# Patient Record
Sex: MALE | Race: BLACK OR AFRICAN AMERICAN | NOT HISPANIC OR LATINO | Employment: OTHER | URBAN - METROPOLITAN AREA
[De-identification: names, ages, dates, MRNs, and addresses within clinical notes are randomized per-mention and may not be internally consistent; named-entity substitution may affect disease eponyms.]

---

## 2017-01-20 RX ORDER — LOSARTAN POTASSIUM AND HYDROCHLOROTHIAZIDE 25; 100 MG/1; MG/1
TABLET ORAL
Qty: 90 TABLET | Refills: 11 | Status: SHIPPED | OUTPATIENT
Start: 2017-01-20 | End: 2017-10-26 | Stop reason: SDUPTHER

## 2017-01-20 RX ORDER — ATORVASTATIN CALCIUM 20 MG/1
TABLET, FILM COATED ORAL
Qty: 90 TABLET | Refills: 11 | Status: SHIPPED | OUTPATIENT
Start: 2017-01-20 | End: 2017-10-26 | Stop reason: SDUPTHER

## 2017-01-24 ENCOUNTER — LAB VISIT (OUTPATIENT)
Dept: LAB | Facility: HOSPITAL | Age: 67
End: 2017-01-24
Attending: INTERNAL MEDICINE
Payer: COMMERCIAL

## 2017-01-24 ENCOUNTER — OFFICE VISIT (OUTPATIENT)
Dept: INTERNAL MEDICINE | Facility: CLINIC | Age: 67
End: 2017-01-24
Payer: COMMERCIAL

## 2017-01-24 VITALS
DIASTOLIC BLOOD PRESSURE: 84 MMHG | WEIGHT: 169 LBS | HEART RATE: 50 BPM | BODY MASS INDEX: 28.16 KG/M2 | SYSTOLIC BLOOD PRESSURE: 122 MMHG | HEIGHT: 65 IN

## 2017-01-24 DIAGNOSIS — E11.9 TYPE 2 DIABETES MELLITUS WITHOUT COMPLICATION, WITHOUT LONG-TERM CURRENT USE OF INSULIN: ICD-10-CM

## 2017-01-24 DIAGNOSIS — N40.1 BPH WITH URINARY OBSTRUCTION: ICD-10-CM

## 2017-01-24 DIAGNOSIS — D35.2 PROLACTINOMA: ICD-10-CM

## 2017-01-24 DIAGNOSIS — N13.8 BPH WITH URINARY OBSTRUCTION: ICD-10-CM

## 2017-01-24 DIAGNOSIS — I10 ESSENTIAL HYPERTENSION: ICD-10-CM

## 2017-01-24 DIAGNOSIS — R35.89 POLYURIA: Primary | ICD-10-CM

## 2017-01-24 LAB
ANION GAP SERPL CALC-SCNC: 6 MMOL/L
BUN SERPL-MCNC: 15 MG/DL
CALCIUM SERPL-MCNC: 9.9 MG/DL
CHLORIDE SERPL-SCNC: 101 MMOL/L
CHOLEST/HDLC SERPL: 4.4 {RATIO}
CO2 SERPL-SCNC: 30 MMOL/L
COMPLEXED PSA SERPL-MCNC: 1.2 NG/ML
CREAT SERPL-MCNC: 1.1 MG/DL
EST. GFR  (AFRICAN AMERICAN): >60 ML/MIN/1.73 M^2
EST. GFR  (NON AFRICAN AMERICAN): >60 ML/MIN/1.73 M^2
GLUCOSE SERPL-MCNC: 92 MG/DL (ref 70–110)
GLUCOSE SERPL-MCNC: 99 MG/DL
HDL/CHOLESTEROL RATIO: 22.7 %
HDLC SERPL-MCNC: 181 MG/DL
HDLC SERPL-MCNC: 41 MG/DL
LDLC SERPL CALC-MCNC: 118.8 MG/DL
NONHDLC SERPL-MCNC: 140 MG/DL
POTASSIUM SERPL-SCNC: 4.2 MMOL/L
PROLACTIN SERPL IA-MCNC: 25.9 NG/ML
SODIUM SERPL-SCNC: 137 MMOL/L
TRIGL SERPL-MCNC: 106 MG/DL

## 2017-01-24 PROCEDURE — 80061 LIPID PANEL: CPT

## 2017-01-24 PROCEDURE — 80048 BASIC METABOLIC PNL TOTAL CA: CPT

## 2017-01-24 PROCEDURE — 84153 ASSAY OF PSA TOTAL: CPT

## 2017-01-24 PROCEDURE — 83036 HEMOGLOBIN GLYCOSYLATED A1C: CPT

## 2017-01-24 PROCEDURE — 4010F ACE/ARB THERAPY RXD/TAKEN: CPT | Mod: S$GLB,,, | Performed by: INTERNAL MEDICINE

## 2017-01-24 PROCEDURE — 3044F HG A1C LEVEL LT 7.0%: CPT | Mod: S$GLB,,, | Performed by: INTERNAL MEDICINE

## 2017-01-24 PROCEDURE — 36415 COLL VENOUS BLD VENIPUNCTURE: CPT

## 2017-01-24 PROCEDURE — 82948 REAGENT STRIP/BLOOD GLUCOSE: CPT | Mod: S$GLB,,, | Performed by: INTERNAL MEDICINE

## 2017-01-24 PROCEDURE — 3079F DIAST BP 80-89 MM HG: CPT | Mod: S$GLB,,, | Performed by: INTERNAL MEDICINE

## 2017-01-24 PROCEDURE — 1159F MED LIST DOCD IN RCRD: CPT | Mod: S$GLB,,, | Performed by: INTERNAL MEDICINE

## 2017-01-24 PROCEDURE — 84146 ASSAY OF PROLACTIN: CPT

## 2017-01-24 PROCEDURE — 3074F SYST BP LT 130 MM HG: CPT | Mod: S$GLB,,, | Performed by: INTERNAL MEDICINE

## 2017-01-24 PROCEDURE — 99214 OFFICE O/P EST MOD 30 MIN: CPT | Mod: S$GLB,,, | Performed by: INTERNAL MEDICINE

## 2017-01-24 PROCEDURE — 99999 PR PBB SHADOW E&M-EST. PATIENT-LVL III: CPT | Mod: PBBFAC,,, | Performed by: INTERNAL MEDICINE

## 2017-01-24 PROCEDURE — 99499 UNLISTED E&M SERVICE: CPT | Mod: S$GLB,,, | Performed by: INTERNAL MEDICINE

## 2017-01-24 RX ORDER — TAMSULOSIN HYDROCHLORIDE 0.4 MG/1
0.4 CAPSULE ORAL DAILY
Qty: 30 CAPSULE | Refills: 11 | Status: SHIPPED | OUTPATIENT
Start: 2017-01-24 | End: 2017-10-25

## 2017-01-24 NOTE — MR AVS SNAPSHOT
[facility]  [address]  [place] [address]  Phone: [phone]  Fax: [phone]                  Isma Martin   2017 1:20 PM   Office Visit    Description:  Male : 1950   Provider:  Anthony Tay MD   Department:  [facility]           Reason for Visit     Follow-up           Diagnoses this Visit        Comments    Polyuria    -  Primary     Essential hypertension         Type 2 diabetes mellitus without complication, without long-term current use of insulin         BPH with urinary obstruction         Prolactinoma                To Do List           Goals (5 Years of Data)     None      Follow-Up and Disposition     Return in about 6 months (around 2017).       These Medications        Disp Refills Start End    tamsulosin (FLOMAX) 0.4 mg Cp24 30 capsule 11 2017    Take 1 capsule (0.4 mg total) by mouth once daily. - Oral    Pharmacy: [facility] [address] [place] [address] #: [phone]         [facility] On Call     [facility] On Call Nurse [facility] -  Assistance  Registered nurses in the [facility] On Call Center provide clinical advisement, health education, appointment booking, and other advisory services.  Call for this free service at [phone].             Medications           Message regarding Medications     Verify the changes and/or additions to your medication regime listed below are the same as discussed with your clinician today.  If any of these changes or additions are incorrect, please notify your healthcare provider.        START taking these NEW medications        Refills    tamsulosin (FLOMAX) 0.4 mg Cp24 11    Sig: Take 1 capsule (0.4 mg total) by mouth once daily.    Class: Normal    Route: Oral           Verify that the below list of medications is an accurate representation of the medications you are currently taking.  If none reported, the list may be blank. If  "incorrect, please contact your healthcare provider. Carry this list with you in case of emergency.           Current Medications     ascorbic acid (VITAMIN C) 500 MG tablet Take 500 mg by mouth Every PM.    atorvastatin (LIPITOR) 20 MG tablet TAKE 1 TABLET DAILY    cabergoline (DOSTINEX) 0.5 mg tablet Take 0.5 tablets (0.25 mg total) by mouth once a week.    cholecalciferol, vitamin D3, 2,000 unit Tab Take 1 tablet by mouth Every PM.    cyanocobalamin (VITAMIN B-12) 1000 MCG tablet     losartan-hydrochlorothiazide 100-25 mg (HYZAAR) 100-25 mg per tablet TAKE 1 TABLET DAILY    metoprolol succinate (TOPROL-XL) 50 MG 24 hr tablet Take 1 tablet (50 mg total) by mouth once daily.    omega-3 fatty acids-vitamin E (FISH OIL) 1,000 mg Cap     omeprazole (PRILOSEC) 20 MG capsule Take 1 capsule (20 mg total) by mouth once daily.    sildenafil (VIAGRA) 50 MG tablet Take 1 tablet (50 mg total) by mouth daily as needed for Erectile Dysfunction.    oxycodone-acetaminophen (PERCOCET) 5-325 mg per tablet Take 1 tablet by mouth 2 (two) times daily as needed for Pain.    tamsulosin (FLOMAX) 0.4 mg Cp24 Take 1 capsule (0.4 mg total) by mouth once daily.           Clinical Reference Information           Vital Signs - Last Recorded  Most recent update: 1/24/2017  1:31 PM by Tayla Rod MA    BP Pulse Ht Wt BMI    122/84 (!) 50 5' 5" (1.651 m) 76.7 kg (169 lb) 28.12 kg/m2      Blood Pressure          Most Recent Value    BP  122/84      Allergies as of 1/24/2017     No Known Allergies      Immunizations Administered on Date of Encounter - 1/24/2017     None      Orders Placed During Today's Visit      Normal Orders This Visit    POCT glucose     Future Labs/Procedures Expected by Expires    Basic metabolic panel  1/24/2017 3/25/2018    Hemoglobin A1c  1/24/2017 4/24/2017    Prolactin  1/24/2017 3/25/2018    Prostate Specific Antigen, Diagnostic  1/24/2017 3/25/2018    Lipid panel  7/23/2017 (Approximate) 9/21/2017       "   1/24/2017  2:00 PM - Tayla Rod MA      Component Results     Component Value Flag Ref Range Units Status    POC Glucose 92  70 - 110 mg/dL Final

## 2017-01-24 NOTE — PROGRESS NOTES
Subjective:       Patient ID: Isma Martin is a 66 y.o. male.    Chief Complaint: Follow-up    HPI Comments: frequ waking up at noc. 6 times last noc. Denies incomplete evac. Has urinary frequ during morning as well, not in afternoon. Stream start is OK, but weak felling and takes a while. No dysuria, no blood. Mild polydip. Does drink much liquids p 6pm including OK.    Wants PRL checked.    Has not check sugars in long time.        Review of Systems   Constitutional: Negative for activity change and appetite change.   HENT: Negative for congestion and sinus pressure.    Eyes: Positive for visual disturbance (chronic occ burriness w/o amaurosis).   Respiratory: Negative for chest tightness, shortness of breath and wheezing.    Cardiovascular: Negative for chest pain, palpitations and leg swelling.   Gastrointestinal: Negative for abdominal distention and abdominal pain.   Endocrine: Positive for polyuria.   Genitourinary: Positive for urgency. Negative for decreased urine volume and dysuria.   Musculoskeletal: Negative for back pain, joint swelling and neck pain.   Skin: Negative for rash.   Neurological: Negative for tremors, syncope and weakness.   Hematological: Negative for adenopathy. Does not bruise/bleed easily.       Objective:      Physical Exam   Constitutional: He appears well-developed and well-nourished. No distress.   HENT:   Head: Normocephalic and atraumatic.   Mouth/Throat: No oropharyngeal exudate.   Eyes: Conjunctivae are normal. Pupils are equal, round, and reactive to light. No scleral icterus.   Neck: Normal range of motion. Neck supple. No thyromegaly present.   Cardiovascular: Normal rate, regular rhythm and normal heart sounds.    Pulmonary/Chest: Effort normal and breath sounds normal.   Abdominal: Soft. Bowel sounds are normal.   Genitourinary:   Genitourinary Comments: prostate non tender, no masses or nodules and not enlarged     Lymphadenopathy:     He has no cervical adenopathy.    Neurological:        Skin: No rash noted. He is not diaphoretic.   Psychiatric: He has a normal mood and affect. His behavior is normal.       Assessment:       1. Polyuria    2. Essential hypertension    3. Type 2 diabetes mellitus without complication, without long-term current use of insulin    4. BPH with urinary obstruction    5. Prolactinoma        Plan:       Isma was seen today for follow-up.    Diagnoses and all orders for this visit:    Polyuria  -     POCT glucose    Essential hypertension  At goal    Type 2 diabetes mellitus without complication, without long-term current use of insulin  -     Hemoglobin A1c; Future  -     POCT glucose  -     Lipid panel; Future    BPH with urinary obstruction  -     Prostate Specific Antigen, Diagnostic; Future  -     tamsulosin (FLOMAX) 0.4 mg Cp24; Take 1 capsule (0.4 mg total) by mouth once daily.  -     Basic metabolic panel; Future  Decrease fluids p 6pm.  Explained that if not better in 1-2 mos, pt should rtc/call PCP then see urology        Prolactinoma  -     Prolactin; Future    Return in about 6 months (around 7/24/2017).           Addendum FS was 92, above symptoms likely from BPH

## 2017-01-25 LAB
ESTIMATED AVG GLUCOSE: 128 MG/DL
HBA1C MFR BLD HPLC: 6.1 %

## 2017-01-30 ENCOUNTER — PATIENT MESSAGE (OUTPATIENT)
Dept: ENDOCRINOLOGY | Facility: CLINIC | Age: 67
End: 2017-01-30

## 2017-01-30 DIAGNOSIS — D35.2 PROLACTINOMA: ICD-10-CM

## 2017-01-30 RX ORDER — CABERGOLINE 0.5 MG/1
0.25 TABLET ORAL
Qty: 15 TABLET | Refills: 3 | Status: SHIPPED | OUTPATIENT
Start: 2017-01-30 | End: 2017-08-28 | Stop reason: SDUPTHER

## 2017-02-07 ENCOUNTER — TELEPHONE (OUTPATIENT)
Dept: OPTOMETRY | Facility: CLINIC | Age: 67
End: 2017-02-07

## 2017-02-07 NOTE — TELEPHONE ENCOUNTER
----- Message from Sangita Hawkins OD sent at 2/7/2017 11:08 AM CST -----  Regarding: call pt to schedule  Hello,    This patient is due for his 6-month follow-up with me to recheck his retinas. Please call him to schedule.    Thank you,  Sangita Hawkins OD

## 2017-02-14 ENCOUNTER — TELEPHONE (OUTPATIENT)
Dept: INTERNAL MEDICINE | Facility: CLINIC | Age: 67
End: 2017-02-14

## 2017-02-14 ENCOUNTER — OFFICE VISIT (OUTPATIENT)
Dept: OPTOMETRY | Facility: CLINIC | Age: 67
End: 2017-02-14
Payer: COMMERCIAL

## 2017-02-14 DIAGNOSIS — H35.62 RETINAL DOT HEMORRHAGE OF LEFT EYE: ICD-10-CM

## 2017-02-14 DIAGNOSIS — H53.9 TRANSIENT VISION DISTURBANCE OF LEFT EYE: ICD-10-CM

## 2017-02-14 DIAGNOSIS — G45.3 AMAUROSIS FUGAX OF LEFT EYE: Primary | ICD-10-CM

## 2017-02-14 DIAGNOSIS — H25.13 NUCLEAR SCLEROTIC CATARACT OF BOTH EYES: ICD-10-CM

## 2017-02-14 DIAGNOSIS — E11.9 TYPE 2 DIABETES MELLITUS WITHOUT RETINOPATHY: ICD-10-CM

## 2017-02-14 DIAGNOSIS — H33.101 RETINOSCHISIS OF RIGHT EYE: ICD-10-CM

## 2017-02-14 DIAGNOSIS — H35.033 HYPERTENSIVE RETINOPATHY OF BOTH EYES: ICD-10-CM

## 2017-02-14 DIAGNOSIS — G45.3 AMAUROSIS FUGAX, LEFT EYE: ICD-10-CM

## 2017-02-14 PROCEDURE — 92014 COMPRE OPH EXAM EST PT 1/>: CPT | Mod: S$GLB,,, | Performed by: OPTOMETRIST

## 2017-02-14 PROCEDURE — 99999 PR PBB SHADOW E&M-EST. PATIENT-LVL II: CPT | Mod: PBBFAC,,, | Performed by: OPTOMETRIST

## 2017-02-14 PROCEDURE — 99499 UNLISTED E&M SERVICE: CPT | Mod: S$GLB,,, | Performed by: OPTOMETRIST

## 2017-02-14 NOTE — PROGRESS NOTES
HPI     Pt here today for a 6 month retina follow up (retinoschisis OD and   isolated midperipheral heme OS)    Pt states that he still experiences transient vision loss OS (vision gets   blurry and then his entire vision OS whites out), same frequency as in   August 2016. These episodes last about 10-15 minutes then fully resolves.   When this happens he also gets foreign body sensation and epiphora OS. He   has noticed that these episodes occur after he takes his blood pressure   pill and he will measure blood pressure and it is really low (systolic in   the 90s).     (+)drops clear eyes every morning pt states he has use the drop for year   for red eye relief   (-)pain  (-)flashes  (-)floaters  (-)diplopia    Diabetic yes  LBS doesn't monitor daily  Hemoglobin A1C       Date                     Value               Ref Range             Status           01/24/2017               6.1                 4.5 - 6.2 %         Final              05/11/2016               5.9                 4.5 - 6.2 %         Final              11/10/2015               5.9                 4.5 - 6.2 %         Final                OCULAR HISTORY  Last Eye Exam 08/18/16 Dr. Cotton   (-)eye surgery   (-)eye injury     FAMILY HISTORY  (+)Glaucoma mother   (-)Macular Degeneration none        Last edited by Sangita Hawkins, OD on 2/14/2017  9:47 AM.         Assessment /Plan     For exam results, see Encounter Report.    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 See plan for retinal hemes below.    Retinal dot hemorrhage of left eye   Worsened vs 6 months ago. 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 Likely cause of transient vision loss OS that has been occurring for over 6 months. Pt noticed that transient vision loss usually occurs with low blood pressure.   Per Dr. Cotton's note 6 months ago, pt had previous cardiovascular work-up including carotid doppler around June 2016. However, results of carotid doppler not found in  Epic. Will consult with PCP to determine if carotid doppler needs to be ordered.   Advised pt to have someone drive him to emergency room immediately if he develops any stroke symptoms. Advised him to re-start 81mg aspirin daily (self-discontinued about 1 month ago).   Referred to retina specialist (scheduled for 02/21/17).    Retinoschisis of right eye    Asymptomatic, monitor.    Hypertensive retinopathy of both eyes   As previously noted, stable. Last in-office measurement on 01/24/17 was 122/84.    Type 2 diabetes mellitus without retinopathy   Controlled. No retinopathy noted OU. Continue management of DM as directed by PCP. Monitor.    Nuclear sclerotic cataract of both eyes   Not visually significant OU. Monitor.        RTC 02/21/17 for retina consult

## 2017-02-14 NOTE — PROGRESS NOTES
Assessment /Plan     For exam results, see Encounter Report.    There are no diagnoses linked to this encounter.  ***

## 2017-02-14 NOTE — Clinical Note
Dear Dr. Tay,  I had the pleasure of seeing Mr. Martin today for his 6-month follow-up. He has multiple retinal hemorrhages in the left eye only, which may be caused by ocular ischemic syndrome. I could not find in Epic that he has had a carotid doppler, but prior notes mention a normal carotid doppler. Can you see if he has had a recent carotid doppler, and if not can you please order one? Also, I have re-started him on 81mg aspirin daily; please let me know if you would like to change this. He has an appointment with the retina specialist on 02/21/17. Please let me know if you have questions or need any additional information.  Sincerely, Sangita Hawkins OD

## 2017-02-14 NOTE — TELEPHONE ENCOUNTER
Hi, please call him -- his eye doctor, Dr. Hawkins, recommend a carotid ultrasound to check for any blood flow blockages in the next that could affect the circulation to his eyes and brain. Here is the order, please schedule it for him.  Let me know if patient has any questions.  Thank you, Anthony Tay

## 2017-02-16 ENCOUNTER — HOSPITAL ENCOUNTER (OUTPATIENT)
Dept: RADIOLOGY | Facility: HOSPITAL | Age: 67
Discharge: HOME | End: 2017-02-16
Attending: INTERNAL MEDICINE
Payer: COMMERCIAL

## 2017-02-16 DIAGNOSIS — G45.3 AMAUROSIS FUGAX, LEFT EYE: ICD-10-CM

## 2017-02-16 DIAGNOSIS — H53.9 TRANSIENT VISION DISTURBANCE OF LEFT EYE: ICD-10-CM

## 2017-02-16 PROCEDURE — 93880 EXTRACRANIAL BILAT STUDY: CPT | Mod: 26,,, | Performed by: RADIOLOGY

## 2017-02-16 PROCEDURE — 93880 EXTRACRANIAL BILAT STUDY: CPT | Mod: TC

## 2017-02-17 ENCOUNTER — TELEPHONE (OUTPATIENT)
Dept: INTERNAL MEDICINE | Facility: CLINIC | Age: 67
End: 2017-02-17

## 2017-02-17 DIAGNOSIS — I77.9 CAROTID ARTERY DISEASE, UNSPECIFIED LATERALITY: Primary | ICD-10-CM

## 2017-02-17 DIAGNOSIS — G45.3 AMAUROSIS FUGAX, LEFT EYE: ICD-10-CM

## 2017-02-17 RX ORDER — ASPIRIN 81 MG/1
81 TABLET ORAL DAILY
Start: 2017-02-17 | End: 2019-10-07 | Stop reason: CLARIF

## 2017-02-17 NOTE — TELEPHONE ENCOUNTER
Hi, please set him up to see vascular ASAP.  Thank you, Anthony Tay    I called and explained that he has a large blockage L carotid, he agrees to see vascular surgery. He is very concerned as a [facility] and will discuss this with surgeon. He is back on Aspirin and taking statin.    Cc Dr. Hawkins

## 2017-02-20 ENCOUNTER — HOSPITAL ENCOUNTER (OUTPATIENT)
Dept: RADIOLOGY | Facility: HOSPITAL | Age: 67
Discharge: HOME | End: 2017-02-20
Attending: SURGERY
Payer: COMMERCIAL

## 2017-02-20 ENCOUNTER — INITIAL CONSULT (OUTPATIENT)
Dept: VASCULAR SURGERY | Facility: CLINIC | Age: 67
End: 2017-02-20
Payer: COMMERCIAL

## 2017-02-20 VITALS
TEMPERATURE: 96 F | BODY MASS INDEX: 28.49 KG/M2 | HEIGHT: 65 IN | WEIGHT: 171 LBS | DIASTOLIC BLOOD PRESSURE: 72 MMHG | HEART RATE: 66 BPM | SYSTOLIC BLOOD PRESSURE: 126 MMHG

## 2017-02-20 DIAGNOSIS — G45.3 AMAUROSIS FUGAX, LEFT EYE: Primary | ICD-10-CM

## 2017-02-20 DIAGNOSIS — I65.22 SYMPTOMATIC STENOSIS OF LEFT CAROTID ARTERY: ICD-10-CM

## 2017-02-20 DIAGNOSIS — H35.82 OCULAR ISCHEMIC SYNDROME: Primary | ICD-10-CM

## 2017-02-20 DIAGNOSIS — G45.3 AMAUROSIS FUGAX, LEFT EYE: ICD-10-CM

## 2017-02-20 PROCEDURE — 71020 XR CHEST PA AND LATERAL: CPT | Mod: TC

## 2017-02-20 PROCEDURE — 99244 OFF/OP CNSLTJ NEW/EST MOD 40: CPT | Mod: S$GLB,,, | Performed by: SURGERY

## 2017-02-20 PROCEDURE — 71020 XR CHEST PA AND LATERAL: CPT | Mod: 26,,, | Performed by: RADIOLOGY

## 2017-02-20 PROCEDURE — 99499 UNLISTED E&M SERVICE: CPT | Mod: S$GLB,,, | Performed by: NURSE PRACTITIONER

## 2017-02-20 PROCEDURE — 99999 PR PBB SHADOW E&M-EST. PATIENT-LVL V: CPT | Mod: PBBFAC,,, | Performed by: SURGERY

## 2017-02-20 RX ORDER — CLOPIDOGREL BISULFATE 75 MG/1
75 TABLET ORAL DAILY
Qty: 30 TABLET | Refills: 3 | Status: ON HOLD | OUTPATIENT
Start: 2017-02-20 | End: 2017-03-16 | Stop reason: HOSPADM

## 2017-02-20 RX ORDER — SODIUM CHLORIDE 9 MG/ML
INJECTION, SOLUTION INTRAVENOUS CONTINUOUS
Status: CANCELLED | OUTPATIENT
Start: 2017-02-20

## 2017-02-20 RX ORDER — LIDOCAINE HYDROCHLORIDE 10 MG/ML
1 INJECTION, SOLUTION EPIDURAL; INFILTRATION; INTRACAUDAL; PERINEURAL ONCE
Status: CANCELLED | OUTPATIENT
Start: 2017-02-20 | End: 2017-02-20

## 2017-02-20 NOTE — PROGRESS NOTES
"[facility]  02/20/2017  Consult: Referred by Dr. Tay  HPI:  Patient is a 66 y.o. male with HTN, DM (diet controlled), HLD, GERD, arthritis, prolactinoma, who is here today for evaluation of symptomatic high grade stenosis of left ICA. Pt. Reports visual symptoms with "whiteness and blurriness x 10-15 minutes" that began about 6 months ago. He experiences these symptoms about 3xs/week.  The patient notes that he experiences the changes in vision related to after he takes his blood pressure medication. When he checks his blood pressure at the time of the incident, he notes his blood pressure is 109 systolic. Pt. Reports that he does not experience these symptoms if he doesn't take his blood pressure medication. Pt. Last experienced these visual symptoms yesterday.  The patient also notes that about 9 months ago he had right arm weakness x 12 hours. He [place] [facility] who screened and ruled out MI/stroke. These symptoms persisted for about a week and never came back. He denies ever having difficulty speaking, or facial drooping or leg weakness.   the patient Is a retired  for [employer].     No MI/stroke  Tobacco use: Quit 1997; 40 pack year history    Past Medical History   Diagnosis Date    Arthritis     Colon polyp     Diabetes mellitus     GERD (gastroesophageal reflux disease)     Hypertension     Hypogonadism male     Obesity     Prolactinoma      Past Surgical History   Procedure Laterality Date    Transphenoidal pituitary resection       pituitary tumor    Foot surgery  4-23-14     right     Family History   Problem Relation Age of Onset    Heart disease Brother     Heart attack Sister     Cancer Neg Hx     Diabetes Neg Hx     Colon polyps Neg Hx      Social History     Social History    Marital status:      Spouse name: Brenda    Number of children: N/A    Years of education: N/A     Occupational History    [employer]     [employer] "     Social History Main Topics    Smoking status: Former Smoker     Packs/day: 1.50     Years: 35.00     Quit date: 1/1/1995    Smokeless tobacco: Never Used    Alcohol use 0.5 oz/week     1 Standard drinks or equivalent per week      Comment: rare    Drug use: No    Sexual activity: No     Other Topics Concern    Not on file     Social History Narrative    , on ssdi for his toe.    . 1 kid still with them. Wife dm and in WC.    3 kids total.     Current Outpatient Prescriptions on File Prior to Visit   Medication Sig    ascorbic acid (VITAMIN C) 500 MG tablet Take 500 mg by mouth Every PM.    aspirin (ECOTRIN) 81 MG EC tablet Take 1 tablet (81 mg total) by mouth once daily.    atorvastatin (LIPITOR) 20 MG tablet TAKE 1 TABLET DAILY    cabergoline (DOSTINEX) 0.5 mg tablet Take 0.5 tablets (0.25 mg total) by mouth twice a week.    cholecalciferol, vitamin D3, 2,000 unit Tab Take 1 tablet by mouth Every PM.    cyanocobalamin (VITAMIN B-12) 1000 MCG tablet     losartan-hydrochlorothiazide 100-25 mg (HYZAAR) 100-25 mg per tablet TAKE 1 TABLET DAILY    metoprolol succinate (TOPROL-XL) 50 MG 24 hr tablet Take 1 tablet (50 mg total) by mouth once daily.    omega-3 fatty acids-vitamin E (FISH OIL) 1,000 mg Cap     omeprazole (PRILOSEC) 20 MG capsule Take 1 capsule (20 mg total) by mouth once daily.    oxycodone-acetaminophen (PERCOCET) 5-325 mg per tablet Take 1 tablet by mouth 2 (two) times daily as needed for Pain.    sildenafil (VIAGRA) 50 MG tablet Take 1 tablet (50 mg total) by mouth daily as needed for Erectile Dysfunction.    tamsulosin (FLOMAX) 0.4 mg Cp24 Take 1 capsule (0.4 mg total) by mouth once daily.     No current facility-administered medications on file prior to visit.        REVIEW OF SYSTEMS:  General: negative; ENT: negative; Allergy and Immunology: negative; Hematological and Lymphatic: Negative; Endocrine: negative; Respiratory: no cough, shortness of breath, or  wheezing; Cardiovascular: no chest pain or dyspnea on exertion; Gastrointestinal: no abdominal pain/back, change in bowel habits, or bloody stools; Genito-Urinary: no dysuria, trouble voiding, or hematuria; Musculoskeletal: negative  Neurological: + visual changes x 6 months;     PHYSICAL EXAM:                General appearance:  Alert, well-appearing, and in no distress.  Oriented to person, place, and time   Neurological: Normal speech, no focal findings noted; CN II - XII grossly intact           Musculoskeletal: Digits/nail without cyanosis/clubbing.  Normal muscle strength/tone.                 Neck: Supple, no significant adenopathy; thyroid is not enlarged                  No carotid bruit can be auscultated                Chest:  Clear to auscultation, no wheezes, rales or rhonchi, symmetric air entry     No use of accessory muscles             Cardiac: Normal rate and regular rhythm, S1 and S2 normal; PMI non-displaced          Abdomen: Soft, nontender, nondistended, no masses or organomegaly     No rebound tenderness noted; bowel sounds normal     No Pulsatile aortic mass is palpable.     No groin adenopathy      Extremities: 1+ femoral pulses bilaterally      1+ Popliteal pulses; 2+ pedal pulses palpable.      No pedal edema      No ulcerations    LAB RESULTS:  Lab Results   Component Value Date    K 4.2 01/24/2017    K 3.6 06/21/2016    K 3.8 05/11/2016    CREATININE 1.1 01/24/2017    CREATININE 0.9 06/21/2016    CREATININE 0.9 05/11/2016     Lab Results   Component Value Date    WBC 4.95 08/04/2016    WBC 4.40 05/03/2016    WBC 5.60 10/13/2014    HCT 43.3 08/04/2016    HCT 43.9 05/03/2016    HCT 40.5 10/13/2014     08/04/2016     05/03/2016     10/13/2014     Lab Results   Component Value Date    HGBA1C 6.1 01/24/2017    HGBA1C 5.9 05/11/2016    HGBA1C 5.9 11/10/2015     IMAGING:  US Carotid Bilateral 2/16/17  Right: ICA: <40%  Left: ICA: 80-95%;  cm/s; Ratio:  4.4    IMP/PLAN:  66 y.o. male with a history of  HTN, DM, HLD, GERD, arthritis, prolactinoma with symptomatic high grade stenosis L ICA.     1. Left carotid endarterectomy schedule 3/8/17   No blood consent- patient is Denominational  2. Start plavix 75 mg daily  3. Continue ASA, and statin    Elsa Costello, MN, APRN, FNP-BC  Nurse Practitioner  Vascular and Endovascular Surgery        STAFF ADDENDUM    I have reviewed the relevant data and the resident's assessment and agree with the findings and plan as outlined above.  66 year old very pleasant male with a high grade L ICA stenosis.  His ocular symptoms are not typical for embolic symptoms from an ICA lesion, such as amaurosis fugax, but these vague symptoms in the setting of such a a high grade (~90%) ICA stenosis are concerning.  We will initiate plavix and continue asa and a statin and plan for elective left CEA in the coming weeks.   I had a long discussion with Mr Martin regarding the indications, risks, benefits, and recovery and he wishes to proceed with carotid endarterectomy.     Jose Ricketts MD  Vascular & Endovascular Surgery

## 2017-02-20 NOTE — LETTER
February 23, 2017      Anthony Tay MD  [address]  [facility] [address]           [facility]  [address]  [facility] [address]  Phone: [phone]  Fax: [phone]          Patient: Isma Martin   MR Number: [id number]   YOB: 1950   Date of Visit: 2/20/2017       Dear Dr. Anthony Tay:    Thank you for referring Isma Martin to me for evaluation. Attached you will find relevant portions of my assessment and plan of care.    If you have questions, please do not hesitate to call me. I look forward to following Isma Martin along with you.    Sincerely,    Jose Ricketts MD    Enclosure  CC:  No Recipients    If you would like to receive this communication electronically, please contact [email or web address] or [phone] to request more information on [facility] [employer] access.    For providers and/or their staff who would like to refer a patient to [facility], please contact us through our one-stop-shop provider referral line, [facility], at [phone].    If you feel you have received this communication in error or would no longer like to receive these types of communications, please e-mail [email or web address]

## 2017-02-21 ENCOUNTER — INITIAL CONSULT (OUTPATIENT)
Dept: OPHTHALMOLOGY | Facility: CLINIC | Age: 67
End: 2017-02-21
Payer: COMMERCIAL

## 2017-02-21 VITALS — SYSTOLIC BLOOD PRESSURE: 107 MMHG | DIASTOLIC BLOOD PRESSURE: 59 MMHG | HEART RATE: 58 BPM

## 2017-02-21 DIAGNOSIS — H35.82 OCULAR ISCHEMIC SYNDROME: Primary | ICD-10-CM

## 2017-02-21 DIAGNOSIS — H35.722 MACULAR PIGMENT EPITHELIAL DETACHMENT OF LEFT EYE: ICD-10-CM

## 2017-02-21 DIAGNOSIS — H35.033 HYPERTENSIVE RETINOPATHY OF BOTH EYES: ICD-10-CM

## 2017-02-21 PROCEDURE — 3078F DIAST BP <80 MM HG: CPT | Mod: S$GLB,,, | Performed by: OPHTHALMOLOGY

## 2017-02-21 PROCEDURE — 99999 PR PBB SHADOW E&M-EST. PATIENT-LVL II: CPT | Mod: PBBFAC,,, | Performed by: OPHTHALMOLOGY

## 2017-02-21 PROCEDURE — 92235 FLUORESCEIN ANGRPH MLTIFRAME: CPT | Mod: S$GLB,,, | Performed by: OPHTHALMOLOGY

## 2017-02-21 PROCEDURE — 92226 PR SPECIAL EYE EXAM, SUBSEQUENT: CPT | Mod: LT,S$GLB,, | Performed by: OPHTHALMOLOGY

## 2017-02-21 PROCEDURE — 99499 UNLISTED E&M SERVICE: CPT | Mod: S$GLB,,, | Performed by: OPHTHALMOLOGY

## 2017-02-21 PROCEDURE — 92134 CPTRZ OPH DX IMG PST SGM RTA: CPT | Mod: S$GLB,,, | Performed by: OPHTHALMOLOGY

## 2017-02-21 PROCEDURE — 92014 COMPRE OPH EXAM EST PT 1/>: CPT | Mod: S$GLB,,, | Performed by: OPHTHALMOLOGY

## 2017-02-21 PROCEDURE — 3074F SYST BP LT 130 MM HG: CPT | Mod: S$GLB,,, | Performed by: OPHTHALMOLOGY

## 2017-02-21 NOTE — PROGRESS NOTES
OCT  OD: normal macula anatomy  OS: superiorly PED, no SRF, no CME      FA  Normal filling time of arterioles OS with minimal leakage superior in area of PED seen on OCT, also late macular leakage and late leakage in macula     A/P    1. Ocular Ischemic Syndrome OS  - 80-95% blockage of L ICA, Vas Surgery planning CEA on 3/8/17  - no NV   - monitor for reperfusion NV post CEA      2. PED OS  - no SRF, will monitor today      3. HTN Retinopathy OU  - BP control      4. NSC OU   monitor      5 weeks

## 2017-02-21 NOTE — MR AVS SNAPSHOT
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      [facility]     [facility] -  Assistance  Registered nurses in the [facility] provide clinical advisement, health education, appointment booking, and other advisory services.  Call for this free service at [phone].             Medications           Message regarding Medications     Verify the changes and/or additions to your medication regime listed below are the same as discussed with your clinician today.  If any of these changes or additions are incorrect, please notify your healthcare provider.             Verify that the below list of medications is an accurate representation of the medications you are currently taking.  If none reported, the list may be blank. If incorrect, please contact your healthcare provider. Carry this list with you in case of emergency.           Current Medications     ascorbic acid  (VITAMIN C) 500 MG tablet Take 500 mg by mouth Every PM.    aspirin (ECOTRIN) 81 MG EC tablet Take 1 tablet (81 mg total) by mouth once daily.    atorvastatin (LIPITOR) 20 MG tablet TAKE 1 TABLET DAILY    cabergoline (DOSTINEX) 0.5 mg tablet Take 0.5 tablets (0.25 mg total) by mouth twice a week.    cholecalciferol, vitamin D3, 2,000 unit Tab Take 1 tablet by mouth Every PM.    clopidogrel (PLAVIX) 75 mg tablet Take 1 tablet (75 mg total) by mouth once daily.    cyanocobalamin (VITAMIN B-12) 1000 MCG tablet     losartan-hydrochlorothiazide 100-25 mg (HYZAAR) 100-25 mg per tablet TAKE 1 TABLET DAILY    metoprolol succinate (TOPROL-XL) 50 MG 24 hr tablet Take 1 tablet (50 mg total) by mouth once daily.    omega-3 fatty acids-vitamin E (FISH OIL) 1,000 mg Cap     omeprazole (PRILOSEC) 20 MG capsule Take 1 capsule (20 mg total) by mouth once daily.    oxycodone-acetaminophen (PERCOCET) 5-325 mg per tablet Take 1 tablet by mouth 2 (two) times daily as needed for Pain.    sildenafil (VIAGRA) 50 MG tablet Take 1 tablet (50 mg total) by mouth daily as needed for Erectile Dysfunction.    tamsulosin (FLOMAX) 0.4 mg Cp24 Take 1 capsule (0.4 mg total) by mouth once daily.           Clinical Reference Information           Your Vitals Were     BP Pulse                107/59 (BP Location: Left arm, Patient Position: Sitting, BP Method: Automatic) 58          Blood Pressure          Most Recent Value    BP  (!)  107/59      Allergies as of 2/21/2017     No Known Allergies      Immunizations Administered on Date of Encounter - 2/21/2017     None      Orders Placed During Today's Visit      Normal Orders This Visit    Fluorescein Angiography - OU - Both Eyes     Posterior Segment OCT Retina-Both eyes       Language Assistance Services     ATTENTION: Language assistance services are available, free of charge. Please call [phone].      ATENCIÓN: Si shayy sanders, tiene a meyers disposición servicios gratuitos de asistencia  lingüística. Je al [phone].     KALPANA Ý: N?u b?n nói Ti?ng Vi?t, có các d?ch v? h? tr? ngôn ng? mi?n phí dành cho b?n. G?i s? [phone].         [facility] complies with applicable Federal civil rights laws and does not discriminate on the basis of race, color, national origin, age, disability, or sex.

## 2017-02-21 NOTE — PATIENT INSTRUCTIONS
Fluorescein Angiography  Fluorescein angiography is an eye test. It is done to look at the back of your eye, including:  · The blood vessels in your eye  · The layer of tissue at the back of your eye (the retina)  · The center of your retina (the macula)  · The optic nerve  This test can diagnose diseases found in these areas. It can also diagnose other conditions that affect these areas. To do this test, a dye called fluorescein is shot (injected) into your arm. The dye goes into your bloodstream and up into the blood vessels in your eyes. A special camera is then used to take images (angiograms) of your eyes.     A fluorescein angiogram of the retina   Getting ready for your test  Tell your healthcare provider if you:  · Are pregnant or think you may be pregnant  · Are breastfeeding  · Have a history of severe allergic reactions, including to X-ray dye or other medicines  · Have kidney problems  Tell your provider about any medicines you are taking. You may need to stop taking all or some of these before the test. This includes:  · All prescription medicines  · Over-the-counter medicines such as aspirin or ibuprofen  · Street drugs  · Herbs, vitamins, and other supplements  You should arrange for an adult family member or friend to drive you home after your test. Your vision will be blurry for up to 12 hours.  Follow any other instructions from your healthcare provider.  During your test  · You are given eye drops to enlarge (dilate) your pupils.  · You then sit in front of a special camera. You place your chin on the chin rest and look into the camera.  · Images are taken of your eyes, one eye at a time.  · Fluorescein dye is then injected into your arm. The lights in the room are turned off. You may have mild nausea. You may have a warm feeling in your arm or upper body. Tell your provider if your skin feels itchy or if you are having trouble breathing. If so, you could be having an allergic reaction to the  dye.  · More pictures of your eyes are taken over 15 to 30 minutes. The camera shines a bright light into your eyes. Try to keep your head still and your eyes open.  · When enough images have been taken, the test is over.  After your test  Your vision will be blurry for up to 4 to 12 hours. This is because of your dilated pupils. Your eye will be more sensitive to light for up to 12 hours. You may want to wear sunglasses during this time. Do not drive if your vision is very blurry. You may also find it uncomfortable to read. Your skin may look yellow for a few hours. This is from the dye. Your urine will be bright yellow or orange for 24 to 48 hours after the test.     Risks and possible complications  All procedures have some risks. Possible risks of fluorescein angiography include:  · Upset stomach (nausea) and vomiting  · Leaking dye around the injection site that causes pain and swelling  · Metallic taste in your mouth  · Infection at injection site  · Allergic reaction to the dye  · Dry mouth or too much saliva  · Faster heart rate  · Sweating  · Lower back pain   © 6868-3513 [employer]. [address] [place] [address]. All rights reserved. This information is not intended as a substitute for professional medical care. Always follow your healthcare professional's instructions.

## 2017-02-21 NOTE — LETTER
February 21, 2017      Sangita Hawkins, OD  [address]  [facility] [address]           [facility] - [facility]  [address]  [facility] [address]  Phone: [phone]  Fax: [phone]          Patient: Isma Martin   MR Number: [id number]   YOB: 1950   Date of Visit: 2/21/2017       Dear Dr. Sangita Hawkins:    Thank you for referring Isma Martin to me for evaluation. Attached you will find relevant portions of my assessment and plan of care.    If you have questions, please do not hesitate to call me. I look forward to following Isma Martin along with you.    Sincerely,    THI Oh MD    Enclosure  CC:  No Recipients    If you would like to receive this communication electronically, please contact [email or web address] or [phone] to request more information on [facility] access.    For providers and/or their staff who would like to refer a patient to [facility], please contact us through our one-stop-shop provider referral line, [facility], at [phone].    If you feel you have received this communication in error or would no longer like to receive these types of communications, please e-mail [email or web address]

## 2017-03-07 ENCOUNTER — TELEPHONE (OUTPATIENT)
Dept: VASCULAR SURGERY | Facility: CLINIC | Age: 67
End: 2017-03-07

## 2017-03-07 NOTE — PRE-PROCEDURE INSTRUCTIONS
Preop instructions: NPO after midnight or 8 hours prior to procedure time, shower instructions, directions, leave all valuables at home, medication instructions for PM prior & am of procedure explained. Patient stated an understanding.    Patient denies any side effects or issues with anesthesia or sedation.    Needs to sign Blood Refusal Form    **rescheduled from 3/8 to 3/15. Reinforced all preop instructions/ questions answered.

## 2017-03-07 NOTE — TELEPHONE ENCOUNTER
Isma Martin notified that his surgery is scheduled for 0800AM on 3/8/17. he needs to arrive to the 2nd floor DOSC in the hospital @ 0700AM. he should not eat or drink anything after midnight.  Isma Martin verbalize understanding.

## 2017-03-13 ENCOUNTER — LAB VISIT (OUTPATIENT)
Dept: LAB | Facility: HOSPITAL | Age: 67
End: 2017-03-13
Attending: INTERNAL MEDICINE
Payer: COMMERCIAL

## 2017-03-13 DIAGNOSIS — D35.2 PROLACTINOMA: ICD-10-CM

## 2017-03-13 LAB
PROLACTIN SERPL IA-MCNC: 9.2 NG/ML
TESTOST SERPL-MCNC: 493 NG/DL

## 2017-03-13 PROCEDURE — 84146 ASSAY OF PROLACTIN: CPT

## 2017-03-13 PROCEDURE — 36415 COLL VENOUS BLD VENIPUNCTURE: CPT

## 2017-03-13 PROCEDURE — 84403 ASSAY OF TOTAL TESTOSTERONE: CPT

## 2017-03-13 RX ORDER — OMEPRAZOLE 20 MG/1
20 CAPSULE, DELAYED RELEASE ORAL DAILY
Qty: 90 CAPSULE | Refills: 11 | Status: SHIPPED | OUTPATIENT
Start: 2017-03-13 | End: 2018-10-24 | Stop reason: SDUPTHER

## 2017-03-14 ENCOUNTER — ANESTHESIA EVENT (OUTPATIENT)
Dept: SURGERY | Facility: HOSPITAL | Age: 67
DRG: 038 | End: 2017-03-14
Payer: COMMERCIAL

## 2017-03-14 ENCOUNTER — TELEPHONE (OUTPATIENT)
Dept: VASCULAR SURGERY | Facility: CLINIC | Age: 67
End: 2017-03-14

## 2017-03-14 NOTE — TELEPHONE ENCOUNTER
Isma Martin notified that his surgery is scheduled for 12:20pm on 3/15/17. he needs to arrive to the 2nd floor DOSC in the hospital @ 1030am. he should not eat or drink anything after midnight. Isma Martin verbalize understanding.

## 2017-03-14 NOTE — ANESTHESIA PREPROCEDURE EVALUATION
"                      Pre-operative evaluation for Procedure(s) (LRB):  ENDARTERECTOMY-CAROTID (Left)    Isma Martin is a 66 y.o. male with HTN, DM (diet controlled), HLD, GERD, arthritis, prolactinoma, who is here today for evaluation of symptomatic high grade stenosis of left ICA. Pt. Reports visual symptoms with "whiteness and blurriness x 10-15 minutes" that began about 6 months ago. Plan for above procedure 3/15/17.     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      Current Outpatient Prescriptions on File Prior to Encounter   Medication Sig Dispense Refill    ascorbic acid (VITAMIN C) 500 MG tablet Take 500 mg by mouth Every PM.      aspirin (ECOTRIN) 81 MG EC tablet Take 1 tablet (81 mg total) by mouth once daily.      atorvastatin (LIPITOR) 20 MG tablet TAKE 1 TABLET DAILY 90 tablet 11    cabergoline (DOSTINEX) 0.5 mg tablet Take 0.5 tablets (0.25 mg total) by mouth twice a week. 15 tablet 3    cholecalciferol, vitamin D3, 2,000 unit Tab Take 1 tablet by mouth Every PM.      clopidogrel (PLAVIX) 75 mg tablet Take 1 tablet (75 mg total) by mouth once daily. 30 tablet 3    cyanocobalamin (VITAMIN B-12) 1000 MCG tablet       losartan-hydrochlorothiazide 100-25 mg (HYZAAR) 100-25 mg per tablet TAKE 1 TABLET DAILY 90 tablet 11    metoprolol succinate (TOPROL-XL) 50 MG 24 hr tablet Take 1 tablet (50 " mg total) by mouth once daily. 90 tablet 11    omega-3 fatty acids-vitamin E (FISH OIL) 1,000 mg Cap       tamsulosin (FLOMAX) 0.4 mg Cp24 Take 1 capsule (0.4 mg total) by mouth once daily. 30 capsule 11    oxycodone-acetaminophen (PERCOCET) 5-325 mg per tablet Take 1 tablet by mouth 2 (two) times daily as needed for Pain. 40 tablet 0    sildenafil (VIAGRA) 50 MG tablet Take 1 tablet (50 mg total) by mouth daily as needed for Erectile Dysfunction. 30 tablet 2       Past Surgical History:   Procedure Laterality Date    FOOT SURGERY  4-23-14    right    TRANSPHENOIDAL PITUITARY RESECTION      pituitary tumor       Social History     Social History    Marital status:      Spouse name: Brenda    Number of children: N/A    Years of education: N/A     Occupational History    [employer]     [employer]     Social History Main Topics    Smoking status: Former Smoker     Packs/day: 1.50     Years: 35.00     Quit date: 1/1/1995    Smokeless tobacco: Never Used    Alcohol use 0.5 oz/week     1 Standard drinks or equivalent per week      Comment: rare    Drug use: No    Sexual activity: No     Other Topics Concern    Not on file     Social History Narrative    , on ssdi for his toe.    . 1 kid still with them. Wife dm and in WC.    3 kids total.         Vital Signs Range (Last 24H):         CBC: No results for input(s): WBC, RBC, HGB, HCT, PLT, MCV, MCH, MCHC in the last 72 hours.    CMP: No results for input(s): NA, K, CL, CO2, BUN, CREATININE, GLU, MG, PHOS, CALCIUM, ALBUMIN, PROT, ALKPHOS, ALT, AST, BILITOT in the last 72 hours.    INR  No results for input(s): INR, PROTIME, APTT in the last 72 hours.    Invalid input(s): PT        Diagnostic Studies:  Exercise stress test 6/2013:  CONCLUSIONS   No evidence of stress induced myocardial ischemia.    EKG:  Vent. Rate : 049 BPM     Atrial Rate : 049 BPM     P-R Int : 178 ms          QRS Dur : 070 ms      QT Int  : 398 ms       P-R-T Axes : 038 017 032 degrees     QTc Int : 359 ms    Marked sinus bradycardia  Abnormal ECG  When compared with ECG of 06-JUN-2013 09:27,  Previous ECG has undetermined rhythm, needs review  Confirmed by IRINA HATFIELD MD ([id number]) on 6/21/2016 12:27:35 PM    2D Echo:  6/2016:      CONCLUSIONS     1 - Normal left ventricular systolic function (EF 55-60%).     2 - Normal left ventricular diastolic function.     3 - Normal right ventricular systolic function .     4 - The estimated PA systolic pressure is 26 mmHg.     5 - Trivial aortic regurgitation.         [facility] Anesthesia Evaluation         Review of Systems         Anesthesia Plan  Type of Anesthesia, risks & benefits discussed:  Anesthesia Type:  general  Patient's Preference: General  Intra-op Monitoring Plan: standard ASA monitors  Intra-op Monitoring Plan Comments:   Post Op Pain Control Plan:   Post Op Pain Control Plan Comments: Per primary service.   Induction:   IV  Beta Blocker:  Patient is not currently on a Beta-Blocker (No further documentation required).       Informed Consent: Patient understands risks and agrees with Anesthesia plan.  Questions answered. Anesthesia consent signed with patient.  ASA Score: 3     Day of Surgery Review of History & Physical:    H&P update referred to the surgeon.     Anesthesia Plan Notes: Monitoring and airway management plans reviewed.   Rastafarian.  Discussed and reassured.          Ready For Surgery From Anesthesia Perspective.

## 2017-03-15 ENCOUNTER — HOSPITAL ENCOUNTER (INPATIENT)
Facility: HOSPITAL | Age: 67
LOS: 1 days | Discharge: HOME | DRG: 038 | End: 2017-03-16
Attending: SURGERY | Admitting: SURGERY
Payer: COMMERCIAL

## 2017-03-15 ENCOUNTER — ANESTHESIA (OUTPATIENT)
Dept: SURGERY | Facility: HOSPITAL | Age: 67
DRG: 038 | End: 2017-03-15
Payer: COMMERCIAL

## 2017-03-15 DIAGNOSIS — I65.22 SYMPTOMATIC STENOSIS OF LEFT CAROTID ARTERY: ICD-10-CM

## 2017-03-15 DIAGNOSIS — G45.3 AMAUROSIS FUGAX, LEFT EYE: ICD-10-CM

## 2017-03-15 DIAGNOSIS — E11.9 TYPE 2 DIABETES MELLITUS WITHOUT COMPLICATION: ICD-10-CM

## 2017-03-15 LAB
POC ACTIVATED CLOTTING TIME K: 327 SEC (ref 74–137)
POCT GLUCOSE: 118 MG/DL (ref 70–110)
SAMPLE: ABNORMAL

## 2017-03-15 PROCEDURE — 63600175 PHARM REV CODE 636 W HCPCS: Performed by: SURGERY

## 2017-03-15 PROCEDURE — D9220A PRA ANESTHESIA: Mod: ,,, | Performed by: ANESTHESIOLOGY

## 2017-03-15 PROCEDURE — 36000707: Performed by: SURGERY

## 2017-03-15 PROCEDURE — 71000033 HC RECOVERY, INTIAL HOUR: Performed by: SURGERY

## 2017-03-15 PROCEDURE — 37000009 HC ANESTHESIA EA ADD 15 MINS: Performed by: SURGERY

## 2017-03-15 PROCEDURE — 27201423 OPTIME MED/SURG SUP & DEVICES STERILE SUPPLY: Performed by: SURGERY

## 2017-03-15 PROCEDURE — 63600175 PHARM REV CODE 636 W HCPCS: Performed by: ANESTHESIOLOGY

## 2017-03-15 PROCEDURE — 25000003 PHARM REV CODE 250

## 2017-03-15 PROCEDURE — 36000706: Performed by: SURGERY

## 2017-03-15 PROCEDURE — 27201037 HC PRESSURE MONITORING SET UP

## 2017-03-15 PROCEDURE — 25000003 PHARM REV CODE 250: Performed by: SURGERY

## 2017-03-15 PROCEDURE — C1768 GRAFT, VASCULAR: HCPCS | Performed by: SURGERY

## 2017-03-15 PROCEDURE — 35301 RECHANNELING OF ARTERY: CPT | Mod: LT,,, | Performed by: SURGERY

## 2017-03-15 PROCEDURE — 71000039 HC RECOVERY, EACH ADD'L HOUR: Performed by: SURGERY

## 2017-03-15 PROCEDURE — 25000003 PHARM REV CODE 250: Performed by: NURSE PRACTITIONER

## 2017-03-15 PROCEDURE — 25000003 PHARM REV CODE 250: Performed by: ANESTHESIOLOGY

## 2017-03-15 PROCEDURE — 36620 INSERTION CATHETER ARTERY: CPT | Mod: 59,,, | Performed by: ANESTHESIOLOGY

## 2017-03-15 PROCEDURE — 03CJ0Z6: ICD-10-PCS | Performed by: SURGERY

## 2017-03-15 PROCEDURE — 63600175 PHARM REV CODE 636 W HCPCS

## 2017-03-15 PROCEDURE — 03UJ0KZ SUPPLEMENT LEFT COMMON CAROTID ARTERY WITH NONAUTOLOGOUS TISSUE SUBSTITUTE, OPEN APPROACH: ICD-10-PCS | Performed by: SURGERY

## 2017-03-15 PROCEDURE — 82962 GLUCOSE BLOOD TEST: CPT | Performed by: SURGERY

## 2017-03-15 PROCEDURE — 25000003 PHARM REV CODE 250: Performed by: STUDENT IN AN ORGANIZED HEALTH CARE EDUCATION/TRAINING PROGRAM

## 2017-03-15 PROCEDURE — 03CL0Z6 EXTIRPATION OF MATTER FROM LEFT INTERNAL CAROTID ARTERY, BIFURCATION, OPEN APPROACH: ICD-10-PCS | Performed by: SURGERY

## 2017-03-15 PROCEDURE — 20600001 HC STEP DOWN PRIVATE ROOM

## 2017-03-15 PROCEDURE — 12000002 HC ACUTE/MED SURGE SEMI-PRIVATE ROOM

## 2017-03-15 PROCEDURE — 37000008 HC ANESTHESIA 1ST 15 MINUTES: Performed by: SURGERY

## 2017-03-15 PROCEDURE — 63600175 PHARM REV CODE 636 W HCPCS: Performed by: STUDENT IN AN ORGANIZED HEALTH CARE EDUCATION/TRAINING PROGRAM

## 2017-03-15 DEVICE — GRAFT VAS GUARD 0.8X8CM BOVINE: Type: IMPLANTABLE DEVICE | Site: CAROTID | Status: FUNCTIONAL

## 2017-03-15 RX ORDER — LIDOCAINE HYDROCHLORIDE 10 MG/ML
1 INJECTION, SOLUTION EPIDURAL; INFILTRATION; INTRACAUDAL; PERINEURAL ONCE
Status: COMPLETED | OUTPATIENT
Start: 2017-03-15 | End: 2017-03-15

## 2017-03-15 RX ORDER — PROTAMINE SULFATE 10 MG/ML
INJECTION, SOLUTION INTRAVENOUS
Status: DISCONTINUED | OUTPATIENT
Start: 2017-03-15 | End: 2017-03-15

## 2017-03-15 RX ORDER — NAPROXEN SODIUM 220 MG/1
81 TABLET, FILM COATED ORAL DAILY
Status: DISCONTINUED | OUTPATIENT
Start: 2017-03-16 | End: 2017-03-16 | Stop reason: HOSPADM

## 2017-03-15 RX ORDER — ONDANSETRON 2 MG/ML
INJECTION INTRAMUSCULAR; INTRAVENOUS
Status: DISCONTINUED | OUTPATIENT
Start: 2017-03-15 | End: 2017-03-15

## 2017-03-15 RX ORDER — PANTOPRAZOLE SODIUM 40 MG/1
40 TABLET, DELAYED RELEASE ORAL DAILY
Status: DISCONTINUED | OUTPATIENT
Start: 2017-03-16 | End: 2017-03-15

## 2017-03-15 RX ORDER — SODIUM CHLORIDE 9 MG/ML
INJECTION, SOLUTION INTRAVENOUS CONTINUOUS
Status: DISCONTINUED | OUTPATIENT
Start: 2017-03-15 | End: 2017-03-16 | Stop reason: HOSPADM

## 2017-03-15 RX ORDER — GLYCOPYRROLATE 0.2 MG/ML
INJECTION INTRAMUSCULAR; INTRAVENOUS
Status: DISCONTINUED | OUTPATIENT
Start: 2017-03-15 | End: 2017-03-15

## 2017-03-15 RX ORDER — CEFAZOLIN SODIUM 2 G/50ML
2 SOLUTION INTRAVENOUS
Status: COMPLETED | OUTPATIENT
Start: 2017-03-15 | End: 2017-03-16

## 2017-03-15 RX ORDER — HYDROMORPHONE HYDROCHLORIDE 1 MG/ML
0.2 INJECTION, SOLUTION INTRAMUSCULAR; INTRAVENOUS; SUBCUTANEOUS EVERY 6 HOURS PRN
Status: DISCONTINUED | OUTPATIENT
Start: 2017-03-15 | End: 2017-03-16 | Stop reason: HOSPADM

## 2017-03-15 RX ORDER — HEPARIN SODIUM 1000 [USP'U]/ML
INJECTION, SOLUTION INTRAVENOUS; SUBCUTANEOUS
Status: DISCONTINUED | OUTPATIENT
Start: 2017-03-15 | End: 2017-03-15

## 2017-03-15 RX ORDER — NEOSTIGMINE METHYLSULFATE 1 MG/ML
INJECTION, SOLUTION INTRAVENOUS
Status: DISCONTINUED | OUTPATIENT
Start: 2017-03-15 | End: 2017-03-15

## 2017-03-15 RX ORDER — FENTANYL CITRATE 50 UG/ML
INJECTION, SOLUTION INTRAMUSCULAR; INTRAVENOUS
Status: DISCONTINUED | OUTPATIENT
Start: 2017-03-15 | End: 2017-03-15

## 2017-03-15 RX ORDER — SODIUM CHLORIDE 9 MG/ML
INJECTION, SOLUTION INTRAVENOUS CONTINUOUS
Status: DISCONTINUED | OUTPATIENT
Start: 2017-03-15 | End: 2017-03-15

## 2017-03-15 RX ORDER — METOPROLOL SUCCINATE 50 MG/1
50 TABLET, EXTENDED RELEASE ORAL DAILY
Status: DISCONTINUED | OUTPATIENT
Start: 2017-03-16 | End: 2017-03-16 | Stop reason: HOSPADM

## 2017-03-15 RX ORDER — LOSARTAN POTASSIUM AND HYDROCHLOROTHIAZIDE 25; 100 MG/1; MG/1
1 TABLET ORAL DAILY
Status: DISCONTINUED | OUTPATIENT
Start: 2017-03-16 | End: 2017-03-16 | Stop reason: HOSPADM

## 2017-03-15 RX ORDER — FENTANYL CITRATE 50 UG/ML
INJECTION, SOLUTION INTRAMUSCULAR; INTRAVENOUS
Status: COMPLETED
Start: 2017-03-15 | End: 2017-03-15

## 2017-03-15 RX ORDER — ACETAMINOPHEN 10 MG/ML
INJECTION, SOLUTION INTRAVENOUS
Status: DISCONTINUED | OUTPATIENT
Start: 2017-03-15 | End: 2017-03-15

## 2017-03-15 RX ORDER — KETAMINE HYDROCHLORIDE 100 MG/ML
INJECTION, SOLUTION INTRAMUSCULAR; INTRAVENOUS
Status: DISCONTINUED | OUTPATIENT
Start: 2017-03-15 | End: 2017-03-15

## 2017-03-15 RX ORDER — NICARDIPINE HYDROCHLORIDE 0.2 MG/ML
INJECTION INTRAVENOUS CONTINUOUS PRN
Status: DISCONTINUED | OUTPATIENT
Start: 2017-03-15 | End: 2017-03-15

## 2017-03-15 RX ORDER — HYDROMORPHONE HYDROCHLORIDE 1 MG/ML
0.5 INJECTION, SOLUTION INTRAMUSCULAR; INTRAVENOUS; SUBCUTANEOUS
Status: DISCONTINUED | OUTPATIENT
Start: 2017-03-15 | End: 2017-03-15

## 2017-03-15 RX ORDER — PROPOFOL 10 MG/ML
VIAL (ML) INTRAVENOUS
Status: DISCONTINUED | OUTPATIENT
Start: 2017-03-15 | End: 2017-03-15

## 2017-03-15 RX ORDER — SODIUM CHLORIDE 0.9 % (FLUSH) 0.9 %
3 SYRINGE (ML) INJECTION EVERY 8 HOURS
Status: DISCONTINUED | OUTPATIENT
Start: 2017-03-15 | End: 2017-03-15 | Stop reason: HOSPADM

## 2017-03-15 RX ORDER — TAMSULOSIN HYDROCHLORIDE 0.4 MG/1
0.4 CAPSULE ORAL DAILY
Status: DISCONTINUED | OUTPATIENT
Start: 2017-03-16 | End: 2017-03-16 | Stop reason: HOSPADM

## 2017-03-15 RX ORDER — HYDROCODONE BITARTRATE AND ACETAMINOPHEN 5; 325 MG/1; MG/1
TABLET ORAL
Status: COMPLETED
Start: 2017-03-15 | End: 2017-03-15

## 2017-03-15 RX ORDER — MIDAZOLAM HYDROCHLORIDE 1 MG/ML
INJECTION, SOLUTION INTRAMUSCULAR; INTRAVENOUS
Status: DISCONTINUED | OUTPATIENT
Start: 2017-03-15 | End: 2017-03-15

## 2017-03-15 RX ORDER — HEPARIN SODIUM 1000 [USP'U]/ML
INJECTION, SOLUTION INTRAVENOUS; SUBCUTANEOUS
Status: DISCONTINUED | OUTPATIENT
Start: 2017-03-15 | End: 2017-03-15 | Stop reason: HOSPADM

## 2017-03-15 RX ORDER — HYDROMORPHONE HYDROCHLORIDE 1 MG/ML
INJECTION, SOLUTION INTRAMUSCULAR; INTRAVENOUS; SUBCUTANEOUS
Status: DISPENSED
Start: 2017-03-15 | End: 2017-03-16

## 2017-03-15 RX ORDER — ROCURONIUM BROMIDE 10 MG/ML
INJECTION, SOLUTION INTRAVENOUS
Status: DISCONTINUED | OUTPATIENT
Start: 2017-03-15 | End: 2017-03-15

## 2017-03-15 RX ORDER — NAPROXEN SODIUM 220 MG/1
81 TABLET, FILM COATED ORAL DAILY
Status: DISCONTINUED | OUTPATIENT
Start: 2017-03-15 | End: 2017-03-15

## 2017-03-15 RX ORDER — GLYCOPYRROLATE 0.2 MG/ML
INJECTION INTRAMUSCULAR; INTRAVENOUS
Status: COMPLETED
Start: 2017-03-15 | End: 2017-03-15

## 2017-03-15 RX ORDER — HYDROCODONE BITARTRATE AND ACETAMINOPHEN 5; 325 MG/1; MG/1
1 TABLET ORAL EVERY 4 HOURS PRN
Status: DISCONTINUED | OUTPATIENT
Start: 2017-03-15 | End: 2017-03-16 | Stop reason: HOSPADM

## 2017-03-15 RX ORDER — FENTANYL CITRATE 50 UG/ML
25 INJECTION, SOLUTION INTRAMUSCULAR; INTRAVENOUS EVERY 5 MIN PRN
Status: COMPLETED | OUTPATIENT
Start: 2017-03-15 | End: 2017-03-15

## 2017-03-15 RX ORDER — SODIUM CHLORIDE 0.9 % (FLUSH) 0.9 %
3 SYRINGE (ML) INJECTION
Status: DISCONTINUED | OUTPATIENT
Start: 2017-03-15 | End: 2017-03-15 | Stop reason: HOSPADM

## 2017-03-15 RX ORDER — PANTOPRAZOLE SODIUM 40 MG/1
40 TABLET, DELAYED RELEASE ORAL DAILY
Status: DISCONTINUED | OUTPATIENT
Start: 2017-03-15 | End: 2017-03-15 | Stop reason: HOSPADM

## 2017-03-15 RX ADMIN — SODIUM CHLORIDE 500 ML: 0.9 INJECTION, SOLUTION INTRAVENOUS at 04:03

## 2017-03-15 RX ADMIN — MIDAZOLAM HYDROCHLORIDE 2 MG: 1 INJECTION, SOLUTION INTRAMUSCULAR; INTRAVENOUS at 11:03

## 2017-03-15 RX ADMIN — FENTANYL CITRATE 25 MCG: 50 INJECTION, SOLUTION INTRAMUSCULAR; INTRAVENOUS at 01:03

## 2017-03-15 RX ADMIN — PROTAMINE SULFATE 5 MG: 10 INJECTION, SOLUTION INTRAVENOUS at 02:03

## 2017-03-15 RX ADMIN — EPHEDRINE SULFATE 5 MG: 50 INJECTION, SOLUTION INTRAMUSCULAR; INTRAVENOUS; SUBCUTANEOUS at 02:03

## 2017-03-15 RX ADMIN — FENTANYL CITRATE 50 MCG: 50 INJECTION, SOLUTION INTRAMUSCULAR; INTRAVENOUS at 12:03

## 2017-03-15 RX ADMIN — HEPARIN SODIUM 3500 UNITS: 1000 INJECTION, SOLUTION INTRAVENOUS; SUBCUTANEOUS at 01:03

## 2017-03-15 RX ADMIN — HYDROCODONE BITARTRATE AND ACETAMINOPHEN 1 TABLET: 5; 325 TABLET ORAL at 03:03

## 2017-03-15 RX ADMIN — FENTANYL CITRATE 25 MCG: 50 INJECTION INTRAMUSCULAR; INTRAVENOUS at 04:03

## 2017-03-15 RX ADMIN — GLYCOPYRROLATE 0.2 MG: 0.2 INJECTION, SOLUTION INTRAMUSCULAR; INTRAVENOUS at 12:03

## 2017-03-15 RX ADMIN — ASPIRIN 81 MG 81 MG: 81 TABLET ORAL at 11:03

## 2017-03-15 RX ADMIN — CEFAZOLIN SODIUM 2 G: 2 SOLUTION INTRAVENOUS at 08:03

## 2017-03-15 RX ADMIN — FENTANYL CITRATE 25 MCG: 50 INJECTION, SOLUTION INTRAMUSCULAR; INTRAVENOUS at 02:03

## 2017-03-15 RX ADMIN — PROPOFOL 120 MG: 10 INJECTION, EMULSION INTRAVENOUS at 12:03

## 2017-03-15 RX ADMIN — SODIUM CHLORIDE, PRESERVATIVE FREE 3 ML: 5 INJECTION INTRAVENOUS at 09:03

## 2017-03-15 RX ADMIN — FENTANYL CITRATE 100 MCG: 50 INJECTION, SOLUTION INTRAMUSCULAR; INTRAVENOUS at 12:03

## 2017-03-15 RX ADMIN — NEOSTIGMINE METHYLSULFATE 4 MG: 1 INJECTION INTRAVENOUS at 02:03

## 2017-03-15 RX ADMIN — HYDROMORPHONE HYDROCHLORIDE 0.3 MG: 1 INJECTION, SOLUTION INTRAMUSCULAR; INTRAVENOUS; SUBCUTANEOUS at 09:03

## 2017-03-15 RX ADMIN — SODIUM CHLORIDE: 0.9 INJECTION, SOLUTION INTRAVENOUS at 11:03

## 2017-03-15 RX ADMIN — KETAMINE HYDROCHLORIDE 10 MG: 100 INJECTION, SOLUTION, CONCENTRATE INTRAMUSCULAR; INTRAVENOUS at 01:03

## 2017-03-15 RX ADMIN — ONDANSETRON 4 MG: 2 INJECTION INTRAMUSCULAR; INTRAVENOUS at 02:03

## 2017-03-15 RX ADMIN — HYDROCODONE BITARTRATE AND ACETAMINOPHEN 1 TABLET: 5; 325 TABLET ORAL at 06:03

## 2017-03-15 RX ADMIN — EPHEDRINE SULFATE 10 MG: 50 INJECTION, SOLUTION INTRAMUSCULAR; INTRAVENOUS; SUBCUTANEOUS at 02:03

## 2017-03-15 RX ADMIN — PANTOPRAZOLE SODIUM 40 MG: 40 TABLET, DELAYED RELEASE ORAL at 09:03

## 2017-03-15 RX ADMIN — KETAMINE HYDROCHLORIDE 20 MG: 100 INJECTION, SOLUTION, CONCENTRATE INTRAMUSCULAR; INTRAVENOUS at 12:03

## 2017-03-15 RX ADMIN — EPHEDRINE SULFATE 10 MG: 50 INJECTION, SOLUTION INTRAMUSCULAR; INTRAVENOUS; SUBCUTANEOUS at 01:03

## 2017-03-15 RX ADMIN — ROCURONIUM BROMIDE 50 MG: 10 INJECTION, SOLUTION INTRAVENOUS at 12:03

## 2017-03-15 RX ADMIN — ACETAMINOPHEN 1000 MG: 10 INJECTION, SOLUTION INTRAVENOUS at 12:03

## 2017-03-15 RX ADMIN — Medication 2 G: at 12:03

## 2017-03-15 RX ADMIN — ROCURONIUM BROMIDE 10 MG: 10 INJECTION, SOLUTION INTRAVENOUS at 02:03

## 2017-03-15 RX ADMIN — SODIUM CHLORIDE, SODIUM GLUCONATE, SODIUM ACETATE, POTASSIUM CHLORIDE, MAGNESIUM CHLORIDE, SODIUM PHOSPHATE, DIBASIC, AND POTASSIUM PHOSPHATE: .53; .5; .37; .037; .03; .012; .00082 INJECTION, SOLUTION INTRAVENOUS at 12:03

## 2017-03-15 RX ADMIN — KETAMINE HYDROCHLORIDE 10 MG: 100 INJECTION, SOLUTION, CONCENTRATE INTRAMUSCULAR; INTRAVENOUS at 02:03

## 2017-03-15 RX ADMIN — PHENYLEPHRINE HYDROCHLORIDE 0.3 MCG/KG/MIN: 10 INJECTION INTRAVENOUS at 12:03

## 2017-03-15 RX ADMIN — GLYCOPYRROLATE 0.4 MG: 0.2 INJECTION, SOLUTION INTRAMUSCULAR; INTRAVENOUS at 02:03

## 2017-03-15 RX ADMIN — PROTAMINE SULFATE 10 MG: 10 INJECTION, SOLUTION INTRAVENOUS at 02:03

## 2017-03-15 RX ADMIN — EPHEDRINE SULFATE 5 MG: 50 INJECTION, SOLUTION INTRAMUSCULAR; INTRAVENOUS; SUBCUTANEOUS at 12:03

## 2017-03-15 RX ADMIN — NICARDIPINE HYDROCHLORIDE 2.5 MG/HR: 0.2 INJECTION, SOLUTION INTRAVENOUS at 12:03

## 2017-03-15 RX ADMIN — GLYCOPYRROLATE 0.2 MG: 0.2 INJECTION INTRAMUSCULAR; INTRAVENOUS at 04:03

## 2017-03-15 RX ADMIN — ROCURONIUM BROMIDE 30 MG: 10 INJECTION, SOLUTION INTRAVENOUS at 01:03

## 2017-03-15 RX ADMIN — SODIUM CHLORIDE: 0.9 INJECTION, SOLUTION INTRAVENOUS at 04:03

## 2017-03-15 RX ADMIN — SODIUM CHLORIDE 500 ML: 0.9 INJECTION, SOLUTION INTRAVENOUS at 11:03

## 2017-03-15 RX ADMIN — LIDOCAINE HYDROCHLORIDE 0.2 MG: 10 INJECTION, SOLUTION EPIDURAL; INFILTRATION; INTRACAUDAL; PERINEURAL at 11:03

## 2017-03-15 NOTE — TRANSFER OF CARE
"Anesthesia Transfer of Care Note    Patient: Isma Martin    Procedure(s) Performed: Procedure(s) (LRB):  ENDARTERECTOMY-CAROTID (Left)    Patient location: PACU    Anesthesia Type: general    Transport from OR: Transported from OR on 6-10 L/min O2 by face mask with adequate spontaneous ventilation    Post pain: adequate analgesia    Post assessment: no apparent anesthetic complications and tolerated procedure well    Post vital signs: stable    Level of consciousness: awake and alert    Nausea/Vomiting: no nausea/vomiting    Complications: none          Last vitals:   Visit Vitals    BP (!) 97/55 (BP Location: Left arm, Patient Position: Lying, BP Method: Automatic)    Pulse (!) 50    Temp 36.1 °C (97 °F) (Temporal)    Resp 16    Ht 5' 5" (1.651 m)    Wt 72.6 kg (160 lb)    SpO2 99%    BMI 26.63 kg/m2     "

## 2017-03-15 NOTE — ANESTHESIA POSTPROCEDURE EVALUATION
"Anesthesia Post Evaluation    Patient: Isma Martin    Procedure(s) Performed: Procedure(s) (LRB):  ENDARTERECTOMY-CAROTID (Left)    Final Anesthesia Type: general  Patient location during evaluation: PACU  Patient participation: Yes- Able to Participate  Level of consciousness: awake and alert and oriented  Post-procedure vital signs: reviewed and stable  Pain management: adequate  Airway patency: patent  PONV status at discharge: No PONV  Anesthetic complications: no      Cardiovascular status: blood pressure returned to baseline  Respiratory status: unassisted and room air  Hydration status: euvolemic  Follow-up not needed.        Visit Vitals    BP (!) 98/44 (BP Location: Other (Comment))    Pulse (!) 56    Temp 36.1 °C (97 °F) (Temporal)    Resp 17    Ht 5' 5" (1.651 m)    Wt 72.6 kg (160 lb)    SpO2 99%    BMI 26.63 kg/m2       Pain/Jf Score: Pain Assessment Performed: Yes (3/15/2017  3:30 PM)  Presence of Pain: complains of pain/discomfort (3/15/2017  3:30 PM)  Pain Rating Prior to Med Admin: 6 (3/15/2017  4:25 PM)  Jf Score: 10 (3/15/2017  3:30 PM)      "

## 2017-03-15 NOTE — ANESTHESIA RELEASE NOTE
"Anesthesia Release from PACU Note    Patient: Isma Martin    Procedure(s) Performed: Procedure(s) (LRB):  ENDARTERECTOMY-CAROTID (Left)    Anesthesia type: general    Post pain: Adequate analgesia    Post assessment: no apparent anesthetic complications, tolerated procedure well and no evidence of recall    Last Vitals:   Visit Vitals    BP (!) 98/44 (BP Location: Other (Comment))    Pulse (!) 56    Temp 36.1 °C (97 °F) (Temporal)    Resp 17    Ht 5' 5" (1.651 m)    Wt 72.6 kg (160 lb)    SpO2 99%    BMI 26.63 kg/m2       Post vital signs: stable    Level of consciousness: awake, alert  and oriented    Nausea/Vomiting: no nausea/no vomiting    Complications: none    Airway Patency: patent    Respiratory: unassisted, spontaneous ventilation, room air    Cardiovascular: stable and blood pressure at baseline    Hydration: euvolemic  "

## 2017-03-15 NOTE — IP AVS SNAPSHOT
[facility]  [address]  [facility] [address]  Phone: [phone]           Patient Discharge Instructions     Our goal is to set you up for success. This packet includes information on your condition, medications, and your home care. It will help you to care for yourself so you don't get sicker and need to go back to the hospital.     Please ask your nurse if you have any questions.        There are many details to remember when preparing to leave the hospital. Here is what you will need to do:    1. Take your medicine. If you are prescribed medications, review your Medication List in the following pages. You may have new medications to  at the pharmacy and others that you'll need to stop taking. Review the instructions for how and when to take your medications. Talk with your doctor or nurses if you are unsure of what to do.     2. Go to your follow-up appointments. Specific follow-up information is listed in the following pages. Your may be contacted by a transition nurse or clinical provider about future appointments. Be sure we have all of the phone numbers to reach you, if needed. Please contact your provider's office if you are unable to make an appointment.     3. Watch for warning signs. Your doctor or nurse will give you detailed warning signs to watch for and when to call for assistance. These instructions may also include educational information about your condition. If you experience any of warning signs to your health, call your doctor.               [facility]  Unless otherwise directed by your provider, please contact [facility], our nurse care line that is available for 24/7 assistance.     [phone] (toll-free)    Registered nurses in the [facility] provide clinical advisement, health education, appointment booking, and other advisory services.                    ** Verify the list of medication(s) below is accurate and up  to date. Carry this with you in case of emergency. If your medications have changed, please notify your healthcare provider.             Medication List      START taking these medications        Additional Info                      ondansetron 4 MG Tbdl   Commonly known as:  ZOFRAN-ODT   Quantity:  20 tablet   Refills:  0   Dose:  4 mg    Instructions:  Take 1 tablet (4 mg total) by mouth every 8 (eight) hours as needed.     Begin Date    AM    Noon    PM    Bedtime         CHANGE how you take these medications        Additional Info                      oxycodone-acetaminophen 5-325 mg per tablet   Commonly known as:  PERCOCET   Quantity:  51 tablet   Refills:  0   What changed:    - how much to take  - how to take this  - when to take this  - reasons to take this  - additional instructions    Instructions:  Take one to two tablets by mouth every four hours as needed for pain     Begin Date    AM    Noon    PM    Bedtime         CONTINUE taking these medications        Additional Info                      ascorbic acid (vitamin C) 500 MG tablet   Commonly known as:  VITAMIN C   Refills:  0   Dose:  500 mg    Instructions:  Take 500 mg by mouth Every PM.     Begin Date    AM    Noon    PM    Bedtime       aspirin 81 MG EC tablet   Commonly known as:  ECOTRIN   Refills:  0   Dose:  81 mg    Instructions:  Take 1 tablet (81 mg total) by mouth once daily.     Begin Date    AM    Noon    PM    Bedtime       atorvastatin 20 MG tablet   Commonly known as:  LIPITOR   Quantity:  90 tablet   Refills:  11    Instructions:  TAKE 1 TABLET DAILY     Begin Date    AM    Noon    PM    Bedtime       cabergoline 0.5 mg tablet   Commonly known as:  DOSTINEX   Quantity:  15 tablet   Refills:  3   Dose:  0.25 mg    Instructions:  Take 0.5 tablets (0.25 mg total) by mouth twice a week.     Begin Date    AM    Noon    PM    Bedtime       cholecalciferol (vitamin D3) 2,000 unit Tab   Refills:  0   Dose:  1 tablet    Instructions:  Take 1  tablet by mouth Every PM.     Begin Date    AM    Noon    PM    Bedtime       FISH OIL 1,000 mg Cap   Refills:  0   Generic drug:  omega-3 fatty acids-vitamin E      Begin Date    AM    Noon    PM    Bedtime       losartan-hydrochlorothiazide 100-25 mg 100-25 mg per tablet   Commonly known as:  HYZAAR   Quantity:  90 tablet   Refills:  11    Instructions:  TAKE 1 TABLET DAILY     Begin Date    AM    Noon    PM    Bedtime       metoprolol succinate 50 MG 24 hr tablet   Commonly known as:  TOPROL-XL   Quantity:  90 tablet   Refills:  11   Dose:  50 mg    Instructions:  Take 1 tablet (50 mg total) by mouth once daily.     Begin Date    AM    Noon    PM    Bedtime       omeprazole 20 MG capsule   Commonly known as:  PRILOSEC   Quantity:  90 capsule   Refills:  11   Dose:  20 mg    Instructions:  Take 1 capsule (20 mg total) by mouth once daily.     Begin Date    AM    Noon    PM    Bedtime       sildenafil 50 MG tablet   Commonly known as:  VIAGRA   Quantity:  30 tablet   Refills:  2   Dose:  50 mg    Instructions:  Take 1 tablet (50 mg total) by mouth daily as needed for Erectile Dysfunction.     Begin Date    AM    Noon    PM    Bedtime       tamsulosin 0.4 mg Cp24   Commonly known as:  FLOMAX   Quantity:  30 capsule   Refills:  11   Dose:  0.4 mg    Instructions:  Take 1 capsule (0.4 mg total) by mouth once daily.     Begin Date    AM    Noon    PM    Bedtime       VITAMIN B-12 1000 MCG tablet   Refills:  0   Generic drug:  cyanocobalamin      Begin Date    AM    Noon    PM    Bedtime         STOP taking these medications     clopidogrel 75 mg tablet   Commonly known as:  PLAVIX            Where to Get Your Medications      These medications were sent to [facility] - [address] [address]     Phone:  [phone]     ondansetron 4 MG Tbdl         You can get these medications from any pharmacy     Bring a paper prescription for each of  these medications     oxycodone-acetaminophen 5-325 mg per tablet                  Please bring to all follow up appointments:    1. A copy of your discharge instructions.  2. All medicines you are currently taking in their original bottles.  3. Identification and insurance card.    Please arrive 15 minutes ahead of scheduled appointment time.    Please call 24 hours in advance if you must reschedule your appointment and/or time.        Your Scheduled Appointments     Mar 28, 2017  9:10 AM CDT   Established Patient Visit with THI Oh MD   [facility] ([facility] )    [address]  [place] [id number] [phone]              Follow-up Information     Follow up with Jose Ricketts MD. Schedule an appointment as soon as possible for a visit in 4 weeks.    Specialty:  Vascular Surgery    Why:  For wound re-check; VAS carotid    Contact information:    [address] [facility] [address]  [phone]          Discharge Instructions     Future Orders    Activity as tolerated     Call MD for:  difficulty breathing or increased cough     Call MD for:  increased confusion or weakness     Call MD for:  persistent dizziness, light-headedness, or visual disturbances     Call MD for:  persistent nausea and vomiting or diarrhea     Call MD for:  redness, tenderness, or signs of infection (pain, swelling, redness, odor or green/yellow discharge around incision site)     Call MD for:  severe persistent headache     Call MD for:  severe uncontrolled pain     Call MD for:  temperature >100.4     Call MD for:  worsening rash     Diet general     Questions:    Total calories:      Fat restriction, if any:      Protein restriction, if any:      Na restriction, if any:      Fluid restriction:      Additional restrictions:      Lifting restrictions     Comments:    No driving while on narcotics and until you can react quickly without pain.    Shower on day dressing removed (No bath)   "   Comments:    No bathing or swimming. Shower with normal soap and water.    VAS Ultrasound doppler carotid bliateral         Primary Diagnosis     Your primary diagnosis was:  Narrowing Of Artery In Neck      Admission Information     Date & Time Provider Department CSN    3/15/2017 10:19 AM Jose Ricketts MD [facility] [id number]      Care Providers     Provider Role Specialty Primary office phone    Jose Ricketts MD Attending Provider Vascular Surgery [phone]    Jose Ricketts MD Surgeon  Vascular Surgery [phone]      Your Vitals Were     BP Pulse Temp Resp Height Weight    104/53 (BP Location: Right arm, Patient Position: Lying, BP Method: Automatic) 46 98.5 °F (36.9 °C) (Oral) 16 5' 5" (1.651 m) 72.6 kg (160 lb)    SpO2 BMI             98% 26.63 kg/m2         Recent Lab Values        9/11/2013 1/30/2014 8/25/2014 1/22/2015 5/11/2015 11/10/2015 5/11/2016 1/24/2017      3:31 PM  2:21 PM 10:04 AM 11:18 AM  7:50 AM 10:20 AM  9:00 AM  2:15 PM    A1C 6.3 (H) 6.5 (H) 6.4 (H) 6.1 6.1 5.9 5.9 6.1    Comment for A1C at  2:15 PM on 1/24/2017:  According to ADA guidelines, hemoglobin A1C <7.0% represents  optimal control in non-pregnant diabetic patients.  Different  metrics may apply to specific populations.   Standards of Medical Care in Diabetes - 2016.  For the purpose of screening for the presence of diabetes:  <5.7%     Consistent with the absence of diabetes  5.7-6.4%  Consistent with increasing risk for diabetes   (prediabetes)  >or=6.5%  Consistent with diabetes  Currently no consensus exists for use of hemoglobin A1C  for diagnosis of diabetes for children.        Allergies as of 3/16/2017     No Known Allergies      Advance Directives     An advance directive is a document which, in the event you are no longer able to make decisions for yourself, tells your healthcare team what kind of treatment you do or do not want to receive, or who you would like to make those " decisions for you.  If you do not currently have an advance directive, [facility] encourages you to create one.  For more information call:  [phone]), [phone] ([phone]),  or log on to [email or web address].        Language Assistance Services     ATTENTION: Language assistance services are available, free of charge. Please call [phone].      ATENCIÓN: Si habla español, tiene a meyers disposición servicios gratuitos de asistencia lingüística. Llame al [phone].     [facility] Ý: N?u b?n nói Ti?ng Vi?t, có các d?ch v? h? tr? ngôn ng? mi?n phí dành cho b?n. G?i s? [phone].        Stroke Education              Diabetes Discharge Instructions                                    [facility] complies with applicable Federal civil rights laws and does not discriminate on the basis of race, color, national origin, age, disability, or sex.

## 2017-03-15 NOTE — INTERVAL H&P NOTE
The patient has been examined and the H&P has been reviewed:    I concur with the findings and no changes have occurred since H&P was written.    Anesthesia/Surgery risks, benefits and alternative options discussed and understood by patient/family.        Review of patient's allergies indicates:  No Known Allergies    Active Hospital Problems    Diagnosis  POA    Amaurosis fugax, left eye [G45.3]  Yes      Resolved Hospital Problems    Diagnosis Date Resolved POA   No resolved problems to display.

## 2017-03-15 NOTE — BRIEF OP NOTE
Brief Operative Note  Date: 03/15/2017    Surgeon(s) and Role:     * Jose Ricketts MD - Primary     * Komal Mistry MD - Fellow    Pre-op Diagnosis:  Amaurosis fugax, left eye [G45.3]  Symptomatic stenosis of left carotid artery [I65.22];     Post-op Diagnosis:  Same    Procedure(s):  1) Left carotid endarterectomy with bovine pericardial patch angioplasty     Surgeon: Jose Ricketts MD  Vascular & Endovascular Surgery       Assistant:  JEAN CLAUDE Mistry DO    Anesthesia: General    Findings/Key Components:  High bifurcation.  Appropriate signals and pulses at completion.  REYES and otherwise neurologically intact at completion.     EBL: Minimal         Specimens     None          I attest to being present for the procedure and performing the case.  Jose Ricketts MD  Vascular & Endovascular Surgery

## 2017-03-15 NOTE — H&P (VIEW-ONLY)
"[facility]  02/20/2017  Consult: Referred by Dr. Tay  HPI:  Patient is a 66 y.o. male with HTN, DM (diet controlled), HLD, GERD, arthritis, prolactinoma, who is here today for evaluation of symptomatic high grade stenosis of left ICA. Pt. Reports visual symptoms with "whiteness and blurriness x 10-15 minutes" that began about 6 months ago. He experiences these symptoms about 3xs/week.  The patient notes that he experiences the changes in vision related to after he takes his blood pressure medication. When he checks his blood pressure at the time of the incident, he notes his blood pressure is 109 systolic. Pt. Reports that he does not experience these symptoms if he doesn't take his blood pressure medication. Pt. Last experienced these visual symptoms yesterday.  The patient also notes that about 9 months ago he had right arm weakness x 12 hours. He [place] [facility] [place] screened and ruled out MI/stroke. These symptoms persisted for about a week and never came back. He denies ever having difficulty speaking, or facial drooping or leg weakness.   the patient Is a retired  for [employer]     No MI/stroke  Tobacco use: Quit 1997; 40 pack year history    Past Medical History   Diagnosis Date    Arthritis     Colon polyp     Diabetes mellitus     GERD (gastroesophageal reflux disease)     Hypertension     Hypogonadism male     Obesity     Prolactinoma      Past Surgical History   Procedure Laterality Date    Transphenoidal pituitary resection       pituitary tumor    Foot surgery  4-23-14     right     Family History   Problem Relation Age of Onset    Heart disease Brother     Heart attack Sister     Cancer Neg Hx     Diabetes Neg Hx     Colon polyps Neg Hx      Social History     Social History    Marital status:      Spouse name: Brenda    Number of children: N/A    Years of education: N/A     Occupational History    [employer]     [employer] "     Social History Main Topics    Smoking status: Former Smoker     Packs/day: 1.50     Years: 35.00     Quit date: 1/1/1995    Smokeless tobacco: Never Used    Alcohol use 0.5 oz/week     1 Standard drinks or equivalent per week      Comment: rare    Drug use: No    Sexual activity: No     Other Topics Concern    Not on file     Social History Narrative    , on ssdi for his toe.    . 1 kid still with them. Wife dm and in WC.    3 kids total.     Current Outpatient Prescriptions on File Prior to Visit   Medication Sig    ascorbic acid (VITAMIN C) 500 MG tablet Take 500 mg by mouth Every PM.    aspirin (ECOTRIN) 81 MG EC tablet Take 1 tablet (81 mg total) by mouth once daily.    atorvastatin (LIPITOR) 20 MG tablet TAKE 1 TABLET DAILY    cabergoline (DOSTINEX) 0.5 mg tablet Take 0.5 tablets (0.25 mg total) by mouth twice a week.    cholecalciferol, vitamin D3, 2,000 unit Tab Take 1 tablet by mouth Every PM.    cyanocobalamin (VITAMIN B-12) 1000 MCG tablet     losartan-hydrochlorothiazide 100-25 mg (HYZAAR) 100-25 mg per tablet TAKE 1 TABLET DAILY    metoprolol succinate (TOPROL-XL) 50 MG 24 hr tablet Take 1 tablet (50 mg total) by mouth once daily.    omega-3 fatty acids-vitamin E (FISH OIL) 1,000 mg Cap     omeprazole (PRILOSEC) 20 MG capsule Take 1 capsule (20 mg total) by mouth once daily.    oxycodone-acetaminophen (PERCOCET) 5-325 mg per tablet Take 1 tablet by mouth 2 (two) times daily as needed for Pain.    sildenafil (VIAGRA) 50 MG tablet Take 1 tablet (50 mg total) by mouth daily as needed for Erectile Dysfunction.    tamsulosin (FLOMAX) 0.4 mg Cp24 Take 1 capsule (0.4 mg total) by mouth once daily.     No current facility-administered medications on file prior to visit.        REVIEW OF SYSTEMS:  General: negative; ENT: negative; Allergy and Immunology: negative; Hematological and Lymphatic: Negative; Endocrine: negative; Respiratory: no cough, shortness of breath, or  wheezing; Cardiovascular: no chest pain or dyspnea on exertion; Gastrointestinal: no abdominal pain/back, change in bowel habits, or bloody stools; Genito-Urinary: no dysuria, trouble voiding, or hematuria; Musculoskeletal: negative  Neurological: + visual changes x 6 months;     PHYSICAL EXAM:                General appearance:  Alert, well-appearing, and in no distress.  Oriented to person, place, and time   Neurological: Normal speech, no focal findings noted; CN II - XII grossly intact           Musculoskeletal: Digits/nail without cyanosis/clubbing.  Normal muscle strength/tone.                 Neck: Supple, no significant adenopathy; thyroid is not enlarged                  No carotid bruit can be auscultated                Chest:  Clear to auscultation, no wheezes, rales or rhonchi, symmetric air entry     No use of accessory muscles             Cardiac: Normal rate and regular rhythm, S1 and S2 normal; PMI non-displaced          Abdomen: Soft, nontender, nondistended, no masses or organomegaly     No rebound tenderness noted; bowel sounds normal     No Pulsatile aortic mass is palpable.     No groin adenopathy      Extremities: 1+ femoral pulses bilaterally      1+ Popliteal pulses; 2+ pedal pulses palpable.      No pedal edema      No ulcerations    LAB RESULTS:  Lab Results   Component Value Date    K 4.2 01/24/2017    K 3.6 06/21/2016    K 3.8 05/11/2016    CREATININE 1.1 01/24/2017    CREATININE 0.9 06/21/2016    CREATININE 0.9 05/11/2016     Lab Results   Component Value Date    WBC 4.95 08/04/2016    WBC 4.40 05/03/2016    WBC 5.60 10/13/2014    HCT 43.3 08/04/2016    HCT 43.9 05/03/2016    HCT 40.5 10/13/2014     08/04/2016     05/03/2016     10/13/2014     Lab Results   Component Value Date    HGBA1C 6.1 01/24/2017    HGBA1C 5.9 05/11/2016    HGBA1C 5.9 11/10/2015     IMAGING:  US Carotid Bilateral 2/16/17  Right: ICA: <40%  Left: ICA: 80-95%;  cm/s; Ratio:  4.4    IMP/PLAN:  66 y.o. male with a history of  HTN, DM, HLD, GERD, arthritis, prolactinoma with symptomatic high grade stenosis L ICA.     1. Left carotid endarterectomy schedule 3/8/17   No blood consent- patient is Bahai  2. Start plavix 75 mg daily  3. Continue ASA, and statin    Elsa Costello, MN, APRN, FNP-BC  Nurse Practitioner  Vascular and Endovascular Surgery        STAFF ADDENDUM    I have reviewed the relevant data and the resident's assessment and agree with the findings and plan as outlined above.  66 year old very pleasant male with a high grade L ICA stenosis.  His ocular symptoms are not typical for embolic symptoms from an ICA lesion, such as amaurosis fugax, but these vague symptoms in the setting of such a a high grade (~90%) ICA stenosis are concerning.  We will initiate plavix and continue asa and a statin and plan for elective left CEA in the coming weeks.   I had a long discussion with Mr Martin regarding the indications, risks, benefits, and recovery and he wishes to proceed with carotid endarterectomy.     Jose Ricketts MD  Vascular & Endovascular Surgery

## 2017-03-15 NOTE — ANESTHESIA PROCEDURE NOTES
Arterial    Diagnosis: Carotid stenosis     Patient location during procedure: done in OR  Procedure start time: 3/15/2017 11:59 AM  Timeout: 3/15/2017 11:58 AM  Procedure end time: 3/15/2017 12:05 PM  Staffing  Anesthesiologist: JAVIER QUINTANA  Resident/CRNA: MANDO DALLAS  Performed by: resident/CRNA and anesthesiologist   Anesthesiologist was present at the time of the procedure.  Preanesthetic Checklist  Completed: patient identified, site marked, surgical consent, pre-op evaluation, timeout performed, IV checked, risks and benefits discussed, monitors and equipment checked and anesthesia consent given  Arterial Line  Skin Prep: chlorhexidine gluconate  Local Infiltration: lidocaine  Orientation: right  Location: radial  Catheter Size: 20 G{[facility] ANESTHESIA BLOCK ART PLACEMENTInsertion Attempts: 1  Assessment  Dressing: secured with tape and tegaderm  Patient: Tolerated well

## 2017-03-16 VITALS
HEIGHT: 65 IN | WEIGHT: 160 LBS | DIASTOLIC BLOOD PRESSURE: 53 MMHG | HEART RATE: 46 BPM | RESPIRATION RATE: 16 BRPM | BODY MASS INDEX: 26.66 KG/M2 | TEMPERATURE: 99 F | OXYGEN SATURATION: 98 % | SYSTOLIC BLOOD PRESSURE: 104 MMHG

## 2017-03-16 LAB
ALBUMIN SERPL BCP-MCNC: 2.6 G/DL
ALP SERPL-CCNC: 30 U/L
ALT SERPL W/O P-5'-P-CCNC: 17 U/L
ANION GAP SERPL CALC-SCNC: 6 MMOL/L
AST SERPL-CCNC: 19 U/L
BASOPHILS # BLD AUTO: 0.01 K/UL
BASOPHILS NFR BLD: 0.2 %
BILIRUB SERPL-MCNC: 0.7 MG/DL
BUN SERPL-MCNC: 10 MG/DL
CALCIUM SERPL-MCNC: 7.7 MG/DL
CHLORIDE SERPL-SCNC: 110 MMOL/L
CO2 SERPL-SCNC: 23 MMOL/L
CREAT SERPL-MCNC: 0.9 MG/DL
DIFFERENTIAL METHOD: ABNORMAL
EOSINOPHIL # BLD AUTO: 0.1 K/UL
EOSINOPHIL NFR BLD: 1.8 %
ERYTHROCYTE [DISTWIDTH] IN BLOOD BY AUTOMATED COUNT: 12.5 %
EST. GFR  (AFRICAN AMERICAN): >60 ML/MIN/1.73 M^2
EST. GFR  (NON AFRICAN AMERICAN): >60 ML/MIN/1.73 M^2
GLUCOSE SERPL-MCNC: 108 MG/DL
HCT VFR BLD AUTO: 35 %
HGB BLD-MCNC: 12.1 G/DL
LYMPHOCYTES # BLD AUTO: 1 K/UL
LYMPHOCYTES NFR BLD: 18.6 %
MAGNESIUM SERPL-MCNC: 1.6 MG/DL
MCH RBC QN AUTO: 29.6 PG
MCHC RBC AUTO-ENTMCNC: 34.6 %
MCV RBC AUTO: 86 FL
MONOCYTES # BLD AUTO: 0.5 K/UL
MONOCYTES NFR BLD: 9.7 %
NEUTROPHILS # BLD AUTO: 3.9 K/UL
NEUTROPHILS NFR BLD: 69.7 %
PHOSPHATE SERPL-MCNC: 2.7 MG/DL
PLATELET # BLD AUTO: 142 K/UL
PLATELET BLD QL SMEAR: ABNORMAL
PMV BLD AUTO: 9.8 FL
POC ACTIVATED CLOTTING TIME K: 250 SEC (ref 74–137)
POTASSIUM SERPL-SCNC: 3.6 MMOL/L
PROT SERPL-MCNC: 5.4 G/DL
RBC # BLD AUTO: 4.09 M/UL
SAMPLE: ABNORMAL
SODIUM SERPL-SCNC: 139 MMOL/L
WBC # BLD AUTO: 5.54 K/UL

## 2017-03-16 PROCEDURE — 85025 COMPLETE CBC W/AUTO DIFF WBC: CPT

## 2017-03-16 PROCEDURE — 63600175 PHARM REV CODE 636 W HCPCS: Performed by: SURGERY

## 2017-03-16 PROCEDURE — 84100 ASSAY OF PHOSPHORUS: CPT

## 2017-03-16 PROCEDURE — 80053 COMPREHEN METABOLIC PANEL: CPT

## 2017-03-16 PROCEDURE — 25000003 PHARM REV CODE 250: Performed by: SURGERY

## 2017-03-16 PROCEDURE — 83735 ASSAY OF MAGNESIUM: CPT

## 2017-03-16 RX ORDER — ONDANSETRON 4 MG/1
4 TABLET, ORALLY DISINTEGRATING ORAL EVERY 8 HOURS PRN
Qty: 20 TABLET | Refills: 0 | OUTPATIENT
Start: 2017-03-16 | End: 2017-03-16

## 2017-03-16 RX ORDER — OXYCODONE AND ACETAMINOPHEN 5; 325 MG/1; MG/1
TABLET ORAL
Qty: 51 TABLET | Refills: 0 | Status: SHIPPED | OUTPATIENT
Start: 2017-03-16 | End: 2017-03-16

## 2017-03-16 RX ORDER — ONDANSETRON 4 MG/1
4 TABLET, ORALLY DISINTEGRATING ORAL EVERY 8 HOURS PRN
Qty: 20 TABLET | Refills: 0 | Status: SHIPPED | OUTPATIENT
Start: 2017-03-16 | End: 2017-03-16

## 2017-03-16 RX ORDER — ONDANSETRON 4 MG/1
4 TABLET, ORALLY DISINTEGRATING ORAL EVERY 8 HOURS PRN
Qty: 20 TABLET | Refills: 0 | Status: SHIPPED | OUTPATIENT
Start: 2017-03-16 | End: 2017-10-25

## 2017-03-16 RX ORDER — OXYCODONE AND ACETAMINOPHEN 5; 325 MG/1; MG/1
TABLET ORAL
Qty: 51 TABLET | Refills: 0 | Status: SHIPPED | OUTPATIENT
Start: 2017-03-16 | End: 2017-03-28

## 2017-03-16 RX ORDER — CALCIUM CARBONATE 200(500)MG
500 TABLET,CHEWABLE ORAL ONCE
Status: COMPLETED | OUTPATIENT
Start: 2017-03-16 | End: 2017-03-16

## 2017-03-16 RX ADMIN — CALCIUM CARBONATE (ANTACID) CHEW TAB 500 MG 500 MG: 500 CHEW TAB at 02:03

## 2017-03-16 RX ADMIN — HYDROCODONE BITARTRATE AND ACETAMINOPHEN 1 TABLET: 5; 325 TABLET ORAL at 09:03

## 2017-03-16 RX ADMIN — ASPIRIN 81 MG CHEWABLE TABLET 81 MG: 81 TABLET CHEWABLE at 09:03

## 2017-03-16 RX ADMIN — CEFAZOLIN SODIUM 2 G: 2 SOLUTION INTRAVENOUS at 05:03

## 2017-03-16 RX ADMIN — LOSARTAN POTASSIUM AND HYDROCHLOROTHIAZIDE 1 TABLET: 25; 100 TABLET ORAL at 09:03

## 2017-03-16 RX ADMIN — HYDROCODONE BITARTRATE AND ACETAMINOPHEN 1 TABLET: 5; 325 TABLET ORAL at 12:03

## 2017-03-16 NOTE — PROGRESS NOTES
Patient AAOx3, VSS on room air, with complaints of incisional neck pain 10 out of 10, received and carried out orders for IV Dilaudid from Dr. Pineda. Patient sitting up in bed, glasses on, dentures in, eating sandwich and jello. [facility]

## 2017-03-16 NOTE — PLAN OF CARE
Patient discharged home with no needs with family.  Dr Ricketts's nurse to schedule appointment and contact patient.       03/16/17 1104   Final Note   Assessment Type Final Discharge Note   Discharge Disposition Home   Any social issues identified prior to discharge? No   Did you assess the readiness or willingness of the family or caregiver to support self management of care? Yes   Right Care Referral Info   Post Acute Recommendation No Care

## 2017-03-16 NOTE — PLAN OF CARE
Problem: Patient Care Overview  Goal: Plan of Care Review  Outcome: Ongoing (interventions implemented as appropriate)  POC reviewed with pt. Who verbalized understanding. AAOx 4. Remains free of falls and injuries. VSS. Tolerating cardiac diet, denies nausea. Complains of pain treated with PRN meds. Assist with activity. A Line. Telemetry-SB. NV Q 4. No acute events. No distress noted. WCTM.

## 2017-03-16 NOTE — NURSING TRANSFER
Nursing Transfer Note      3/15/2017     Transfer To: 609 from PACU    Transfer via bed    Transfer with cardiac monitoring    Transported by RN and PCT    Medicines sent: NS infusing    Chart send with patient: Yes    Notified: spouse via phone message    Patient reassessed at: 3/15/17 23:30

## 2017-03-16 NOTE — DISCHARGE SUMMARY
"[facility]  General Surgery  Discharge Summary      Patient Name: Isma Martin  MRN: [id number]  Admission Date: 3/15/2017  Hospital Length of Stay: 1 days  Discharge Date and Time:  03/16/2017 7:28 AM  Attending Physician: Jose Ricketts MD   Discharging Provider: Sangita Pineda MD  Primary Care Provider: Anthony Tay MD     HPI: Patient is a 66 y.o. male with HTN, DM (diet controlled), HLD, GERD, arthritis, prolactinoma, who is here today for evaluation of symptomatic high grade stenosis of left ICA. Pt. Reports visual symptoms with "whiteness and blurriness x 10-15 minutes" that began about 6 months ago. He experiences these symptoms about 3xs/week. The patient notes that he experiences the changes in vision related to after he takes his blood pressure medication. When he checks his blood pressure at the time of the incident, he notes his blood pressure is 109 systolic. Pt. Reports that he does not experience these symptoms if he doesn't take his blood pressure medication. Pt. Last experienced these visual symptoms yesterday.  The patient also notes that about 9 months ago he had right arm weakness x 12 hours. [place] [facility] [place] ruled out [place]/stroke. These symptoms persisted for about a week and never came back. He denies ever having difficulty speaking, or facial drooping or leg weakness.   the patient Is a retired  for [employer].     Procedure(s) (LRB):  ENDARTERECTOMY-CAROTID (Left)     Hospital Course: Pt tolerated procedure well and had an uncomplicated post op course.  He had excellent bp post op. No neuro deficits. He should d/c his plavix.      Pending Diagnostic Studies:     Procedure Component Value Units Date/Time    CBC auto differential [[id number]] Collected:  03/16/17 0430    Order Status:  Sent Lab Status:  In process Updated:  03/16/17 0622    Specimen:  Blood from Blood     CBC auto differential [[id number]]     Order Status:  Sent Lab " Status:  No result     Specimen:  Blood from Blood     CBC auto differential [[id number]]     Order Status:  Sent Lab Status:  No result     Specimen:  Blood from Blood     Comprehensive metabolic panel [[id number]] Collected:  03/16/17 0430    Order Status:  Sent Lab Status:  In process Updated:  03/16/17 0622    Specimen:  Blood from Blood     Comprehensive metabolic panel [[id number]]     Order Status:  Sent Lab Status:  No result     Specimen:  Blood from Blood     Comprehensive metabolic panel [[id number]]     Order Status:  Sent Lab Status:  No result     Specimen:  Blood from Blood     Magnesium [[id number]] Collected:  03/16/17 0430    Order Status:  Sent Lab Status:  In process Updated:  03/16/17 0622    Specimen:  Blood from Blood     Magnesium [[id number]]     Order Status:  Sent Lab Status:  No result     Specimen:  Blood from Blood     Magnesium [[id number]]     Order Status:  Sent Lab Status:  No result     Specimen:  Blood from Blood     Phosphorus [[id number]] Collected:  03/16/17 0430    Order Status:  Sent Lab Status:  In process Updated:  03/16/17 0622    Specimen:  Blood from Blood     Phosphorus [[id number]]     Order Status:  Sent Lab Status:  No result     Specimen:  Blood from Blood     Phosphorus [[id number]]     Order Status:  Sent Lab Status:  No result     Specimen:  Blood from Blood         Final Active Diagnoses:    Diagnosis Date Noted POA    PRINCIPAL PROBLEM:  Symptomatic stenosis of left carotid artery [I65.22] 02/20/2017 Yes    Amaurosis fugax, left eye [G45.3] 08/18/2016 Yes      Problems Resolved During this Admission:    Diagnosis Date Noted Date Resolved POA      Discharged Condition: good    Disposition: Home or Self Care    Follow Up:  Follow-up Information     Follow up with Jose Ricketts MD. Schedule an appointment as soon as possible for a visit in 4 weeks.    Specialty:  Vascular Surgery    Why:  For wound re-check; VAS carotid    Contact information:    [address]  [facility] [address]  [phone]          Patient Instructions:     VAS Ultrasound doppler carotid bliateral   Standing Status: Future  Standing Exp. Date: 03/16/18     Diet general     Activity as tolerated     Shower on day dressing removed (No bath)   Order Comments: No bathing or swimming. Shower with normal soap and water.     Lifting restrictions   Order Comments: No driving while on narcotics and until you can react quickly without pain.     Call MD for:  temperature >100.4     Call MD for:  persistent nausea and vomiting or diarrhea     Call MD for:  severe uncontrolled pain     Call MD for:  redness, tenderness, or signs of infection (pain, swelling, redness, odor or green/yellow discharge around incision site)     Call MD for:  difficulty breathing or increased cough     Call MD for:  severe persistent headache     Call MD for:  worsening rash     Call MD for:  persistent dizziness, light-headedness, or visual disturbances     Call MD for:  increased confusion or weakness     Remove dressing in 24 hours   Order Comments: Steri strips will fall off on their own       Medications:  Reconciled Home Medications:   Current Discharge Medication List      START taking these medications    Details   ondansetron (ZOFRAN-ODT) 4 MG TbDL Take 1 tablet (4 mg total) by mouth every 8 (eight) hours as needed.  Qty: 20 tablet, Refills: 0         CONTINUE these medications which have CHANGED    Details   oxycodone-acetaminophen (PERCOCET) 5-325 mg per tablet Take one to two tablets by mouth every four hours as needed for pain  Qty: 51 tablet, Refills: 0         CONTINUE these medications which have NOT CHANGED    Details   ascorbic acid (VITAMIN C) 500 MG tablet Take 500 mg by mouth Every PM.      aspirin (ECOTRIN) 81 MG EC tablet Take 1 tablet (81 mg total) by mouth once daily.    Associated Diagnoses: Carotid artery disease, unspecified laterality; Amaurosis fugax, left eye      atorvastatin (LIPITOR) 20 MG tablet  TAKE 1 TABLET DAILY  Qty: 90 tablet, Refills: 11      cabergoline (DOSTINEX) 0.5 mg tablet Take 0.5 tablets (0.25 mg total) by mouth twice a week.  Qty: 15 tablet, Refills: 3    Associated Diagnoses: Prolactinoma      cholecalciferol, vitamin D3, 2,000 unit Tab Take 1 tablet by mouth Every PM.      cyanocobalamin (VITAMIN B-12) 1000 MCG tablet       losartan-hydrochlorothiazide 100-25 mg (HYZAAR) 100-25 mg per tablet TAKE 1 TABLET DAILY  Qty: 90 tablet, Refills: 11      metoprolol succinate (TOPROL-XL) 50 MG 24 hr tablet Take 1 tablet (50 mg total) by mouth once daily.  Qty: 90 tablet, Refills: 11    Associated Diagnoses: Essential hypertension      omega-3 fatty acids-vitamin E (FISH OIL) 1,000 mg Cap       omeprazole (PRILOSEC) 20 MG capsule Take 1 capsule (20 mg total) by mouth once daily.  Qty: 90 capsule, Refills: 11      sildenafil (VIAGRA) 50 MG tablet Take 1 tablet (50 mg total) by mouth daily as needed for Erectile Dysfunction.  Qty: 30 tablet, Refills: 2    Associated Diagnoses: Erectile dysfunction, unspecified erectile dysfunction type      tamsulosin (FLOMAX) 0.4 mg Cp24 Take 1 capsule (0.4 mg total) by mouth once daily.  Qty: 30 capsule, Refills: 11    Associated Diagnoses: BPH with urinary obstruction         STOP taking these medications       clopidogrel (PLAVIX) 75 mg tablet Comments:   Reason for Stopping:               Sangita Pineda MD  General Surgery  [facility]

## 2017-03-16 NOTE — NURSING
Education given to pt. Patient verbalized understanding. All questions answered. Peripheral IV removed with catheter intact. RX given to pt. Awaiting transport for discharge.  Will continue to monitor. Alisia Segal RN

## 2017-03-16 NOTE — PLAN OF CARE
Patient is a 66 year old male admitted from home 3/15/2017 and underwent Left Carotid Endarterectomy.  Patient is dressed and ready to discharge home with no needs with family.    Patient lives in a one story home with his wife, Alea, who will care for him and obtain anything he needs after discharge.    PCP  Anthony Tay MD  [address] [facility] [address]  [phone]      [facility] - [place], [place] - [facility]  [address] [facility] [address]  Phone: [phone] Fax: [phone]      Extended Emergency Contact Information  Primary Emergency Contact: Alea Martin  Address: [address] [place] [address]  Home Phone: [phone]  Relation: Spouse       03/16/17 1053   Discharge Assessment   Assessment Type Discharge Planning Assessment   Confirmed/corrected address and phone number on facesheet? Yes   Assessment information obtained from? Patient   Expected Length of Stay (days) 2   Prior to hospitilization cognitive status: Alert/Oriented   Prior to hospitalization functional status: Independent   Current cognitive status: Alert/Oriented   Current Functional Status: Independent   Arrived From home or self-care   Lives With spouse   Able to Return to Prior Arrangements yes   Is patient able to care for self after discharge? Yes   How many people do you have in your home that can help with your care after discharge? 1   Who are your caregiver(s) and their phone number(s)? wife   Patient's perception of discharge disposition home or selfcare   Equipment Currently Used at Home none   Do you have any problems affording any of your prescribed medications? No   Is the patient taking medications as prescribed? yes   Do you have any financial concerns preventing you from receiving the healthcare you need? No   Does the patient have transportation to healthcare appointments? Yes   Transportation  Available family or friend will provide   On Dialysis? No   Does the patient receive services at the Coumadin Clinic? No   Are there any open cases? No   Discharge Plan A Home with family   Patient/Family In Agreement With Plan yes

## 2017-03-20 ENCOUNTER — PATIENT OUTREACH (OUTPATIENT)
Dept: ADMINISTRATIVE | Facility: CLINIC | Age: 67
End: 2017-03-20
Payer: COMMERCIAL

## 2017-03-20 ENCOUNTER — TELEPHONE (OUTPATIENT)
Dept: VASCULAR SURGERY | Facility: CLINIC | Age: 67
End: 2017-03-20

## 2017-03-20 ENCOUNTER — TELEPHONE (OUTPATIENT)
Dept: INTERNAL MEDICINE | Facility: CLINIC | Age: 67
End: 2017-03-20

## 2017-03-20 NOTE — PROGRESS NOTES
C3 nurse attempted to contact patient. No answer. The following message was left for the patient to return the call:  Good afternoon I am a nurse calling on behalf of [facility] from the Care Coordination [facility].  This is a Transitional Care Call for [facility]. When you have a moment please contact us at [phone] or [phone] Monday through Friday, between the hours of 8 am to 4 pm. We look forward to speaking with you. On behalf of [facility] have a nice day.    The patient does not have a scheduled HOSFU appointment within 7-14 days post hospital discharge date 03/16/17. Message sent to Physician staff to assist with HOSFU appointment scheduling.

## 2017-03-20 NOTE — TELEPHONE ENCOUNTER
----- Message from Sierra Gregory sent at 3/17/2017 12:44 PM CDT -----  Contact: lavell//wife  [phone]//caller states that she needs to schedule an appt for pt/please call//thank you

## 2017-03-28 ENCOUNTER — OFFICE VISIT (OUTPATIENT)
Dept: OPHTHALMOLOGY | Facility: CLINIC | Age: 67
End: 2017-03-28
Payer: COMMERCIAL

## 2017-03-28 ENCOUNTER — OFFICE VISIT (OUTPATIENT)
Dept: INTERNAL MEDICINE | Facility: CLINIC | Age: 67
End: 2017-03-28
Payer: COMMERCIAL

## 2017-03-28 VITALS
HEART RATE: 53 BPM | WEIGHT: 171.94 LBS | SYSTOLIC BLOOD PRESSURE: 124 MMHG | DIASTOLIC BLOOD PRESSURE: 82 MMHG | HEIGHT: 65 IN | BODY MASS INDEX: 28.65 KG/M2

## 2017-03-28 DIAGNOSIS — H35.033 HYPERTENSIVE RETINOPATHY OF BOTH EYES: ICD-10-CM

## 2017-03-28 DIAGNOSIS — I10 ESSENTIAL HYPERTENSION: ICD-10-CM

## 2017-03-28 DIAGNOSIS — I65.22 SYMPTOMATIC STENOSIS OF LEFT CAROTID ARTERY: Primary | ICD-10-CM

## 2017-03-28 DIAGNOSIS — H35.82 OCULAR ISCHEMIC SYNDROME: Primary | ICD-10-CM

## 2017-03-28 DIAGNOSIS — G45.3 AMAUROSIS FUGAX, LEFT EYE: ICD-10-CM

## 2017-03-28 DIAGNOSIS — H35.82 OCULAR ISCHEMIC SYNDROME: ICD-10-CM

## 2017-03-28 DIAGNOSIS — E11.9 TYPE 2 DIABETES MELLITUS WITHOUT COMPLICATION, WITHOUT LONG-TERM CURRENT USE OF INSULIN: ICD-10-CM

## 2017-03-28 PROCEDURE — 99999 PR PBB SHADOW E&M-EST. PATIENT-LVL III: CPT | Mod: PBBFAC,,, | Performed by: OPHTHALMOLOGY

## 2017-03-28 PROCEDURE — 99999 PR PBB SHADOW E&M-EST. PATIENT-LVL III: CPT | Mod: PBBFAC,,, | Performed by: INTERNAL MEDICINE

## 2017-03-28 PROCEDURE — 92226 PR SPECIAL EYE EXAM, SUBSEQUENT: CPT | Mod: LT,S$GLB,, | Performed by: OPHTHALMOLOGY

## 2017-03-28 PROCEDURE — 99499 UNLISTED E&M SERVICE: CPT | Mod: S$GLB,,, | Performed by: INTERNAL MEDICINE

## 2017-03-28 PROCEDURE — 99499 UNLISTED E&M SERVICE: CPT | Mod: S$GLB,,, | Performed by: OPHTHALMOLOGY

## 2017-03-28 PROCEDURE — 92014 COMPRE OPH EXAM EST PT 1/>: CPT | Mod: S$GLB,,, | Performed by: OPHTHALMOLOGY

## 2017-03-28 RX ORDER — OXYCODONE AND ACETAMINOPHEN 5; 325 MG/1; MG/1
1 TABLET ORAL EVERY 12 HOURS PRN
Qty: 30 TABLET | Refills: 0 | Status: SHIPPED | OUTPATIENT
Start: 2017-03-28 | End: 2017-07-24

## 2017-03-28 NOTE — MR AVS SNAPSHOT
[facility]  [address]  [facility] [address]  Phone: [phone]  Fax: [phone]                  Isma Martin   3/28/2017 3:40 PM   Office Visit    Description:  Male : 1950   Provider:  Anthony Tay MD   Department:  [facility]           Reason for Visit     Follow-up           Diagnoses this Visit        Comments    Ocular ischemic syndrome         Symptomatic stenosis of left carotid artery         Amaurosis fugax, left eye         Essential hypertension         Type 2 diabetes mellitus without complication, without long-term current use of insulin                To Do List           Future Appointments        Provider Department Dept Phone    2017 1:00 PM VASCULAR, LAB [facility] - Vascular Laboratory [phone]    2017 1:45 PM Jose Ricketts MD [facility] - Vascular Surgery [phone]    2017 8:30 AM THI Oh MD [facility] - Ophthalmology [phone]      Goals (5 Years of Data)     None       These Medications        Disp Refills Start End    oxycodone-acetaminophen (PERCOCET) 5-325 mg per tablet 30 tablet 0 3/28/2017     Take 1 tablet by mouth every 12 (twelve) hours as needed for Pain. - Oral    Pharmacy: [facility] [address] [place] [address] Ph #: [phone]         [facility] On Call     [facility] On Call Nurse Care Line -  Assistance  Registered nurses in the [facility] On Call Center provide clinical advisement, health education, appointment booking, and other advisory services.  Call for this free service at [phone].             Medications           Message regarding Medications     Verify the changes and/or additions to your medication regime listed below are the same as discussed with your clinician today.  If any of these changes or additions are incorrect, please notify your healthcare provider.        START taking these NEW medications        Refills     "oxycodone-acetaminophen (PERCOCET) 5-325 mg per tablet 0    Sig: Take 1 tablet by mouth every 12 (twelve) hours as needed for Pain.    Class: Normal    Route: Oral      STOP taking these medications     metoprolol succinate (TOPROL-XL) 50 MG 24 hr tablet Take 1 tablet (50 mg total) by mouth once daily.           Verify that the below list of medications is an accurate representation of the medications you are currently taking.  If none reported, the list may be blank. If incorrect, please contact your healthcare provider. Carry this list with you in case of emergency.           Current Medications     ascorbic acid (VITAMIN C) 500 MG tablet Take 500 mg by mouth Every PM.    aspirin (ECOTRIN) 81 MG EC tablet Take 1 tablet (81 mg total) by mouth once daily.    atorvastatin (LIPITOR) 20 MG tablet TAKE 1 TABLET DAILY    cabergoline (DOSTINEX) 0.5 mg tablet Take 0.5 tablets (0.25 mg total) by mouth twice a week.    cholecalciferol, vitamin D3, 2,000 unit Tab Take 1 tablet by mouth Every PM.    cyanocobalamin (VITAMIN B-12) 1000 MCG tablet     losartan-hydrochlorothiazide 100-25 mg (HYZAAR) 100-25 mg per tablet TAKE 1 TABLET DAILY    omega-3 fatty acids-vitamin E (FISH OIL) 1,000 mg Cap     omeprazole (PRILOSEC) 20 MG capsule Take 1 capsule (20 mg total) by mouth once daily.    ondansetron (ZOFRAN-ODT) 4 MG TbDL Take 1 tablet (4 mg total) by mouth every 8 (eight) hours as needed.    sildenafil (VIAGRA) 50 MG tablet Take 1 tablet (50 mg total) by mouth daily as needed for Erectile Dysfunction.    tamsulosin (FLOMAX) 0.4 mg Cp24 Take 1 capsule (0.4 mg total) by mouth once daily.    oxycodone-acetaminophen (PERCOCET) 5-325 mg per tablet Take 1 tablet by mouth every 12 (twelve) hours as needed for Pain.           Clinical Reference Information           Your Vitals Were     BP Pulse Height Weight BMI    124/82 (BP Location: Right arm, Patient Position: Sitting, BP Method: Manual) 53 5' 5" (1.651 m) 78 kg (171 lb 15.3 oz) " 28.62 kg/m2      Blood Pressure          Most Recent Value    BP  124/82      Allergies as of 3/28/2017     No Known Allergies      Immunizations Administered on Date of Encounter - 3/28/2017     None      Instructions    Neck Exercises: Active Neck Rotation    To start, lie on your back, knees bent and feet flat on the floor. Keep your ears, shoulders, and hips aligned, but dont press your lower back to the floor. Rest your hands on your pelvis. Breathe deeply and relax.              · Use your neck muscles to turn your head to one side until you feel a stretch in the muscles.  · Hold for 5 seconds. Then turn to the other side.  · Repeat 5 times on each side.  Note: Keep your shoulders on the floor. Dont lift or tuck your chin as you turn your head.  © 3605-8850 [employer]. [address] [place] [address]. All rights reserved. This information is not intended as a substitute for professional medical care. Always follow your healthcare professional's instructions.        Neck Exercises: Neck Flex                            To start, sit in a chair with your feet flat on the floor. Your weight should be slightly forward so that youre balanced evenly on your buttocks. Relax your shoulders and keep your head level. Avoid arching your back or rounding your shoulders. Using a chair with arms may help you keep your balance:  · Rest the back of your left hand against your lower back. Place your right palm on the top of your head.  · Gently pull your head forward and down until you feel a stretch in the muscles in the back of your neck. Dont force the motion.  · Hold for 20 seconds, then return to starting position. Switch arms.  © 3381-0990 [employer]. [address] [place] [address]. All rights reserved. This information is not intended as a substitute for professional medical care. Always follow your healthcare professional's instructions.        Neck Exercises:  Passive Neck Rotation        To start, lie on your back, knees bent and feet flat on the floor. Keep your ears, shoulders, and hips aligned, but dont press your lower back to the floor. Rest your hands on your pelvis. Breathe deeply and relax.  · With your neck relaxed, place the palm of one hand on your forehead. Use your hand to turn your head to one side until you feel a stretch in the neck muscles. Do not push through pain.  · Hold for 5 seconds. Then turn to the other side.  · Repeat 5 times on each side.   Note: Keep your shoulders on the floor. Dont lift your chin as you turn your head.  © 0720-0966 [employer]. [address] [place] [address], [place] [address]. All rights reserved. This information is not intended as a substitute for professional medical care. Always follow your healthcare professional's instructions.                 Language Assistance Services     ATTENTION: Language assistance services are available, free of charge. Please call [phone].      ATENCIÓN: Si habla tommy, tiene a meyers disposición servicios gratuitos de asistencia lingüística. Llame al [phone].     KALPANA Ý: N?u b?n nói Ti?ng Vi?t, có các d?ch v? h? tr? ngôn ng? mi?n phí dành cho b?n. G?i s? [phone].         [facility] complies with applicable Federal civil rights laws and does not discriminate on the basis of race, color, national origin, age, disability, or sex.

## 2017-03-28 NOTE — PROGRESS NOTES
Subjective:       Patient ID: Isma Martin is a 66 y.o. male.    Chief Complaint: Follow-up    HPI  Review of Systems    Objective:      Physical Exam    Assessment:       No diagnosis found.    Plan:       ***

## 2017-03-28 NOTE — PROGRESS NOTES
Subjective:       Patient ID: Isma Martin is a 66 y.o. male.    Chief Complaint: Follow-up    HPI Comments: In hosp HR was low, asymptomatic.    Since hosp has been doing fine. No longer L eye episodes of visual loss. Wound healing well. Does have L neck pains since CEA.    Home BPs have been fine.    Review of Systems   Constitutional: Negative for activity change and appetite change.   HENT: Negative for congestion and sinus pressure.    Eyes: Negative for visual disturbance.   Respiratory: Negative for chest tightness, shortness of breath and wheezing.    Cardiovascular: Negative for chest pain, palpitations and leg swelling.   Gastrointestinal: Negative for abdominal distention and abdominal pain.   Endocrine: Negative for polyuria.   Genitourinary: Negative for decreased urine volume, dysuria and urgency (improved).   Musculoskeletal: Negative for back pain, joint swelling and neck pain.   Skin: Negative for rash.   Neurological: Negative for tremors, syncope and weakness.   Hematological: Negative for adenopathy. Does not bruise/bleed easily.       Objective:      Physical Exam   Constitutional: He appears well-developed and well-nourished. No distress.   HENT:   Head: Normocephalic and atraumatic.   Mouth/Throat: No oropharyngeal exudate.   Eyes: Conjunctivae are normal. Pupils are equal, round, and reactive to light. No scleral icterus.   Neck: Normal range of motion. Neck supple. No thyromegaly present.   L CEA wound site is CDI.   Cardiovascular: Normal rate, regular rhythm and normal heart sounds.    Pulmonary/Chest: Effort normal and breath sounds normal.   Abdominal: Soft. Bowel sounds are normal.   Musculoskeletal:   L sided pain with rom of the neck and decreased rom present as well, no midline spine tenderness to deep palpation     Lymphadenopathy:     He has no cervical adenopathy.   Neurological:        Skin: No rash noted. He is not diaphoretic.   Psychiatric: He has a normal mood and  affect. His behavior is normal.       Assessment:       1. Ocular ischemic syndrome    2. Symptomatic stenosis of left carotid artery    3. Amaurosis fugax, left eye    4. Essential hypertension    5. Type 2 diabetes mellitus without complication, without long-term current use of insulin        Plan:       Isma was seen today for follow-up.    Diagnoses and all orders for this visit:    Ocular ischemic syndrome  -     oxycodone-acetaminophen (PERCOCET) 5-325 mg per tablet; Take 1 tablet by mouth every 12 (twelve) hours as needed for Pain.  Symptomatic stenosis of left carotid artery  -     oxycodone-acetaminophen (PERCOCET) 5-325 mg per tablet; Take 1 tablet by mouth every 12 (twelve) hours as needed for Pain.  Amaurosis fugax, left eye  -     oxycodone-acetaminophen (PERCOCET) 5-325 mg per tablet; Take 1 tablet by mouth every 12 (twelve) hours as needed for Pain.  Above symptoms improved s/p L CEA. Some pain from surgery, ? Mild nerve inj, will rx more percocet.  Some pain from neck muscle spasms as well, Pt given handouts on rom exercises.    Essential hypertension  Controlled, stop Toprol and follow home BPs, call if rachel    Type 2 diabetes mellitus without complication, without long-term current use of insulin  Has been well controlled    Transitional Care Note    Family and/or Caretaker present at visit?  Yes.  Diagnostic tests reviewed/disposition: No diagnosic tests pending after this hospitalization.  Disease/illness education: provided  Home health/community services discussion/referrals: Patient does not have home health established from hospital visit.  They do not need home health.  If needed, we will set up home health for the patient.   Establishment or re-establishment of referral orders for community resources: No other necessary community resources.   Discussion with other health care providers: No discussion with other health care providers necessary.             Return to clinic previously  agreed (at last visit) next due visit

## 2017-03-28 NOTE — MR AVS SNAPSHOT
[facility]  [address]  [facility] [address]  Phone: [phone]  Fax: [phone]                  Isma Martin   3/28/2017 9:10 AM   Office Visit    Description:  Male : 1950   Provider:  THI Oh MD   Department:  [facility]           Reason for Visit     Eye Problem           Diagnoses this Visit        Comments    Ocular ischemic syndrome    -  Primary     Hypertensive retinopathy of both eyes                To Do List           Future Appointments        Provider Department Dept Phone    3/30/2017 12:40 PM Anthony Tay MD [facility] - Internal Medicine [phone]    2017 1:00 PM VASCULAR, LAB [facility] [phone]    2017 1:45 PM Jose Ricketts MD [facility] - Vascular Surgery [phone]      Goals (5 Years of Data)     None      Follow-Up and Disposition     Return in about 3 months (around 2017).      [facility] On Call     [facility] On Call Nurse [facility] -  Assistance  Registered nurses in the [facility] On Call Center provide clinical advisement, health education, appointment booking, and other advisory services.  Call for this free service at [phone].             Medications           Message regarding Medications     Verify the changes and/or additions to your medication regime listed below are the same as discussed with your clinician today.  If any of these changes or additions are incorrect, please notify your healthcare provider.             Verify that the below list of medications is an accurate representation of the medications you are currently taking.  If none reported, the list may be blank. If incorrect, please contact your healthcare provider. Carry this list with you in case of emergency.           Current Medications     ascorbic acid (VITAMIN C) 500 MG tablet Take 500 mg by mouth Every PM.    aspirin (ECOTRIN) 81 MG EC tablet Take 1 tablet (81 mg total) by mouth once  daily.    atorvastatin (LIPITOR) 20 MG tablet TAKE 1 TABLET DAILY    cabergoline (DOSTINEX) 0.5 mg tablet Take 0.5 tablets (0.25 mg total) by mouth twice a week.    cholecalciferol, vitamin D3, 2,000 unit Tab Take 1 tablet by mouth Every PM.    cyanocobalamin (VITAMIN B-12) 1000 MCG tablet     losartan-hydrochlorothiazide 100-25 mg (HYZAAR) 100-25 mg per tablet TAKE 1 TABLET DAILY    metoprolol succinate (TOPROL-XL) 50 MG 24 hr tablet Take 1 tablet (50 mg total) by mouth once daily.    omega-3 fatty acids-vitamin E (FISH OIL) 1,000 mg Cap     omeprazole (PRILOSEC) 20 MG capsule Take 1 capsule (20 mg total) by mouth once daily.    ondansetron (ZOFRAN-ODT) 4 MG TbDL Take 1 tablet (4 mg total) by mouth every 8 (eight) hours as needed.    oxycodone-acetaminophen (PERCOCET) 5-325 mg per tablet Take one to two tablets by mouth every four hours as needed for pain    sildenafil (VIAGRA) 50 MG tablet Take 1 tablet (50 mg total) by mouth daily as needed for Erectile Dysfunction.    tamsulosin (FLOMAX) 0.4 mg Cp24 Take 1 capsule (0.4 mg total) by mouth once daily.           Clinical Reference Information           Allergies as of 3/28/2017     No Known Allergies      Immunizations Administered on Date of Encounter - 3/28/2017     None      Language Assistance Services     ATTENTION: Language assistance services are available, free of charge. Please call [phone].      ATENCIÓN: Si habla tommy, tiene a meyers disposición servicios gratuitos de asistencia lingüística. Llame al [phone].     [facility] Ý: N?u b?n nói Ti?ng Vi?t, có các d?ch v? h? tr? ngôn ng? mi?n phí dành cho b?n. G?i s? [phone].         [facility] complies with applicable Federal civil rights laws and does not discriminate on the basis of race, color, national origin, age, disability, or sex.

## 2017-03-28 NOTE — PATIENT INSTRUCTIONS
Neck Exercises: Active Neck Rotation    To start, lie on your back, knees bent and feet flat on the floor. Keep your ears, shoulders, and hips aligned, but dont press your lower back to the floor. Rest your hands on your pelvis. Breathe deeply and relax.              · Use your neck muscles to turn your head to one side until you feel a stretch in the muscles.  · Hold for 5 seconds. Then turn to the other side.  · Repeat 5 times on each side.  Note: Keep your shoulders on the floor. Dont lift or tuck your chin as you turn your head.  © 3100-9051 [employer]. [address] [place] [address]. All rights reserved. This information is not intended as a substitute for professional medical care. Always follow your healthcare professional's instructions.        Neck Exercises: Neck Flex                            To start, sit in a chair with your feet flat on the floor. Your weight should be slightly forward so that youre balanced evenly on your buttocks. Relax your shoulders and keep your head level. Avoid arching your back or rounding your shoulders. Using a chair with arms may help you keep your balance:  · Rest the back of your left hand against your lower back. Place your right palm on the top of your head.  · Gently pull your head forward and down until you feel a stretch in the muscles in the back of your neck. Dont force the motion.  · Hold for 20 seconds, then return to starting position. Switch arms.  © 3545-4020 [employer]. [address] [place] [address]. All rights reserved. This information is not intended as a substitute for professional medical care. Always follow your healthcare professional's instructions.        Neck Exercises: Passive Neck Rotation        To start, lie on your back, knees bent and feet flat on the floor. Keep your ears, shoulders, and hips aligned, but dont press your lower back to the floor. Rest your hands on your pelvis. Breathe  deeply and relax.  · With your neck relaxed, place the palm of one hand on your forehead. Use your hand to turn your head to one side until you feel a stretch in the neck muscles. Do not push through pain.  · Hold for 5 seconds. Then turn to the other side.  · Repeat 5 times on each side.   Note: Keep your shoulders on the floor. Dont lift your chin as you turn your head.  © 1744-7142 [employer]. [address] [place] [address], [place] [address]. All rights reserved. This information is not intended as a substitute for professional medical care. Always follow your healthcare professional's instructions.

## 2017-03-28 NOTE — PROGRESS NOTES
Prior OCT  OD: normal macula anatomy  OS: superiorly PED, no SRF, no CME      Prior FA  Normal filling time of arterioles OS with minimal leakage superior in area of PED seen on OCT, also late macular leakage and late leakage in macula     A/P    1. Ocular Ischemic Syndrome OS  - 80-95% blockage of L ICA  -Vas Surgery CEA on 3/15/2017  - no NV   - monitor for reperfusion NV post CEA      2. PED OS  - no SRF, will monitor today      3. HTN Retinopathy OU  - BP control      4. NSC OU   monitor      3 months

## 2017-04-14 PROCEDURE — 99495 TRANSJ CARE MGMT MOD F2F 14D: CPT | Mod: S$GLB,,, | Performed by: INTERNAL MEDICINE

## 2017-04-17 ENCOUNTER — HOSPITAL ENCOUNTER (OUTPATIENT)
Dept: VASCULAR SURGERY | Facility: CLINIC | Age: 67
Discharge: HOME | End: 2017-04-17
Attending: SURGERY
Payer: COMMERCIAL

## 2017-04-17 ENCOUNTER — OFFICE VISIT (OUTPATIENT)
Dept: VASCULAR SURGERY | Facility: CLINIC | Age: 67
End: 2017-04-17
Attending: SURGERY
Payer: COMMERCIAL

## 2017-04-17 VITALS
HEIGHT: 65 IN | DIASTOLIC BLOOD PRESSURE: 58 MMHG | HEART RATE: 52 BPM | BODY MASS INDEX: 27.82 KG/M2 | TEMPERATURE: 98 F | WEIGHT: 167 LBS | SYSTOLIC BLOOD PRESSURE: 112 MMHG

## 2017-04-17 DIAGNOSIS — I65.22 SYMPTOMATIC STENOSIS OF LEFT CAROTID ARTERY: ICD-10-CM

## 2017-04-17 DIAGNOSIS — I65.22 CAROTID STENOSIS, SYMPTOMATIC W/O INFARCT, LEFT: Primary | ICD-10-CM

## 2017-04-17 PROCEDURE — 93880 EXTRACRANIAL BILAT STUDY: CPT | Mod: S$GLB,,, | Performed by: SURGERY

## 2017-04-17 PROCEDURE — 99499 UNLISTED E&M SERVICE: CPT | Mod: S$GLB,,, | Performed by: SURGERY

## 2017-04-17 PROCEDURE — 99999 PR PBB SHADOW E&M-EST. PATIENT-LVL III: CPT | Mod: PBBFAC,,, | Performed by: SURGERY

## 2017-04-17 PROCEDURE — 99024 POSTOP FOLLOW-UP VISIT: CPT | Mod: S$GLB,,, | Performed by: SURGERY

## 2017-04-17 RX ORDER — METOPROLOL SUCCINATE 50 MG/1
50 TABLET, EXTENDED RELEASE ORAL DAILY
COMMUNITY
Start: 2017-04-06 | End: 2018-10-29

## 2017-04-17 NOTE — LETTER
April 17, 2017      Sangita Pineda MD  [address]  [facility] [address]           [facility]  [address]  [facility] [address]  Phone: [phone]  Fax: [phone]          Patient: Isma Martin   MR Number: [id number]   YOB: 1950   Date of Visit: 4/17/2017       Dear Dr. Sangita Pineda:    Thank you for referring Isma Martin to me for evaluation. Attached you will find relevant portions of my assessment and plan of care.    If you have questions, please do not hesitate to call me. I look forward to following Isma Martin along with you.    Sincerely,    Jose Ricketts MD    Enclosure  CC:  No Recipients    If you would like to receive this communication electronically, please contact [email or web address] or [phone] to request more information on [employer] Link access.    For providers and/or their staff who would like to refer a patient to [facility], please contact us through our one-stop-shop provider referral line, [facility], at [phone].    If you feel you have received this communication in error or would no longer like to receive these types of communications, please e-mail [email or web address]

## 2017-04-17 NOTE — PROGRESS NOTES
"Ashby Washington  4/17/2017    Consult: Referred by Dr. Tay  HPI:  Patient is a 66 y.o. male with HTN, DM (diet controlled), HLD, GERD, arthritis, prolactinoma, who is here today for evaluation of symptomatic high grade stenosis of left ICA. Pt. Reports visual symptoms with "whiteness and blurriness x 10-15 minutes" that began about 6 months ago. He experienced these symptoms about 3xs/week.  The patient notes that he experiences the changes in vision related to after he takes his blood pressure medication. When he checks his blood pressure at the time of the incident, he notes his blood pressure is 109 systolic. Pt. Reports that he does not experience these symptoms if he doesn't take his blood pressure medication. Pt. Last experienced these visual symptoms yesterday.  The patient also notes that about 9 months ago he had right arm weakness x 12 hours. He [place] [facility] [place] screened and ruled out MI/stroke.     He underwent L CEA 3/15/2017 and recovered uneventfully. Since that time, he has done well, with no further episodes of ocular symptoms.  He has intermittent left kaushal-mandibular pain but is otherwise without complaint.           No MI/stroke  Tobacco use: Quit 1997; 40 pack year history    Past Medical History   Diagnosis Date    Arthritis     Colon polyp     Diabetes mellitus     GERD (gastroesophageal reflux disease)     Hypertension     Hypogonadism male     Obesity     Prolactinoma      Past Surgical History   Procedure Laterality Date    Transphenoidal pituitary resection       pituitary tumor    Foot surgery  4-23-14     right     Family History   Problem Relation Age of Onset    Heart disease Brother     Heart attack Sister     Cancer Neg Hx     Diabetes Neg Hx     Colon polyps Neg Hx      Social History     Social History    Marital status:      Spouse name: Brenda    Number of children: N/A    Years of education: N/A     Occupational History    [employer]  " [employer]     [employer]     Social History Main Topics    Smoking status: Former Smoker     Packs/day: 1.50     Years: 35.00     Quit date: 1/1/1995    Smokeless tobacco: Never Used    Alcohol use 0.5 oz/week     1 Standard drinks or equivalent per week      Comment: rare    Drug use: No    Sexual activity: No     Other Topics Concern    Not on file     Social History Narrative    , on ssdi for his toe.    . 1 kid still with them. Wife dm and in WC.    3 kids total.     Current Outpatient Prescriptions on File Prior to Visit   Medication Sig    ascorbic acid (VITAMIN C) 500 MG tablet Take 500 mg by mouth Every PM.    aspirin (ECOTRIN) 81 MG EC tablet Take 1 tablet (81 mg total) by mouth once daily.    atorvastatin (LIPITOR) 20 MG tablet TAKE 1 TABLET DAILY    cabergoline (DOSTINEX) 0.5 mg tablet Take 0.5 tablets (0.25 mg total) by mouth twice a week.    cholecalciferol, vitamin D3, 2,000 unit Tab Take 1 tablet by mouth Every PM.    cyanocobalamin (VITAMIN B-12) 1000 MCG tablet     losartan-hydrochlorothiazide 100-25 mg (HYZAAR) 100-25 mg per tablet TAKE 1 TABLET DAILY    metoprolol succinate (TOPROL-XL) 50 MG 24 hr tablet Take 1 tablet (50 mg total) by mouth once daily.    omega-3 fatty acids-vitamin E (FISH OIL) 1,000 mg Cap     omeprazole (PRILOSEC) 20 MG capsule Take 1 capsule (20 mg total) by mouth once daily.    oxycodone-acetaminophen (PERCOCET) 5-325 mg per tablet Take 1 tablet by mouth 2 (two) times daily as needed for Pain.    sildenafil (VIAGRA) 50 MG tablet Take 1 tablet (50 mg total) by mouth daily as needed for Erectile Dysfunction.    tamsulosin (FLOMAX) 0.4 mg Cp24 Take 1 capsule (0.4 mg total) by mouth once daily.     No current facility-administered medications on file prior to visit.        REVIEW OF SYSTEMS:  General: negative; ENT: negative; Allergy and Immunology: negative; Hematological and Lymphatic: Negative; Endocrine: negative;  Respiratory: no cough, shortness of breath, or wheezing; Cardiovascular: no chest pain or dyspnea on exertion; Gastrointestinal: no abdominal pain/back, change in bowel habits, or bloody stools; Genito-Urinary: no dysuria, trouble voiding, or hematuria; Musculoskeletal: negative  Neurological: + visual changes x 6 months;     PHYSICAL EXAM:                General appearance:  Alert, well-appearing, and in no distress.  Oriented to person, place, and time   Neurological: Normal speech, no focal findings noted; CN II - XII grossly intact           Musculoskeletal: Digits/nail without cyanosis/clubbing.  Normal muscle strength/tone.                 Neck: Supple, no significant adenopathy; thyroid is not enlarged                  No carotid bruit can be auscultated      L CEA incision well-healed                Chest:  Clear to auscultation, no wheezes, rales or rhonchi, symmetric air entry     No use of accessory muscles             Cardiac: Normal rate and regular rhythm, S1 and S2 normal; PMI non-displaced          Abdomen: Soft, nontender, nondistended, no masses or organomegaly     No rebound tenderness noted; bowel sounds normal     No Pulsatile aortic mass is palpable.     No groin adenopathy      Extremities: 1+ femoral pulses bilaterally      1+ Popliteal pulses; 2+ pedal pulses palpable.      No pedal edema      No ulcerations    LAB RESULTS:  Lab Results   Component Value Date    K 4.2 01/24/2017    K 3.6 06/21/2016    K 3.8 05/11/2016    CREATININE 1.1 01/24/2017    CREATININE 0.9 06/21/2016    CREATININE 0.9 05/11/2016     Lab Results   Component Value Date    WBC 4.95 08/04/2016    WBC 4.40 05/03/2016    WBC 5.60 10/13/2014    HCT 43.3 08/04/2016    HCT 43.9 05/03/2016    HCT 40.5 10/13/2014     08/04/2016     05/03/2016     10/13/2014     Lab Results   Component Value Date    HGBA1C 6.1 01/24/2017    HGBA1C 5.9 05/11/2016    HGBA1C 5.9 11/10/2015     IMAGING:  US Carotid Bilateral  2/16/17  Right: ICA: <40%  Left: ICA: 80-95%;  cm/s; Ratio: 4.4    4/17/2017:    L ICA - widely patent  R ICA 1 - 39%      66 year old very pleasant male who presented with a high grade L ICA stenosis and underwent uneventful L CEA in 3/2017.   He has done very well post-operatively and has resumed baseline activity with no further neurological symptoms.  He continues on asa and a statin.    1) RTC in 6 months with carotid duplex  2) continue asa, statin     Jose Ricketts MD  Vascular & Endovascular Surgery

## 2017-06-30 ENCOUNTER — OFFICE VISIT (OUTPATIENT)
Dept: OPHTHALMOLOGY | Facility: CLINIC | Age: 67
End: 2017-06-30
Payer: MEDICARE

## 2017-06-30 VITALS — SYSTOLIC BLOOD PRESSURE: 124 MMHG | DIASTOLIC BLOOD PRESSURE: 71 MMHG | HEART RATE: 59 BPM

## 2017-06-30 DIAGNOSIS — H35.82 OCULAR ISCHEMIC SYNDROME: Primary | ICD-10-CM

## 2017-06-30 DIAGNOSIS — I10 ESSENTIAL HYPERTENSION: ICD-10-CM

## 2017-06-30 DIAGNOSIS — H35.033 HYPERTENSIVE RETINOPATHY OF BOTH EYES: ICD-10-CM

## 2017-06-30 PROCEDURE — 99499 UNLISTED E&M SERVICE: CPT | Mod: S$GLB,,, | Performed by: OPHTHALMOLOGY

## 2017-06-30 PROCEDURE — 92014 COMPRE OPH EXAM EST PT 1/>: CPT | Mod: S$GLB,,, | Performed by: OPHTHALMOLOGY

## 2017-06-30 PROCEDURE — 99999 PR PBB SHADOW E&M-EST. PATIENT-LVL IV: CPT | Mod: PBBFAC,,, | Performed by: OPHTHALMOLOGY

## 2017-06-30 PROCEDURE — 92226 PR SPECIAL EYE EXAM, SUBSEQUENT: CPT | Mod: RT,S$GLB,, | Performed by: OPHTHALMOLOGY

## 2017-06-30 NOTE — PROGRESS NOTES
HPI     3 mo check   DLS- 03/28/2017 Dr. Oh    Pt sts improvement in va since last visit denies pain   (-)Flashes (-)Floaters  (-)Photophobia  (-)Glare      Prior OCT  OD: normal macula anatomy  OS: superiorly PED, no SRF, no CME      Prior FA  Normal filling time of arterioles OS with minimal leakage superior in area of PED seen on OCT, also late macular leakage and late leakage in macula     A/P    1. Ocular Ischemic Syndrome OS  - 80-95% blockage of L ICA  -Vas Surgery CEA on 3/15/2017  - no NV   - monitor for reperfusion NV post CEA      2. PED OS  - no SRF, will monitor today      3. HTN Retinopathy OU  - BP control      4. NSC OU   monitor      12 months OCT

## 2017-07-24 ENCOUNTER — OFFICE VISIT (OUTPATIENT)
Dept: INTERNAL MEDICINE | Facility: CLINIC | Age: 67
End: 2017-07-24
Payer: MEDICARE

## 2017-07-24 ENCOUNTER — HOSPITAL ENCOUNTER (OUTPATIENT)
Dept: RADIOLOGY | Facility: HOSPITAL | Age: 67
Discharge: HOME | End: 2017-07-24
Attending: INTERNAL MEDICINE
Payer: MEDICARE

## 2017-07-24 VITALS
SYSTOLIC BLOOD PRESSURE: 112 MMHG | OXYGEN SATURATION: 96 % | HEIGHT: 65 IN | HEART RATE: 66 BPM | WEIGHT: 167.31 LBS | TEMPERATURE: 98 F | DIASTOLIC BLOOD PRESSURE: 62 MMHG | BODY MASS INDEX: 27.88 KG/M2

## 2017-07-24 DIAGNOSIS — M54.2 NECK PAIN: Primary | ICD-10-CM

## 2017-07-24 DIAGNOSIS — M54.2 NECK PAIN: ICD-10-CM

## 2017-07-24 PROCEDURE — 72050 X-RAY EXAM NECK SPINE 4/5VWS: CPT | Mod: 26,,, | Performed by: RADIOLOGY

## 2017-07-24 PROCEDURE — 72050 X-RAY EXAM NECK SPINE 4/5VWS: CPT | Mod: TC

## 2017-07-24 PROCEDURE — 99999 PR PBB SHADOW E&M-EST. PATIENT-LVL III: CPT | Mod: PBBFAC,,, | Performed by: INTERNAL MEDICINE

## 2017-07-24 PROCEDURE — 99213 OFFICE O/P EST LOW 20 MIN: CPT | Mod: S$GLB,,, | Performed by: INTERNAL MEDICINE

## 2017-07-24 RX ORDER — HYDROCODONE BITARTRATE AND ACETAMINOPHEN 5; 325 MG/1; MG/1
1 TABLET ORAL EVERY 6 HOURS PRN
Qty: 30 TABLET | Refills: 0 | Status: SHIPPED | OUTPATIENT
Start: 2017-07-24 | End: 2017-08-03

## 2017-07-25 NOTE — PROGRESS NOTES
"Subjective:       Patient ID: Isma Martin is a 67 y.o. male.    Chief Complaint: Loss of Consciousness and Emesis    3 days ago after making a "public speech" patient became very dizzy.  He had not eaten.  He felt very weak and had to have help to walk.  Was riding home and had to vomit.   stopped car, he opened the door, leaned out, vomited fainted and fell out of car.  He awoke quickly and fully, and felt ok ever since.  Had CEA in 3/2017.  Only complaint now is that his neck is stiff on right      Review of Systems   Constitutional: Negative for activity change, appetite change and fever.   HENT: Negative for congestion, postnasal drip and sore throat.    Respiratory: Negative for cough, shortness of breath and wheezing.    Cardiovascular: Negative for chest pain and palpitations.   Gastrointestinal: Negative for abdominal pain, blood in stool, constipation, diarrhea, nausea and vomiting.   Genitourinary: Negative for decreased urine volume, difficulty urinating, flank pain and frequency.   Musculoskeletal: Negative for arthralgias.   Neurological: Negative for dizziness, weakness and headaches.       Objective:      Physical Exam   Constitutional: He is oriented to person, place, and time. He appears well-developed and well-nourished. No distress.   HENT:   Head: Normocephalic and atraumatic.   Right Ear: External ear normal.   Left Ear: External ear normal.   Eyes: Conjunctivae and EOM are normal. Pupils are equal, round, and reactive to light.   Neck: Normal range of motion. Neck supple. No thyromegaly present.       Cardiovascular: Normal rate and regular rhythm.    Pulmonary/Chest: Effort normal and breath sounds normal.   Abdominal: Soft. Bowel sounds are normal. He exhibits no mass. There is no tenderness. There is no rebound and no guarding.   Musculoskeletal: Normal range of motion.   Lymphadenopathy:     He has no cervical adenopathy.   Neurological: He is alert and oriented to person, " place, and time. He has normal reflexes. He displays normal reflexes. No cranial nerve deficit. He exhibits normal muscle tone. Coordination normal.   Skin: Skin is warm and dry.       Assessment:       1. Neck pain        Plan:   Isma was seen today for loss of consciousness and emesis.    Diagnoses and all orders for this visit:    Neck pain  -     X-Ray Cervical Spine Complete 5 view; Future    Other orders  -     hydrocodone-acetaminophen 5-325mg (NORCO) 5-325 mg per tablet; Take 1 tablet by mouth every 6 (six) hours as needed for Pain.

## 2017-08-04 DIAGNOSIS — E11.9 TYPE 2 DIABETES MELLITUS WITHOUT COMPLICATION: ICD-10-CM

## 2017-08-24 ENCOUNTER — PATIENT MESSAGE (OUTPATIENT)
Dept: ORTHOPEDICS | Facility: CLINIC | Age: 67
End: 2017-08-24

## 2017-08-24 ENCOUNTER — PATIENT MESSAGE (OUTPATIENT)
Dept: ENDOCRINOLOGY | Facility: CLINIC | Age: 67
End: 2017-08-24

## 2017-08-28 DIAGNOSIS — D35.2 PROLACTINOMA: ICD-10-CM

## 2017-08-28 NOTE — TELEPHONE ENCOUNTER
----- Message from Altagracia Pollard sent at 2017  2:53 PM CDT -----  Contact: self/ [phone]  RX request - refill or new RX.refill  Is this a refill or new RX:    RX name and strength: cabergoline (DOSTINEX) 0.5 mg tablet  Directions:   Is this a 30 day or 90 day RX:    Pharmacy name and phone #:   Comments:  Patient had changed insurances and the RX he had is .    Sooner appointment than the  can schedule.  Did you offer to schedule the next available appointment and put the patient on the wait list?:    When is the first available appointment: 10/2017  What is the nature of the appointment: f/u  What visit type: ep  Patient preference of timeframe to be scheduled:    Comments: Please call and advise.

## 2017-08-29 RX ORDER — CABERGOLINE 0.5 MG/1
0.25 TABLET ORAL
Qty: 15 TABLET | Refills: 3 | Status: SHIPPED | OUTPATIENT
Start: 2017-08-31 | End: 2017-10-26 | Stop reason: SDUPTHER

## 2017-09-11 ENCOUNTER — OFFICE VISIT (OUTPATIENT)
Dept: ORTHOPEDICS | Facility: CLINIC | Age: 67
End: 2017-09-11
Payer: MEDICARE

## 2017-09-11 ENCOUNTER — HOSPITAL ENCOUNTER (OUTPATIENT)
Dept: RADIOLOGY | Facility: HOSPITAL | Age: 67
Discharge: HOME | End: 2017-09-11
Attending: ORTHOPAEDIC SURGERY
Payer: MEDICARE

## 2017-09-11 DIAGNOSIS — M79.671 BILATERAL FOOT PAIN: ICD-10-CM

## 2017-09-11 DIAGNOSIS — M19.079 ARTHRITIS OF FIRST MTP JOINT: ICD-10-CM

## 2017-09-11 DIAGNOSIS — M79.672 BILATERAL FOOT PAIN: ICD-10-CM

## 2017-09-11 DIAGNOSIS — M79.671 BILATERAL FOOT PAIN: Primary | ICD-10-CM

## 2017-09-11 DIAGNOSIS — M79.672 BILATERAL FOOT PAIN: Primary | ICD-10-CM

## 2017-09-11 PROCEDURE — 73630 X-RAY EXAM OF FOOT: CPT | Mod: 26,RT,, | Performed by: RADIOLOGY

## 2017-09-11 PROCEDURE — 99213 OFFICE O/P EST LOW 20 MIN: CPT | Mod: S$GLB,,, | Performed by: PHYSICIAN ASSISTANT

## 2017-09-11 PROCEDURE — 73630 X-RAY EXAM OF FOOT: CPT | Mod: 26,LT,, | Performed by: RADIOLOGY

## 2017-09-11 PROCEDURE — 73630 X-RAY EXAM OF FOOT: CPT | Mod: 50,TC

## 2017-09-11 PROCEDURE — 3008F BODY MASS INDEX DOCD: CPT | Mod: S$GLB,,, | Performed by: PHYSICIAN ASSISTANT

## 2017-09-11 PROCEDURE — 99999 PR PBB SHADOW E&M-EST. PATIENT-LVL I: CPT | Mod: PBBFAC,,, | Performed by: PHYSICIAN ASSISTANT

## 2017-09-11 RX ORDER — MELOXICAM 15 MG/1
15 TABLET ORAL DAILY
Qty: 14 TABLET | Refills: 0 | Status: SHIPPED | OUTPATIENT
Start: 2017-09-11 | End: 2017-09-28 | Stop reason: SDUPTHER

## 2017-09-11 NOTE — PROGRESS NOTES
Subjective:      Patient ID: Isma Martin is a 67 y.o. male.    Chief Complaint: No chief complaint on file.    HPI    Patient is a 67 year old male who presents to clinic with chief complaint of a-traumatic constant bilateral left greater than right great toe MTP pain increased over past 7-8 months. . Pain is increased with any range of motion and walking. Reports that it feels like it locks up on his at night. He has tried OTC NSAID and Tylenol without relief.  Patient denied numbness tingling and history of gout as well as fever chills or night sweats. Patient has history of DM, last A1C was 6.1 on 01/24/2017.     Surgical history: Right big toe cheilectomy on 04/23/2014 by .     Review of Systems   Constitution: Negative for chills and fever.   Cardiovascular: Negative for chest pain.   Respiratory: Negative for cough and shortness of breath.    Skin: Negative for color change, dry skin, itching, nail changes, poor wound healing and rash.   Musculoskeletal:        Bilateral MTP pain.    Neurological: Negative for dizziness.   Psychiatric/Behavioral: Negative for altered mental status. The patient is not nervous/anxious.    All other systems reviewed and are negative.        Objective:            General    Constitutional: He is oriented to person, place, and time. He appears well-developed and well-nourished. No distress.   HENT:   Head: Atraumatic.   Eyes: Conjunctivae are normal.   Cardiovascular: Normal rate.    Pulmonary/Chest: Effort normal.   Neurological: He is alert and oriented to person, place, and time.   Psychiatric: He has a normal mood and affect. His behavior is normal.         Right Ankle/Foot Exam     Inspection   Scars: present  Deformity: absent  Bruising: Foot - absent    Range of Motion   The patient has normal right ankle ROM.  First MTP Joint: limited and painful    Muscle Strength   The patient has normal right ankle strength.    Other   Sensation: normal    Left  Ankle/Foot Exam     Inspection  Deformity: absent  Bruising: Foot - absent  Scars: absent    Range of Motion   The patient has normal left ankle ROM.     Muscle Strength   The patient has normal left ankle strength.    Other   Sensation: normal      Vascular Exam     Right Pulses  Dorsalis Pedis:      2+          Left Pulses  Dorsalis Pedis:      2+          pes planus     RADS: narrowing of MTP joint bilaterally, with osteophyte formation of left.       Assessment:       Encounter Diagnoses   Name Primary?    Bilateral foot pain Yes    Arthritis of first MTP joint           Plan:       Discussed treatment options with patient. At this point he is not interested in any surgical intervention.   - antiinflammatories,   - hard sole shoe, will try post op shoe if helps will give order for diabetic shoes with hard sole.   - return to clinic as needed

## 2017-09-25 DIAGNOSIS — M19.071 ARTHRITIS OF FOOT, RIGHT: Primary | ICD-10-CM

## 2017-09-25 RX ORDER — MELOXICAM 15 MG/1
15 TABLET ORAL DAILY
Qty: 14 TABLET | Refills: 0 | Status: CANCELLED | OUTPATIENT
Start: 2017-09-25 | End: 2017-10-25

## 2017-09-25 NOTE — TELEPHONE ENCOUNTER
----- Message from Edis Dalton sent at 9/25/2017 12:43 PM CDT -----  Contact: Self [phone]  RX request - refill or new RX.  Is this a refill or new RX:  Refill   RX name and strength: meloxicam (MOBIC) 15 MG   Directions:   Is this a 30 day or 90 day RX:    Pharmacy name and phone # [facility] [phone]  Comments:

## 2017-09-25 NOTE — TELEPHONE ENCOUNTER
----- Message from Edis Dalton sent at 9/25/2017 12:43 PM CDT -----  Contact: Self [phone]  RX request - refill or new RX.  Is this a refill or new RX:  Refill   RX name and strength: meloxicam (MOBIC) 15 MG   Directions:   Is this a 30 day or 90 day RX:    Pharmacy name and phone # [facility] [phone]  Comments:

## 2017-09-26 ENCOUNTER — PATIENT MESSAGE (OUTPATIENT)
Dept: ORTHOPEDICS | Facility: CLINIC | Age: 67
End: 2017-09-26

## 2017-09-26 RX ORDER — MELOXICAM 15 MG/1
15 TABLET ORAL DAILY
Qty: 14 TABLET | Refills: 0 | OUTPATIENT
Start: 2017-09-26 | End: 2017-10-26

## 2017-09-28 RX ORDER — MELOXICAM 15 MG/1
15 TABLET ORAL DAILY
Qty: 30 TABLET | Refills: 2 | Status: SHIPPED | OUTPATIENT
Start: 2017-09-28 | End: 2017-09-28 | Stop reason: SDUPTHER

## 2017-09-28 RX ORDER — MELOXICAM 15 MG/1
15 TABLET ORAL DAILY
Qty: 30 TABLET | Refills: 2 | Status: SHIPPED | OUTPATIENT
Start: 2017-09-28 | End: 2017-10-25 | Stop reason: SDUPTHER

## 2017-09-28 NOTE — TELEPHONE ENCOUNTER
Hi, please call [facility] and cancel the meloxicam I sent in, now I see that he wanted it at [facility], rx sent to [facility].  Thank you, Anthony Tay

## 2017-10-25 ENCOUNTER — LAB VISIT (OUTPATIENT)
Dept: LAB | Facility: HOSPITAL | Age: 67
End: 2017-10-25
Attending: INTERNAL MEDICINE
Payer: MEDICARE

## 2017-10-25 ENCOUNTER — IMMUNIZATION (OUTPATIENT)
Dept: INTERNAL MEDICINE | Facility: CLINIC | Age: 67
End: 2017-10-25
Payer: MEDICARE

## 2017-10-25 ENCOUNTER — OFFICE VISIT (OUTPATIENT)
Dept: INTERNAL MEDICINE | Facility: CLINIC | Age: 67
End: 2017-10-25
Payer: MEDICARE

## 2017-10-25 VITALS
WEIGHT: 169.56 LBS | BODY MASS INDEX: 28.25 KG/M2 | OXYGEN SATURATION: 99 % | HEART RATE: 53 BPM | HEIGHT: 65 IN | SYSTOLIC BLOOD PRESSURE: 130 MMHG | DIASTOLIC BLOOD PRESSURE: 70 MMHG

## 2017-10-25 DIAGNOSIS — N13.8 BPH WITH URINARY OBSTRUCTION: ICD-10-CM

## 2017-10-25 DIAGNOSIS — E29.1 HYPOGONADISM MALE: ICD-10-CM

## 2017-10-25 DIAGNOSIS — B35.3 TINEA PEDIS OF BOTH FEET: ICD-10-CM

## 2017-10-25 DIAGNOSIS — H35.82 OCULAR ISCHEMIC SYNDROME: ICD-10-CM

## 2017-10-25 DIAGNOSIS — E11.9 TYPE 2 DIABETES MELLITUS WITHOUT COMPLICATION, WITHOUT LONG-TERM CURRENT USE OF INSULIN: ICD-10-CM

## 2017-10-25 DIAGNOSIS — N40.1 BPH WITH URINARY OBSTRUCTION: ICD-10-CM

## 2017-10-25 DIAGNOSIS — M19.071 ARTHRITIS OF FOOT, RIGHT: Primary | ICD-10-CM

## 2017-10-25 DIAGNOSIS — I10 ESSENTIAL HYPERTENSION: ICD-10-CM

## 2017-10-25 DIAGNOSIS — Z13.6 SCREENING FOR AAA (ABDOMINAL AORTIC ANEURYSM): ICD-10-CM

## 2017-10-25 LAB
COMPLEXED PSA SERPL-MCNC: 0.93 NG/ML
ESTIMATED AVG GLUCOSE: 117 MG/DL
HBA1C MFR BLD HPLC: 5.7 %

## 2017-10-25 PROCEDURE — 99214 OFFICE O/P EST MOD 30 MIN: CPT | Mod: S$GLB,,, | Performed by: INTERNAL MEDICINE

## 2017-10-25 PROCEDURE — G0008 ADMIN INFLUENZA VIRUS VAC: HCPCS | Mod: S$GLB,,, | Performed by: INTERNAL MEDICINE

## 2017-10-25 PROCEDURE — 36415 COLL VENOUS BLD VENIPUNCTURE: CPT

## 2017-10-25 PROCEDURE — 99999 PR PBB SHADOW E&M-EST. PATIENT-LVL III: CPT | Mod: PBBFAC,,, | Performed by: INTERNAL MEDICINE

## 2017-10-25 PROCEDURE — 83036 HEMOGLOBIN GLYCOSYLATED A1C: CPT

## 2017-10-25 PROCEDURE — 99499 UNLISTED E&M SERVICE: CPT | Mod: S$GLB,,, | Performed by: INTERNAL MEDICINE

## 2017-10-25 PROCEDURE — 90662 IIV NO PRSV INCREASED AG IM: CPT | Mod: S$GLB,,, | Performed by: INTERNAL MEDICINE

## 2017-10-25 PROCEDURE — 84153 ASSAY OF PSA TOTAL: CPT

## 2017-10-25 RX ORDER — MELOXICAM 15 MG/1
15 TABLET ORAL DAILY
Qty: 30 TABLET | Refills: 2 | Status: SHIPPED | OUTPATIENT
Start: 2017-10-25 | End: 2019-07-30

## 2017-10-25 RX ORDER — KETOCONAZOLE 20 MG/G
CREAM TOPICAL 2 TIMES DAILY
Qty: 60 G | Refills: 1 | Status: SHIPPED | OUTPATIENT
Start: 2017-10-25 | End: 2019-09-24

## 2017-10-25 NOTE — PROGRESS NOTES
Subjective:       Patient ID: Isma Martin is a 67 y.o. male.    Chief Complaint: Annual Exam    Here for f/u.    Recent rxays for foot pain. Pain worst at noc, big toes lock on him at noc. L>R.  Describes the big toe pain as cramping in nature, like a spasm.    Wants foot fungus examined.          Review of Systems   Constitutional: Negative for activity change and appetite change.   HENT: Negative for congestion and sinus pressure.    Eyes: Negative for visual disturbance.   Respiratory: Negative for chest tightness, shortness of breath and wheezing.    Cardiovascular: Negative for chest pain, palpitations and leg swelling.   Gastrointestinal: Negative for abdominal distention and abdominal pain.   Endocrine: Negative for polyuria.   Genitourinary: Negative for decreased urine volume, dysuria and urgency.   Musculoskeletal: Negative for back pain, joint swelling and neck pain.   Skin: Negative for rash.   Neurological: Negative for tremors, syncope and weakness.   Hematological: Negative for adenopathy. Does not bruise/bleed easily.       Objective:      Physical Exam   Constitutional: He appears well-developed and well-nourished. No distress.   HENT:   Head: Normocephalic and atraumatic.   Mouth/Throat: No oropharyngeal exudate.   Eyes: Conjunctivae are normal. Pupils are equal, round, and reactive to light. No scleral icterus.   Neck: Normal range of motion. Neck supple. No thyromegaly present.   L CEA wound site is CDI.   Cardiovascular: Normal rate, regular rhythm and normal heart sounds.    Pulmonary/Chest: Effort normal and breath sounds normal.   Abdominal: Soft. Bowel sounds are normal.   Musculoskeletal: He exhibits no edema.   Minimal movement of bilat big toes is painful.    Protective Sensation (w/ 10 gram monofilament):  Right: Intact  Left: Intact    Visual Inspection:  Skin Breakdown -  Bilateral and webspace tinea on L especially and moccasin distribution    Pedal Pulses:   Right:  Present  Left: Present    Posterior tibialis:   Right:Present  Left: Present'       Lymphadenopathy:     He has no cervical adenopathy.   Skin: No rash noted. He is not diaphoretic.   Psychiatric: He has a normal mood and affect. His behavior is normal.       Assessment:       1. Tinea pedis of both feet    2. Arthritis of foot, right    3. Essential hypertension    4. Type 2 diabetes mellitus without complication, without long-term current use of insulin    5. BPH with urinary obstruction    6. Hypogonadism male    7. Ocular ischemic syndrome    8. Screening for AAA (abdominal aortic aneurysm)        Plan:       Isma was seen today for annual exam.    Diagnoses and all orders for this visit:    Tinea pedis of both feet  -     ketoconazole (NIZORAL) 2 % cream; Apply topically 2 (two) times daily.  Explained that if not better in 1-2 weeks, pt should rtc/call PCP        Arthritis of foot, right  -     meloxicam (MOBIC) 15 MG tablet; Take 1 tablet (15 mg total) by mouth once daily.  Not sure cause of the cramps, bunions do not seem large.  Dm NP seems possible as well    Essential hypertension  controlled    Type 2 diabetes mellitus without complication, without long-term current use of insulin  -     Hemoglobin A1c; Future    BPH with urinary obstruction  -     Prostate Specific Antigen, Diagnostic; Future    Hypogonadism male    Ocular ischemic syndrome    Screening for AAA (abdominal aortic aneurysm)  -     US Abdominal Aorta; Future        Health Maintenance       Date Due Completion Date    Abdominal Aortic Aneurysm Screening 05/24/2017 ---    Hemoglobin A1c 07/24/2017 1/24/2017    Foot Exam 07/28/2017 7/28/2016 (Done)    Override on 7/28/2016: Done    Override on 8/10/2015: Done    Influenza Vaccine 08/01/2017 ---    Lipid Panel 01/24/2018 1/24/2017    Eye Exam 06/30/2018 6/30/2017    Override on 8/10/2015: Done    Colonoscopy 09/08/2020 9/8/2015    Override on 5/1/2008: Done    Pneumococcal (65+) (2 of 2 -  PPSV23) 07/05/2021 7/5/2016    TETANUS VACCINE 05/03/2026 5/3/2016      Flu vax please      Return in about 6 months (around 4/25/2018).

## 2017-10-26 DIAGNOSIS — D35.2 PROLACTINOMA: ICD-10-CM

## 2017-10-26 DIAGNOSIS — E11.9 TYPE 2 DIABETES MELLITUS WITHOUT COMPLICATION, WITHOUT LONG-TERM CURRENT USE OF INSULIN: ICD-10-CM

## 2017-10-26 DIAGNOSIS — I10 ESSENTIAL HYPERTENSION: ICD-10-CM

## 2017-10-26 RX ORDER — ATORVASTATIN CALCIUM 20 MG/1
20 TABLET, FILM COATED ORAL DAILY
Qty: 90 TABLET | Refills: 4 | Status: SHIPPED | OUTPATIENT
Start: 2017-10-26 | End: 2018-10-29

## 2017-10-26 RX ORDER — LOSARTAN POTASSIUM AND HYDROCHLOROTHIAZIDE 25; 100 MG/1; MG/1
1 TABLET ORAL DAILY
Qty: 90 TABLET | Refills: 4 | Status: SHIPPED | OUTPATIENT
Start: 2017-10-26 | End: 2018-05-23

## 2017-10-26 RX ORDER — CABERGOLINE 0.5 MG/1
0.25 TABLET ORAL
Qty: 12 TABLET | Refills: 0 | Status: SHIPPED | OUTPATIENT
Start: 2017-10-26 | End: 2018-08-22 | Stop reason: SDUPTHER

## 2017-10-26 NOTE — TELEPHONE ENCOUNTER
Hi, his endocrin doctor Dr Pan has been prescribing the Cabergoline, I sent in the others.  Thank you, Anthony Tay

## 2017-10-26 NOTE — TELEPHONE ENCOUNTER
----- Message from Sandi Streeter sent at 10/26/2017 12:16 PM CDT -----  Contact: self [phone]  Type: Rx    Name of medication(s): losartan-hydrochlorothiazide 100-25 mg (HYZAAR) 100-25 mg per tablet, atorvastatin (LIPITOR) 20 MG tablet, cabergoline (DOSTINEX) 0.5 mg tablet and     Is this a refill? New rx? refills    Who prescribed medication? [facility]    Pharmacy Name, Phone, & Location: [facility] [phone] (Phone) [phone] (Fax)     Comments: Patient is requesting above medications to go to the [facility] pharmacy due to his insurance preferences.    Thank you

## 2017-10-27 ENCOUNTER — OFFICE VISIT (OUTPATIENT)
Dept: ENDOCRINOLOGY | Facility: CLINIC | Age: 67
End: 2017-10-27
Payer: MEDICARE

## 2017-10-27 VITALS
BODY MASS INDEX: 28.28 KG/M2 | DIASTOLIC BLOOD PRESSURE: 65 MMHG | RESPIRATION RATE: 16 BRPM | HEART RATE: 54 BPM | HEIGHT: 65 IN | WEIGHT: 169.75 LBS | SYSTOLIC BLOOD PRESSURE: 122 MMHG

## 2017-10-27 DIAGNOSIS — I10 ESSENTIAL HYPERTENSION: Primary | ICD-10-CM

## 2017-10-27 DIAGNOSIS — E11.9 TYPE 2 DIABETES MELLITUS WITHOUT COMPLICATION, WITHOUT LONG-TERM CURRENT USE OF INSULIN: ICD-10-CM

## 2017-10-27 DIAGNOSIS — D35.2 PROLACTINOMA: ICD-10-CM

## 2017-10-27 DIAGNOSIS — E29.1 HYPOGONADISM MALE: ICD-10-CM

## 2017-10-27 PROCEDURE — 99499 UNLISTED E&M SERVICE: CPT | Mod: S$GLB,,, | Performed by: INTERNAL MEDICINE

## 2017-10-27 PROCEDURE — 99214 OFFICE O/P EST MOD 30 MIN: CPT | Mod: S$GLB,,, | Performed by: INTERNAL MEDICINE

## 2017-10-27 PROCEDURE — 99999 PR PBB SHADOW E&M-EST. PATIENT-LVL III: CPT | Mod: PBBFAC,,, | Performed by: INTERNAL MEDICINE

## 2017-10-27 NOTE — PROGRESS NOTES
Subjective:       Patient ID: Isma Martin is a 67 y.o. male.    Chief Complaint: Prolactinoma    Mr. Martin is presenting as a follow-up visit for prolactinoma. Has been on cabergoline 0.25mg BID since having resumed medication in August 2016.. No galactorrhea.           Initially in his 40s he Presented with symptoms of HA and Was found to have a pituitary tumor  Interventions have included: surgery 1996 at [facility] - he was advised that he needed sugery even though he was only on DA for a few days based on the size and invasion   After surgery his HA resolved, libido returned and was doing well... Was not on any meds      In his late 50s he Re-presented with ED and low libido   He started Taking testosterone age 62   Started with IM and was switched to gel but stopped it - did not feel any different on testosterone     Libido was better after starting DA.      mri 6/21/16  Remote operative change from transsphenoidal sellar lesion resection with continued heterogeneous probable fat packing material within sphenoid sinus.    Otherwise stable configuration of the sella with enhancing material along the floor and left aspect of the sella which may represent residual pituitary tissue underlying residual lesion not excluded. No evidence for new signal abnormality or enhancement to suggest worsening residual  lesion.       Review of Systems   Constitutional: Positive for fatigue (ocassional ). Negative for activity change.   HENT: Negative for congestion.    Respiratory: Negative for shortness of breath.    Cardiovascular: Negative for chest pain.   Gastrointestinal: Negative for abdominal pain.   Endocrine: Negative for cold intolerance and heat intolerance.   Genitourinary: Negative for difficulty urinating.   Musculoskeletal: Negative for arthralgias.   Allergic/Immunologic: Negative for environmental allergies.   Hematological: Negative for adenopathy.       Objective:      Physical Exam   Neck: No  thyromegaly present.   Cardiovascular: Intact distal pulses.    Pulmonary/Chest: Breath sounds normal.   Skin: Skin is warm and dry.       Vitals:    10/27/17 0814   BP: 122/65   Pulse: (!) 54   Resp: 16     Labs:  Results for orders placed or performed in visit on 10/25/17   Hemoglobin A1c   Result Value Ref Range    Hemoglobin A1C 5.7 (H) 4.0 - 5.6 %    Estimated Avg Glucose 117 68 - 131 mg/dL   Prostate Specific Antigen, Diagnostic   Result Value Ref Range    PSA DIAGNOSTIC 0.93 0.00 - 4.00 ng/mL      Assessment:           1.  Prolactinoma    2. Erectile dysfunction, unspecified erectile dysfunction type      3. Hypogonadism male      4. T2Dm controlled   Plan:       # prolactinoma with ED and hypogonadism - improvement in testosterone since on DA     - check prolactin today   - we will make changes in treatment after     # T2Dm diet controlled    # HTN at goal      RTC in 1 year

## 2017-10-31 ENCOUNTER — HOSPITAL ENCOUNTER (OUTPATIENT)
Dept: RADIOLOGY | Facility: HOSPITAL | Age: 67
Discharge: HOME | End: 2017-10-31
Attending: INTERNAL MEDICINE
Payer: MEDICARE

## 2017-10-31 DIAGNOSIS — Z13.6 SCREENING FOR AAA (ABDOMINAL AORTIC ANEURYSM): ICD-10-CM

## 2017-10-31 PROCEDURE — 76775 US EXAM ABDO BACK WALL LIM: CPT | Mod: TC

## 2017-10-31 PROCEDURE — 76775 US EXAM ABDO BACK WALL LIM: CPT | Mod: 26,,, | Performed by: RADIOLOGY

## 2018-02-16 DIAGNOSIS — E11.9 TYPE 2 DIABETES MELLITUS WITHOUT COMPLICATION: ICD-10-CM

## 2018-03-23 ENCOUNTER — OFFICE VISIT (OUTPATIENT)
Dept: INTERNAL MEDICINE | Facility: CLINIC | Age: 68
End: 2018-03-23
Payer: MEDICARE

## 2018-03-23 ENCOUNTER — LAB VISIT (OUTPATIENT)
Dept: LAB | Facility: HOSPITAL | Age: 68
End: 2018-03-23
Attending: INTERNAL MEDICINE
Payer: MEDICARE

## 2018-03-23 VITALS
DIASTOLIC BLOOD PRESSURE: 80 MMHG | HEIGHT: 65 IN | SYSTOLIC BLOOD PRESSURE: 120 MMHG | HEART RATE: 56 BPM | BODY MASS INDEX: 28.36 KG/M2 | WEIGHT: 170.19 LBS

## 2018-03-23 DIAGNOSIS — I65.22 SYMPTOMATIC STENOSIS OF LEFT CAROTID ARTERY: ICD-10-CM

## 2018-03-23 DIAGNOSIS — E11.9 TYPE 2 DIABETES MELLITUS WITHOUT COMPLICATION, WITHOUT LONG-TERM CURRENT USE OF INSULIN: Primary | ICD-10-CM

## 2018-03-23 DIAGNOSIS — D35.2 PROLACTINOMA: ICD-10-CM

## 2018-03-23 DIAGNOSIS — H35.82 OCULAR ISCHEMIC SYNDROME: ICD-10-CM

## 2018-03-23 DIAGNOSIS — E11.9 TYPE 2 DIABETES MELLITUS WITHOUT COMPLICATION, WITHOUT LONG-TERM CURRENT USE OF INSULIN: ICD-10-CM

## 2018-03-23 LAB
CHOLEST SERPL-MCNC: 190 MG/DL
CHOLEST/HDLC SERPL: 3.8 {RATIO}
ESTIMATED AVG GLUCOSE: 117 MG/DL
HBA1C MFR BLD HPLC: 5.7 %
HDLC SERPL-MCNC: 50 MG/DL
HDLC SERPL: 26.3 %
LDLC SERPL CALC-MCNC: 123.6 MG/DL
NONHDLC SERPL-MCNC: 140 MG/DL
TRIGL SERPL-MCNC: 82 MG/DL

## 2018-03-23 PROCEDURE — 3044F HG A1C LEVEL LT 7.0%: CPT | Mod: CPTII,S$GLB,, | Performed by: INTERNAL MEDICINE

## 2018-03-23 PROCEDURE — 99499 UNLISTED E&M SERVICE: CPT | Mod: S$GLB,,, | Performed by: INTERNAL MEDICINE

## 2018-03-23 PROCEDURE — 80061 LIPID PANEL: CPT

## 2018-03-23 PROCEDURE — 99999 PR PBB SHADOW E&M-EST. PATIENT-LVL III: CPT | Mod: PBBFAC,,, | Performed by: INTERNAL MEDICINE

## 2018-03-23 PROCEDURE — 36415 COLL VENOUS BLD VENIPUNCTURE: CPT

## 2018-03-23 PROCEDURE — 99214 OFFICE O/P EST MOD 30 MIN: CPT | Mod: S$GLB,,, | Performed by: INTERNAL MEDICINE

## 2018-03-23 PROCEDURE — 3074F SYST BP LT 130 MM HG: CPT | Mod: CPTII,S$GLB,, | Performed by: INTERNAL MEDICINE

## 2018-03-23 PROCEDURE — 83036 HEMOGLOBIN GLYCOSYLATED A1C: CPT

## 2018-03-23 PROCEDURE — 3079F DIAST BP 80-89 MM HG: CPT | Mod: CPTII,S$GLB,, | Performed by: INTERNAL MEDICINE

## 2018-03-23 NOTE — PROGRESS NOTES
Subjective:       Patient ID: Isma Martin is a 67 y.o. male.    Chief Complaint: Follow-up (arthritis of foot, right foot )    Patient is here for followup for chronic conditions.    No new issues.    No recurrence of amaurosis.    DM2:  good med adherence  no changed Diet  < 140 AM sugars  n/a Post-prandial sugars  no Numbness in feet  no sores in feet  no Visual changes  no Polyuria/polydipsia.          Review of Systems   Constitutional: Negative for activity change and appetite change.   HENT: Negative for congestion and sinus pressure.    Eyes: Negative for visual disturbance.   Respiratory: Negative for chest tightness, shortness of breath and wheezing.    Cardiovascular: Negative for chest pain, palpitations and leg swelling.   Gastrointestinal: Negative for abdominal distention and abdominal pain.   Endocrine: Negative for polyuria.   Genitourinary: Negative for decreased urine volume, dysuria and urgency.   Musculoskeletal: Negative for back pain, joint swelling and neck pain.   Skin: Negative for rash.   Neurological: Negative for tremors, syncope and weakness.   Hematological: Negative for adenopathy. Does not bruise/bleed easily.       Objective:      Physical Exam   Constitutional: He appears well-developed and well-nourished. No distress.   HENT:   Head: Normocephalic and atraumatic.   Mouth/Throat: No oropharyngeal exudate.   Eyes: Conjunctivae are normal. Pupils are equal, round, and reactive to light. No scleral icterus.   Neck: Normal range of motion. Neck supple. No thyromegaly present.   L CEA wound site is CDI.   Cardiovascular: Normal rate, regular rhythm and normal heart sounds.    Pulmonary/Chest: Effort normal and breath sounds normal.   Abdominal: Soft. Bowel sounds are normal.   Musculoskeletal: He exhibits no edema.   Lymphadenopathy:     He has no cervical adenopathy.   Skin: No rash noted. He is not diaphoretic.   Psychiatric: He has a normal mood and affect. His behavior is  normal.       Assessment:       1. Type 2 diabetes mellitus without complication, without long-term current use of insulin    2. Symptomatic stenosis of left carotid artery    3. Ocular ischemic syndrome        Plan:       Isma was seen today for follow-up.    Diagnoses and all orders for this visit:    Type 2 diabetes mellitus without complication, without long-term current use of insulin  -     Lipid panel; Future  -     Hemoglobin A1c; Future    Symptomatic stenosis of left carotid artery  -     US Carotid Bilateral; Future  Ocular ischemic syndrome  -     US Carotid Bilateral; Future    Prolactinoma -- controlled and now asymptomatic        Health Maintenance       Date Due Completion Date    Urine Microalbumin 08/12/2016 8/12/2015    Eye Exam 06/30/2018 6/30/2017    Override on 8/10/2015: Done    Hemoglobin A1c 09/23/2018 3/23/2018    Foot Exam 10/25/2018 10/25/2017    Override on 7/28/2016: Done    Override on 8/10/2015: Done    High Dose Statin 03/23/2019 3/23/2018    Lipid Panel 03/23/2019 3/23/2018    Colonoscopy 09/08/2020 9/8/2015    Override on 5/1/2008: Done    Pneumococcal (65+) (2 of 2 - PPSV23) 07/05/2021 7/5/2016    TETANUS VACCINE 05/03/2026 5/3/2016      Next time MAC      Follow-up in about 6 months (around 9/23/2018).

## 2018-04-26 ENCOUNTER — HOSPITAL ENCOUNTER (OUTPATIENT)
Dept: RADIOLOGY | Facility: HOSPITAL | Age: 68
Discharge: HOME | End: 2018-04-26
Attending: INTERNAL MEDICINE
Payer: MEDICARE

## 2018-04-26 DIAGNOSIS — I65.22 SYMPTOMATIC STENOSIS OF LEFT CAROTID ARTERY: ICD-10-CM

## 2018-04-26 DIAGNOSIS — H35.82 OCULAR ISCHEMIC SYNDROME: ICD-10-CM

## 2018-04-26 PROCEDURE — 93880 EXTRACRANIAL BILAT STUDY: CPT | Mod: 26,,, | Performed by: RADIOLOGY

## 2018-04-26 PROCEDURE — 93880 EXTRACRANIAL BILAT STUDY: CPT | Mod: TC

## 2018-05-08 ENCOUNTER — LAB VISIT (OUTPATIENT)
Dept: LAB | Facility: HOSPITAL | Age: 68
End: 2018-05-08
Attending: INTERNAL MEDICINE
Payer: MEDICARE

## 2018-05-08 ENCOUNTER — TELEPHONE (OUTPATIENT)
Dept: INTERNAL MEDICINE | Facility: CLINIC | Age: 68
End: 2018-05-08

## 2018-05-08 DIAGNOSIS — E11.9 TYPE 2 DIABETES MELLITUS WITHOUT COMPLICATION, WITHOUT LONG-TERM CURRENT USE OF INSULIN: ICD-10-CM

## 2018-05-08 DIAGNOSIS — R53.83 FATIGUE, UNSPECIFIED TYPE: Primary | ICD-10-CM

## 2018-05-08 DIAGNOSIS — R53.83 FATIGUE, UNSPECIFIED TYPE: ICD-10-CM

## 2018-05-08 LAB
ALBUMIN SERPL BCP-MCNC: 3.7 G/DL
ALP SERPL-CCNC: 48 U/L
ALT SERPL W/O P-5'-P-CCNC: 28 U/L
ANION GAP SERPL CALC-SCNC: 5 MMOL/L
AST SERPL-CCNC: 23 U/L
BASOPHILS # BLD AUTO: 0.03 K/UL
BASOPHILS NFR BLD: 0.6 %
BILIRUB SERPL-MCNC: 0.6 MG/DL
BUN SERPL-MCNC: 13 MG/DL
CALCIUM SERPL-MCNC: 9.3 MG/DL
CHLORIDE SERPL-SCNC: 103 MMOL/L
CHOLEST SERPL-MCNC: 164 MG/DL
CHOLEST/HDLC SERPL: 3.6 {RATIO}
CO2 SERPL-SCNC: 31 MMOL/L
CREAT SERPL-MCNC: 1.2 MG/DL
DIFFERENTIAL METHOD: NORMAL
EOSINOPHIL # BLD AUTO: 0.1 K/UL
EOSINOPHIL NFR BLD: 2.7 %
ERYTHROCYTE [DISTWIDTH] IN BLOOD BY AUTOMATED COUNT: 12.6 %
EST. GFR  (AFRICAN AMERICAN): >60 ML/MIN/1.73 M^2
EST. GFR  (NON AFRICAN AMERICAN): >60 ML/MIN/1.73 M^2
ESTIMATED AVG GLUCOSE: 123 MG/DL
GLUCOSE SERPL-MCNC: 98 MG/DL
HBA1C MFR BLD HPLC: 5.9 %
HCT VFR BLD AUTO: 48.7 %
HDLC SERPL-MCNC: 46 MG/DL
HDLC SERPL: 28 %
HGB BLD-MCNC: 16 G/DL
LDLC SERPL CALC-MCNC: 93.8 MG/DL
LYMPHOCYTES # BLD AUTO: 1.4 K/UL
LYMPHOCYTES NFR BLD: 26.8 %
MCH RBC QN AUTO: 29.6 PG
MCHC RBC AUTO-ENTMCNC: 32.9 G/DL
MCV RBC AUTO: 90 FL
MONOCYTES # BLD AUTO: 0.5 K/UL
MONOCYTES NFR BLD: 10 %
NEUTROPHILS # BLD AUTO: 3.1 K/UL
NEUTROPHILS NFR BLD: 59.7 %
NONHDLC SERPL-MCNC: 118 MG/DL
NRBC BLD-RTO: 0 /100 WBC
PLATELET # BLD AUTO: 198 K/UL
PMV BLD AUTO: 10.4 FL
POTASSIUM SERPL-SCNC: 4.1 MMOL/L
PROLACTIN SERPL IA-MCNC: 10.3 NG/ML
PROT SERPL-MCNC: 7.7 G/DL
RBC # BLD AUTO: 5.4 M/UL
SODIUM SERPL-SCNC: 139 MMOL/L
T4 FREE SERPL-MCNC: 1.02 NG/DL
TESTOST SERPL-MCNC: 427 NG/DL
TRIGL SERPL-MCNC: 121 MG/DL
TSH SERPL DL<=0.005 MIU/L-ACNC: 0.71 UIU/ML
WBC # BLD AUTO: 5.22 K/UL

## 2018-05-08 PROCEDURE — 83036 HEMOGLOBIN GLYCOSYLATED A1C: CPT

## 2018-05-08 PROCEDURE — 80053 COMPREHEN METABOLIC PANEL: CPT

## 2018-05-08 PROCEDURE — 85025 COMPLETE CBC W/AUTO DIFF WBC: CPT

## 2018-05-08 PROCEDURE — 84443 ASSAY THYROID STIM HORMONE: CPT

## 2018-05-08 PROCEDURE — 80061 LIPID PANEL: CPT

## 2018-05-08 PROCEDURE — 84146 ASSAY OF PROLACTIN: CPT

## 2018-05-08 PROCEDURE — 84403 ASSAY OF TOTAL TESTOSTERONE: CPT

## 2018-05-08 PROCEDURE — 36415 COLL VENOUS BLD VENIPUNCTURE: CPT

## 2018-05-08 PROCEDURE — 84439 ASSAY OF FREE THYROXINE: CPT

## 2018-05-23 ENCOUNTER — OFFICE VISIT (OUTPATIENT)
Dept: INTERNAL MEDICINE | Facility: CLINIC | Age: 68
End: 2018-05-23
Payer: MEDICARE

## 2018-05-23 VITALS
SYSTOLIC BLOOD PRESSURE: 102 MMHG | HEART RATE: 68 BPM | BODY MASS INDEX: 28.98 KG/M2 | WEIGHT: 173.94 LBS | HEIGHT: 65 IN | OXYGEN SATURATION: 98 % | DIASTOLIC BLOOD PRESSURE: 62 MMHG

## 2018-05-23 DIAGNOSIS — I10 ESSENTIAL HYPERTENSION: ICD-10-CM

## 2018-05-23 DIAGNOSIS — L73.9 FOLLICULITIS: Primary | ICD-10-CM

## 2018-05-23 DIAGNOSIS — E11.9 TYPE 2 DIABETES MELLITUS WITHOUT COMPLICATION, WITHOUT LONG-TERM CURRENT USE OF INSULIN: ICD-10-CM

## 2018-05-23 PROCEDURE — 3074F SYST BP LT 130 MM HG: CPT | Mod: CPTII,S$GLB,, | Performed by: INTERNAL MEDICINE

## 2018-05-23 PROCEDURE — 3044F HG A1C LEVEL LT 7.0%: CPT | Mod: CPTII,S$GLB,, | Performed by: INTERNAL MEDICINE

## 2018-05-23 PROCEDURE — 99999 PR PBB SHADOW E&M-EST. PATIENT-LVL III: CPT | Mod: PBBFAC,,, | Performed by: INTERNAL MEDICINE

## 2018-05-23 PROCEDURE — 3008F BODY MASS INDEX DOCD: CPT | Mod: CPTII,S$GLB,, | Performed by: INTERNAL MEDICINE

## 2018-05-23 PROCEDURE — 99214 OFFICE O/P EST MOD 30 MIN: CPT | Mod: S$GLB,,, | Performed by: INTERNAL MEDICINE

## 2018-05-23 PROCEDURE — 3078F DIAST BP <80 MM HG: CPT | Mod: CPTII,S$GLB,, | Performed by: INTERNAL MEDICINE

## 2018-05-23 RX ORDER — DOXYCYCLINE HYCLATE 100 MG
100 TABLET ORAL EVERY 12 HOURS
Qty: 20 TABLET | Refills: 0 | Status: SHIPPED | OUTPATIENT
Start: 2018-05-23 | End: 2018-06-02

## 2018-05-23 RX ORDER — LOSARTAN POTASSIUM AND HYDROCHLOROTHIAZIDE 12.5; 5 MG/1; MG/1
1 TABLET ORAL DAILY
Qty: 90 TABLET | Refills: 11 | Status: SHIPPED | OUTPATIENT
Start: 2018-05-23 | End: 2018-06-22

## 2018-05-23 NOTE — PROGRESS NOTES
Subjective:       Patient ID: Isma Martin is a 67 y.o. male.    Chief Complaint: Follow-up    Patient is here for followup for chronic conditions.     whole body still gets tired, not exertional, comes on around 10am. Has tried 1 day w/o BP med and he does not have the exhaustion.    Bumps on the back.      Review of Systems   Constitutional: Positive for fatigue (no snoring or nodding off). Negative for activity change and appetite change.   HENT: Negative for congestion and sinus pressure.    Eyes: Negative for visual disturbance.   Respiratory: Negative for chest tightness, shortness of breath and wheezing.    Cardiovascular: Negative for chest pain, palpitations and leg swelling.   Gastrointestinal: Negative for abdominal distention and abdominal pain.   Endocrine: Negative for polyuria.   Genitourinary: Negative for decreased urine volume, dysuria and urgency.   Musculoskeletal: Negative for back pain, joint swelling and neck pain.   Skin: Negative for rash.   Neurological: Negative for tremors, syncope and weakness.   Hematological: Negative for adenopathy. Does not bruise/bleed easily.       Objective:      Physical Exam   Constitutional: He appears well-developed and well-nourished. No distress.   HENT:   Head: Normocephalic and atraumatic.   Mouth/Throat: No oropharyngeal exudate.   Eyes: Conjunctivae are normal. Pupils are equal, round, and reactive to light. No scleral icterus.   Neck: Normal range of motion. Neck supple. No thyromegaly present.   L CEA wound site is CDI.   Cardiovascular: Normal rate, regular rhythm and normal heart sounds.    Orthostatic check:  100/60 hr 60 -> same, no symptoms   Pulmonary/Chest: Effort normal and breath sounds normal.   Abdominal: Soft. Bowel sounds are normal.   Musculoskeletal: He exhibits no edema.   Lymphadenopathy:     He has no cervical adenopathy.   Skin: No rash noted. He is not diaphoretic.   Follicular eruption on upper back with small white heads c/w  backne   Psychiatric: He has a normal mood and affect. His behavior is normal.       Assessment:       1. Folliculitis    2. Essential hypertension    3. Type 2 diabetes mellitus without complication, without long-term current use of insulin        Plan:       Isma was seen today for follow-up.    Diagnoses and all orders for this visit:    Folliculitis  -     doxycycline (VIBRA-TABS) 100 MG tablet; Take 1 tablet (100 mg total) by mouth every 12 (twelve) hours. Taking with food and water  Explained that if not better in 1-2 weeks, pt should rtc/call PCP        Essential hypertension  -     losartan-hydrochlorothiazide 50-12.5 mg (HYZAAR) 50-12.5 mg per tablet; Take 1 tablet by mouth once daily.  Likely too strong and causing tiredness, dec dose.  Explained that if not better in 1-2 weeks, pt should rtc/call PCP        Type 2 diabetes mellitus without complication, without long-term current use of insulin  Has been well controlled.      Health Maintenance       Date Due Completion Date    Eye Exam 06/30/2018 6/30/2017    Override on 8/10/2015: Done    Influenza Vaccine 08/01/2018 10/25/2017    Foot Exam 10/25/2018 10/25/2017    Override on 7/28/2016: Done    Override on 8/10/2015: Done    Hemoglobin A1c 11/08/2018 5/8/2018    Lipid Panel 05/08/2019 5/8/2018    High Dose Statin 05/23/2019 5/23/2018    Colonoscopy 09/08/2020 9/8/2015    Override on 5/1/2008: Done    Pneumococcal (65+) (2 of 2 - PPSV23) 07/05/2021 7/5/2016    TETANUS VACCINE 05/03/2026 5/3/2016          Follow-up in about 6 months (around 11/23/2018).

## 2018-07-03 ENCOUNTER — OFFICE VISIT (OUTPATIENT)
Dept: OPHTHALMOLOGY | Facility: CLINIC | Age: 68
End: 2018-07-03
Payer: MEDICARE

## 2018-07-03 DIAGNOSIS — H35.033 HYPERTENSIVE RETINOPATHY OF BOTH EYES: ICD-10-CM

## 2018-07-03 DIAGNOSIS — H35.722 MACULAR PIGMENT EPITHELIAL DETACHMENT OF LEFT EYE: ICD-10-CM

## 2018-07-03 DIAGNOSIS — H35.82 OCULAR ISCHEMIC SYNDROME: Primary | ICD-10-CM

## 2018-07-03 PROCEDURE — 92014 COMPRE OPH EXAM EST PT 1/>: CPT | Mod: S$GLB,,, | Performed by: OPHTHALMOLOGY

## 2018-07-03 PROCEDURE — 99999 PR PBB SHADOW E&M-EST. PATIENT-LVL II: CPT | Mod: PBBFAC,,, | Performed by: OPHTHALMOLOGY

## 2018-07-03 PROCEDURE — 92134 CPTRZ OPH DX IMG PST SGM RTA: CPT | Mod: S$GLB,,, | Performed by: OPHTHALMOLOGY

## 2018-07-03 PROCEDURE — 92226 PR SPECIAL EYE EXAM, SUBSEQUENT: CPT | Mod: RT,S$GLB,, | Performed by: OPHTHALMOLOGY

## 2018-07-03 RX ORDER — LOSARTAN POTASSIUM AND HYDROCHLOROTHIAZIDE 12.5; 5 MG/1; MG/1
TABLET ORAL
COMMUNITY
Start: 2018-05-23 | End: 2018-10-29

## 2018-07-03 RX ORDER — LOSARTAN POTASSIUM AND HYDROCHLOROTHIAZIDE 25; 100 MG/1; MG/1
TABLET ORAL
COMMUNITY
Start: 2018-06-27 | End: 2018-10-29

## 2018-07-03 NOTE — PROGRESS NOTES
HPI       DLS- 6/30/2017 Dr. Oh    No visual complaints.  Feels like glasses are working for him    Pt sts eyes run water a little. No problems. No flashes no floaters  (-)Flashes (-)Floaters  (-)Photophobia  (-)Glare      Prior OCT  OD: normal macula anatomy  OS: superiorly PED, no SRF, no CME      Prior FA  Normal filling time of arterioles OS with minimal leakage superior in area of PED seen on OCT, also late macular leakage and late leakage in macula     A/P    1. Ocular Ischemic Syndrome OS  - 80-95% blockage of L ICA  -Vas Surgery CEA on 3/15/2017  - no NV   - monitor for reperfusion NV post CEA      2. PED OS  - no SRF, will monitor today      3. HTN Retinopathy OU  - BP control      4. NSC OU   monitor      12 months OCT

## 2018-08-17 ENCOUNTER — LAB VISIT (OUTPATIENT)
Dept: LAB | Facility: HOSPITAL | Age: 68
End: 2018-08-17
Attending: INTERNAL MEDICINE
Payer: MEDICARE

## 2018-08-17 DIAGNOSIS — D35.2 PROLACTINOMA: ICD-10-CM

## 2018-08-17 LAB — PROLACTIN SERPL IA-MCNC: 12.5 NG/ML

## 2018-08-17 PROCEDURE — 84146 ASSAY OF PROLACTIN: CPT

## 2018-08-17 PROCEDURE — 36415 COLL VENOUS BLD VENIPUNCTURE: CPT

## 2018-08-20 NOTE — PROGRESS NOTES
Subjective:       Patient ID: Isma Martin is a 68 y.o. male.    Chief Complaint: Follow-up (Pitutary )    HPI    Mr. Martin is presenting as a follow-up visit for prolactinoma. Previous patient of Dr. Galeas. Last seen in 2017. This is her first visit with me.     Has been on cabergoline 0.25mg twice weekly since having resumed medication in August 2016.  No galactorrhea or breast tenderness.    Initially in his 40s he Presented with symptoms of HA and Was found to have a pituitary tumor  Interventions have included: surgery 1996 at [facility] - he was advised that he needed sugery even though he was only on DA for a few days based on the size and invasion   After surgery his HA resolved, libido returned and was doing well... Was not on any meds       In his late 50s he Re-presented with ED and low libido   He started Taking testosterone age 62   Started with IM and was switched to gel but stopped it - did not feel any different on testosterone     Libido was better after starting DA but has seemed to have decreased. Total testosterone was 427 on labs.       mri 6/21/16  Remote operative change from transsphenoidal sellar lesion resection with continued heterogeneous probable fat packing material within sphenoid sinus.    Otherwise stable configuration of the sella with enhancing material along the floor and left aspect of the sella which may represent residual pituitary tissue underlying residual lesion not excluded. No evidence for new signal abnormality or enhancement to suggest worsening residual  lesion.     Also has type 2 DM, controlled with Diet. Last A1c 5.9%.   On statin & ARB      Review of Systems   Constitutional: Positive for fatigue (ocassional ). Negative for activity change.   HENT: Negative for congestion.    Respiratory: Negative for shortness of breath.    Cardiovascular: Negative for chest pain.   Gastrointestinal: Negative for abdominal pain.   Endocrine: Negative for cold  intolerance and heat intolerance.   Genitourinary: Negative for difficulty urinating.   Musculoskeletal: Negative for arthralgias.   Allergic/Immunologic: Negative for environmental allergies.   Hematological: Negative for adenopathy.       Objective:      Physical Exam   Neck: No thyromegaly present.   Cardiovascular: Intact distal pulses.   Pulmonary/Chest: Breath sounds normal.   Skin: Skin is warm and dry.       Vitals:    08/22/18 1424   BP: 124/68   Pulse: 74     Labs:   Results for FLORIDALMA DIAZ (MRN [id number]) as of 8/22/2018 14:29   Ref. Range 5/8/2018 16:30   Sodium Latest Ref Range: 136 - 145 mmol/L 139   Potassium Latest Ref Range: 3.5 - 5.1 mmol/L 4.1   Chloride Latest Ref Range: 95 - 110 mmol/L 103   CO2 Latest Ref Range: 23 - 29 mmol/L 31 (H)   Anion Gap Latest Ref Range: 8 - 16 mmol/L 5 (L)   BUN, Bld Latest Ref Range: 8 - 23 mg/dL 13   Creatinine Latest Ref Range: 0.5 - 1.4 mg/dL 1.2   eGFR if non African American Latest Ref Range: >60 mL/min/1.73 m^2 >60.0   eGFR if African American Latest Ref Range: >60 mL/min/1.73 m^2 >60.0   Glucose Latest Ref Range: 70 - 110 mg/dL 98   Calcium Latest Ref Range: 8.7 - 10.5 mg/dL 9.3   Alkaline Phosphatase Latest Ref Range: 55 - 135 U/L 48 (L)   Total Protein Latest Ref Range: 6.0 - 8.4 g/dL 7.7   Albumin Latest Ref Range: 3.5 - 5.2 g/dL 3.7   Total Bilirubin Latest Ref Range: 0.1 - 1.0 mg/dL 0.6   AST Latest Ref Range: 10 - 40 U/L 23   ALT Latest Ref Range: 10 - 44 U/L 28   Triglycerides Latest Ref Range: 30 - 150 mg/dL 121   Cholesterol Latest Ref Range: 120 - 199 mg/dL 164   HDL Latest Ref Range: 40 - 75 mg/dL 46   LDL Cholesterol Latest Ref Range: 63.0 - 159.0 mg/dL 93.8   Total Cholesterol/HDL Ratio Latest Ref Range: 2.0 - 5.0  3.6   Hemoglobin A1C Latest Ref Range: 4.0 - 5.6 % 5.9 (H)   Estimated Avg Glucose Latest Ref Range: 68 - 131 mg/dL 123   TSH Latest Ref Range: 0.400 - 4.000 uIU/mL 0.715   Free T4 Latest Ref Range: 0.71 - 1.51 ng/dL 1.02    Prolactin Latest Ref Range: 3.5 - 19.4 ng/mL 10.3   Testosterone, Total Latest Ref Range: 195.0 - 1138.0 ng/dL 427     Results for FLORIDALMA DIAZ (MRN [id number]) as of 8/22/2018 14:29   Ref. Range 8/17/2018 13:28   Prolactin Latest Ref Range: 3.5 - 19.4 ng/mL 12.5     Assessment:       1. Prolactinoma  Comprehensive metabolic panel    TSH    T4, free    Prolactin    cabergoline (DOSTINEX) 0.5 mg tablet   2. Hypogonadism male  Testosterone Panel    Testosterone   3. Erectile dysfunction, unspecified erectile dysfunction type     4. Type 2 diabetes mellitus without complication, without long-term current use of insulin     5. Essential hypertension     6. Hyperlipidemia, unspecified hyperlipidemia type       Plan:       Prolactinoma with ED and hypogonadism   -- testosterone has remained stable since on DA   -- check testosterone panel at 8 am. If low will start testosterone. If not will send to urology for ED medication.    -- Prolactin normal, continue cabergoline 0.25 mg twice weekly     T2Dm diet controlled, on ACEi & statin    HTN at goal      Follow-up in about 1 year (around 8/22/2019).    Case discussed with Dr. Burns  Recommendations were discussed with the patient in detail  The patient verbalized understanding and agrees with the plan outlined as above.

## 2018-08-22 ENCOUNTER — OFFICE VISIT (OUTPATIENT)
Dept: ENDOCRINOLOGY | Facility: CLINIC | Age: 68
End: 2018-08-22
Payer: MEDICARE

## 2018-08-22 VITALS
HEART RATE: 74 BPM | WEIGHT: 176.69 LBS | BODY MASS INDEX: 29.44 KG/M2 | DIASTOLIC BLOOD PRESSURE: 68 MMHG | HEIGHT: 65 IN | SYSTOLIC BLOOD PRESSURE: 124 MMHG

## 2018-08-22 DIAGNOSIS — N52.9 ERECTILE DYSFUNCTION, UNSPECIFIED ERECTILE DYSFUNCTION TYPE: ICD-10-CM

## 2018-08-22 DIAGNOSIS — E11.9 TYPE 2 DIABETES MELLITUS WITHOUT COMPLICATION, WITHOUT LONG-TERM CURRENT USE OF INSULIN: ICD-10-CM

## 2018-08-22 DIAGNOSIS — E78.5 HYPERLIPIDEMIA, UNSPECIFIED HYPERLIPIDEMIA TYPE: ICD-10-CM

## 2018-08-22 DIAGNOSIS — D35.2 PROLACTINOMA: Primary | ICD-10-CM

## 2018-08-22 DIAGNOSIS — E29.1 HYPOGONADISM MALE: ICD-10-CM

## 2018-08-22 DIAGNOSIS — I10 ESSENTIAL HYPERTENSION: ICD-10-CM

## 2018-08-22 PROCEDURE — 3074F SYST BP LT 130 MM HG: CPT | Mod: CPTII,S$GLB,, | Performed by: NURSE PRACTITIONER

## 2018-08-22 PROCEDURE — 3078F DIAST BP <80 MM HG: CPT | Mod: CPTII,S$GLB,, | Performed by: NURSE PRACTITIONER

## 2018-08-22 PROCEDURE — 3044F HG A1C LEVEL LT 7.0%: CPT | Mod: CPTII,S$GLB,, | Performed by: NURSE PRACTITIONER

## 2018-08-22 PROCEDURE — 99214 OFFICE O/P EST MOD 30 MIN: CPT | Mod: S$GLB,,, | Performed by: NURSE PRACTITIONER

## 2018-08-22 PROCEDURE — 99499 UNLISTED E&M SERVICE: CPT | Mod: S$GLB,,, | Performed by: NURSE PRACTITIONER

## 2018-08-22 PROCEDURE — 99999 PR PBB SHADOW E&M-EST. PATIENT-LVL III: CPT | Mod: PBBFAC,,, | Performed by: NURSE PRACTITIONER

## 2018-08-22 RX ORDER — CABERGOLINE 0.5 MG/1
0.25 TABLET ORAL
Qty: 12 TABLET | Refills: 0 | Status: SHIPPED | OUTPATIENT
Start: 2018-08-23 | End: 2018-12-03 | Stop reason: SDUPTHER

## 2018-08-23 ENCOUNTER — LAB VISIT (OUTPATIENT)
Dept: LAB | Facility: HOSPITAL | Age: 68
End: 2018-08-23
Payer: MEDICARE

## 2018-08-23 DIAGNOSIS — E29.1 HYPOGONADISM MALE: ICD-10-CM

## 2018-08-23 LAB — TESTOST SERPL-MCNC: 618 NG/DL

## 2018-08-23 PROCEDURE — 84403 ASSAY OF TOTAL TESTOSTERONE: CPT | Mod: 91

## 2018-08-23 PROCEDURE — 84270 ASSAY OF SEX HORMONE GLOBUL: CPT

## 2018-08-23 PROCEDURE — 82040 ASSAY OF SERUM ALBUMIN: CPT

## 2018-08-27 LAB
ALBUMIN SERPL-MCNC: 3.7 G/DL (ref 3.6–5.1)
SHBG SERPL-SCNC: 43 NMOL/L (ref 22–77)
TESTOST FREE SERPL-MCNC: 59.9 PG/ML (ref 46–224)
TESTOST SERPL-MCNC: 529 NG/DL (ref 250–1100)
TESTOSTERONE.FREE+WB SERPL-MCNC: 102.4 NG/DL (ref 110–575)

## 2018-08-28 ENCOUNTER — TELEPHONE (OUTPATIENT)
Dept: ENDOCRINOLOGY | Facility: CLINIC | Age: 68
End: 2018-08-28

## 2018-08-28 ENCOUNTER — PATIENT MESSAGE (OUTPATIENT)
Dept: ENDOCRINOLOGY | Facility: CLINIC | Age: 68
End: 2018-08-28

## 2018-08-28 DIAGNOSIS — N52.9 ERECTILE DYSFUNCTION, UNSPECIFIED ERECTILE DYSFUNCTION TYPE: Primary | ICD-10-CM

## 2018-08-28 NOTE — TELEPHONE ENCOUNTER
----- Message from Katie Christina NP sent at 8/28/2018  8:05 AM CDT -----  Please schedule patient appointment with urology. Order in    Thank you,   Katie

## 2018-10-22 ENCOUNTER — LAB VISIT (OUTPATIENT)
Dept: LAB | Facility: HOSPITAL | Age: 68
End: 2018-10-22
Attending: INTERNAL MEDICINE
Payer: MEDICARE

## 2018-10-22 DIAGNOSIS — E11.9 TYPE 2 DIABETES MELLITUS WITHOUT COMPLICATION: ICD-10-CM

## 2018-10-22 DIAGNOSIS — D35.2 PROLACTINOMA: ICD-10-CM

## 2018-10-22 LAB
ALBUMIN SERPL BCP-MCNC: 3.5 G/DL
ALP SERPL-CCNC: 51 U/L
ALT SERPL W/O P-5'-P-CCNC: 21 U/L
ANION GAP SERPL CALC-SCNC: 5 MMOL/L
AST SERPL-CCNC: 24 U/L
BILIRUB SERPL-MCNC: 0.5 MG/DL
BUN SERPL-MCNC: 16 MG/DL
CALCIUM SERPL-MCNC: 9.2 MG/DL
CHLORIDE SERPL-SCNC: 108 MMOL/L
CHOLEST SERPL-MCNC: 165 MG/DL
CHOLEST/HDLC SERPL: 4.1 {RATIO}
CO2 SERPL-SCNC: 28 MMOL/L
CREAT SERPL-MCNC: 1 MG/DL
EST. GFR  (AFRICAN AMERICAN): >60 ML/MIN/1.73 M^2
EST. GFR  (NON AFRICAN AMERICAN): >60 ML/MIN/1.73 M^2
GLUCOSE SERPL-MCNC: 113 MG/DL
HDLC SERPL-MCNC: 40 MG/DL
HDLC SERPL: 24.2 %
LDLC SERPL CALC-MCNC: 113.8 MG/DL
NONHDLC SERPL-MCNC: 125 MG/DL
POTASSIUM SERPL-SCNC: 4.4 MMOL/L
PROLACTIN SERPL IA-MCNC: 9.3 NG/ML
PROT SERPL-MCNC: 7 G/DL
SODIUM SERPL-SCNC: 141 MMOL/L
T4 FREE SERPL-MCNC: 0.8 NG/DL
TRIGL SERPL-MCNC: 56 MG/DL
TSH SERPL DL<=0.005 MIU/L-ACNC: 1 UIU/ML

## 2018-10-22 PROCEDURE — 84443 ASSAY THYROID STIM HORMONE: CPT

## 2018-10-22 PROCEDURE — 84439 ASSAY OF FREE THYROXINE: CPT

## 2018-10-22 PROCEDURE — 80061 LIPID PANEL: CPT

## 2018-10-22 PROCEDURE — 36415 COLL VENOUS BLD VENIPUNCTURE: CPT

## 2018-10-22 PROCEDURE — 80053 COMPREHEN METABOLIC PANEL: CPT

## 2018-10-22 PROCEDURE — 84146 ASSAY OF PROLACTIN: CPT

## 2018-10-24 RX ORDER — OMEPRAZOLE 20 MG/1
20 CAPSULE, DELAYED RELEASE ORAL DAILY
Qty: 90 CAPSULE | Refills: 11 | Status: SHIPPED | OUTPATIENT
Start: 2018-10-24 | End: 2018-10-29

## 2018-10-29 ENCOUNTER — TELEPHONE (OUTPATIENT)
Dept: INTERNAL MEDICINE | Facility: CLINIC | Age: 68
End: 2018-10-29

## 2018-10-29 ENCOUNTER — OFFICE VISIT (OUTPATIENT)
Dept: INTERNAL MEDICINE | Facility: CLINIC | Age: 68
End: 2018-10-29
Payer: MEDICARE

## 2018-10-29 VITALS
DIASTOLIC BLOOD PRESSURE: 68 MMHG | BODY MASS INDEX: 29.57 KG/M2 | SYSTOLIC BLOOD PRESSURE: 122 MMHG | WEIGHT: 177.5 LBS | OXYGEN SATURATION: 97 % | HEIGHT: 65 IN | HEART RATE: 57 BPM

## 2018-10-29 DIAGNOSIS — E11.9 TYPE 2 DIABETES MELLITUS WITHOUT COMPLICATION, WITHOUT LONG-TERM CURRENT USE OF INSULIN: ICD-10-CM

## 2018-10-29 DIAGNOSIS — K21.9 GASTROESOPHAGEAL REFLUX DISEASE, ESOPHAGITIS PRESENCE NOT SPECIFIED: ICD-10-CM

## 2018-10-29 DIAGNOSIS — I10 ESSENTIAL HYPERTENSION: Primary | ICD-10-CM

## 2018-10-29 PROCEDURE — 99999 PR PBB SHADOW E&M-EST. PATIENT-LVL III: CPT | Mod: PBBFAC,,, | Performed by: INTERNAL MEDICINE

## 2018-10-29 PROCEDURE — 99213 OFFICE O/P EST LOW 20 MIN: CPT | Mod: PBBFAC | Performed by: INTERNAL MEDICINE

## 2018-10-29 PROCEDURE — 99214 OFFICE O/P EST MOD 30 MIN: CPT | Mod: S$PBB,,, | Performed by: INTERNAL MEDICINE

## 2018-10-29 PROCEDURE — 3074F SYST BP LT 130 MM HG: CPT | Mod: CPTII,,, | Performed by: INTERNAL MEDICINE

## 2018-10-29 PROCEDURE — 1101F PT FALLS ASSESS-DOCD LE1/YR: CPT | Mod: CPTII,,, | Performed by: INTERNAL MEDICINE

## 2018-10-29 PROCEDURE — 3078F DIAST BP <80 MM HG: CPT | Mod: CPTII,,, | Performed by: INTERNAL MEDICINE

## 2018-10-29 PROCEDURE — 3044F HG A1C LEVEL LT 7.0%: CPT | Mod: CPTII,,, | Performed by: INTERNAL MEDICINE

## 2018-10-29 RX ORDER — ATORVASTATIN CALCIUM 40 MG/1
40 TABLET, FILM COATED ORAL DAILY
Qty: 90 TABLET | Refills: 11 | Status: SHIPPED | OUTPATIENT
Start: 2018-10-29 | End: 2018-10-29

## 2018-10-29 RX ORDER — ATORVASTATIN CALCIUM 40 MG/1
40 TABLET, FILM COATED ORAL DAILY
Qty: 90 TABLET | Refills: 11 | Status: SHIPPED | OUTPATIENT
Start: 2018-10-29 | End: 2019-10-28 | Stop reason: SDUPTHER

## 2018-10-29 RX ORDER — OMEPRAZOLE 20 MG/1
20 CAPSULE, DELAYED RELEASE ORAL DAILY
Qty: 90 CAPSULE | Refills: 11 | Status: SHIPPED | OUTPATIENT
Start: 2018-10-29 | End: 2020-01-23 | Stop reason: SDUPTHER

## 2018-10-29 RX ORDER — LOSARTAN POTASSIUM AND HYDROCHLOROTHIAZIDE 25; 100 MG/1; MG/1
1 TABLET ORAL DAILY
Qty: 90 TABLET | Refills: 11 | Status: SHIPPED | OUTPATIENT
Start: 2018-10-29 | End: 2019-05-28

## 2018-10-29 RX ORDER — METOPROLOL SUCCINATE 50 MG/1
50 TABLET, EXTENDED RELEASE ORAL DAILY
Qty: 90 TABLET | Refills: 11 | Status: SHIPPED | OUTPATIENT
Start: 2018-10-29 | End: 2018-11-05

## 2018-10-29 NOTE — PROGRESS NOTES
Subjective:       Patient ID: Isma Martin is a 68 y.o. male.    Chief Complaint: Annual Exam    Patient is here for followup for chronic conditions.    Occasionally sticking pins sensation in upper abd.    Occasionally nerve shocks in his body.    Some incd anxiety.      Review of Systems   Constitutional: Negative for activity change and appetite change.   HENT: Negative for congestion and sinus pressure.    Eyes: Negative for visual disturbance.   Respiratory: Negative for chest tightness, shortness of breath and wheezing.    Cardiovascular: Negative for chest pain, palpitations and leg swelling.   Gastrointestinal: Negative for abdominal distention and abdominal pain.   Endocrine: Negative for polyuria.   Genitourinary: Negative for decreased urine volume, dysuria and urgency.   Musculoskeletal: Negative for back pain, joint swelling and neck pain.   Skin: Negative for rash.   Neurological: Negative for tremors, syncope and weakness.   Hematological: Negative for adenopathy. Does not bruise/bleed easily.   Psychiatric/Behavioral: The patient is nervous/anxious.        Objective:      Physical Exam   Constitutional: He appears well-developed and well-nourished. No distress.   HENT:   Head: Normocephalic and atraumatic.   Mouth/Throat: No oropharyngeal exudate.   Eyes: Conjunctivae are normal. Pupils are equal, round, and reactive to light. No scleral icterus.   Neck: Normal range of motion. Neck supple. No thyromegaly present.   L CEA wound site is CDI.   Cardiovascular: Normal rate, regular rhythm and normal heart sounds.   Pulmonary/Chest: Effort normal and breath sounds normal.   Abdominal: Soft. Bowel sounds are normal.   Musculoskeletal: He exhibits no edema.   Protective Sensation (w/ 10 gram monofilament):  Right: Intact  Left: Intact    Visual Inspection:  Normal -  Bilateral    Pedal Pulses:   Right: Present  Left: Present    Posterior tibialis:   Right:Present  Left: Present     Lymphadenopathy:      He has no cervical adenopathy.   Skin: No rash noted. He is not diaphoretic.   Still some acne on the back, no abscesses   Psychiatric: He has a normal mood and affect. His behavior is normal.       Assessment:       1. Essential hypertension    2. Type 2 diabetes mellitus without complication, without long-term current use of insulin    3. Gastroesophageal reflux disease, esophagitis presence not specified        Plan:       Isma was seen today for annual exam.    Diagnoses and all orders for this visit:    Essential hypertension  -     TOPROL XL 50 mg 24 hr tablet; Take 1 tablet (50 mg total) by mouth once daily.  -     losartan-hydrochlorothiazide 100-25 mg (HYZAAR) 100-25 mg per tablet; Take 1 tablet by mouth once daily.  controlled    Type 2 diabetes mellitus without complication, without long-term current use of insulin  -     Discontinue: atorvastatin (LIPITOR) 40 MG tablet; Take 1 tablet (40 mg total) by mouth once daily.  -     atorvastatin (LIPITOR) 40 MG tablet; Take 1 tablet (40 mg total) by mouth once daily.  Increased dose to high dose statin    Gastroesophageal reflux disease, esophagitis presence not specified  -     omeprazole (PRILOSEC) 20 MG capsule; Take 1 capsule (20 mg total) by mouth once daily.  Controlled    Abnormal bodily sensations does seem more c/w anxiety, symptoms lasts seconds. He will call if any changes.      Health Maintenance       Date Due Completion Date    Hemoglobin A1c 02/22/2019 8/22/2018    Eye Exam 07/03/2019 7/3/2018    Override on 8/10/2015: Done    Lipid Panel 10/22/2019 10/22/2018    High Dose Statin 10/29/2019 10/29/2018    Foot Exam 10/29/2019 10/29/2018 (Done)    Override on 10/29/2018: Done    Override on 7/28/2016: Done    Override on 8/10/2015: Done    Colonoscopy 09/08/2020 9/8/2015    Override on 5/1/2008: Done    Pneumococcal (65+) (2 of 2 - PPSV23) 07/05/2021 7/5/2016    TETANUS VACCINE 05/03/2026 5/3/2016          Follow-up in about 6 months (around  4/29/2019).      No future appointments.

## 2018-11-05 ENCOUNTER — TELEPHONE (OUTPATIENT)
Dept: INTERNAL MEDICINE | Facility: CLINIC | Age: 68
End: 2018-11-05

## 2018-11-05 DIAGNOSIS — I10 ESSENTIAL HYPERTENSION: Primary | ICD-10-CM

## 2018-11-05 RX ORDER — METOPROLOL SUCCINATE 50 MG/1
50 TABLET, EXTENDED RELEASE ORAL DAILY
Qty: 90 TABLET | Refills: 11 | Status: SHIPPED | OUTPATIENT
Start: 2018-11-05 | End: 2018-11-20

## 2018-11-05 NOTE — TELEPHONE ENCOUNTER
----- Message from Amirah Guillaume sent at 11/5/2018  9:30 AM CST -----  Contact: [facility]   Prescription Alternative Needed:     The pharmacy needs alternative on the following RX:    TOPROL XL 50 mg 24 hr tablet    Reason: Drug not on patients formulary.    [facility] [place], [place] - [address] AT PORTAL TO REGISTERED [facility] SITES    Please advise.    Thank You

## 2018-11-05 NOTE — TELEPHONE ENCOUNTER
Carlo Ramsey, please call pharmacy [facility] and ask is the is a generic metoprolol succinate.  Let me know if one is not available.  Thank you, Anthony Tay

## 2018-11-06 NOTE — TELEPHONE ENCOUNTER
Hi, will his pharmacy be able to provide the generic on the formulary?  Here was the initial message --      ----- Message from Amirah Guillaume sent at 11/5/2018  9:30 AM CST -----  Contact: [facility]   Prescription Alternative Needed:      The pharmacy needs alternative on the following RX:     TOPROL XL 50 mg 24 hr tablet     Reason: Drug not on patients formulary.     [facility] [place], [place] - [address] AT PORTAL TO REGISTERED [facility] SITES     Please advise.     Thank You

## 2018-11-06 NOTE — TELEPHONE ENCOUNTER
Yes, metoprolol succinate comes in an extended-release form which is available as brand and generic.

## 2018-11-09 NOTE — TELEPHONE ENCOUNTER
Your PA has been faxed to the plan as a paper copy. Please contact the plan directly if you haven't received a determination in a typical timeframe.    You will be notified of the determination via fax.

## 2018-11-19 NOTE — TELEPHONE ENCOUNTER
Pt's medication was denied. Covered meds are: Metoprolol Succinate ER, Metoprolol Tartrate, Atenolo, Labetalol, Nadolol, Propranolol.

## 2018-11-20 RX ORDER — METOPROLOL SUCCINATE 50 MG/1
50 TABLET, EXTENDED RELEASE ORAL DAILY
Qty: 90 TABLET | Refills: 11 | Status: SHIPPED | OUTPATIENT
Start: 2018-11-20 | End: 2018-11-27

## 2018-11-20 NOTE — TELEPHONE ENCOUNTER
"Hi, I could only send it in this was with this note --  "Generic and covered by insurance please" in the note section.  Please see if I sent this in correctly and if not see whether that can take a verbal order.  Thank you, Anthony Tay    "

## 2018-11-27 DIAGNOSIS — I10 ESSENTIAL HYPERTENSION: ICD-10-CM

## 2018-11-27 RX ORDER — METOPROLOL SUCCINATE 50 MG/1
50 TABLET, EXTENDED RELEASE ORAL DAILY
Qty: 90 TABLET | Refills: 11 | Status: CANCELLED | OUTPATIENT
Start: 2018-11-27

## 2018-11-27 NOTE — TELEPHONE ENCOUNTER
Hi, please call him --  I recommend that he stop the medicine altogether and check his blood pressure and pulse (which is usually reported on home pressure machines and pressure checks at stores) 2-3 times per week and let me know if his pressure is over 140/90 and/or pulse over 100.    I think he should be fine off the medicine.    Let me know if patient has any questions.  Thank you, Anthony Tay

## 2018-11-27 NOTE — TELEPHONE ENCOUNTER
----- Message from Sandi Streeter sent at 11/27/2018 11:50 AM CST -----  Contact: self [phone]  Type: Rx    Name of medication(s): metoprolol succinate (TOPROL-XL) 50 MG 24 hr tablet    Is this a refill? New rx? new    Who prescribed medication? Dr Tay    Pharmacy Name, Phone, & Location[facility] - [place], [place] - [address] AT Portal to [facility] Sites [phone]      Comments: Patient just received a letter from Medicare stating they will no longer cover above medication and the something else needs to be called in instead, patient only has enough to cover him for a week, Please advise    Thank you

## 2018-12-03 DIAGNOSIS — D35.2 PROLACTINOMA: ICD-10-CM

## 2018-12-03 RX ORDER — CABERGOLINE 0.5 MG/1
0.25 TABLET ORAL
Qty: 12 TABLET | Refills: 3 | Status: SHIPPED | OUTPATIENT
Start: 2018-12-03 | End: 2019-02-28 | Stop reason: SDUPTHER

## 2019-02-28 DIAGNOSIS — D35.2 PROLACTINOMA: ICD-10-CM

## 2019-03-01 RX ORDER — CABERGOLINE 0.5 MG/1
0.25 TABLET ORAL
Qty: 12 TABLET | Refills: 3 | Status: SHIPPED | OUTPATIENT
Start: 2019-03-04 | End: 2019-09-24 | Stop reason: SDUPTHER

## 2019-03-25 ENCOUNTER — OFFICE VISIT (OUTPATIENT)
Dept: INTERNAL MEDICINE | Facility: CLINIC | Age: 69
End: 2019-03-25
Payer: MEDICARE

## 2019-03-25 ENCOUNTER — TELEPHONE (OUTPATIENT)
Dept: INTERNAL MEDICINE | Facility: CLINIC | Age: 69
End: 2019-03-25

## 2019-03-25 ENCOUNTER — LAB VISIT (OUTPATIENT)
Dept: LAB | Facility: HOSPITAL | Age: 69
End: 2019-03-25
Payer: MEDICARE

## 2019-03-25 VITALS
DIASTOLIC BLOOD PRESSURE: 62 MMHG | WEIGHT: 173.94 LBS | OXYGEN SATURATION: 97 % | TEMPERATURE: 98 F | HEART RATE: 63 BPM | BODY MASS INDEX: 28.98 KG/M2 | HEIGHT: 65 IN | SYSTOLIC BLOOD PRESSURE: 128 MMHG

## 2019-03-25 DIAGNOSIS — E11.9 TYPE 2 DIABETES MELLITUS WITHOUT COMPLICATION, WITHOUT LONG-TERM CURRENT USE OF INSULIN: ICD-10-CM

## 2019-03-25 DIAGNOSIS — J40 BRONCHITIS: ICD-10-CM

## 2019-03-25 DIAGNOSIS — R06.01 ORTHOPNEA: ICD-10-CM

## 2019-03-25 DIAGNOSIS — R09.1 PLEURISY: Primary | ICD-10-CM

## 2019-03-25 DIAGNOSIS — R09.1 PLEURISY: ICD-10-CM

## 2019-03-25 LAB
ALBUMIN SERPL BCP-MCNC: 3.4 G/DL (ref 3.5–5.2)
ALP SERPL-CCNC: 47 U/L (ref 55–135)
ALT SERPL W/O P-5'-P-CCNC: 19 U/L (ref 10–44)
ANION GAP SERPL CALC-SCNC: 8 MMOL/L (ref 8–16)
AST SERPL-CCNC: 21 U/L (ref 10–40)
BASOPHILS # BLD AUTO: 0.01 K/UL (ref 0–0.2)
BASOPHILS NFR BLD: 0.2 % (ref 0–1.9)
BILIRUB SERPL-MCNC: 0.8 MG/DL (ref 0.1–1)
BNP SERPL-MCNC: 12 PG/ML (ref 0–99)
BUN SERPL-MCNC: 11 MG/DL (ref 8–23)
CALCIUM SERPL-MCNC: 9.6 MG/DL (ref 8.7–10.5)
CHLORIDE SERPL-SCNC: 102 MMOL/L (ref 95–110)
CO2 SERPL-SCNC: 30 MMOL/L (ref 23–29)
CREAT SERPL-MCNC: 1 MG/DL (ref 0.5–1.4)
DIFFERENTIAL METHOD: ABNORMAL
EOSINOPHIL # BLD AUTO: 0.2 K/UL (ref 0–0.5)
EOSINOPHIL NFR BLD: 3.5 % (ref 0–8)
ERYTHROCYTE [DISTWIDTH] IN BLOOD BY AUTOMATED COUNT: 12.9 % (ref 11.5–14.5)
EST. GFR  (AFRICAN AMERICAN): >60 ML/MIN/1.73 M^2
EST. GFR  (NON AFRICAN AMERICAN): >60 ML/MIN/1.73 M^2
GLUCOSE SERPL-MCNC: 101 MG/DL (ref 70–110)
HCT VFR BLD AUTO: 45.8 % (ref 40–54)
HGB BLD-MCNC: 15 G/DL (ref 14–18)
LYMPHOCYTES # BLD AUTO: 1.4 K/UL (ref 1–4.8)
LYMPHOCYTES NFR BLD: 22.6 % (ref 18–48)
MCH RBC QN AUTO: 29 PG (ref 27–31)
MCHC RBC AUTO-ENTMCNC: 32.8 G/DL (ref 32–36)
MCV RBC AUTO: 89 FL (ref 82–98)
MONOCYTES # BLD AUTO: 0.6 K/UL (ref 0.3–1)
MONOCYTES NFR BLD: 10.4 % (ref 4–15)
NEUTROPHILS # BLD AUTO: 3.8 K/UL (ref 1.8–7.7)
NEUTROPHILS NFR BLD: 63.3 % (ref 38–73)
PLATELET # BLD AUTO: 177 K/UL (ref 150–350)
PMV BLD AUTO: 8.8 FL (ref 9.2–12.9)
POTASSIUM SERPL-SCNC: 3.5 MMOL/L (ref 3.5–5.1)
PROT SERPL-MCNC: 7.6 G/DL (ref 6–8.4)
RBC # BLD AUTO: 5.17 M/UL (ref 4.6–6.2)
SODIUM SERPL-SCNC: 140 MMOL/L (ref 136–145)
WBC # BLD AUTO: 6.05 K/UL (ref 3.9–12.7)

## 2019-03-25 PROCEDURE — 3074F SYST BP LT 130 MM HG: CPT | Mod: CPTII,S$GLB,, | Performed by: NURSE PRACTITIONER

## 2019-03-25 PROCEDURE — 3044F HG A1C LEVEL LT 7.0%: CPT | Mod: CPTII,S$GLB,, | Performed by: NURSE PRACTITIONER

## 2019-03-25 PROCEDURE — 3074F PR MOST RECENT SYSTOLIC BLOOD PRESSURE < 130 MM HG: ICD-10-PCS | Mod: CPTII,S$GLB,, | Performed by: NURSE PRACTITIONER

## 2019-03-25 PROCEDURE — 85025 COMPLETE CBC W/AUTO DIFF WBC: CPT

## 2019-03-25 PROCEDURE — 99999 PR PBB SHADOW E&M-EST. PATIENT-LVL V: CPT | Mod: PBBFAC,,, | Performed by: NURSE PRACTITIONER

## 2019-03-25 PROCEDURE — 36415 COLL VENOUS BLD VENIPUNCTURE: CPT

## 2019-03-25 PROCEDURE — 99213 PR OFFICE/OUTPT VISIT, EST, LEVL III, 20-29 MIN: ICD-10-PCS | Mod: S$GLB,,, | Performed by: NURSE PRACTITIONER

## 2019-03-25 PROCEDURE — 99499 RISK ADDL DX/OHS AUDIT: ICD-10-PCS | Mod: S$GLB,,, | Performed by: NURSE PRACTITIONER

## 2019-03-25 PROCEDURE — 99999 PR PBB SHADOW E&M-EST. PATIENT-LVL V: ICD-10-PCS | Mod: PBBFAC,,, | Performed by: NURSE PRACTITIONER

## 2019-03-25 PROCEDURE — 80053 COMPREHEN METABOLIC PANEL: CPT

## 2019-03-25 PROCEDURE — 3078F DIAST BP <80 MM HG: CPT | Mod: CPTII,S$GLB,, | Performed by: NURSE PRACTITIONER

## 2019-03-25 PROCEDURE — 99499 UNLISTED E&M SERVICE: CPT | Mod: S$GLB,,, | Performed by: NURSE PRACTITIONER

## 2019-03-25 PROCEDURE — 1101F PR PT FALLS ASSESS DOC 0-1 FALLS W/OUT INJ PAST YR: ICD-10-PCS | Mod: CPTII,S$GLB,, | Performed by: NURSE PRACTITIONER

## 2019-03-25 PROCEDURE — 1101F PT FALLS ASSESS-DOCD LE1/YR: CPT | Mod: CPTII,S$GLB,, | Performed by: NURSE PRACTITIONER

## 2019-03-25 PROCEDURE — 3078F PR MOST RECENT DIASTOLIC BLOOD PRESSURE < 80 MM HG: ICD-10-PCS | Mod: CPTII,S$GLB,, | Performed by: NURSE PRACTITIONER

## 2019-03-25 PROCEDURE — 83880 ASSAY OF NATRIURETIC PEPTIDE: CPT

## 2019-03-25 PROCEDURE — 99213 OFFICE O/P EST LOW 20 MIN: CPT | Mod: S$GLB,,, | Performed by: NURSE PRACTITIONER

## 2019-03-25 PROCEDURE — 3044F PR MOST RECENT HEMOGLOBIN A1C LEVEL <7.0%: ICD-10-PCS | Mod: CPTII,S$GLB,, | Performed by: NURSE PRACTITIONER

## 2019-03-25 RX ORDER — METHYLPREDNISOLONE 4 MG/1
TABLET ORAL
Qty: 1 PACKAGE | Refills: 0 | Status: SHIPPED | OUTPATIENT
Start: 2019-03-25 | End: 2019-05-28

## 2019-03-25 RX ORDER — MONTELUKAST SODIUM 10 MG/1
10 TABLET ORAL NIGHTLY
Qty: 30 TABLET | Refills: 0 | Status: SHIPPED | OUTPATIENT
Start: 2019-03-25 | End: 2019-04-24

## 2019-03-25 RX ORDER — BENZONATATE 200 MG/1
200 CAPSULE ORAL 3 TIMES DAILY PRN
Qty: 30 CAPSULE | Refills: 0 | Status: SHIPPED | OUTPATIENT
Start: 2019-03-25 | End: 2019-04-04

## 2019-03-25 NOTE — PROGRESS NOTES
Subjective:       Patient ID: Isma Martin is a 68 y.o. male.    Chief Complaint: Sinusitis (cough, chest congestion)    Mr Martin presents today for cough and congestion x 1 week. It is getting worse and he now has pain when he breathes deeply.     Review of Systems   Constitutional: Negative for fever.   HENT: Negative for facial swelling.    Eyes: Negative for visual disturbance.   Respiratory: Positive for cough, chest tightness and shortness of breath.    Cardiovascular: Negative for chest pain.   Gastrointestinal: Negative for diarrhea, nausea and vomiting.   Genitourinary: Negative for dysuria.   Musculoskeletal: Negative for gait problem.   Skin: Negative for rash.   Neurological: Negative for headaches.   Psychiatric/Behavioral: Negative for confusion.       Objective:      Physical Exam   Constitutional: He is oriented to person, place, and time. He appears well-developed and well-nourished. No distress.   HENT:   Head: Normocephalic and atraumatic.   Eyes: No scleral icterus.   Neck: Normal range of motion. Neck supple. No JVD present.   Cardiovascular: Normal rate, regular rhythm and normal heart sounds.   Pulmonary/Chest: Effort normal and breath sounds normal. No stridor. No respiratory distress. He has no wheezes. He has no rales. He exhibits tenderness.   Unable to inhale deeply and continuously   Musculoskeletal: He exhibits no edema.   Neurological: He is alert and oriented to person, place, and time.   Skin: Skin is warm and dry. He is not diaphoretic.   Psychiatric: He has a normal mood and affect. His behavior is normal.   Nursing note and vitals reviewed.      Assessment:       1. Pleurisy    2. Bronchitis    3. Orthopnea    4. Type 2 diabetes mellitus without complication, without long-term current use of insulin        Plan:   1. Pleurisy  - CBC auto differential; Future  - Comprehensive metabolic panel; Future  - methylPREDNISolone (MEDROL DOSEPACK) 4 mg tablet; use as directed   Dispense: 1 Package; Refill: 0    2. Bronchitis  - CBC auto differential; Future  - Comprehensive metabolic panel; Future  - benzonatate (TESSALON) 200 MG capsule; Take 1 capsule (200 mg total) by mouth 3 (three) times daily as needed for Cough.  Dispense: 30 capsule; Refill: 0  - methylPREDNISolone (MEDROL DOSEPACK) 4 mg tablet; use as directed  Dispense: 1 Package; Refill: 0  - montelukast (SINGULAIR) 10 mg tablet; Take 1 tablet (10 mg total) by mouth every evening.  Dispense: 30 tablet; Refill: 0    3. Orthopnea  - Brain natriuretic peptide; Future    4. Type 2 diabetes mellitus without complication, without long-term current use of insulin  - Stable, no longer on medication. Considered in decision to use steroids.       Pt has been given instructions populated from [employer] database and has verbalized understanding of the after visit summary and information contained wherein.    Follow up with a primary care provider. May go to ER for acute shortness of breath, lightheadedness, fever, or any other emergent complaints or changes in condition.

## 2019-03-25 NOTE — TELEPHONE ENCOUNTER
----- Message from Hernandez Botello sent at 3/25/2019  3:19 PM CDT -----  Contact: [facility] [phone]  Pharmacy calling regarding the [facility] Rx not covered under insurance, would like to know if something can be sent that is cheaper to replace.     [id number] - [place], [place] - [address] [facility] [address] [phone] (Phone)  [phone] (Fax)    Please call an advise  Thank you

## 2019-04-15 ENCOUNTER — HOSPITAL ENCOUNTER (EMERGENCY)
Facility: HOSPITAL | Age: 69
Discharge: HOME | End: 2019-04-15
Attending: EMERGENCY MEDICINE
Payer: MEDICARE

## 2019-04-15 VITALS
BODY MASS INDEX: 28.98 KG/M2 | OXYGEN SATURATION: 99 % | HEART RATE: 102 BPM | TEMPERATURE: 99 F | WEIGHT: 173.94 LBS | HEIGHT: 65 IN | RESPIRATION RATE: 18 BRPM | SYSTOLIC BLOOD PRESSURE: 142 MMHG | DIASTOLIC BLOOD PRESSURE: 87 MMHG

## 2019-04-15 DIAGNOSIS — R07.9 CHEST PAIN: ICD-10-CM

## 2019-04-15 DIAGNOSIS — R05.9 COUGH: ICD-10-CM

## 2019-04-15 DIAGNOSIS — J20.9 ACUTE BRONCHITIS, UNSPECIFIED ORGANISM: Primary | ICD-10-CM

## 2019-04-15 PROCEDURE — 99284 EMERGENCY DEPT VISIT MOD MDM: CPT | Mod: 25

## 2019-04-15 PROCEDURE — 93005 ELECTROCARDIOGRAM TRACING: CPT

## 2019-04-15 PROCEDURE — 99284 EMERGENCY DEPT VISIT MOD MDM: CPT | Mod: ,,, | Performed by: EMERGENCY MEDICINE

## 2019-04-15 PROCEDURE — 63600175 PHARM REV CODE 636 W HCPCS: Performed by: EMERGENCY MEDICINE

## 2019-04-15 PROCEDURE — 93010 ELECTROCARDIOGRAM REPORT: CPT | Mod: ,,, | Performed by: INTERNAL MEDICINE

## 2019-04-15 PROCEDURE — 93010 EKG 12-LEAD: ICD-10-PCS | Mod: ,,, | Performed by: INTERNAL MEDICINE

## 2019-04-15 PROCEDURE — 99284 PR EMERGENCY DEPT VISIT,LEVEL IV: ICD-10-PCS | Mod: ,,, | Performed by: EMERGENCY MEDICINE

## 2019-04-15 RX ORDER — PREDNISONE 20 MG/1
20 TABLET ORAL DAILY
Qty: 5 TABLET | Refills: 0 | Status: SHIPPED | OUTPATIENT
Start: 2019-04-15 | End: 2019-04-20

## 2019-04-15 RX ORDER — ALBUTEROL SULFATE 90 UG/1
2 AEROSOL, METERED RESPIRATORY (INHALATION) EVERY 6 HOURS PRN
Qty: 18 G | Refills: 0 | Status: SHIPPED | OUTPATIENT
Start: 2019-04-15 | End: 2019-09-24

## 2019-04-15 RX ORDER — PROMETHAZINE HYDROCHLORIDE AND CODEINE PHOSPHATE 6.25; 1 MG/5ML; MG/5ML
5 SOLUTION ORAL EVERY 4 HOURS PRN
Qty: 120 ML | Refills: 0 | Status: SHIPPED | OUTPATIENT
Start: 2019-04-15 | End: 2019-04-25

## 2019-04-15 RX ORDER — PREDNISONE 20 MG/1
40 TABLET ORAL
Status: COMPLETED | OUTPATIENT
Start: 2019-04-16 | End: 2019-04-15

## 2019-04-15 RX ADMIN — PREDNISONE 40 MG: 20 TABLET ORAL at 11:04

## 2019-04-16 NOTE — ED PROVIDER NOTES
Encounter Date: 4/15/2019    SCRIBE #1 NOTE: I, Kait King, am scribing for, and in the presence of,  Dr. Angel. I have scribed the following portions of the note - Other sections scribed: HPI, ROS, PE.       History     Chief Complaint   Patient presents with    Cough     Cough and SOB since Friday.     Chest Pain     Chest pain since Friday.      Mr. Martin is a 67 yo M with history of DM2, HLD, HTN presenting to the ED with complaints of SOB with cough and chest pain x3 days.  Patient reports coughing spells since Friday worse at night which started with a small cough, sore throat and congestion.  Patient endorses chest tenderness during inspiration and cough localized on the sides of his ribs but non while sitting still.  He also reports getting hot and sweating more at night.  Patient denies smoking, vomiting, coughing up blood, history of heart poblems.  He use to work for the [employer] as a  but is now retired.  Patient endorses taking theraflu today.      The history is provided by the patient and medical records.     Review of patient's allergies indicates:  No Known Allergies  Past Medical History:   Diagnosis Date    Arthritis     Colon polyp     Diabetes mellitus     GERD (gastroesophageal reflux disease)     Hyperlipidemia     Hypertension     Hypogonadism male     Obesity     Prolactinoma      Past Surgical History:   Procedure Laterality Date    CAROTID ENDARTERECTOMY Left     CHEILECTOMY Right 4/23/2014    Performed by Mega Ba[facility]uelos MD at [facility] 1ST FLR    COLONOSCOPY N/A 9/8/2015    Performed by MACKENZIE Beckett MD at [facility] ENDO (4TH FLR)    ENDARTERECTOMY-CAROTID Left 3/15/2017    Performed by Jose Ricketts MD at [facility] OR 2ND FLR    FOOT SURGERY  4-23-14    right    TRANSPHENOIDAL PITUITARY RESECTION      pituitary tumor     Family History   Problem Relation Age of Onset    Heart disease Brother     Heart attack Sister     Heart disease Sister     Cancer Neg Hx      Diabetes Neg Hx     Colon polyps Neg Hx      Social History     Tobacco Use    Smoking status: Former Smoker     Packs/day: 1.50     Years: 35.00     Pack years: 52.50     Last attempt to quit: 1995     Years since quittin.3    Smokeless tobacco: Never Used   Substance Use Topics    Alcohol use: Yes     Alcohol/week: 0.5 oz     Types: 1 Standard drinks or equivalent per week     Comment: rare    Drug use: No     Review of Systems   Constitutional: Negative for chills and fever.   Eyes: Negative for visual disturbance.        Neg vision changes   Respiratory: Positive for cough and shortness of breath.    Cardiovascular: Positive for chest pain. Negative for leg swelling.   Gastrointestinal: Negative for abdominal pain, nausea and vomiting.        Neg changes in stool   Genitourinary:        Neg changes in urination   Musculoskeletal: Negative for arthralgias and joint swelling.   Skin: Negative for rash.   Allergic/Immunologic: Negative for immunocompromised state.   Neurological: Negative for headaches.   Hematological: Does not bruise/bleed easily.       Physical Exam     Initial Vitals [04/15/19 1944]   BP Pulse Resp Temp SpO2   (!) 142/87 102 18 98.9 °F (37.2 °C) 99 %      MAP       --         Physical Exam    Nursing note and vitals reviewed.  Constitutional: He appears well-developed and well-nourished. He is not diaphoretic. No distress.   Non productive cough present through out the exam     HENT:   Head: Normocephalic and atraumatic.   Mouth/Throat: Oropharynx is clear and moist.   Eyes: Conjunctivae and EOM are normal. Pupils are equal, round, and reactive to light.   Neck: Normal range of motion. Neck supple. No JVD present.   Cardiovascular: Normal rate, regular rhythm and normal heart sounds.   No murmur heard.  Pulmonary/Chest: Breath sounds normal. No respiratory distress. He has no wheezes. He has no rhonchi. He has no rales.   Abdominal: Soft. Bowel sounds are normal. He exhibits  no distension and no mass. There is no tenderness. There is no rebound and no guarding.   Musculoskeletal: Normal range of motion. He exhibits no edema or tenderness.   Neurological: He is alert and oriented to person, place, and time. He has normal strength. No cranial nerve deficit or sensory deficit.   Skin: Skin is warm and dry. No rash noted.   Psychiatric: He has a normal mood and affect.         ED Course   Procedures  Labs Reviewed - No data to display       Imaging Results          X-Ray Chest PA And Lateral (Final result)  Result time 04/15/19 23:51:57    Final result by Ascencion Sparks MD (04/15/19 23:51:57)                 Impression:      No acute cardiopulmonary process.      Electronically signed by: Ascencion Sparks MD  Date:    04/15/2019  Time:    23:51             Narrative:    EXAMINATION:  XR CHEST PA AND LATERAL    CLINICAL HISTORY:  Cough    TECHNIQUE:  PA and lateral views of the chest were performed.    COMPARISON:  February 20, 2017.    FINDINGS:  There is no consolidation, effusion, or pneumothorax.    Cardiomediastinal silhouette is unremarkable.    Regional osseous structures are unremarkable.                                 Medical Decision Making:   History:   Old Medical Records: I decided to obtain old medical records.  Clinical Tests:   Medical Tests: Ordered and Reviewed  ED Management:  Patient presents with 4 day history of cough that is nonproductive with no fever or chills.  He has known sick ocontacts with the same symptoms.  Cough is interrupting his sleep.  Patient tried taking theraflu for cough with no relief.  He does complain of chest pain in the bilateral lower ribs with coughing and deep breath only, there is no pain at rest.  Patient's history and physical were consistent with bronchitis vs pneumonia, a CXR was done which did not show any acute processes.  Patient was given a prescription of prednisone 20 mg daily for 5 days with loading dose of 40 mg PO here, and  was also given albuterol inhaler, promethazine codeine cough syrup.  Advised of 21 day average duration of this illness as well as instructions for return if conditions worsens.  Discharged in stable conditona and verabalizes full agreeement with this plan.       EKG ** - Sinus tachy 105.     CXR ** - Negative.               Scribe Attestation:   Scribe #1: I performed the above scribed service and the documentation accurately describes the services I performed. I attest to the accuracy of the note.               Clinical Impression:       ICD-10-CM ICD-9-CM   1. Acute bronchitis, unspecified organism J20.9 466.0   2. Chest pain R07.9 786.50   3. Cough R05 786.2                                Chadwick Angel III, MD  04/16/19 0012       Chadwick Angel III, MD  04/16/19 0026

## 2019-04-16 NOTE — ED NOTES
LOC: Patient name and date of birth verified for [facility]. The patient is awake, alert and aware of environment with an appropriate affect, the patient is oriented x 3 and speaking appropriately.   APPEARANCE: Patient resting comfortably, patient is clean and well groomed, patient's clothing is properly fastened.  SKIN: The skin is warm and dry, color consistent with ethnicity, patient has normal skin turgor and moist mucus membranes, skin intact, no breakdown or bruising noted.  MUSCULOSKELETAL: Patient moving all extremities well, no obvious swelling or deformities noted.   RESPIRATORY: Respirations are spontaneous, patient has a normal effort and rate, no accessory muscle use noted. Pt is tachypnic upon exertion.   CARDIAC: Patient has a normal rate, no periphreal edema noted, capillary refill < 3 seconds.  ABDOMEN: Soft and non tender, no distention noted.   NEUROLOGIC: Eyes open spontaneously, behavior appropriate to situation, follows commands, facial expression symmetrical.

## 2019-04-16 NOTE — ED TRIAGE NOTES
Pt presents with cough, SOB, and chest pain when coughing that began last Friday. Pt states he is coughing up clear mucus. Pt denies any other symptoms at this time.

## 2019-05-15 DIAGNOSIS — E11.9 TYPE 2 DIABETES MELLITUS WITHOUT COMPLICATION: ICD-10-CM

## 2019-05-27 ENCOUNTER — PATIENT OUTREACH (OUTPATIENT)
Dept: ADMINISTRATIVE | Facility: HOSPITAL | Age: 69
End: 2019-05-27

## 2019-05-27 NOTE — PROGRESS NOTES
Chart review completed. Immunizations reconciled, HM modifiers updated, HM duplicate entries deleted, care team updated, old orders deleted. Pt due for A1c, order has been placed. Pt also due for shingles vaccine.

## 2019-05-28 ENCOUNTER — LAB VISIT (OUTPATIENT)
Dept: LAB | Facility: HOSPITAL | Age: 69
End: 2019-05-28
Attending: INTERNAL MEDICINE
Payer: MEDICARE

## 2019-05-28 ENCOUNTER — OFFICE VISIT (OUTPATIENT)
Dept: INTERNAL MEDICINE | Facility: CLINIC | Age: 69
End: 2019-05-28
Payer: MEDICARE

## 2019-05-28 VITALS
BODY MASS INDEX: 27.88 KG/M2 | SYSTOLIC BLOOD PRESSURE: 98 MMHG | DIASTOLIC BLOOD PRESSURE: 60 MMHG | WEIGHT: 167.31 LBS | OXYGEN SATURATION: 98 % | HEART RATE: 55 BPM | HEIGHT: 65 IN

## 2019-05-28 DIAGNOSIS — E11.9 TYPE 2 DIABETES MELLITUS WITHOUT COMPLICATION, WITHOUT LONG-TERM CURRENT USE OF INSULIN: Primary | ICD-10-CM

## 2019-05-28 DIAGNOSIS — D35.2 PROLACTINOMA: ICD-10-CM

## 2019-05-28 DIAGNOSIS — I10 ESSENTIAL HYPERTENSION: ICD-10-CM

## 2019-05-28 DIAGNOSIS — E11.9 TYPE 2 DIABETES MELLITUS WITHOUT COMPLICATION, WITHOUT LONG-TERM CURRENT USE OF INSULIN: ICD-10-CM

## 2019-05-28 LAB
ESTIMATED AVG GLUCOSE: 126 MG/DL (ref 68–131)
HBA1C MFR BLD HPLC: 6 % (ref 4–5.6)

## 2019-05-28 PROCEDURE — 99499 RISK ADDL DX/OHS AUDIT: ICD-10-PCS | Mod: S$GLB,,, | Performed by: INTERNAL MEDICINE

## 2019-05-28 PROCEDURE — 1101F PR PT FALLS ASSESS DOC 0-1 FALLS W/OUT INJ PAST YR: ICD-10-PCS | Mod: CPTII,S$GLB,, | Performed by: INTERNAL MEDICINE

## 2019-05-28 PROCEDURE — 99214 PR OFFICE/OUTPT VISIT, EST, LEVL IV, 30-39 MIN: ICD-10-PCS | Mod: S$GLB,,, | Performed by: INTERNAL MEDICINE

## 2019-05-28 PROCEDURE — 83036 HEMOGLOBIN GLYCOSYLATED A1C: CPT

## 2019-05-28 PROCEDURE — 99214 OFFICE O/P EST MOD 30 MIN: CPT | Mod: S$GLB,,, | Performed by: INTERNAL MEDICINE

## 2019-05-28 PROCEDURE — 3078F PR MOST RECENT DIASTOLIC BLOOD PRESSURE < 80 MM HG: ICD-10-PCS | Mod: CPTII,S$GLB,, | Performed by: INTERNAL MEDICINE

## 2019-05-28 PROCEDURE — 3044F HG A1C LEVEL LT 7.0%: CPT | Mod: CPTII,S$GLB,, | Performed by: INTERNAL MEDICINE

## 2019-05-28 PROCEDURE — 99999 PR PBB SHADOW E&M-EST. PATIENT-LVL III: ICD-10-PCS | Mod: PBBFAC,,, | Performed by: INTERNAL MEDICINE

## 2019-05-28 PROCEDURE — 3074F PR MOST RECENT SYSTOLIC BLOOD PRESSURE < 130 MM HG: ICD-10-PCS | Mod: CPTII,S$GLB,, | Performed by: INTERNAL MEDICINE

## 2019-05-28 PROCEDURE — 1101F PT FALLS ASSESS-DOCD LE1/YR: CPT | Mod: CPTII,S$GLB,, | Performed by: INTERNAL MEDICINE

## 2019-05-28 PROCEDURE — 99499 UNLISTED E&M SERVICE: CPT | Mod: S$GLB,,, | Performed by: INTERNAL MEDICINE

## 2019-05-28 PROCEDURE — 36415 COLL VENOUS BLD VENIPUNCTURE: CPT

## 2019-05-28 PROCEDURE — 3044F PR MOST RECENT HEMOGLOBIN A1C LEVEL <7.0%: ICD-10-PCS | Mod: CPTII,S$GLB,, | Performed by: INTERNAL MEDICINE

## 2019-05-28 PROCEDURE — 99999 PR PBB SHADOW E&M-EST. PATIENT-LVL III: CPT | Mod: PBBFAC,,, | Performed by: INTERNAL MEDICINE

## 2019-05-28 PROCEDURE — 3078F DIAST BP <80 MM HG: CPT | Mod: CPTII,S$GLB,, | Performed by: INTERNAL MEDICINE

## 2019-05-28 PROCEDURE — 3074F SYST BP LT 130 MM HG: CPT | Mod: CPTII,S$GLB,, | Performed by: INTERNAL MEDICINE

## 2019-05-28 RX ORDER — LOSARTAN POTASSIUM AND HYDROCHLOROTHIAZIDE 12.5; 5 MG/1; MG/1
1 TABLET ORAL DAILY
Qty: 90 TABLET | Refills: 11 | Status: SHIPPED | OUTPATIENT
Start: 2019-05-28 | End: 2020-01-23 | Stop reason: SDUPTHER

## 2019-05-28 NOTE — PROGRESS NOTES
Subjective:       Patient ID: Isma Martin is a 69 y.o. male.    Chief Complaint: No chief complaint on file.    Patient is here for followup for chronic conditions.    BP low today but asymptomatic.    DM2:  good med adherence  no changed Diet  < 140 AM sugars  <200 Post-prandial sugars  no Numbness in feet  no sores in feet  no Visual changes  no Polyuria/polydipsia.        Review of Systems   Constitutional: Negative for activity change and appetite change.   HENT: Positive for congestion. Negative for sinus pressure.    Eyes: Negative for visual disturbance.   Respiratory: Negative for chest tightness, shortness of breath and wheezing.    Cardiovascular: Negative for chest pain, palpitations and leg swelling.   Gastrointestinal: Negative for abdominal distention and abdominal pain.   Endocrine: Negative for polyuria.   Genitourinary: Negative for decreased urine volume, dysuria and urgency.   Musculoskeletal: Negative for back pain, joint swelling and neck pain.   Skin: Negative for rash.   Neurological: Negative for tremors, syncope and weakness.   Hematological: Negative for adenopathy. Does not bruise/bleed easily.   Psychiatric/Behavioral: The patient is not nervous/anxious.        Objective:      Physical Exam   Constitutional: He appears well-developed and well-nourished. No distress.   HENT:   Head: Normocephalic and atraumatic.   Mouth/Throat: No oropharyngeal exudate.   Eyes: Pupils are equal, round, and reactive to light. Conjunctivae are normal. No scleral icterus.   Neck: Normal range of motion. Neck supple. No thyromegaly present.   L CEA wound site is CDI.   Cardiovascular: Normal rate, regular rhythm and normal heart sounds.   Pulmonary/Chest: Effort normal and breath sounds normal.   Abdominal: Soft. Bowel sounds are normal.   Musculoskeletal: He exhibits no edema.   Lymphadenopathy:     He has no cervical adenopathy.   Skin: No rash noted. He is not diaphoretic.   Psychiatric: He has a  normal mood and affect. His behavior is normal.       Assessment:       1. Type 2 diabetes mellitus without complication, without long-term current use of insulin    2. Essential hypertension    3. Prolactinoma        Plan:       Diagnoses and all orders for this visit:    Type 2 diabetes mellitus without complication, without long-term current use of insulin  -     Hemoglobin A1c; Future    Essential hypertension  -     losartan-hydrochlorothiazide 50-12.5 mg (HYZAAR) 50-12.5 mg per tablet; Take 1 tablet by mouth once daily.  Low dose    Prolactinoma  Has been w/o any symptoms, well controlled    Patient Instructions   Loratadine or claritin 10mg every day as needed for congestion          Health Maintenance       Date Due Completion Date    Shingles Vaccine (2 of 3) 06/28/2016 5/3/2016    Eye Exam 07/03/2019 7/3/2018    Override on 8/10/2015: Done    Influenza Vaccine 08/01/2019 9/25/2018    Lipid Panel 10/22/2019 10/22/2018    Foot Exam 10/29/2019 10/29/2018    Override on 7/28/2016: Done    Override on 8/10/2015: Done    Hemoglobin A1c 11/28/2019 5/28/2019    High Dose Statin 05/28/2020 5/28/2019    Aspirin/Antiplatelet Therapy 05/28/2020 5/28/2019    Colonoscopy 09/08/2020 9/8/2015    Override on 5/1/2008: Done    Pneumococcal Vaccine (65+ Low/Medium Risk) (2 of 2 - PPSV23) 07/05/2021 7/5/2016    TETANUS VACCINE 05/03/2026 5/3/2016          Follow up in about 6 months (around 11/28/2019).    Future Appointments   Date Time Provider Department Center   7/9/2019  7:50 AM [facility]   9/24/2019  3:00 PM Katie Christina NP [facility]

## 2019-05-30 ENCOUNTER — TELEPHONE (OUTPATIENT)
Dept: INTERNAL MEDICINE | Facility: CLINIC | Age: 69
End: 2019-05-30

## 2019-05-30 NOTE — TELEPHONE ENCOUNTER
Hi, please call his pharmacy and let them know that we are stopping losartan 100/hydrochlorothiazide 25 and would like to start instead   losartan 50/hydrochlorothiazide 12.5    We received a fax about this from the pharmacy.    Let me know if pharmacist has any questions.  Thank you, Anthony Tay

## 2019-05-31 NOTE — TELEPHONE ENCOUNTER
Called the [facility] pharmacy who did not have losartan on file at all, its actually the [facility].

## 2019-06-04 ENCOUNTER — TELEPHONE (OUTPATIENT)
Dept: INTERNAL MEDICINE | Facility: CLINIC | Age: 69
End: 2019-06-04

## 2019-06-04 NOTE — TELEPHONE ENCOUNTER
Spoke with [facility], they wanted to verify what dose of Losartan-HCTZ pt is on. Pt's medcard and dr Tay's last note state 50-12.5 mg.

## 2019-06-04 NOTE — TELEPHONE ENCOUNTER
----- Message from Harlan Jean Baptiste sent at 6/4/2019  7:55 AM CDT -----  Contact: KRISHAN Kuhn/ [place] [phone]  Pharmacy is calling to clarify an RX.  RX name:  losartan-hydrochlorothiazide 50-12.5 mg (HYZAAR) 50-12.5 mg per tablet  What do they need to clarify:  The strength/milligram he is currently taking  Comments: Ref# [id number]

## 2019-07-09 ENCOUNTER — OFFICE VISIT (OUTPATIENT)
Dept: OPHTHALMOLOGY | Facility: CLINIC | Age: 69
End: 2019-07-09
Payer: MEDICARE

## 2019-07-09 DIAGNOSIS — H35.722 MACULAR PIGMENT EPITHELIAL DETACHMENT OF LEFT EYE: Primary | ICD-10-CM

## 2019-07-09 DIAGNOSIS — H35.033 HYPERTENSIVE RETINOPATHY OF BOTH EYES: ICD-10-CM

## 2019-07-09 DIAGNOSIS — H35.82 OCULAR ISCHEMIC SYNDROME: ICD-10-CM

## 2019-07-09 PROCEDURE — 99999 PR PBB SHADOW E&M-EST. PATIENT-LVL III: CPT | Mod: PBBFAC,,, | Performed by: OPHTHALMOLOGY

## 2019-07-09 PROCEDURE — 92014 COMPRE OPH EXAM EST PT 1/>: CPT | Mod: S$GLB,,, | Performed by: OPHTHALMOLOGY

## 2019-07-09 PROCEDURE — 99999 PR PBB SHADOW E&M-EST. PATIENT-LVL III: ICD-10-PCS | Mod: PBBFAC,,, | Performed by: OPHTHALMOLOGY

## 2019-07-09 PROCEDURE — 92014 PR EYE EXAM, EST PATIENT,COMPREHESV: ICD-10-PCS | Mod: S$GLB,,, | Performed by: OPHTHALMOLOGY

## 2019-07-09 PROCEDURE — 92226 PR SPECIAL EYE EXAM, SUBSEQUENT: ICD-10-PCS | Mod: LT,S$GLB,, | Performed by: OPHTHALMOLOGY

## 2019-07-09 PROCEDURE — 92134 CPTRZ OPH DX IMG PST SGM RTA: CPT | Mod: S$GLB,,, | Performed by: OPHTHALMOLOGY

## 2019-07-09 PROCEDURE — 92226 PR SPECIAL EYE EXAM, SUBSEQUENT: CPT | Mod: LT,S$GLB,, | Performed by: OPHTHALMOLOGY

## 2019-07-09 PROCEDURE — 92134 POSTERIOR SEGMENT OCT RETINA (OCULAR COHERENCE TOMOGRAPHY)-BOTH EYES: ICD-10-PCS | Mod: S$GLB,,, | Performed by: OPHTHALMOLOGY

## 2019-07-09 RX ORDER — METOPROLOL SUCCINATE 50 MG/1
TABLET, EXTENDED RELEASE ORAL
COMMUNITY
Start: 2019-07-06 | End: 2019-10-11

## 2019-07-09 NOTE — PROGRESS NOTES
HPI     Yearly f/u   DLS- 07/03/2018 Dr. Oh    Pt sts he is due for some new glasses and see's Dr. Hawkins but would like to   get eval before getting RX   Denies pain.   (-)Flashes (-)Floaters  (-)Photophobia  (-)Glare    Clear eyes PRN         Prior OCT  OD: normal macula anatomy  OS: superiorly PED, no SRF, no CME      Prior FA  Normal filling time of arterioles OS with minimal leakage superior in area of PED seen on OCT, also late macular leakage and late leakage in macula     A/P    1. Ocular Ischemic Syndrome OS  - 80-95% blockage of L ICA  -Vas Surgery CEA on 3/15/2017  - no NV or reperfusion problems      2. PED OS  - no SRF, will monitor today      3. HTN Retinopathy OU  - BP control      4. NSC OU   monitor      12 months OCT

## 2019-07-26 ENCOUNTER — OFFICE VISIT (OUTPATIENT)
Dept: INTERNAL MEDICINE | Facility: CLINIC | Age: 69
End: 2019-07-26
Payer: MEDICARE

## 2019-07-26 VITALS
DIASTOLIC BLOOD PRESSURE: 72 MMHG | WEIGHT: 163.13 LBS | SYSTOLIC BLOOD PRESSURE: 142 MMHG | OXYGEN SATURATION: 97 % | HEIGHT: 65 IN | HEART RATE: 69 BPM | BODY MASS INDEX: 27.18 KG/M2 | TEMPERATURE: 98 F

## 2019-07-26 DIAGNOSIS — S43.102A SEPARATION OF LEFT ACROMIOCLAVICULAR JOINT, INITIAL ENCOUNTER: Primary | ICD-10-CM

## 2019-07-26 PROCEDURE — 1101F PR PT FALLS ASSESS DOC 0-1 FALLS W/OUT INJ PAST YR: ICD-10-PCS | Mod: CPTII,S$GLB,, | Performed by: INTERNAL MEDICINE

## 2019-07-26 PROCEDURE — 1101F PT FALLS ASSESS-DOCD LE1/YR: CPT | Mod: CPTII,S$GLB,, | Performed by: INTERNAL MEDICINE

## 2019-07-26 PROCEDURE — 99999 PR PBB SHADOW E&M-EST. PATIENT-LVL IV: ICD-10-PCS | Mod: PBBFAC,,, | Performed by: INTERNAL MEDICINE

## 2019-07-26 PROCEDURE — 3078F DIAST BP <80 MM HG: CPT | Mod: CPTII,S$GLB,, | Performed by: INTERNAL MEDICINE

## 2019-07-26 PROCEDURE — 3077F PR MOST RECENT SYSTOLIC BLOOD PRESSURE >= 140 MM HG: ICD-10-PCS | Mod: CPTII,S$GLB,, | Performed by: INTERNAL MEDICINE

## 2019-07-26 PROCEDURE — 3077F SYST BP >= 140 MM HG: CPT | Mod: CPTII,S$GLB,, | Performed by: INTERNAL MEDICINE

## 2019-07-26 PROCEDURE — 99213 OFFICE O/P EST LOW 20 MIN: CPT | Mod: S$GLB,,, | Performed by: INTERNAL MEDICINE

## 2019-07-26 PROCEDURE — 99213 PR OFFICE/OUTPT VISIT, EST, LEVL III, 20-29 MIN: ICD-10-PCS | Mod: S$GLB,,, | Performed by: INTERNAL MEDICINE

## 2019-07-26 PROCEDURE — 3078F PR MOST RECENT DIASTOLIC BLOOD PRESSURE < 80 MM HG: ICD-10-PCS | Mod: CPTII,S$GLB,, | Performed by: INTERNAL MEDICINE

## 2019-07-26 PROCEDURE — 99999 PR PBB SHADOW E&M-EST. PATIENT-LVL IV: CPT | Mod: PBBFAC,,, | Performed by: INTERNAL MEDICINE

## 2019-07-26 RX ORDER — HYDROCODONE BITARTRATE AND ACETAMINOPHEN 5; 325 MG/1; MG/1
1 TABLET ORAL EVERY 6 HOURS PRN
Qty: 30 TABLET | Refills: 0 | Status: ON HOLD | OUTPATIENT
Start: 2019-07-26 | End: 2019-10-30 | Stop reason: HOSPADM

## 2019-07-26 RX ORDER — MELOXICAM 15 MG/1
15 TABLET ORAL DAILY
Qty: 30 TABLET | Refills: 1 | Status: SHIPPED | OUTPATIENT
Start: 2019-07-26 | End: 2019-07-30

## 2019-07-26 NOTE — PROGRESS NOTES
Subjective:       Patient ID: Isma Martin is a 69 y.o. male.    Chief Complaint: Shoulder Pain (left side)    69 year old man fell off a 4 foot ladder while washing [employer] SUV.  Fell directly onto left shoulder in the grass.  Has pain in left shoulder.  No LOC  Has markedly diminished ROM     Review of Systems   Constitutional: Negative for activity change, appetite change and fever.   HENT: Negative for congestion, postnasal drip and sore throat.    Respiratory: Negative for cough, shortness of breath and wheezing.    Cardiovascular: Negative for chest pain and palpitations.   Gastrointestinal: Negative for abdominal pain, blood in stool, constipation, diarrhea, nausea and vomiting.   Genitourinary: Negative for decreased urine volume, difficulty urinating, flank pain and frequency.   Musculoskeletal: Negative for arthralgias.   Neurological: Negative for dizziness, weakness and headaches.       Objective:      Physical Exam   Musculoskeletal:        Left shoulder: He exhibits decreased range of motion, tenderness, bony tenderness, deformity, pain and decreased strength. He exhibits no swelling, no effusion, no crepitus, no laceration and no spasm.        Arms:      Assessment:       1. Separation of left acromioclavicular joint, initial encounter        Plan:   Isma was seen today for shoulder pain.    Diagnoses and all orders for this visit:    Separation of left acromioclavicular joint, initial encounter  -     Ambulatory consult to Orthopedics  -     Ambulatory consult to Physical Therapy    Other orders  -     HYDROcodone-acetaminophen (NORCO) 5-325 mg per tablet; Take 1 tablet by mouth every 6 (six) hours as needed for Pain.  -     meloxicam (MOBIC) 15 MG tablet; Take 1 tablet (15 mg total) by mouth once daily.

## 2019-07-29 PROBLEM — Z74.09 IMPAIRED MOBILITY AND ADLS: Status: ACTIVE | Noted: 2019-07-29

## 2019-07-29 PROBLEM — Z78.9 IMPAIRED MOBILITY AND ADLS: Status: ACTIVE | Noted: 2019-07-29

## 2019-07-29 PROBLEM — R29.898 DECREASED STRENGTH OF UPPER EXTREMITY: Status: ACTIVE | Noted: 2019-07-29

## 2019-07-29 PROBLEM — M25.612 DECREASED RANGE OF MOTION OF LEFT SHOULDER: Status: ACTIVE | Noted: 2019-07-29

## 2019-07-30 ENCOUNTER — PATIENT MESSAGE (OUTPATIENT)
Dept: ORTHOPEDICS | Facility: CLINIC | Age: 69
End: 2019-07-30

## 2019-07-30 ENCOUNTER — OFFICE VISIT (OUTPATIENT)
Dept: ORTHOPEDICS | Facility: CLINIC | Age: 69
End: 2019-07-30
Payer: MEDICARE

## 2019-07-30 ENCOUNTER — HOSPITAL ENCOUNTER (OUTPATIENT)
Dept: RADIOLOGY | Facility: HOSPITAL | Age: 69
Discharge: HOME | End: 2019-07-30
Attending: PHYSICIAN ASSISTANT
Payer: MEDICARE

## 2019-07-30 DIAGNOSIS — R52 PAIN: ICD-10-CM

## 2019-07-30 DIAGNOSIS — R52 PAIN: Primary | ICD-10-CM

## 2019-07-30 DIAGNOSIS — S46.812A TRAPEZIUS STRAIN, LEFT, INITIAL ENCOUNTER: ICD-10-CM

## 2019-07-30 DIAGNOSIS — M54.2 NECK PAIN: ICD-10-CM

## 2019-07-30 PROCEDURE — 73030 X-RAY EXAM OF SHOULDER: CPT | Mod: 26,LT,, | Performed by: RADIOLOGY

## 2019-07-30 PROCEDURE — 73030 XR SHOULDER TRAUMA 3 VIEW LEFT: ICD-10-PCS | Mod: 26,LT,, | Performed by: RADIOLOGY

## 2019-07-30 PROCEDURE — 99214 OFFICE O/P EST MOD 30 MIN: CPT | Mod: S$GLB,,, | Performed by: PHYSICIAN ASSISTANT

## 2019-07-30 PROCEDURE — 99999 PR PBB SHADOW E&M-EST. PATIENT-LVL III: ICD-10-PCS | Mod: PBBFAC,,, | Performed by: PHYSICIAN ASSISTANT

## 2019-07-30 PROCEDURE — 99999 PR PBB SHADOW E&M-EST. PATIENT-LVL III: CPT | Mod: PBBFAC,,, | Performed by: PHYSICIAN ASSISTANT

## 2019-07-30 PROCEDURE — 73030 X-RAY EXAM OF SHOULDER: CPT | Mod: TC,LT

## 2019-07-30 PROCEDURE — 1101F PT FALLS ASSESS-DOCD LE1/YR: CPT | Mod: CPTII,S$GLB,, | Performed by: PHYSICIAN ASSISTANT

## 2019-07-30 PROCEDURE — 99214 PR OFFICE/OUTPT VISIT, EST, LEVL IV, 30-39 MIN: ICD-10-PCS | Mod: S$GLB,,, | Performed by: PHYSICIAN ASSISTANT

## 2019-07-30 PROCEDURE — 1101F PR PT FALLS ASSESS DOC 0-1 FALLS W/OUT INJ PAST YR: ICD-10-PCS | Mod: CPTII,S$GLB,, | Performed by: PHYSICIAN ASSISTANT

## 2019-07-30 RX ORDER — DICLOFENAC SODIUM 75 MG/1
75 TABLET, DELAYED RELEASE ORAL 2 TIMES DAILY
Qty: 60 TABLET | Refills: 0 | Status: SHIPPED | OUTPATIENT
Start: 2019-07-30 | End: 2019-08-29

## 2019-07-30 RX ORDER — METHOCARBAMOL 500 MG/1
500 TABLET, FILM COATED ORAL 4 TIMES DAILY
Qty: 40 TABLET | Refills: 0 | Status: SHIPPED | OUTPATIENT
Start: 2019-07-30 | End: 2019-08-09

## 2019-07-30 NOTE — LETTER
August 2, 2019      Inocencia Tadeo MD  [address]  [facility] [address]           [facility]  [address]  [facility] [address]  Phone: [phone]          Patient: Isma Martin   MR Number: [id number]   YOB: 1950   Date of Visit: 7/30/2019       Dear Dr. Inocencia Tadeo:    Thank you for referring Isma Martin to me for evaluation. Attached you will find relevant portions of my assessment and plan of care.    If you have questions, please do not hesitate to call me. I look forward to following Isma Martin along with you.    Sincerely,    Carmen Uriarte PA-C    Enclosure  CC:  No Recipients    If you would like to receive this communication electronically, please contact [email or web address] or [phone] to request more information on [employer] access.    For providers and/or their staff who would like to refer a patient to [facility], please contact us through our one-stop-shop provider referral line, Dipti Shoemaker, at [phone].    If you feel you have received this communication in error or would no longer like to receive these types of communications, please e-mail [email or web address]

## 2019-07-31 ENCOUNTER — TELEPHONE (OUTPATIENT)
Dept: ORTHOPEDICS | Facility: CLINIC | Age: 69
End: 2019-07-31

## 2019-07-31 RX ORDER — METHYLPREDNISOLONE 4 MG/1
TABLET ORAL
Qty: 1 TABLET | Refills: 0 | Status: SHIPPED | OUTPATIENT
Start: 2019-07-31 | End: 2019-09-24

## 2019-07-31 NOTE — TELEPHONE ENCOUNTER
Spoke with patient. He took bother medications at the same time once. He is not sure which caused the reaction but will no longer take. Prescription sent in for medrol dose cristino. Patient will monitor blood sugars.     ----- Message from Jessiac Ng MA sent at 7/31/2019 11:02 AM CDT -----  Contact: Self       ----- Message -----  From: Ahmet Burt  Sent: 7/31/2019  10:12 AM  To: [facility] Staff    Needs Advice    Reason for call: Pt stated he had a reaction to taking bothmethocarbamol (ROBAXIN) 500 MG     Tab and diclofenac (VOLTAREN) 75 MG EC tablet. He stated he had shortness of breath     and also his fingers were swelling. Please contact pt.         Communication Preference: [phone] (home)       Additional Information:

## 2019-08-02 NOTE — PROGRESS NOTES
Subjective:      Patient ID: Isma Martin is a 69 y.o. male.    Chief Complaint: No chief complaint on file.    HPI  Patient is a 69 year old male who presents to clinic with chief complaint of left shoulder pain that began on 07/25/2019 secondary to falling off of a 4 foot ladder while washing his daughters car. Patient stated that he fell directly on to his left shoulder.  He was seen by PCP who has treated him with mobic and Norco. He reports that he has not noticed a difference with the mobic. He reports he has posterior shoulder pain that radiates to his neck. Associated symptoms include a shooting pain down his arm to his hand as well as decreased range of motion due to pain. His symptoms are increased at the end of the day as well as at night. Patient does report some neck pain. Denied hitting his head or loss of consciousness.       Review of Systems   Constitution: Negative for chills and fever.   Cardiovascular: Negative for chest pain.   Respiratory: Negative for cough and shortness of breath.    Skin: Negative for color change, dry skin, itching, nail changes, poor wound healing and rash.   Musculoskeletal: Positive for falls, joint pain, muscle weakness, neck pain and stiffness.   Neurological: Negative for dizziness.   Psychiatric/Behavioral: Negative for altered mental status. The patient is not nervous/anxious.    All other systems reviewed and are negative.        Objective:      General    Constitutional: He is oriented to person, place, and time. He appears well-developed and well-nourished. No distress.   HENT:   Head: Atraumatic.   Eyes: Conjunctivae are normal.   Cardiovascular: Normal rate.    Pulmonary/Chest: Effort normal.   Neurological: He is alert and oriented to person, place, and time.   Psychiatric: He has a normal mood and affect. His behavior is normal.         Back (L-Spine & T-Spine) / Neck (C-Spine) Exam     Tenderness   The patient is tender to palpation of the left  trapezial.       Left Shoulder Exam     Tenderness   The patient is tender to palpation of the biceps tendon.    Range of Motion   Active abduction: normal   Passive abduction: normal   Forward Flexion:  100 abnormal   Adduction: normal  External Rotation 0 degrees: abnormal   External Rotation 90 degrees: abnormal  Internal rotation 0 degrees: abnormal   Internal rotation 90 degrees: abnormal     Tests & Signs   Drop arm: negative  Pierre test: negative  Impingement: negative  Rotator Cuff Painful Arc/Range: moderate      Muscle Strength   Left Upper Extremity  Shoulder Abduction: 5/5   Shoulder Internal Rotation: 4/5   Shoulder External Rotation: 4/5   Supraspinatus: 4/5/5   Subscapularis: 4/5/5   Biceps: 5/5/5     Vascular Exam       Left Pulses      Radial:                    2+          RADS: Visualized osseous structures demonstrate no evidence of recent or healing fracture, lytic destructive process, or other significant abnormality.  Some spurring involving the inferior glenoid margin is again noted, with no interval glenohumeral joint space narrowing observed.  No glenohumeral dislocation.  No abnormal soft tissue calcifications.  Assessment:       Encounter Diagnoses   Name Primary?    Pain Yes    Neck pain     Trapezius strain, left, initial encounter     Bursitis/tendonitis, shoulder           Plan:       Discussed plan with patient.. We discussed that his pain may be coming from multiple pantera including shoulder as well as neck. At this time I will use antiinflammatory as well as muscle relaxer . He will use moist heat and ice to reduce symptoms. We discussed PT/OT shola has order. If continued neck symptoms will follow up with back and spine.

## 2019-08-08 ENCOUNTER — OFFICE VISIT (OUTPATIENT)
Dept: OPTOMETRY | Facility: CLINIC | Age: 69
End: 2019-08-08
Payer: MEDICARE

## 2019-08-08 DIAGNOSIS — H52.4 HYPEROPIA WITH ASTIGMATISM AND PRESBYOPIA, BILATERAL: ICD-10-CM

## 2019-08-08 DIAGNOSIS — H52.03 HYPEROPIA WITH ASTIGMATISM AND PRESBYOPIA, BILATERAL: ICD-10-CM

## 2019-08-08 DIAGNOSIS — H25.13 NUCLEAR SCLEROTIC CATARACT OF BOTH EYES: ICD-10-CM

## 2019-08-08 DIAGNOSIS — H52.203 HYPEROPIA WITH ASTIGMATISM AND PRESBYOPIA, BILATERAL: ICD-10-CM

## 2019-08-08 DIAGNOSIS — H04.123 BILATERAL DRY EYES: Primary | ICD-10-CM

## 2019-08-08 PROCEDURE — 99999 PR PBB SHADOW E&M-EST. PATIENT-LVL III: ICD-10-PCS | Mod: PBBFAC,,, | Performed by: OPTOMETRIST

## 2019-08-08 PROCEDURE — 92012 PR EYE EXAM, EST PATIENT,INTERMED: ICD-10-PCS | Mod: S$GLB,,, | Performed by: OPTOMETRIST

## 2019-08-08 PROCEDURE — 92015 PR REFRACTION: ICD-10-PCS | Mod: S$GLB,,, | Performed by: OPTOMETRIST

## 2019-08-08 PROCEDURE — 92012 INTRM OPH EXAM EST PATIENT: CPT | Mod: S$GLB,,, | Performed by: OPTOMETRIST

## 2019-08-08 PROCEDURE — 92015 DETERMINE REFRACTIVE STATE: CPT | Mod: S$GLB,,, | Performed by: OPTOMETRIST

## 2019-08-08 PROCEDURE — 99999 PR PBB SHADOW E&M-EST. PATIENT-LVL III: CPT | Mod: PBBFAC,,, | Performed by: OPTOMETRIST

## 2019-08-08 NOTE — PATIENT INSTRUCTIONS
You have dryness of your eyes. Please use over-the-counter artificial tears or lubricants (such as Systane Balance, Soothe XP, Refresh, Blink, Genteal), 1 drop in each eye 3-4 times per day. Please avoid drops that advertise redness-relief as these can be unhealthy for your eyes and can cause permanent redness if used long-term.      ==============================================          Cataract Surgery FAQs  Frequently Asked Questions    My doctor told me I have a cataract     What do I do next?   Relax. Cataracts are a normal part of aging, and cataract surgery is among the safest and most common procedures performed today.  Most patients who have had cataract surgery report significant improvement in their quality of life because of their improved vision, with a speedy recovery and minimal discomfort or inconvenience.    Your optometrist will recommend a cataract surgeon who will examine your eyes, evaluate the need for surgery, and discuss the details with you and your family.     What exactly is a cataract?   A cataract is simply a darkening or clouding of the eyes internal lens, which blurs your vision.  It is not a film which grows over the eye, but rather a degenerative process which causes the eyes normally clear lens to become cloudy or hazy.     Because this degenerative process occurs slowly over many years, we generally wait until the cataract begins to significantly impact your vision, before recommend surgery.                     How is cataract surgery performed?  Cataract surgery involves removal of the dark or cloudy lens from the eye, and implantation of a new, clear lens implant or IOL.  Cataract surgery is a lens replacement surgery.          Modern cataract surgery is performed as a same-day surgery and typically takes about 15 minutes to complete.  It is performed while you are awake, but you will be medicated so that you are relaxed and comfortable. Of course, the eye is anesthetized  so that you dont feel any discomfort, and many people only vaguely remember the actual procedure, recalling only lights and the sensation of moisture on the eye.     Although is takes about a week to fully heal, most people are able to see better and resume their normal activities the very next day!  You will need to use eye drops for about one month after your surgery.     Will I need glasses after cataract surgery?   The purpose of cataract surgery is to improve the overall quality of your vision, but it does not necessarily eliminate the need for glasses. Although some people dont need to wear glasses after standard cataract surgery, most people will still need to wear glasses for certain tasks.  If you are interested in minimizing or even eliminating the need for glasses, you should ask your surgeon about Refractive Cataract Surgery.    What is Refractive Cataract Surgery?   Refractive Cataract Surgery refers to the use of additional techniques performed either at the time of your cataract surgery or soon afterwards, designed to minimize and often eliminate your need for glasses.  Technologies such as LASIK, Astigmatism Correcting Incisions, Multifocal, Accommodating, or Toric lens implants can all be used, depending on the particular needs of each patient. Only your surgeon can determine if you are a candidate and which techniques are appropriate for your goals and your eye.                         LASIK                Multifocal IOL         Will my insurance cover these additional procedures?   Although your insurance will fully cover your cataract surgery, any other additional procedures -designed solely to eliminate the need for glasses- are considered elective (not medically necessary), and therefore not covered by your insurance.  Rest assured that your vision will be better after cataract surgery, in either case.  You simply need to decide whether minimizing your dependence on glasses is worth the  additional investment in your eyes.     View a 1hr video discussing cataract surgery with live patient questions and answers on the [facility] Web Site (Keywords: Comuto Cataract):    [email or web address]

## 2019-08-08 NOTE — PROGRESS NOTES
HPI     Mr. Isma Martin is here for a refraction per Dr. Oh.    Difficulty reading at distance with glasses in brightly lit conditions. He   also has occasional blur all ranges with glasses (Rx about 3 years old);   improves after blinking. He also reports eyes feeling irritated and are   watery.     (+)drops: Redness relief drop  (-)flashes  (-)floaters  (-)diplopia    (+)Diabetes  Hemoglobin A1C       Date                     Value               Ref Range             Status           05/28/2019               6.0 (H)             4.0 - 5.6 %         Final  05/08/2018               5.9 (H)             4.0 - 5.6 %         Final  03/23/2018               5.7 (H)             4.0 - 5.6 %         Final    OCULAR HISTORY  Last Eye Exam: 07/09/19 with Dr. Oh  (-)eye surgery   Cataracts OU  PED OS  Hypertensive retinpathy OU  Ocular ischemic syndrome OS  - 80-95% blockage of L ICA  -Vas Surgery CEA on 3/15/2017  - no NV or reperfusion problems    FAMILY HISTORY  (+)Glaucoma: Mother        Last edited by Sangita Hawkins, OD on 8/8/2019 10:53 AM. (History)            Assessment /Plan     For exam results, see Encounter Report.    Bilateral dry eyes   Contributing to intermittent blur. Recommended artificial tears prn.    Nuclear sclerotic cataract of both eyes   Contributing to glare. Pt does not yet feel ready for cataract surgery. Monitor.    Hyperopia with astigmatism and presbyopia, bilateral   Relatively stable OU. New glasses prescription released, adaptation expected.  New glasses optional.   Eyeglass Final Rx     Eyeglass Final Rx       Sphere Cylinder Axis Add    Right +1.25 +1.00 020 +2.50    Left +1.00 +1.00 145 +2.50    Expiration Date:  8/8/2020                 RTC July 2020 for annual f/u with Dr. Oh

## 2019-08-27 ENCOUNTER — PATIENT MESSAGE (OUTPATIENT)
Dept: ORTHOPEDICS | Facility: CLINIC | Age: 69
End: 2019-08-27

## 2019-08-30 ENCOUNTER — TELEPHONE (OUTPATIENT)
Dept: ORTHOPEDICS | Facility: CLINIC | Age: 69
End: 2019-08-30

## 2019-08-30 DIAGNOSIS — M50.30 DDD (DEGENERATIVE DISC DISEASE), CERVICAL: Primary | ICD-10-CM

## 2019-09-03 ENCOUNTER — OFFICE VISIT (OUTPATIENT)
Dept: ORTHOPEDICS | Facility: CLINIC | Age: 69
End: 2019-09-03
Payer: MEDICARE

## 2019-09-03 ENCOUNTER — HOSPITAL ENCOUNTER (OUTPATIENT)
Dept: RADIOLOGY | Facility: HOSPITAL | Age: 69
Discharge: HOME | End: 2019-09-03
Attending: ORTHOPAEDIC SURGERY
Payer: MEDICARE

## 2019-09-03 VITALS — WEIGHT: 164.56 LBS | HEIGHT: 65 IN | BODY MASS INDEX: 27.42 KG/M2

## 2019-09-03 DIAGNOSIS — M54.12 CERVICAL RADICULOPATHY: ICD-10-CM

## 2019-09-03 DIAGNOSIS — M50.30 DDD (DEGENERATIVE DISC DISEASE), CERVICAL: ICD-10-CM

## 2019-09-03 PROCEDURE — 72050 X-RAY EXAM NECK SPINE 4/5VWS: CPT | Mod: TC

## 2019-09-03 PROCEDURE — 72050 X-RAY EXAM NECK SPINE 4/5VWS: CPT | Mod: 26,,, | Performed by: RADIOLOGY

## 2019-09-03 PROCEDURE — 99999 PR PBB SHADOW E&M-EST. PATIENT-LVL IV: ICD-10-PCS | Mod: PBBFAC,,, | Performed by: PHYSICIAN ASSISTANT

## 2019-09-03 PROCEDURE — 99999 PR PBB SHADOW E&M-EST. PATIENT-LVL IV: CPT | Mod: PBBFAC,,, | Performed by: PHYSICIAN ASSISTANT

## 2019-09-03 PROCEDURE — 99214 OFFICE O/P EST MOD 30 MIN: CPT | Mod: S$GLB,,, | Performed by: PHYSICIAN ASSISTANT

## 2019-09-03 PROCEDURE — 99214 PR OFFICE/OUTPT VISIT, EST, LEVL IV, 30-39 MIN: ICD-10-PCS | Mod: S$GLB,,, | Performed by: PHYSICIAN ASSISTANT

## 2019-09-03 PROCEDURE — 1101F PR PT FALLS ASSESS DOC 0-1 FALLS W/OUT INJ PAST YR: ICD-10-PCS | Mod: CPTII,S$GLB,, | Performed by: PHYSICIAN ASSISTANT

## 2019-09-03 PROCEDURE — 1101F PT FALLS ASSESS-DOCD LE1/YR: CPT | Mod: CPTII,S$GLB,, | Performed by: PHYSICIAN ASSISTANT

## 2019-09-03 PROCEDURE — 72050 XR CERVICAL SPINE AP LAT WITH FLEX EXTEN: ICD-10-PCS | Mod: 26,,, | Performed by: RADIOLOGY

## 2019-09-03 NOTE — LETTER
September 4, 2019      Carmen Uriarte PA-C  [address] [facility] [address]           [facility]  [address]  [facility] [address]  Phone: [phone]          Patient: Isma Martin   MR Number: [id number]   YOB: 1950   Date of Visit: 9/3/2019       Dear Carmen Uriarte:    Thank you for referring Isma Martin to me for evaluation. Attached you will find relevant portions of my assessment and plan of care.    If you have questions, please do not hesitate to call me. I look forward to following Isma Martin along with you.    Sincerely,    Paloma Higgins PA-C    Enclosure  CC:  No Recipients    If you would like to receive this communication electronically, please contact [email or web address] or [phone] to request more information on [employer] Link access.    For providers and/or their staff who would like to refer a patient to [facility], please contact us through our one-stop-shop provider referral line, [facility] Shoemaker, at [phone].    If you feel you have received this communication in error or would no longer like to receive these types of communications, please e-mail [email or web address]

## 2019-09-04 NOTE — PROGRESS NOTES
DATE: 9/4/2019  PATIENT: Isma Martin    CHIEF COMPLAINT: neck and left arm pain    HISTORY:  Isma Martin is a 69 y.o. male here for initial evaluation of neck and left arm pain (Neck - 8, Arm - 8). The pain has been present since 7/25/2019. The pain began after falling from a 4 ft ladder.  The pain is in his neck and radiates down his entire arm and into his fingers.  He was initially evaluated for his shoulder and was referred due to lack of improvement and worsening symptoms concerning for radiculopathy.  The patient describes the pain as achy. The pain is worse with rotation of his neck and any movement with his left arm and improved by rest. There is associated numbness and tingling diffusely in his fingers. There is subjective weakness. Prior treatments have included medrol dose pack, nsaids, muscle relxants but no PT, YUMIKO, or surgery.  He is currently undergoing treatment for pituitary tumor.     The patient reports myelopathic symptoms such as handwriting changes or difficulty with buttons/coins/keys.  He states this is due to the pain and tingling in his fingers. Denies perineal paresthesias, bowel/bladder dysfunction.    PAST MEDICAL/SURGICAL HISTORY:  Past Medical History:   Diagnosis Date    Arthritis     Cataract     Colon polyp     Diabetes mellitus     GERD (gastroesophageal reflux disease)     Hyperlipidemia     Hypertension     Hypogonadism male     Obesity     Prolactinoma      Past Surgical History:   Procedure Laterality Date    CAROTID ENDARTERECTOMY Left     CHEILECTOMY Right 4/23/2014    Performed by Mega Ba[facility]uelos MD at [facility] OR 1ST FLR    COLONOSCOPY N/A 9/8/2015    Performed by MACKENZIE Beckett MD at [facility] ENDO (4TH FLR)    ENDARTERECTOMY-CAROTID Left 3/15/2017    Performed by Jose Ricketts MD at [facility] OR 2ND FLR    FOOT SURGERY  4-23-14    right    TRANSPHENOIDAL PITUITARY RESECTION      pituitary tumor       Medications:  Current Outpatient  Medications on File Prior to Visit   Medication Sig Dispense Refill    albuterol (PROVENTIL/VENTOLIN HFA) 90 mcg/actuation inhaler Inhale 2 puffs into the lungs every 6 (six) hours as needed for Wheezing. Rescue 18 g 0    ascorbic acid (VITAMIN C) 500 MG tablet Take 500 mg by mouth Every PM.      aspirin (ECOTRIN) 81 MG EC tablet Take 1 tablet (81 mg total) by mouth once daily.      atorvastatin (LIPITOR) 40 MG tablet Take 1 tablet (40 mg total) by mouth once daily. 90 tablet 11    cabergoline (DOSTINEX) 0.5 mg tablet Take 0.5 tablets (0.25 mg total) by mouth twice a week. 12 tablet 3    cholecalciferol, vitamin D3, 2,000 unit Tab Take 1 tablet by mouth Every PM.      cyanocobalamin (VITAMIN B-12) 1000 MCG tablet       HYDROcodone-acetaminophen (NORCO) 5-325 mg per tablet Take 1 tablet by mouth every 6 (six) hours as needed for Pain. 30 tablet 0    ketoconazole (NIZORAL) 2 % cream Apply topically 2 (two) times daily. 60 g 1    losartan-hydrochlorothiazide 50-12.5 mg (HYZAAR) 50-12.5 mg per tablet Take 1 tablet by mouth once daily. 90 tablet 11    methylPREDNISolone (MEDROL DOSEPACK) 4 mg tablet use as directed 1 tablet 0    metoprolol succinate (TOPROL-XL) 50 MG 24 hr tablet       omega-3 fatty acids-vitamin E (FISH OIL) 1,000 mg Cap       omeprazole (PRILOSEC) 20 MG capsule Take 1 capsule (20 mg total) by mouth once daily. 90 capsule 11    VITAMIN E ACETATE (VITAMIN E ORAL) Take by mouth.       No current facility-administered medications on file prior to visit.        Social History:   Social History     Socioeconomic History    Marital status:      Spouse name: Brenda    Number of children: Not on file    Years of education: Not on file    Highest education level: Not on file   Occupational History    Occupation: [employer]      Employer: [employer]     Employer: [employer]   Social Needs    Financial resource strain: Not on file    Food insecurity:     Worry: Not on  "file     Inability: Not on file    Transportation needs:     Medical: Not on file     Non-medical: Not on file   Tobacco Use    Smoking status: Former Smoker     Packs/day: 1.50     Years: 35.00     Pack years: 52.50     Last attempt to quit: 1995     Years since quittin.6    Smokeless tobacco: Never Used   Substance and Sexual Activity    Alcohol use: Yes     Alcohol/week: 0.5 oz     Types: 1 Standard drinks or equivalent per week     Comment: rare    Drug use: No    Sexual activity: Never   Lifestyle    Physical activity:     Days per week: Not on file     Minutes per session: Not on file    Stress: Not on file   Relationships    Social connections:     Talks on phone: Not on file     Gets together: Not on file     Attends Oriental orthodox service: Not on file     Active member of club or organization: Not on file     Attends meetings of clubs or organizations: Not on file     Relationship status: Not on file   Other Topics Concern    Not on file   Social History Narrative    , on ssdi for his toe.    . 1 kid still with them. Wife dm and in WC.    3 kids total.       REVIEW OF SYSTEMS:  Constitution: Negative. Negative for chills, fever and night sweats.   Cardiovascular: Negative for chest pain and syncope.   Respiratory: Negative for cough and shortness of breath.   Gastrointestinal: See HPI. Negative for nausea/vomiting. Negative for abdominal pain.  Genitourinary: See HPI. Negative for discoloration or dysuria.  Skin: Negative for dry skin, itching and rash.   Hematologic/Lymphatic: Negative for bleeding problem. Does not bruise/bleed easily.   Musculoskeletal: Negative for falls and muscle weakness.   Neurological: See HPI. No seizures.   Endocrine: Negative for polydipsia, polyphagia and polyuria.   Allergic/Immunologic: Negative for hives and persistent infections.  Psychiatric/Behavioral: Negative for depression and insomnia.         EXAM:  Ht 5' 5" (1.651 m)   Wt 74.6 kg (164 " lb 9.2 oz)   BMI 27.39 kg/m²     General: The patient is a pleasant 69 y.o. male in no apparent distress, the patient is oriented to person, place and time.  Psych: Normal mood and affect  HEENT: Vision grossly intact, hearing intact to the spoken word.  Lungs: Respirations unlabored.  Gait: Normal station and gait, no difficulty with toe or heel walk.   Skin: Cervical skin negative for rashes, lesions, hairy patches and surgical scars.  Range of motion: Cervical range of motion is acceptable. There is tenderness to palpation of trapezius musculature.  Spinal Balance: Global saggital and coronal spinal balance acceptable, no significant for scoliosis and kyphosis.  Musculoskeletal: Motion limited in left shoulder due to pain. Normal bulk and contour of the bilateral hands.  Decreased  strength in left hand.  Vascular: Bilateral hands warm and well perfused, radial pulses 2+ bilaterally.  Neurological: Normal strength and tone in all major motor groups in the right upper and bilateral lower extremities. Normal sensation to light touch in the C5-T1 and L2-S1 dermatomes bilaterally.  Deep tendon reflexes symmetric 2+ in the bilateral upper and lower extremities.  Negative Inverted Radial Reflex and Ramesh's bilaterally. Negative Babinski bilaterally.     IMAGING:   Today I personally reviewed AP, Lat and Flex/Ex  upright C-spine films that demonstrate significant DJD C5-C6, retrolisthesis C3/4, C4/5, and C5/6.  No acute abnormalities.     Body mass index is 27.39 kg/m².    Hemoglobin A1C   Date Value Ref Range Status   05/28/2019 6.0 (H) 4.0 - 5.6 % Final     Comment:     ADA Screening Guidelines:  5.7-6.4%  Consistent with prediabetes  >or=6.5%  Consistent with diabetes  High levels of fetal hemoglobin interfere with the HbA1C  assay. Heterozygous hemoglobin variants (HbS, HgC, etc)do  not significantly interfere with this assay.   However, presence of multiple variants may affect accuracy.     05/08/2018 5.9  (H) 4.0 - 5.6 % Final     Comment:     According to ADA guidelines, hemoglobin A1c <7.0% represents  optimal control in non-pregnant diabetic patients. Different  metrics may apply to specific patient populations.   Standards of Medical Care in Diabetes-2016.  For the purpose of screening for the presence of diabetes:  <5.7%     Consistent with the absence of diabetes  5.7-6.4%  Consistent with increasing risk for diabetes   (prediabetes)  >or=6.5%  Consistent with diabetes  Currently, no consensus exists for use of hemoglobin A1c  for diagnosis of diabetes for children.  This Hemoglobin A1c assay has significant interference with fetal   hemoglobin   (HbF). The results are invalid for patients with abnormal amounts of   HbF,   including those with known Hereditary Persistence   of Fetal Hemoglobin. Heterozygous hemoglobin variants (HbAS, HbAC,   HbAD, HbAE, HbA2) do not significantly interfere with this assay;   however, presence of multiple variants in a sample may impact the %   interference.     03/23/2018 5.7 (H) 4.0 - 5.6 % Final     Comment:     According to ADA guidelines, hemoglobin A1c <7.0% represents  optimal control in non-pregnant diabetic patients. Different  metrics may apply to specific patient populations.   Standards of Medical Care in Diabetes-2016.  For the purpose of screening for the presence of diabetes:  <5.7%     Consistent with the absence of diabetes  5.7-6.4%  Consistent with increasing risk for diabetes   (prediabetes)  >or=6.5%  Consistent with diabetes  Currently, no consensus exists for use of hemoglobin A1c  for diagnosis of diabetes for children.  This Hemoglobin A1c assay has significant interference with fetal   hemoglobin   (HbF). The results are invalid for patients with abnormal amounts of   HbF,   including those with known Hereditary Persistence   of Fetal Hemoglobin. Heterozygous hemoglobin variants (HbAS, HbAC,   HbAD, HbAE, HbA2) do not significantly interfere with this  assay;   however, presence of multiple variants in a sample may impact the %   interference.             ASSESSMENT/PLAN:    Diagnoses and all orders for this visit:    Cervical radiculopathy  -     MRI Cervical Spine Without Contrast; Future      - His symptoms are concerning for cervical radiculopathy that has failed to improve with conservative treatment over the past 4 weeks.  I have recommended an MRI to evaluate and discussed possible YUMIKO depending on results of his MRI.  He will follow up for results.

## 2019-09-09 ENCOUNTER — HOSPITAL ENCOUNTER (OUTPATIENT)
Dept: RADIOLOGY | Facility: HOSPITAL | Age: 69
Discharge: HOME | End: 2019-09-09
Attending: PHYSICIAN ASSISTANT
Payer: MEDICARE

## 2019-09-09 DIAGNOSIS — M54.12 CERVICAL RADICULOPATHY: ICD-10-CM

## 2019-09-09 PROCEDURE — 72141 MRI NECK SPINE W/O DYE: CPT | Mod: 26,,, | Performed by: RADIOLOGY

## 2019-09-09 PROCEDURE — 72141 MRI NECK SPINE W/O DYE: CPT | Mod: TC

## 2019-09-09 PROCEDURE — 72141 MRI CERVICAL SPINE WITHOUT CONTRAST: ICD-10-PCS | Mod: 26,,, | Performed by: RADIOLOGY

## 2019-09-13 ENCOUNTER — OFFICE VISIT (OUTPATIENT)
Dept: ORTHOPEDICS | Facility: CLINIC | Age: 69
End: 2019-09-13
Payer: MEDICARE

## 2019-09-13 VITALS
BODY MASS INDEX: 26.85 KG/M2 | WEIGHT: 161.19 LBS | HEART RATE: 73 BPM | DIASTOLIC BLOOD PRESSURE: 82 MMHG | SYSTOLIC BLOOD PRESSURE: 139 MMHG | HEIGHT: 65 IN

## 2019-09-13 DIAGNOSIS — M54.12 CERVICAL RADICULOPATHY: ICD-10-CM

## 2019-09-13 DIAGNOSIS — S46.912A SHOULDER STRAIN, LEFT, INITIAL ENCOUNTER: ICD-10-CM

## 2019-09-13 PROCEDURE — 3079F DIAST BP 80-89 MM HG: CPT | Mod: CPTII,S$GLB,, | Performed by: PHYSICIAN ASSISTANT

## 2019-09-13 PROCEDURE — 1101F PR PT FALLS ASSESS DOC 0-1 FALLS W/OUT INJ PAST YR: ICD-10-PCS | Mod: CPTII,S$GLB,, | Performed by: PHYSICIAN ASSISTANT

## 2019-09-13 PROCEDURE — 3075F SYST BP GE 130 - 139MM HG: CPT | Mod: CPTII,S$GLB,, | Performed by: PHYSICIAN ASSISTANT

## 2019-09-13 PROCEDURE — 1101F PT FALLS ASSESS-DOCD LE1/YR: CPT | Mod: CPTII,S$GLB,, | Performed by: PHYSICIAN ASSISTANT

## 2019-09-13 PROCEDURE — 3079F PR MOST RECENT DIASTOLIC BLOOD PRESSURE 80-89 MM HG: ICD-10-PCS | Mod: CPTII,S$GLB,, | Performed by: PHYSICIAN ASSISTANT

## 2019-09-13 PROCEDURE — 99214 PR OFFICE/OUTPT VISIT, EST, LEVL IV, 30-39 MIN: ICD-10-PCS | Mod: S$GLB,,, | Performed by: PHYSICIAN ASSISTANT

## 2019-09-13 PROCEDURE — 99214 OFFICE O/P EST MOD 30 MIN: CPT | Mod: S$GLB,,, | Performed by: PHYSICIAN ASSISTANT

## 2019-09-13 PROCEDURE — 3075F PR MOST RECENT SYSTOLIC BLOOD PRESS GE 130-139MM HG: ICD-10-PCS | Mod: CPTII,S$GLB,, | Performed by: PHYSICIAN ASSISTANT

## 2019-09-13 PROCEDURE — 99999 PR PBB SHADOW E&M-EST. PATIENT-LVL IV: ICD-10-PCS | Mod: PBBFAC,,, | Performed by: PHYSICIAN ASSISTANT

## 2019-09-13 PROCEDURE — 99999 PR PBB SHADOW E&M-EST. PATIENT-LVL IV: CPT | Mod: PBBFAC,,, | Performed by: PHYSICIAN ASSISTANT

## 2019-09-13 RX ORDER — TRAMADOL HYDROCHLORIDE 50 MG/1
50 TABLET ORAL EVERY 6 HOURS PRN
Qty: 20 TABLET | Refills: 0 | Status: SHIPPED | OUTPATIENT
Start: 2019-09-13 | End: 2019-09-20

## 2019-09-13 NOTE — PROGRESS NOTES
DATE: 9/13/2019  PATIENT: Isma Martin    Attending Physician: Ariel Lamar M.D.    HISTORY:  Isma Martin is a 69 y.o. male who returns to me today for MRI results.  He was last seen by me 9/3/2019.  Today he is doing well but continues to have constant pain in his left arm that radiates from his neck down to his hand with tingling and numbness in his fingers.  He has not improved with nsaids, steroids and muscle relaxants.  His injury occurred 7/25/2019 when he fell from a 4 foot ladder and landed on his left arm.  He does report significant weakness in his left arm and hand.  His pain is worse when laying down at night.  He also has significant pain and weakness with shoulder activity such as reaching or lifting.  He denies problems with balance.      The Patient reports myelopathic symptoms such as difficulty with buttons/coins/keys. Denies perineal paresthesias, bowel/bladder dysfunction.    PMH/PSH/FamHx/SocHx:  Unchanged from prior visit    ROS:  REVIEW OF SYSTEMS:  Constitution: Negative. Negative for chills, fever and night sweats.   HENT: Negative for congestion and headaches.    Eyes: Negative for blurred vision, left vision loss and right vision loss.   Cardiovascular: Negative for chest pain and syncope.   Respiratory: Negative for cough and shortness of breath.    Endocrine: Negative for polydipsia, polyphagia and polyuria.   Hematologic/Lymphatic: Negative for bleeding problem. Does not bruise/bleed easily.   Skin: Negative for dry skin, itching and rash.   Musculoskeletal: Negative for falls and muscle weakness.   Gastrointestinal: Negative for abdominal pain and bowel incontinence.   Allergic/Immunologic: Negative for hives and persistent infections.  Genitourinary: Negative for urinary retention/incontinence and nocturia.   Neurological: negative for disturbances in coordination, positive for myelopathic symptoms such as handwriting changes or difficulty with buttons, coins, keys  "or small objects. No loss of balance and seizures.   Psychiatric/Behavioral: Negative for depression. The patient does not have insomnia.   Denies myelopathic symptoms, perineal paresthesias, bowel or bladder incontinence    EXAM:  /82 (BP Location: Right arm, Patient Position: Sitting, BP Method: Medium (Automatic))   Pulse 73   Ht 5' 5" (1.651 m)   Wt 73.1 kg (161 lb 2.5 oz)   BMI 26.82 kg/m²     My physical examination was notable for the following findings:     Normal station and gait, no difficulty with toe or heel walk.   Cervical skin negative for rashes, lesions, hairy patches and surgical scars.   Cervical range of motion is acceptable.  There is left paracervical tenderness to palpation.  There is tenderness throughout the left shoulder and trapezius  Shoulder ROM is limited.  Significant pain and weakness with rotator cuff strength testing.  Normal bulk and contour of the bilateral hands.  No intrinsic weakness  Bilateral hands warm and well perfused, radial pulses 2+ bilaterally.   4/5 biceps, 4/5 triceps  Normal strength and tone in all major motor groups in the bilateral lower extremities. Normal sensation to light touch in the C5-T1 and L2-S1 dermatomes bilaterally.   Deep tendon reflexes symmetric 2+ in the bilateral lower extremities.  1+ LUE reflexes, 2+ RUE reflexes  Negative Inverted Radial Reflex and Ramesh's bilaterally. Negative Babinski bilaterally.     IMAGING:  No new imaging today.    Today I personally re-reviewed AP, Lat and Flex/Ex  upright C-spine films that demonstrate mild DJD.  C3/C4, C4/5, and C5/6 retrolisthesis    Cervical MRI shows cervical spondylosis with severe stenosis at C3-C4 and moderate at C4-6.  Abnormal cord signal at C5-C6 consistent with myelomalacia.    X-rays of the left shoulder show mild DJD with no fracture or dislocation    Body mass index is 26.82 kg/m².    Hemoglobin A1C   Date Value Ref Range Status   05/28/2019 6.0 (H) 4.0 - 5.6 % Final     " Comment:     ADA Screening Guidelines:  5.7-6.4%  Consistent with prediabetes  >or=6.5%  Consistent with diabetes  High levels of fetal hemoglobin interfere with the HbA1C  assay. Heterozygous hemoglobin variants (HbS, HgC, etc)do  not significantly interfere with this assay.   However, presence of multiple variants may affect accuracy.     05/08/2018 5.9 (H) 4.0 - 5.6 % Final     Comment:     According to ADA guidelines, hemoglobin A1c <7.0% represents  optimal control in non-pregnant diabetic patients. Different  metrics may apply to specific patient populations.   Standards of Medical Care in Diabetes-2016.  For the purpose of screening for the presence of diabetes:  <5.7%     Consistent with the absence of diabetes  5.7-6.4%  Consistent with increasing risk for diabetes   (prediabetes)  >or=6.5%  Consistent with diabetes  Currently, no consensus exists for use of hemoglobin A1c  for diagnosis of diabetes for children.  This Hemoglobin A1c assay has significant interference with fetal   hemoglobin   (HbF). The results are invalid for patients with abnormal amounts of   HbF,   including those with known Hereditary Persistence   of Fetal Hemoglobin. Heterozygous hemoglobin variants (HbAS, HbAC,   HbAD, HbAE, HbA2) do not significantly interfere with this assay;   however, presence of multiple variants in a sample may impact the %   interference.     03/23/2018 5.7 (H) 4.0 - 5.6 % Final     Comment:     According to ADA guidelines, hemoglobin A1c <7.0% represents  optimal control in non-pregnant diabetic patients. Different  metrics may apply to specific patient populations.   Standards of Medical Care in Diabetes-2016.  For the purpose of screening for the presence of diabetes:  <5.7%     Consistent with the absence of diabetes  5.7-6.4%  Consistent with increasing risk for diabetes   (prediabetes)  >or=6.5%  Consistent with diabetes  Currently, no consensus exists for use of hemoglobin A1c  for diagnosis of  diabetes for children.  This Hemoglobin A1c assay has significant interference with fetal   hemoglobin   (HbF). The results are invalid for patients with abnormal amounts of   HbF,   including those with known Hereditary Persistence   of Fetal Hemoglobin. Heterozygous hemoglobin variants (HbAS, HbAC,   HbAD, HbAE, HbA2) do not significantly interfere with this assay;   however, presence of multiple variants in a sample may impact the %   interference.           ASSESSMENT/PLAN:    -  He has symptoms consistent with cervical radiculopathy.  MRI is concerning for cervical myelopathy.  I will have him follow up with Dr. Lamar for evaluation.  -  He also has significant weakness in his left shoulder and limited range of motion, I have ordered an MRI of his left shoulder to evaluate his rotator cuff.  I will call him with the results.

## 2019-09-18 ENCOUNTER — TELEPHONE (OUTPATIENT)
Dept: INTERNAL MEDICINE | Facility: CLINIC | Age: 69
End: 2019-09-18

## 2019-09-18 DIAGNOSIS — Z12.5 SCREENING PSA (PROSTATE SPECIFIC ANTIGEN): Primary | ICD-10-CM

## 2019-09-18 DIAGNOSIS — E11.9 TYPE 2 DIABETES MELLITUS WITHOUT COMPLICATION, WITHOUT LONG-TERM CURRENT USE OF INSULIN: ICD-10-CM

## 2019-09-18 NOTE — TELEPHONE ENCOUNTER
Hi, please contact the patient to assist in scheduling    Orders Placed This Encounter    CBC auto differential    Comprehensive metabolic panel    Lipid panel    Hemoglobin A1c    PSA, Screening       Thank you, Anthony Tay

## 2019-09-18 NOTE — TELEPHONE ENCOUNTER
----- Message from Terri Morgan sent at 9/18/2019 11:58 AM CDT -----  Contact: [phone]  Type: Orders Request    What orders/ testing are being requested? Routine labs    Is there a future appointment scheduled for the patient with PCP? yes    When?  10-31-19  physical    Would you prefer a response via [facility]?    Comments:  Please advise, thank you.

## 2019-09-19 NOTE — PROGRESS NOTES
Subjective:       Patient ID: Isma Martin is a 69 y.o. male.    Chief Complaint: Follow-up    HPI    Mr. Martin is presenting as a follow-up visit for prolactinoma.     Has been on cabergoline 0.25mg twice weekly since having resumed medication in August 2016.  No galactorrhea or breast tenderness.    Initially in his 40s he Presented with symptoms of HA and Was found to have a pituitary tumor  Interventions have included: surgery 1996 at [facility] - he was advised that he needed sugery even though he was only on DA for a few days based on the size and invasion   After surgery his HA resolved, libido returned and was doing well... Was not on any meds       In his late 50s he Re-presented with ED and low libido   He started Taking testosterone age 62   Started with IM and was switched to gel but stopped it - did not feel any different on testosterone     Libido was better after starting DA but has seemed to have decreased. Total testosterone was 618 on labs.      MRI 6/21/16  Remote operative change from transsphenoidal sellar lesion resection with continued heterogeneous probable fat packing material within sphenoid sinus.    Otherwise stable configuration of the sella with enhancing material along the floor and left aspect of the sella which may represent residual pituitary tissue underlying residual lesion not excluded. No evidence for new signal abnormality or enhancement to suggest worsening residual  lesion.     Also has type 2 DM, controlled with Diet. Last A1c 5.9%.   On statin & ARB      Review of Systems   Constitutional: Positive for fatigue (ocassional ). Negative for activity change.   HENT: Negative for congestion.    Respiratory: Negative for shortness of breath.    Cardiovascular: Negative for chest pain.   Gastrointestinal: Negative for abdominal pain.   Endocrine: Negative for cold intolerance and heat intolerance.   Genitourinary: Negative for difficulty urinating.    Musculoskeletal: Negative for arthralgias.   Allergic/Immunologic: Negative for environmental allergies.   Hematological: Negative for adenopathy.       Objective:      Physical Exam   Neck: No thyromegaly present.   Cardiovascular: Intact distal pulses.   Pulmonary/Chest: Breath sounds normal.   Skin: Skin is warm and dry.       Vitals:    09/24/19 1440   BP: 134/68   Pulse: 77     Labs:   Results for FLORIDALMA DIAZ (MRN [id number]) as of 8/22/2018 14:29   Ref. Range 5/8/2018 16:30   Sodium Latest Ref Range: 136 - 145 mmol/L 139   Potassium Latest Ref Range: 3.5 - 5.1 mmol/L 4.1   Chloride Latest Ref Range: 95 - 110 mmol/L 103   CO2 Latest Ref Range: 23 - 29 mmol/L 31 (H)   Anion Gap Latest Ref Range: 8 - 16 mmol/L 5 (L)   BUN, Bld Latest Ref Range: 8 - 23 mg/dL 13   Creatinine Latest Ref Range: 0.5 - 1.4 mg/dL 1.2   eGFR if non African American Latest Ref Range: >60 mL/min/1.73 m^2 >60.0   eGFR if African American Latest Ref Range: >60 mL/min/1.73 m^2 >60.0   Glucose Latest Ref Range: 70 - 110 mg/dL 98   Calcium Latest Ref Range: 8.7 - 10.5 mg/dL 9.3   Alkaline Phosphatase Latest Ref Range: 55 - 135 U/L 48 (L)   Total Protein Latest Ref Range: 6.0 - 8.4 g/dL 7.7   Albumin Latest Ref Range: 3.5 - 5.2 g/dL 3.7   Total Bilirubin Latest Ref Range: 0.1 - 1.0 mg/dL 0.6   AST Latest Ref Range: 10 - 40 U/L 23   ALT Latest Ref Range: 10 - 44 U/L 28   Triglycerides Latest Ref Range: 30 - 150 mg/dL 121   Cholesterol Latest Ref Range: 120 - 199 mg/dL 164   HDL Latest Ref Range: 40 - 75 mg/dL 46   LDL Cholesterol Latest Ref Range: 63.0 - 159.0 mg/dL 93.8   Total Cholesterol/HDL Ratio Latest Ref Range: 2.0 - 5.0  3.6   Hemoglobin A1C Latest Ref Range: 4.0 - 5.6 % 5.9 (H)   Estimated Avg Glucose Latest Ref Range: 68 - 131 mg/dL 123   TSH Latest Ref Range: 0.400 - 4.000 uIU/mL 0.715   Free T4 Latest Ref Range: 0.71 - 1.51 ng/dL 1.02   Prolactin Latest Ref Range: 3.5 - 19.4 ng/mL 10.3   Testosterone, Total Latest Ref Range:  195.0 - 1138.0 ng/dL 427     Results for FLORIDALMA DIAZ (MRN [id number]) as of 8/22/2018 14:29   Ref. Range 8/17/2018 13:28   Prolactin Latest Ref Range: 3.5 - 19.4 ng/mL 12.5     Assessment:       1. Prolactinoma  TSH    Hemoglobin A1c    Prolactin    Testosterone Panel    T4, free   2. Hypogonadism male  TSH    Hemoglobin A1c    Prolactin    Testosterone Panel    T4, free   3. Type 2 diabetes mellitus without complication, without long-term current use of insulin  TSH    Hemoglobin A1c    Prolactin    Testosterone Panel    T4, free   4. Essential hypertension  TSH    Hemoglobin A1c    Prolactin    Testosterone Panel    T4, free   5. Erectile dysfunction, unspecified erectile dysfunction type  Ambulatory referral to Urology   6. Vitamin D deficiency  Vitamin D     Plan:       Prolactinoma with ED and hypogonadism   -- testosterone has remained stable since on DA   -- check testosterone panel & other labs at 8 am. If low will start testosterone patches. If not will send to urology for ED medication.    --  continue cabergoline 0.25 mg twice weekly, May adjust after lab results today.     T2DM diet controlled, on ACEi & statin  Check A1c today    HTN at goal      Follow up in about 1 year (around 9/24/2020).

## 2019-09-20 ENCOUNTER — HOSPITAL ENCOUNTER (OUTPATIENT)
Dept: RADIOLOGY | Facility: HOSPITAL | Age: 69
Discharge: HOME | End: 2019-09-20
Attending: PHYSICIAN ASSISTANT
Payer: MEDICARE

## 2019-09-20 ENCOUNTER — PATIENT OUTREACH (OUTPATIENT)
Dept: ADMINISTRATIVE | Facility: OTHER | Age: 69
End: 2019-09-20

## 2019-09-20 DIAGNOSIS — S46.912A SHOULDER STRAIN, LEFT, INITIAL ENCOUNTER: ICD-10-CM

## 2019-09-20 PROCEDURE — 73221 MRI SHOULDER WITHOUT CONTRAST LEFT: ICD-10-PCS | Mod: 26,LT,, | Performed by: RADIOLOGY

## 2019-09-20 PROCEDURE — 73221 MRI JOINT UPR EXTREM W/O DYE: CPT | Mod: 26,LT,, | Performed by: RADIOLOGY

## 2019-09-20 PROCEDURE — 73221 MRI JOINT UPR EXTREM W/O DYE: CPT | Mod: TC,LT

## 2019-09-24 ENCOUNTER — PATIENT MESSAGE (OUTPATIENT)
Dept: INTERNAL MEDICINE | Facility: CLINIC | Age: 69
End: 2019-09-24

## 2019-09-24 ENCOUNTER — PATIENT MESSAGE (OUTPATIENT)
Dept: ENDOCRINOLOGY | Facility: CLINIC | Age: 69
End: 2019-09-24

## 2019-09-24 ENCOUNTER — OFFICE VISIT (OUTPATIENT)
Dept: ENDOCRINOLOGY | Facility: CLINIC | Age: 69
End: 2019-09-24
Payer: MEDICARE

## 2019-09-24 VITALS
HEART RATE: 77 BPM | DIASTOLIC BLOOD PRESSURE: 68 MMHG | BODY MASS INDEX: 27.53 KG/M2 | SYSTOLIC BLOOD PRESSURE: 134 MMHG | WEIGHT: 165.25 LBS | HEIGHT: 65 IN

## 2019-09-24 DIAGNOSIS — E55.9 VITAMIN D DEFICIENCY: ICD-10-CM

## 2019-09-24 DIAGNOSIS — D35.2 PROLACTINOMA: Primary | ICD-10-CM

## 2019-09-24 DIAGNOSIS — E11.9 TYPE 2 DIABETES MELLITUS WITHOUT COMPLICATION, WITHOUT LONG-TERM CURRENT USE OF INSULIN: ICD-10-CM

## 2019-09-24 DIAGNOSIS — E29.1 HYPOGONADISM MALE: ICD-10-CM

## 2019-09-24 DIAGNOSIS — I10 ESSENTIAL HYPERTENSION: ICD-10-CM

## 2019-09-24 DIAGNOSIS — N52.9 ERECTILE DYSFUNCTION, UNSPECIFIED ERECTILE DYSFUNCTION TYPE: ICD-10-CM

## 2019-09-24 PROCEDURE — 99214 OFFICE O/P EST MOD 30 MIN: CPT | Mod: S$GLB,,, | Performed by: NURSE PRACTITIONER

## 2019-09-24 PROCEDURE — 1101F PR PT FALLS ASSESS DOC 0-1 FALLS W/OUT INJ PAST YR: ICD-10-PCS | Mod: CPTII,S$GLB,, | Performed by: NURSE PRACTITIONER

## 2019-09-24 PROCEDURE — 99999 PR PBB SHADOW E&M-EST. PATIENT-LVL III: CPT | Mod: PBBFAC,,, | Performed by: NURSE PRACTITIONER

## 2019-09-24 PROCEDURE — 3078F PR MOST RECENT DIASTOLIC BLOOD PRESSURE < 80 MM HG: ICD-10-PCS | Mod: CPTII,S$GLB,, | Performed by: NURSE PRACTITIONER

## 2019-09-24 PROCEDURE — 99999 PR PBB SHADOW E&M-EST. PATIENT-LVL III: ICD-10-PCS | Mod: PBBFAC,,, | Performed by: NURSE PRACTITIONER

## 2019-09-24 PROCEDURE — 3075F PR MOST RECENT SYSTOLIC BLOOD PRESS GE 130-139MM HG: ICD-10-PCS | Mod: CPTII,S$GLB,, | Performed by: NURSE PRACTITIONER

## 2019-09-24 PROCEDURE — 99214 PR OFFICE/OUTPT VISIT, EST, LEVL IV, 30-39 MIN: ICD-10-PCS | Mod: S$GLB,,, | Performed by: NURSE PRACTITIONER

## 2019-09-24 PROCEDURE — 3075F SYST BP GE 130 - 139MM HG: CPT | Mod: CPTII,S$GLB,, | Performed by: NURSE PRACTITIONER

## 2019-09-24 PROCEDURE — 1101F PT FALLS ASSESS-DOCD LE1/YR: CPT | Mod: CPTII,S$GLB,, | Performed by: NURSE PRACTITIONER

## 2019-09-24 PROCEDURE — 3078F DIAST BP <80 MM HG: CPT | Mod: CPTII,S$GLB,, | Performed by: NURSE PRACTITIONER

## 2019-09-24 PROCEDURE — 3044F HG A1C LEVEL LT 7.0%: CPT | Mod: CPTII,S$GLB,, | Performed by: NURSE PRACTITIONER

## 2019-09-24 PROCEDURE — 3044F PR MOST RECENT HEMOGLOBIN A1C LEVEL <7.0%: ICD-10-PCS | Mod: CPTII,S$GLB,, | Performed by: NURSE PRACTITIONER

## 2019-09-24 RX ORDER — CABERGOLINE 0.5 MG/1
0.25 TABLET ORAL
Qty: 12 TABLET | Refills: 3 | Status: SHIPPED | OUTPATIENT
Start: 2019-09-26 | End: 2020-02-04 | Stop reason: SDUPTHER

## 2019-09-24 RX ORDER — HYDROCODONE BITARTRATE AND ACETAMINOPHEN 5; 325 MG/1; MG/1
1 TABLET ORAL EVERY 6 HOURS PRN
Qty: 30 TABLET | Refills: 0 | Status: ON HOLD | OUTPATIENT
Start: 2019-09-24 | End: 2019-10-30 | Stop reason: HOSPADM

## 2019-09-25 ENCOUNTER — LAB VISIT (OUTPATIENT)
Dept: LAB | Facility: HOSPITAL | Age: 69
End: 2019-09-25
Attending: NURSE PRACTITIONER
Payer: MEDICARE

## 2019-09-25 DIAGNOSIS — D35.2 PROLACTINOMA: ICD-10-CM

## 2019-09-25 DIAGNOSIS — E11.9 TYPE 2 DIABETES MELLITUS WITHOUT COMPLICATION, WITHOUT LONG-TERM CURRENT USE OF INSULIN: ICD-10-CM

## 2019-09-25 DIAGNOSIS — E55.9 VITAMIN D DEFICIENCY: ICD-10-CM

## 2019-09-25 DIAGNOSIS — E29.1 HYPOGONADISM MALE: ICD-10-CM

## 2019-09-25 DIAGNOSIS — I10 ESSENTIAL HYPERTENSION: ICD-10-CM

## 2019-09-25 LAB
25(OH)D3+25(OH)D2 SERPL-MCNC: 26 NG/ML (ref 30–96)
ESTIMATED AVG GLUCOSE: 126 MG/DL (ref 68–131)
HBA1C MFR BLD HPLC: 6 % (ref 4–5.6)
PROLACTIN SERPL IA-MCNC: 5.1 NG/ML (ref 3.5–19.4)
T4 FREE SERPL-MCNC: 1 NG/DL (ref 0.71–1.51)
TSH SERPL DL<=0.005 MIU/L-ACNC: 2.02 UIU/ML (ref 0.4–4)

## 2019-09-25 PROCEDURE — 83036 HEMOGLOBIN GLYCOSYLATED A1C: CPT

## 2019-09-25 PROCEDURE — 82306 VITAMIN D 25 HYDROXY: CPT

## 2019-09-25 PROCEDURE — 84146 ASSAY OF PROLACTIN: CPT

## 2019-09-25 PROCEDURE — 84439 ASSAY OF FREE THYROXINE: CPT

## 2019-09-25 PROCEDURE — 82040 ASSAY OF SERUM ALBUMIN: CPT

## 2019-09-25 PROCEDURE — 84443 ASSAY THYROID STIM HORMONE: CPT

## 2019-09-25 PROCEDURE — 36415 COLL VENOUS BLD VENIPUNCTURE: CPT

## 2019-09-30 LAB
ALBUMIN SERPL-MCNC: 3.5 G/DL (ref 3.6–5.1)
SHBG SERPL-SCNC: 53 NMOL/L (ref 22–77)
TESTOST FREE SERPL-MCNC: 50.4 PG/ML (ref 46–224)
TESTOST SERPL-MCNC: 536 NG/DL (ref 250–1100)
TESTOSTERONE.FREE+WB SERPL-MCNC: 81.7 NG/DL (ref 110–575)

## 2019-10-01 ENCOUNTER — IMMUNIZATION (OUTPATIENT)
Dept: PHARMACY | Facility: CLINIC | Age: 69
End: 2019-10-01
Payer: MEDICARE

## 2019-10-01 ENCOUNTER — PATIENT OUTREACH (OUTPATIENT)
Dept: ADMINISTRATIVE | Facility: OTHER | Age: 69
End: 2019-10-01

## 2019-10-02 ENCOUNTER — OFFICE VISIT (OUTPATIENT)
Dept: ORTHOPEDICS | Facility: CLINIC | Age: 69
End: 2019-10-02
Payer: MEDICARE

## 2019-10-02 VITALS — WEIGHT: 167.56 LBS | BODY MASS INDEX: 27.92 KG/M2 | HEIGHT: 65 IN

## 2019-10-02 DIAGNOSIS — G95.9 CERVICAL MYELOPATHY: Primary | ICD-10-CM

## 2019-10-02 PROCEDURE — 99999 PR PBB SHADOW E&M-EST. PATIENT-LVL IV: ICD-10-PCS | Mod: PBBFAC,,, | Performed by: ORTHOPAEDIC SURGERY

## 2019-10-02 PROCEDURE — 99213 PR OFFICE/OUTPT VISIT, EST, LEVL III, 20-29 MIN: ICD-10-PCS | Mod: S$GLB,,, | Performed by: ORTHOPAEDIC SURGERY

## 2019-10-02 PROCEDURE — 99999 PR PBB SHADOW E&M-EST. PATIENT-LVL IV: CPT | Mod: PBBFAC,,, | Performed by: ORTHOPAEDIC SURGERY

## 2019-10-02 PROCEDURE — 1101F PT FALLS ASSESS-DOCD LE1/YR: CPT | Mod: CPTII,S$GLB,, | Performed by: ORTHOPAEDIC SURGERY

## 2019-10-02 PROCEDURE — 99213 OFFICE O/P EST LOW 20 MIN: CPT | Mod: S$GLB,,, | Performed by: ORTHOPAEDIC SURGERY

## 2019-10-02 PROCEDURE — 99499 UNLISTED E&M SERVICE: CPT | Mod: S$GLB,,, | Performed by: ORTHOPAEDIC SURGERY

## 2019-10-02 PROCEDURE — 99499 RISK ADDL DX/OHS AUDIT: ICD-10-PCS | Mod: S$GLB,,, | Performed by: ORTHOPAEDIC SURGERY

## 2019-10-02 PROCEDURE — 1101F PR PT FALLS ASSESS DOC 0-1 FALLS W/OUT INJ PAST YR: ICD-10-PCS | Mod: CPTII,S$GLB,, | Performed by: ORTHOPAEDIC SURGERY

## 2019-10-02 RX ORDER — MUPIROCIN 20 MG/G
OINTMENT TOPICAL
Status: CANCELLED | OUTPATIENT
Start: 2019-10-02

## 2019-10-02 RX ORDER — SODIUM CHLORIDE 9 MG/ML
INJECTION, SOLUTION INTRAVENOUS CONTINUOUS
Status: CANCELLED | OUTPATIENT
Start: 2019-10-02

## 2019-10-03 ENCOUNTER — OFFICE VISIT (OUTPATIENT)
Dept: SPORTS MEDICINE | Facility: CLINIC | Age: 69
End: 2019-10-03
Payer: MEDICARE

## 2019-10-03 VITALS
HEIGHT: 65 IN | WEIGHT: 167 LBS | DIASTOLIC BLOOD PRESSURE: 77 MMHG | HEART RATE: 59 BPM | SYSTOLIC BLOOD PRESSURE: 144 MMHG | BODY MASS INDEX: 27.82 KG/M2

## 2019-10-03 DIAGNOSIS — M54.12 CERVICAL RADICULOPATHY: ICD-10-CM

## 2019-10-03 DIAGNOSIS — M75.122 NONTRAUMATIC COMPLETE TEAR OF LEFT ROTATOR CUFF: Primary | ICD-10-CM

## 2019-10-03 DIAGNOSIS — M75.22 BICEPS TENDINITIS OF LEFT UPPER EXTREMITY: ICD-10-CM

## 2019-10-03 PROCEDURE — 3078F DIAST BP <80 MM HG: CPT | Mod: CPTII,S$GLB,, | Performed by: ORTHOPAEDIC SURGERY

## 2019-10-03 PROCEDURE — 3077F PR MOST RECENT SYSTOLIC BLOOD PRESSURE >= 140 MM HG: ICD-10-PCS | Mod: CPTII,S$GLB,, | Performed by: ORTHOPAEDIC SURGERY

## 2019-10-03 PROCEDURE — 20610 LARGE JOINT ASPIRATION/INJECTION: L SUBACROMIAL BURSA: ICD-10-PCS | Mod: LT,S$GLB,, | Performed by: ORTHOPAEDIC SURGERY

## 2019-10-03 PROCEDURE — 99999 PR PBB SHADOW E&M-EST. PATIENT-LVL III: ICD-10-PCS | Mod: PBBFAC,,, | Performed by: ORTHOPAEDIC SURGERY

## 2019-10-03 PROCEDURE — 99204 OFFICE O/P NEW MOD 45 MIN: CPT | Mod: 25,S$GLB,, | Performed by: ORTHOPAEDIC SURGERY

## 2019-10-03 PROCEDURE — 3078F PR MOST RECENT DIASTOLIC BLOOD PRESSURE < 80 MM HG: ICD-10-PCS | Mod: CPTII,S$GLB,, | Performed by: ORTHOPAEDIC SURGERY

## 2019-10-03 PROCEDURE — 1101F PT FALLS ASSESS-DOCD LE1/YR: CPT | Mod: CPTII,S$GLB,, | Performed by: ORTHOPAEDIC SURGERY

## 2019-10-03 PROCEDURE — 99999 PR PBB SHADOW E&M-EST. PATIENT-LVL III: CPT | Mod: PBBFAC,,, | Performed by: ORTHOPAEDIC SURGERY

## 2019-10-03 PROCEDURE — 3077F SYST BP >= 140 MM HG: CPT | Mod: CPTII,S$GLB,, | Performed by: ORTHOPAEDIC SURGERY

## 2019-10-03 PROCEDURE — 20610 DRAIN/INJ JOINT/BURSA W/O US: CPT | Mod: LT,S$GLB,, | Performed by: ORTHOPAEDIC SURGERY

## 2019-10-03 PROCEDURE — 99204 PR OFFICE/OUTPT VISIT, NEW, LEVL IV, 45-59 MIN: ICD-10-PCS | Mod: 25,S$GLB,, | Performed by: ORTHOPAEDIC SURGERY

## 2019-10-03 PROCEDURE — 1101F PR PT FALLS ASSESS DOC 0-1 FALLS W/OUT INJ PAST YR: ICD-10-PCS | Mod: CPTII,S$GLB,, | Performed by: ORTHOPAEDIC SURGERY

## 2019-10-03 RX ADMIN — TRIAMCINOLONE ACETONIDE 40 MG: 40 INJECTION, SUSPENSION INTRA-ARTICULAR; INTRAMUSCULAR at 10:10

## 2019-10-03 NOTE — LETTER
October 14, 2019      Paloma Higgins PA-C  [address]  [facility] [address]           [facility]  [address]  [facility] [address]  Phone: [phone]          Patient: Isma Martin   MR Number: [id number]   YOB: 1950   Date of Visit: 10/3/2019       Dear Paloma Higgins:    Thank you for referring Isma Martin to me for evaluation. Attached you will find relevant portions of my assessment and plan of care.    If you have questions, please do not hesitate to call me. I look forward to following Isma Martin along with you.    Sincerely,    TIP Duckworth MD    Enclosure  CC:  No Recipients    If you would like to receive this communication electronically, please contact [email or web address] or [phone] to request more information on [facility] Link access.    For providers and/or their staff who would like to refer a patient to [facility], please contact us through our one-stop-shop provider referral line, [facility], at [phone].    If you feel you have received this communication in error or would no longer like to receive these types of communications, please e-mail [email or web address]

## 2019-10-03 NOTE — PROGRESS NOTES
CC: LEFT shoulder pain, neck pain with associated radicular symptoms      69 y.o. Male right-hand-dominant retired  who presents to me as a new patient.  Complaint is both significant left shoulder pain and weakness with intermittent daily radicular symptoms up and down the arm and generalized achiness of the left upper extremity.  He also has some neck stiffness.  Seen recently by my spine colleague for cervical spondylosis.  The patient has some cervical myelomalacia.  He has been indicated for ACDF.  Plan is to proceed in November.  The patient's left shoulder problems began acutely when he fell off a ladder 2 months ago.  He denies any pre-existing issues.  Complaint is both pain and significant weakness.  Treatment has included formal physical therapy which has been of no significant benefit.  This has become a quality of life issue for him.  Numbness and tingling goes up and down the arm into the hand.  He also has subjective weakness in the hand.       The patient denies any balance problems, trouble buttoning buttons or writing.    Pain Score:   8    PAST MEDICAL HISTORY:   Past Medical History:   Diagnosis Date    Arthritis     Cataract     Colon polyp     Diabetes mellitus     GERD (gastroesophageal reflux disease)     Hyperlipidemia     Hypertension     Hypogonadism male     Obesity     Prolactinoma      PAST SURGICAL HISTORY:  Past Surgical History:   Procedure Laterality Date    CAROTID ENDARTERECTOMY Left     FOOT SURGERY  4-23-14    right    TRANSPHENOIDAL PITUITARY RESECTION      pituitary tumor     FAMILY HISTORY:  Family History   Problem Relation Age of Onset    Glaucoma Mother     Heart disease Brother     Heart attack Sister     Heart disease Sister     Cancer Neg Hx     Diabetes Neg Hx     Colon polyps Neg Hx     Blindness Neg Hx     Amblyopia Neg Hx     Cataracts Neg Hx     Hypertension Neg Hx     Macular degeneration Neg Hx     Retinal detachment Neg Hx      Strabismus Neg Hx     Stroke Neg Hx     Thyroid disease Neg Hx        MEDICATIONS:    Current Outpatient Medications:     ascorbic acid (VITAMIN C) 500 MG tablet, Take 500 mg by mouth Every PM., Disp: , Rfl:     atorvastatin (LIPITOR) 40 MG tablet, Take 1 tablet (40 mg total) by mouth once daily., Disp: 90 tablet, Rfl: 11    cabergoline (DOSTINEX) 0.5 mg tablet, Take 0.5 tablets (0.25 mg total) by mouth twice a week., Disp: 12 tablet, Rfl: 3    cholecalciferol, vitamin D3, 2,000 unit Tab, Take 1 tablet by mouth Every PM., Disp: , Rfl:     cyanocobalamin (VITAMIN B-12) 1000 MCG tablet, , Disp: , Rfl:     HYDROcodone-acetaminophen (NORCO) 5-325 mg per tablet, Take 1 tablet by mouth every 6 (six) hours as needed for Pain., Disp: 30 tablet, Rfl: 0    HYDROcodone-acetaminophen (NORCO) 5-325 mg per tablet, Take 1 tablet by mouth every 6 (six) hours as needed for Pain., Disp: 30 tablet, Rfl: 0    losartan-hydrochlorothiazide 50-12.5 mg (HYZAAR) 50-12.5 mg per tablet, Take 1 tablet by mouth once daily., Disp: 90 tablet, Rfl: 11    omega-3 fatty acids-vitamin E (FISH OIL) 1,000 mg Cap, , Disp: , Rfl:     omeprazole (PRILOSEC) 20 MG capsule, Take 1 capsule (20 mg total) by mouth once daily., Disp: 90 capsule, Rfl: 11    VITAMIN E ACETATE (VITAMIN E ORAL), Take by mouth., Disp: , Rfl:     metoprolol succinate (TOPROL-XL) 25 MG 24 hr tablet, Take 1 tablet (25 mg total) by mouth once daily., Disp: 90 tablet, Rfl: 11    oxyCODONE-acetaminophen (PERCOCET) 5-325 mg per tablet, Take one to two tablets by mouth every four hours as needed for pain, Disp: 30 tablet, Rfl: 0    ALLERGIES:  Review of patient's allergies indicates:  No Known Allergies     REVIEW OF SYSTEMS:  Constitution: Negative. Negative for chills, fever and night sweats.    Hematologic/Lymphatic: Negative for bleeding problem. Does not bruise/bleed easily.   Skin: Negative for dry skin, itching and rash.   Musculoskeletal: Negative for falls. Positive  "for left shoulder pain and muscle weakness and neck pain with associated radicular symptoms.     All other review of symptoms were reviewed and found to be noncontributory.    PHYSICAL EXAMINATION:  Vitals:  BP (!) 144/77   Pulse (!) 59   Ht 5' 5" (1.651 m)   Wt 75.8 kg (167 lb)   BMI 27.79 kg/m²    General: Well-developed well-nourished 69 y.o. malein no acute distress   Cardiovascular: Regular rhythm by palpation of distal pulse, normal color and temperature, no concerning varicosities on symptomatic side   Lungs: No labored breathing or wheezing appreciated   Neuro: Alert and oriented ×3   Psychiatric: well oriented to person, place and time, demonstrates normal mood and affect   Skin: No rashes, lesions or ulcers, normal temperature, turgor, and texture on uninvolved extremity    Ortho/SPM Exam  Examination of the left shoulder demonstrates global tenderness to palpation and pain.  Difficulty with active and passive range of motion limitation due to both weakness and pain. Active forward elevation in scapular plane with shoulder hiking to 80°.  Passive to 150° with pain and guarding.  Active external rotation with arm at side limited to about 40°.  Passive to 50-60 degrees. 4-out of 5 resisted scaption with a positive drop-arm test.  4/5 resisted ER at side. Negative ER lag sign.  Midline and paraspinal neck tenderness.  Positive Spurling's test.  Negative Nita sign distally. Mild weakness to intrinsic function distally otherwise intact motor strength biceps/triceps/wrist extensors/wrist flexors.     IMAGING:  Xrays including AP, Outlet and Axillary Lateral of Left shoulder are ordered / images reviewed by me:   No fracture or acute change appreciated. Mild glenohumeral degenerative changes. Mild to moderate AC joint arthritis. Normal acromiohumeral distance.     MRI of Left shoulder reviewed by me and discussed with patient. Study shows:    1. Full-thickness tear of the supraspinatus tendon with " retraction to the level of the glenoid.   2. Fraying of the inferior labrum.   3. Glenohumeral cartilage loss.   4. Small joint effusion with subacromial-subdeltoid bursitis.    Well-maintained musculature on my read    MRI C/S reviewed by me and discussed with patient. Study shows:    Cervical spondylosis resulting in severe spinal canal stenosis at C3-C4 and moderate at C4 through C6.  Abnormal cord signal at the level of C5-C6 consistent with myelomalacia.  Additional multilevel neural foraminal narrowing as above.    ASSESSMENT:      ICD-10-CM ICD-9-CM   1. Nontraumatic complete tear of left rotator cuff M75.122 727.61   2. Biceps tendinitis of left upper extremity M75.22 726.12   3. Cervical radiculopathy M54.12 723.4       PLAN:     Findings were discussed with the patient. He has a large rotator cuff tear with some tissue retraction.  No significant fatty infiltration.  This does appear to be a repairable tear. Unfortunate the patient also has cervical radicular symptoms with weakness and has been indicated for cervical spine surgery. I have counseled the patient that I would not proceed with any surgery for the shoulder at this time. Subacromial steroid injection was given.  Instructed to begin physical therapy for the shoulder.  After the neck had been addressed, we will reassess the shoulder.  If he is having problems at that time, we could consider arthroscopic intervention.    I have communicated this to the patient's spine surgeon.      Large Joint Aspiration/Injection: L subacromial bursa  Date/Time: 10/3/2019 10:00 AM  Performed by: TIP Duckworth MD  Authorized by: TIP Duckworth MD     Consent Done?:  Yes (Verbal)  Indications:  Pain  Procedure site marked: Yes    Timeout: Prior to procedure the correct patient, procedure, and site was verified    Anesthesia  Local anesthesia used  Anesthesia: local infiltration  Anesthetic: lidocaine 1% without epinephrine and bupivacaine 0.25% without  epinephrine  Anesthetic total: 4mL    Location:  Shoulder  Site:  L subacromial bursa  Prep: Patient was prepped and draped in usual sterile fashion    Needle size:  22 G  Ultrasonic Guidance for needle placement: No  Approach:  Posterior  Medications:  40 mg triamcinolone acetonide 40 mg/mL  Patient tolerance:  Patient tolerated the procedure well with no immediate complications

## 2019-10-07 ENCOUNTER — PATIENT MESSAGE (OUTPATIENT)
Dept: UROLOGY | Facility: CLINIC | Age: 69
End: 2019-10-07

## 2019-10-07 ENCOUNTER — ANESTHESIA EVENT (OUTPATIENT)
Dept: SURGERY | Facility: HOSPITAL | Age: 69
DRG: 473 | End: 2019-10-07
Payer: MEDICARE

## 2019-10-07 ENCOUNTER — TELEPHONE (OUTPATIENT)
Dept: PREADMISSION TESTING | Facility: HOSPITAL | Age: 69
End: 2019-10-07

## 2019-10-07 ENCOUNTER — TELEPHONE (OUTPATIENT)
Dept: INTERNAL MEDICINE | Facility: CLINIC | Age: 69
End: 2019-10-07

## 2019-10-07 DIAGNOSIS — D69.6 THROMBOCYTOPENIA: ICD-10-CM

## 2019-10-07 DIAGNOSIS — Z01.818 PREOPERATIVE TESTING: Primary | ICD-10-CM

## 2019-10-07 DIAGNOSIS — G95.9 CERVICAL MYELOPATHY: Primary | ICD-10-CM

## 2019-10-07 NOTE — PROGRESS NOTES
The patient returns for follow-up.  He has known severe cervical stenosis and symptomatic myelopathy as well as a left-sided rotator cuff tear.    Today we discussed options, I have recommended a C3-C6 laminectomy and fusion.  The plan will be for surgery on October 29th.    I spent 15 minutes with the patient of which greater than 1/2 the time was devoted to counciling the patient regarding treatment options.

## 2019-10-07 NOTE — TELEPHONE ENCOUNTER
----- Message from Magaly Renodn RN sent at 10/7/2019 10:45 AM CDT -----    ,       This patient is scheduled for surgery (Cervical spine fusion) on 10/29/19 with . This will last approximately 230 min under general anesthesia. Requesting surgical clearance prior to this procedure. Please advise. Will await your response.                                                                Thanks so much,                                                                            Magaly Rendon RN, [facility] Pre-op

## 2019-10-07 NOTE — PRE ADMISSION SCREENING
Anesthesia Assessment: Preoperative EQUATION    Planned Procedure: Procedure(s) (LRB):  LAMINECTOMY, SPINE, CERVICAL, WITH FUSION C3-6 Depuy SNS: Motors/SSEP [place] + Pads (N/A)  Requested Anesthesia Type:General  Surgeon: Ariel Lamar MD  Service: Neurosurgery  Known or anticipated Date of Surgery:10/29/2019    Surgeon notes: reviewed    Electronic QUestionnaire Assessment completed via nurse interview with patient.        NO AQ        Triage considerations:     The patient has no apparent active cardiac condition (No unstable coronary Syndrome such as severe unstable angina or recent [<1 month] myocardial infarction, decompensated CHF, severe valvular   disease or significant arrhythmia)    Previous anesthesia records:GETA and No problems   Airway/Jaw/Neck:  Airway Findings: Mouth Opening: Normal Tongue: Normal  General Airway Assessment: Adult  TM Distance: Normal, at least 6 cm  Jaw/Neck Findings:  Neck ROM: Normal ROM        Last PCP note: 3-6 months ago , within [facility] 7/26/19  Subspecialty notes: SPORTS MED, ORTHO, OPTOMETRY, OPTHAMOLOGY    Other important co-morbidities: DM, GERD, HLD, HTN, OBESITY     Tests already available:  Available tests,  within 1 month , within [facility] .9/25/19-A1C (6.0), TSH, T4            Instructions given. (See in Nurse's note)    Optimization:  Anesthesia Preop Clinic Assessment  Indicated-WILL SCHEDULE POC    Medical Opinion Indicated       Sub-specialist consult indicated:   TBCB PCP WITH MEDICATION INSTRUCTIONS          Plan:    Testing:  CBC, BMP, PT/INR, PTT and UA   Pre-anesthesia  visit       Visit focus: concerns in complex and/or prolonged anesthesia     Consultation:Patient's PCP for a statement of optimization      Patient  has previously scheduled Medical Appointment:10/23,10/25    Navigation: Tests Scheduled.              Consults scheduled.             Results will be tracked by Preop Clinic.

## 2019-10-07 NOTE — ANESTHESIA PREPROCEDURE EVALUATION
Magaly Rendon, RN   Registered Nurse      Pre Admission Screening   Signed                       []Hide copied text    []Gregorio for details  Anesthesia Assessment: Preoperative EQUATION     Planned Procedure: Procedure(s) (LRB):  LAMINECTOMY, SPINE, CERVICAL, WITH FUSION C3-6 Depuy SNS: Motors/SSEP [place] + Pads (N/A)  Requested Anesthesia Type:General  Surgeon: Ariel Lamar MD  Service: Neurosurgery  Known or anticipated Date of Surgery:10/29/2019     Surgeon notes: reviewed     Electronic QUestionnaire Assessment completed via nurse interview with patient.         NO AQ           Triage considerations:      The patient has no apparent active cardiac condition (No unstable coronary Syndrome such as severe unstable angina or recent [<1 month] myocardial infarction, decompensated CHF, severe valvular   disease or significant arrhythmia)     Previous anesthesia records:GETA and No problems   Airway/Jaw/Neck:  Airway Findings: Mouth Opening: Normal Tongue: Normal  General Airway Assessment: Adult  TM Distance: Normal, at least 6 cm  Jaw/Neck Findings:  Neck ROM: Normal ROM         Last PCP note: 3-6 months ago , within [facility] 7/26/19  Subspecialty notes: SPORTS MED, ORTHO, OPTOMETRY, OPTHAMOLOGY     Other important co-morbidities: DM, GERD, HLD, HTN, OBESITY     Tests already available:  Available tests,  within 1 month , within [facility] .9/25/19-A1C (6.0), TSH, T4                            Instructions given. (See in Nurse's note)     Optimization:  Anesthesia Preop Clinic Assessment  Indicated-WILL SCHEDULE POC    Medical Opinion Indicated                            Sub-specialist consult indicated:   TBCB PCP WITH MEDICATION INSTRUCTIONS                               Plan:    Testing:  CBC, BMP, PT/INR, PTT and UA   Pre-anesthesia  visit                                        Visit focus: concerns in complex and/or prolonged anesthesia                           Consultation:Patient's PCP for a  statement of optimization                            Patient  has previously scheduled Medical Appointment:10/23,10/25     Navigation: Tests Scheduled.                         Consults scheduled.                        Results will be tracked by Preop Clinic.         10/11/19-Pt is at low risk for moderately risky orthopedic surgery.  He is medically cleared for surgery.                                                                                                            10/07/2019  Isma Martin is a 69 y.o., male.    Anesthesia Evaluation    I have reviewed the Patient Summary Reports.    I have reviewed the Nursing Notes.   I have reviewed the Medications.     Review of Systems  Anesthesia Hx:  No problems with previous Anesthesia  History of prior surgery of interest to airway management or planning: Previous anesthesia: General 3/2017 CEA with general anesthesia.  Procedure performed at an [facility] Facility. Denies Family Hx of Anesthesia complications.   Denies Personal Hx of Anesthesia complications.   Social:  Former Smoker, No Alcohol Use    Cardiovascular:   Hypertension  Denies Angina. hyperlipidemia ECG has been reviewed.  Functional Capacity good / => 4 METS  Carotoid Artery Disease, left, s/p carotid endarectomy  Hypertension, Essential Hypertension , Pt in normal range, systolic < 130 and diastolic < 85, Fairly Controlled on RX , Recent typical home B/P of 120/80, Recent typical clinic B/P of 180/89-did not take am meds    Pulmonary:   Denies Shortness of breath.  Denies Recent URI.  Possible Obstructive Sleep Apnea , (STOP/BANG) Symptoms A - Age > 50 and G - Gender (Male > Female)    Renal/:  Renal/ Normal     Hepatic/GI:   GERD  Esophageal / Stomach Disorders Gerd Controlled by PRN antireflux medication.    Musculoskeletal:   left rotator cuff tear Musculoskeletal General/Symptoms: Functional capacity is ambulatory without assistance.  Spine Disorders:  Cervical Spine Disorder, Cervical  Disc Disease, Myelopathy    Neurological:  Neurology Normal  Neuro Symptoms of numbness handsPain , onset is chronic , location of shoulder and neck , alleviating factors are percocet 2/day.   Endocrine:  Endocrine Normal  Diabetes, Type 2 Diabetes , controlled by diet. , most recent HgA1c value was 6 on 9/25/19.  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    Impaired mobility and ADLs    Decreased range of motion of left shoulder    Decreased strength of upper extremity    Nuclear sclerotic cataract of both eyes    Cervical myelopathy     Past Medical History:   Diagnosis Date    Arthritis     Cataract     Colon polyp     Diabetes mellitus     GERD (gastroesophageal reflux disease)     Hyperlipidemia     Hypertension     Hypogonadism male     Obesity     Prolactinoma      Past Surgical History:   Procedure Laterality Date    CAROTID ENDARTERECTOMY Left     FOOT SURGERY  4-23-14    right    TRANSPHENOIDAL PITUITARY RESECTION      pituitary tumor         Physical Exam  General:  Well nourished    Airway/Jaw/Neck:  Airway Findings: Mouth Opening: Normal Tongue: Normal  General Airway Assessment: Adult  Mallampati: II  TM Distance: Normal, at least 6 cm  Jaw/Neck Findings:  Neck ROM: Extension Decreased, Mod., Decreased flexion, Decreased Lateral Motion, to the left      Dental:  Dental Findings: Lower partial dentures, Upper Dentures   Chest/Lungs:  Chest/Lungs Findings: Clear to  auscultation     Heart/Vascular:  Heart Findings: Rate: Normal  Rhythm: Regular Rhythm  Sounds: Normal        Mental Status:  Mental Status Findings:  Cooperative, Alert and Oriented         Anesthesia Plan  Type of Anesthesia, risks & benefits discussed:  Anesthesia Type:  general  Patient's Preference:   Intra-op Monitoring Plan: standard ASA monitors  Intra-op Monitoring Plan Comments:   Post Op Pain Control Plan: per primary service following discharge from PACU  Post Op Pain Control Plan Comments:   Induction:   IV  Beta Blocker:  Patient is not currently on a Beta-Blocker (No further documentation required).       Informed Consent: Patient understands risks and agrees with Anesthesia plan.  Questions answered. Anesthesia consent signed with patient.  ASA Score: 2     Day of Surgery Review of History & Physical:    H&P update referred to the surgeon.         Ready For Surgery From Anesthesia Perspective.     10/24/19 preop visit completed, lab/urine results noted.

## 2019-10-07 NOTE — TELEPHONE ENCOUNTER
Hi, please call him and see if he can see me on Friday at 2pm for a preop prior to his next surgery --  10/11/19.  Thank you, Anthony Tay

## 2019-10-11 ENCOUNTER — OFFICE VISIT (OUTPATIENT)
Dept: INTERNAL MEDICINE | Facility: CLINIC | Age: 69
End: 2019-10-11
Payer: MEDICARE

## 2019-10-11 VITALS
WEIGHT: 164.88 LBS | HEIGHT: 65 IN | DIASTOLIC BLOOD PRESSURE: 80 MMHG | OXYGEN SATURATION: 99 % | BODY MASS INDEX: 27.47 KG/M2 | HEART RATE: 54 BPM | SYSTOLIC BLOOD PRESSURE: 102 MMHG

## 2019-10-11 DIAGNOSIS — I10 ESSENTIAL HYPERTENSION: ICD-10-CM

## 2019-10-11 DIAGNOSIS — Z01.810 PREOP CARDIOVASCULAR EXAM: Primary | ICD-10-CM

## 2019-10-11 DIAGNOSIS — Z12.5 SCREENING PSA (PROSTATE SPECIFIC ANTIGEN): ICD-10-CM

## 2019-10-11 DIAGNOSIS — S46.012D TRAUMATIC COMPLETE TEAR OF LEFT ROTATOR CUFF, SUBSEQUENT ENCOUNTER: ICD-10-CM

## 2019-10-11 DIAGNOSIS — E11.9 TYPE 2 DIABETES MELLITUS WITHOUT COMPLICATION, WITHOUT LONG-TERM CURRENT USE OF INSULIN: ICD-10-CM

## 2019-10-11 PROCEDURE — 93005 EKG 12-LEAD: ICD-10-PCS | Mod: S$GLB,,, | Performed by: INTERNAL MEDICINE

## 2019-10-11 PROCEDURE — 99214 PR OFFICE/OUTPT VISIT, EST, LEVL IV, 30-39 MIN: ICD-10-PCS | Mod: S$GLB,,, | Performed by: INTERNAL MEDICINE

## 2019-10-11 PROCEDURE — 3074F SYST BP LT 130 MM HG: CPT | Mod: CPTII,S$GLB,, | Performed by: INTERNAL MEDICINE

## 2019-10-11 PROCEDURE — 93005 ELECTROCARDIOGRAM TRACING: CPT | Mod: S$GLB,,, | Performed by: INTERNAL MEDICINE

## 2019-10-11 PROCEDURE — 99499 UNLISTED E&M SERVICE: CPT | Mod: S$GLB,,, | Performed by: INTERNAL MEDICINE

## 2019-10-11 PROCEDURE — 99214 OFFICE O/P EST MOD 30 MIN: CPT | Mod: S$GLB,,, | Performed by: INTERNAL MEDICINE

## 2019-10-11 PROCEDURE — 93010 ELECTROCARDIOGRAM REPORT: CPT | Mod: S$GLB,,, | Performed by: INTERNAL MEDICINE

## 2019-10-11 PROCEDURE — 3074F PR MOST RECENT SYSTOLIC BLOOD PRESSURE < 130 MM HG: ICD-10-PCS | Mod: CPTII,S$GLB,, | Performed by: INTERNAL MEDICINE

## 2019-10-11 PROCEDURE — 1101F PT FALLS ASSESS-DOCD LE1/YR: CPT | Mod: CPTII,S$GLB,, | Performed by: INTERNAL MEDICINE

## 2019-10-11 PROCEDURE — 99499 RISK ADDL DX/OHS AUDIT: ICD-10-PCS | Mod: S$GLB,,, | Performed by: INTERNAL MEDICINE

## 2019-10-11 PROCEDURE — 93010 EKG 12-LEAD: ICD-10-PCS | Mod: S$GLB,,, | Performed by: INTERNAL MEDICINE

## 2019-10-11 PROCEDURE — 1101F PR PT FALLS ASSESS DOC 0-1 FALLS W/OUT INJ PAST YR: ICD-10-PCS | Mod: CPTII,S$GLB,, | Performed by: INTERNAL MEDICINE

## 2019-10-11 PROCEDURE — 99999 PR PBB SHADOW E&M-EST. PATIENT-LVL III: ICD-10-PCS | Mod: PBBFAC,,, | Performed by: INTERNAL MEDICINE

## 2019-10-11 PROCEDURE — 3044F HG A1C LEVEL LT 7.0%: CPT | Mod: CPTII,S$GLB,, | Performed by: INTERNAL MEDICINE

## 2019-10-11 PROCEDURE — 3079F DIAST BP 80-89 MM HG: CPT | Mod: CPTII,S$GLB,, | Performed by: INTERNAL MEDICINE

## 2019-10-11 PROCEDURE — 3079F PR MOST RECENT DIASTOLIC BLOOD PRESSURE 80-89 MM HG: ICD-10-PCS | Mod: CPTII,S$GLB,, | Performed by: INTERNAL MEDICINE

## 2019-10-11 PROCEDURE — 99999 PR PBB SHADOW E&M-EST. PATIENT-LVL III: CPT | Mod: PBBFAC,,, | Performed by: INTERNAL MEDICINE

## 2019-10-11 PROCEDURE — 3044F PR MOST RECENT HEMOGLOBIN A1C LEVEL <7.0%: ICD-10-PCS | Mod: CPTII,S$GLB,, | Performed by: INTERNAL MEDICINE

## 2019-10-11 RX ORDER — METOPROLOL SUCCINATE 25 MG/1
25 TABLET, EXTENDED RELEASE ORAL DAILY
Qty: 90 TABLET | Refills: 11 | Status: SHIPPED | OUTPATIENT
Start: 2019-10-11 | End: 2020-01-23 | Stop reason: SDUPTHER

## 2019-10-11 RX ORDER — OXYCODONE AND ACETAMINOPHEN 5; 325 MG/1; MG/1
TABLET ORAL
Qty: 30 TABLET | Refills: 0 | Status: ON HOLD | OUTPATIENT
Start: 2019-10-11 | End: 2019-10-30 | Stop reason: HOSPADM

## 2019-10-11 NOTE — PROGRESS NOTES
Subjective:       Patient ID: Isma Martin is a 69 y.o. male.    Chief Complaint: Pre-op Exam (arm and neck surgery )    Patient is here for followup for chronic conditions and preop visit.    A few months ago he fell on the L shoulder and instantly felt pain and weakness L shoulder.    Now chronic radicular pains down L arm and painful L shoulder. Workup has revealed stable C spine stenosis (similar to 2012 mri) and new full thickness tear of L rotator cuff. Symptoms are really just at the L shoulder.    He is here for preop for C spine surgery with possible L shoulder surgery months after the spine surgery.    He denies difficulty with manual dexterity, no urinary incont, no leg weakness, no balance issues. No shooting pains down the R arm and shooting pains down L arm is most likely cuff tear related. No finger weakness.    Review of Systems   Constitutional: Negative for activity change and appetite change.   HENT: Negative for congestion and sinus pressure.    Eyes: Negative for visual disturbance.   Respiratory: Negative for chest tightness, shortness of breath and wheezing.    Cardiovascular: Negative for chest pain, palpitations and leg swelling.   Gastrointestinal: Negative for abdominal distention and abdominal pain.   Endocrine: Negative for polyuria.   Genitourinary: Negative for decreased urine volume, dysuria and urgency.   Musculoskeletal: Positive for arthralgias (L shoulder). Negative for back pain, joint swelling and neck pain.   Skin: Negative for rash.   Neurological: Negative for tremors, syncope and weakness.   Hematological: Negative for adenopathy. Does not bruise/bleed easily.   Psychiatric/Behavioral: The patient is not nervous/anxious.        Objective:      Physical Exam   Constitutional: He appears well-developed and well-nourished. No distress.   HENT:   Head: Normocephalic and atraumatic.   Mouth/Throat: No oropharyngeal exudate.   Eyes: Pupils are equal, round, and reactive to  light. Conjunctivae are normal. No scleral icterus.   Neck: Normal range of motion. Neck supple. No thyromegaly present.   Cardiovascular: Regular rhythm and normal heart sounds.   Mild mary   Pulmonary/Chest: Effort normal and breath sounds normal.   Abdominal: Soft. Bowel sounds are normal.   Musculoskeletal: He exhibits no edema.   Painful L shoulder rom, and very decreased rom 2/2 pain and weakness. There is clear weakness of the L shoulder rotator cuff.    No midline spine tenderness to deep palpation.    There is decreased spine rom   Lymphadenopathy:     He has no cervical adenopathy.   Neurological:   Normal  strength bilat, no hand muscle weakness seen or atrophy.  nl lower extrem strength/sense/DTRs     Skin: No rash noted. He is not diaphoretic.   Psychiatric: He has a normal mood and affect. His behavior is normal.       Assessment:       1. Preop cardiovascular exam    2. Type 2 diabetes mellitus without complication, without long-term current use of insulin    3. Screening PSA (prostate specific antigen)    4. Traumatic complete tear of left rotator cuff, subsequent encounter    5. Essential hypertension        Plan:       Isma was seen today for pre-op exam.    Diagnoses and all orders for this visit:    Preop cardiovascular exam  -     IN OFFICE EKG 12-LEAD (to Muse)  Pt is at low risk for moderately risky orthopedic surgery.  He is medically cleared for surgery.    I had a lengthy discussion regarding his injury and pain symptoms, which seem most c/w L rotator cuff tear instead of cervical myelopathy. I reviewed his 2012 C spine mri (the read sounds similar). He will think more about which surgery to get -- C spine or L shoulder.    Type 2 diabetes mellitus without complication, without long-term current use of insulin  -     Lipid panel; Future  -     Comprehensive metabolic panel; Future    Screening PSA (prostate specific antigen)  -     PSA, Screening; Future    Traumatic complete tear  of left rotator cuff, subsequent encounter  -     oxyCODONE-acetaminophen (PERCOCET) 5-325 mg per tablet; Take one to two tablets by mouth every four hours as needed for pain  Pain not v responsive to hydrocodone    Essential hypertension  -     metoprolol succinate (TOPROL-XL) 25 MG 24 hr tablet; Take 1 tablet (25 mg total) by mouth once daily.  Since mild mary      Health Maintenance       Date Due Completion Date    Aspirin/Antiplatelet Therapy 05/24/1970 ---    Shingles Vaccine (2 of 3) 06/28/2016 5/3/2016    Foot Exam 10/29/2019 10/29/2018    Override on 7/28/2016: Done    Override on 8/10/2015: Done    Lipid Panel 10/22/2019 10/22/2018    Hemoglobin A1c 03/25/2020 9/25/2019    Eye Exam 08/08/2020 8/8/2019    Override on 8/10/2015: Done    Colonoscopy 09/08/2020 9/8/2015    Override on 5/1/2008: Done    High Dose Statin 10/11/2020 10/11/2019    Pneumococcal Vaccine (65+ Low/Medium Risk) (2 of 2 - PPSV23) 07/05/2021 7/5/2016    TETANUS VACCINE 05/03/2026 5/3/2016          Follow up in about 3 months (around 1/11/2020) for Please link blood work with upcoming lab for 10/25 or 10/25.    Future Appointments   Date Time Provider Department Center   10/23/2019  1:30 PM Ariel Lamar MD [facility] SPINE [address]   10/24/2019 10:00 AM Porsche Domingo RN [facility] S1 PADM [facility]   10/24/2019 11:20 AM LAB, APPOINTMENT [facility] LAB [facility]   10/24/2019 11:30 AM LAB, APPOINTMENT [facility] LAB P [facility]   10/25/2019  8:25 AM LAB, APPOINTMENT [facility] INTMED [facility] LAB IM [address] [facility]   10/31/2019  9:20 AM Anthony Tay MD [facility] IM [address] PCW   11/26/2019  3:30 PM Thaddeus Perez MD [facility] [place]   12/3/2019 10:30 AM TIP Duckworth MD [facility] SPORTS [place]

## 2019-10-11 NOTE — Clinical Note
Hi, please cancel his 10/31 appt with me and instead please schedule a 3 month followup.Thank you, Anthony Tay

## 2019-10-14 RX ORDER — TRIAMCINOLONE ACETONIDE 40 MG/ML
40 INJECTION, SUSPENSION INTRA-ARTICULAR; INTRAMUSCULAR
Status: DISCONTINUED | OUTPATIENT
Start: 2019-10-03 | End: 2019-10-14 | Stop reason: HOSPADM

## 2019-10-22 NOTE — DISCHARGE INSTRUCTIONS
Your surgery has been scheduled for:__________________________________________    You should report to:  ____[facility], located on the [place] side of the first floor of the           [facility] ([phone])  ____[facility], located on the [facility] side of the            Second floor of the [facility] ([phone])  ____3rd Floor [facility] located on the [facility] side of the [facility] [phone]  Please Note   - Tell your doctor if you take Aspirin, products containing Aspirin, herbal medications  or blood thinners, such as Coumadin, Ticlid, or Plavix.  (Consult your provider regarding holding or stopping before surgery).  - Arrange for someone to drive you home following surgery.  You will not be allowed to leave the surgical facility alone or drive yourself home following sedation and anesthesia.  Before Surgery  - Stop taking all herbal medications 14days prior to surgery  - No Motrin/Advil (Ibuprofen) 7 days before surgery  - No Aleve (Naproxen) 7 days before surgery  - Stop Taking Asprin, products containing Asprin _____days before surgery  - Stop taking blood thinners_______days before surgery  - No Goody's/BC  Powder 7 days before surgery  - Refrain from drinking alcoholic beverages for 24hours before and after surgery  - Stop or limit smoking _________days before surgery  - You may take Tylenol for pain  Night before Surgery  Stop ALL solid food, gum, candy (including vitamins) 8 hours before arrival time.  (Please note: If your surgeon gives you different eating and drinking instructions, please follow surgeon's directions.)  Stop all CLOUDY liquids: coffee with creamer, formula, tube feeds, cloudy juices, non-human milk and breast milk with additives, 6 hours prior to arrival time.  Stop plain breast milk 4 hours prior to arrival time.  The patient should be ENCOURAGED to drink carbohydrate-rich  clear liquids (sports drinks, clear juices) until 2 hours prior to arrival time.  CLEAR liquids include only water, black coffee NO creamer, clear oral rehydration drinks, clear sports drinks or clear fruit juices (no orange juice, no pulpy juices, no apple cider). Advise patients if they can read newsprint through the liquid, it qualifies as clear liquid.   IF IN DOUBT, drink water instead.   - Take a shower or bath (shower is recommended).  Bathe with Hibiclens soap or an antibacterial soap from the neck down.  If not supplied by your surgeon, hibiclens soap will need to be purchased over the counter in pharmacy.  Rinse soap off thoroughly.  - Shampoo your hair with your regular shampoo  The Day of Surgery  · NOTHING TO  DRINK 2 hours before arrival time. If you are told to take medication on the morning of surgery, it may be taken with a sip of water.   - Take another bath or shower with hibiclens or any antibacterial soap, to reduce the chance of infection.  - Take heart and blood pressure medications with a small sip of water, as advised by the perioperative team.  - Do not take fluid pills  - You may brush your teeth and rinse your mouth, but do not swall any additional water.   - Do not apply perfumes, powder, body lotions or deodorant on the day of surgery.  - Nail polish should be removed.  - Do not wear makeup or moisturizer  - Wear comfortable clothes, such as a button front shirt and loose fitting pants.  - Leave all jewelry, including body piercings, and valuables at home.    - Bring any devices you will neeed after surgery such as crutches or canes.  - If you have sleep apnea, please bring your CPAP machine  In the event that your physical condition changes including the onset of a cold or respiratory illness, or if you have to delay or cancel your surgery, please notify your surgeon.  Anesthesia: General Anesthesia     You are watched continuously during your procedure by your anesthesia provider.      Youre due to have surgery. During surgery, youll be given medicine called anesthesia or anesthetic. This will keep you comfortable and pain-free. Your anesthesia provider will use general anesthesia.  What is general anesthesia?  General anesthesia puts you into a state like deep sleep. It goes into the bloodstream (IV anesthetics), into the lungs (gas anesthetics), or both. You feel nothing during the procedure. You will not remember it. During the procedure, the anesthesia provider monitors you continuously. He or she checks your heart rate and rhythm, blood pressure, breathing, and blood oxygen.  · IV anesthetics. IV anesthetics are given through an IV line in your arm. Theyre often given first. This is so you are asleep before a gas anesthetic is started. Some kinds of IV anesthetics relieve pain. Others relax you. Your doctor will decide which kind is best in your case.  · Gas anesthetics. Gas anesthetics are breathed into the lungs. They are often used to keep you asleep. They can be given through a facemask or a tube placed in your larynx or trachea (breathing tube).  ? If you have a facemask, your anesthesia provider will most likely place it over your nose and mouth while youre still awake. Youll breathe oxygen through the mask as your IV anesthetic is started. Gas anesthetic may be added through the mask.  ? If you have a tube in the larynx or trachea, it will be inserted into your throat after youre asleep.  Anesthesia tools and medicines  You will likely have:  · IV anesthetics. These are put into an IV line into your bloodstream.  · Gas anesthetics. You breathe these anesthetics into your lungs, where they pass into your bloodstream.  · Pulse oximeter. This is a small clip that is attached to the end of your finger. This measures your blood oxygen level.  · Electrocardiography leads (electrodes). These are small sticky pads that are placed on your chest. They record your heart rate and  rhythm.  · Blood pressure cuff. This reads your blood pressure.  Risks and possible complications  General anesthesia has some risks. These include:  · Breathing problems  · Nausea and vomiting  · Sore throat or hoarseness (usually temporary)  · Allergic reaction to the anesthetic  · Irregular heartbeat (rare)  · Cardiac arrest (rare)   Anesthesia safety  · Follow all instructions you are given for how long not to eat or drink before your procedure.  · Be sure your doctor knows what medicines and drugs you take. This includes over-the-counter medicines, herbs, supplements, alcohol or other drugs. You will be asked when those were last taken.  · Have an adult family member or friend drive you home after the procedure.  · For the first 24 hours after your surgery:  ? Do not drive or use heavy equipment.  ? Do not make important decisions or sign legal documents. If important decisions or signing legal documents is necessary during the first 24 hours after surgery, have a trusted family member or spouse act on your behalf.  ? Avoid alcohol.  ? Have a responsible adult stay with you. He or she can watch for problems and help keep you safe.  Date Last Reviewed: 12/1/2016  © 1102-7978 [employer]. [address] [place] [address], [place] [address]. All rights reserved. This information is not intended as a substitute for professional medical care. Always follow your healthcare professional's instructions

## 2019-10-23 ENCOUNTER — OFFICE VISIT (OUTPATIENT)
Dept: ORTHOPEDICS | Facility: CLINIC | Age: 69
End: 2019-10-23
Payer: MEDICARE

## 2019-10-23 VITALS — BODY MASS INDEX: 28.19 KG/M2 | HEIGHT: 65 IN | WEIGHT: 169.19 LBS

## 2019-10-23 DIAGNOSIS — G95.9 CERVICAL MYELOPATHY: Primary | ICD-10-CM

## 2019-10-23 PROCEDURE — 99999 PR PBB SHADOW E&M-EST. PATIENT-LVL III: CPT | Mod: PBBFAC,,, | Performed by: ORTHOPAEDIC SURGERY

## 2019-10-23 PROCEDURE — 1100F PTFALLS ASSESS-DOCD GE2>/YR: CPT | Mod: CPTII,S$GLB,, | Performed by: ORTHOPAEDIC SURGERY

## 2019-10-23 PROCEDURE — 99499 RISK ADDL DX/OHS AUDIT: ICD-10-PCS | Mod: S$GLB,,, | Performed by: ORTHOPAEDIC SURGERY

## 2019-10-23 PROCEDURE — 1100F PR PT FALLS ASSESS DOC 2+ FALLS/FALL W/INJURY/YR: ICD-10-PCS | Mod: CPTII,S$GLB,, | Performed by: ORTHOPAEDIC SURGERY

## 2019-10-23 PROCEDURE — 3288F PR FALLS RISK ASSESSMENT DOCUMENTED: ICD-10-PCS | Mod: CPTII,S$GLB,, | Performed by: ORTHOPAEDIC SURGERY

## 2019-10-23 PROCEDURE — 99213 PR OFFICE/OUTPT VISIT, EST, LEVL III, 20-29 MIN: ICD-10-PCS | Mod: S$GLB,,, | Performed by: ORTHOPAEDIC SURGERY

## 2019-10-23 PROCEDURE — 99999 PR PBB SHADOW E&M-EST. PATIENT-LVL III: ICD-10-PCS | Mod: PBBFAC,,, | Performed by: ORTHOPAEDIC SURGERY

## 2019-10-23 PROCEDURE — 99213 OFFICE O/P EST LOW 20 MIN: CPT | Mod: S$GLB,,, | Performed by: ORTHOPAEDIC SURGERY

## 2019-10-23 PROCEDURE — 99499 UNLISTED E&M SERVICE: CPT | Mod: S$GLB,,, | Performed by: ORTHOPAEDIC SURGERY

## 2019-10-23 PROCEDURE — 3288F FALL RISK ASSESSMENT DOCD: CPT | Mod: CPTII,S$GLB,, | Performed by: ORTHOPAEDIC SURGERY

## 2019-10-24 ENCOUNTER — HOSPITAL ENCOUNTER (OUTPATIENT)
Dept: PREADMISSION TESTING | Facility: HOSPITAL | Age: 69
Discharge: HOME | End: 2019-10-24
Attending: ANESTHESIOLOGY
Payer: MEDICARE

## 2019-10-24 VITALS
WEIGHT: 169 LBS | HEIGHT: 65 IN | BODY MASS INDEX: 28.16 KG/M2 | HEART RATE: 56 BPM | OXYGEN SATURATION: 100 % | DIASTOLIC BLOOD PRESSURE: 89 MMHG | RESPIRATION RATE: 16 BRPM | TEMPERATURE: 98 F | SYSTOLIC BLOOD PRESSURE: 180 MMHG

## 2019-10-25 ENCOUNTER — LAB VISIT (OUTPATIENT)
Dept: LAB | Facility: HOSPITAL | Age: 69
End: 2019-10-25
Attending: INTERNAL MEDICINE
Payer: MEDICARE

## 2019-10-25 DIAGNOSIS — Z12.5 SCREENING PSA (PROSTATE SPECIFIC ANTIGEN): ICD-10-CM

## 2019-10-25 DIAGNOSIS — E11.9 TYPE 2 DIABETES MELLITUS WITHOUT COMPLICATION, WITHOUT LONG-TERM CURRENT USE OF INSULIN: ICD-10-CM

## 2019-10-25 LAB
ALBUMIN SERPL BCP-MCNC: 3.6 G/DL (ref 3.5–5.2)
ALBUMIN SERPL BCP-MCNC: 3.6 G/DL (ref 3.5–5.2)
ALP SERPL-CCNC: 58 U/L (ref 55–135)
ALP SERPL-CCNC: 58 U/L (ref 55–135)
ALT SERPL W/O P-5'-P-CCNC: 24 U/L (ref 10–44)
ALT SERPL W/O P-5'-P-CCNC: 24 U/L (ref 10–44)
ANION GAP SERPL CALC-SCNC: 6 MMOL/L (ref 8–16)
ANION GAP SERPL CALC-SCNC: 6 MMOL/L (ref 8–16)
AST SERPL-CCNC: 22 U/L (ref 10–40)
AST SERPL-CCNC: 22 U/L (ref 10–40)
BASOPHILS # BLD AUTO: 0.01 K/UL (ref 0–0.2)
BASOPHILS NFR BLD: 0.2 % (ref 0–1.9)
BILIRUB SERPL-MCNC: 0.5 MG/DL (ref 0.1–1)
BILIRUB SERPL-MCNC: 0.5 MG/DL (ref 0.1–1)
BUN SERPL-MCNC: 15 MG/DL (ref 8–23)
BUN SERPL-MCNC: 15 MG/DL (ref 8–23)
CALCIUM SERPL-MCNC: 9.8 MG/DL (ref 8.7–10.5)
CALCIUM SERPL-MCNC: 9.8 MG/DL (ref 8.7–10.5)
CHLORIDE SERPL-SCNC: 102 MMOL/L (ref 95–110)
CHLORIDE SERPL-SCNC: 102 MMOL/L (ref 95–110)
CHOLEST SERPL-MCNC: 213 MG/DL (ref 120–199)
CHOLEST SERPL-MCNC: 213 MG/DL (ref 120–199)
CHOLEST/HDLC SERPL: 4.7 {RATIO} (ref 2–5)
CHOLEST/HDLC SERPL: 4.7 {RATIO} (ref 2–5)
CO2 SERPL-SCNC: 29 MMOL/L (ref 23–29)
CO2 SERPL-SCNC: 29 MMOL/L (ref 23–29)
COMPLEXED PSA SERPL-MCNC: 1 NG/ML (ref 0–4)
COMPLEXED PSA SERPL-MCNC: 1 NG/ML (ref 0–4)
CREAT SERPL-MCNC: 1.4 MG/DL (ref 0.5–1.4)
CREAT SERPL-MCNC: 1.4 MG/DL (ref 0.5–1.4)
DIFFERENTIAL METHOD: NORMAL
EOSINOPHIL # BLD AUTO: 0.2 K/UL (ref 0–0.5)
EOSINOPHIL NFR BLD: 3.9 % (ref 0–8)
ERYTHROCYTE [DISTWIDTH] IN BLOOD BY AUTOMATED COUNT: 12.8 % (ref 11.5–14.5)
EST. GFR  (AFRICAN AMERICAN): 59.7 ML/MIN/1.73 M^2
EST. GFR  (AFRICAN AMERICAN): 59.7 ML/MIN/1.73 M^2
EST. GFR  (NON AFRICAN AMERICAN): 51.6 ML/MIN/1.73 M^2
EST. GFR  (NON AFRICAN AMERICAN): 51.6 ML/MIN/1.73 M^2
ESTIMATED AVG GLUCOSE: 123 MG/DL (ref 68–131)
GLUCOSE SERPL-MCNC: 115 MG/DL (ref 70–110)
GLUCOSE SERPL-MCNC: 115 MG/DL (ref 70–110)
HBA1C MFR BLD HPLC: 5.9 % (ref 4–5.6)
HCT VFR BLD AUTO: 50.2 % (ref 40–54)
HDLC SERPL-MCNC: 45 MG/DL (ref 40–75)
HDLC SERPL-MCNC: 45 MG/DL (ref 40–75)
HDLC SERPL: 21.1 % (ref 20–50)
HDLC SERPL: 21.1 % (ref 20–50)
HGB BLD-MCNC: 16.9 G/DL (ref 14–18)
IMM GRANULOCYTES # BLD AUTO: 0.02 K/UL (ref 0–0.04)
IMM GRANULOCYTES NFR BLD AUTO: 0.4 % (ref 0–0.5)
LDLC SERPL CALC-MCNC: 145 MG/DL (ref 63–159)
LDLC SERPL CALC-MCNC: 145 MG/DL (ref 63–159)
LYMPHOCYTES # BLD AUTO: 1.1 K/UL (ref 1–4.8)
LYMPHOCYTES NFR BLD: 22.4 % (ref 18–48)
MCH RBC QN AUTO: 29.7 PG (ref 27–31)
MCHC RBC AUTO-ENTMCNC: 33.7 G/DL (ref 32–36)
MCV RBC AUTO: 88 FL (ref 82–98)
MONOCYTES # BLD AUTO: 0.5 K/UL (ref 0.3–1)
MONOCYTES NFR BLD: 10.2 % (ref 4–15)
NEUTROPHILS # BLD AUTO: 3 K/UL (ref 1.8–7.7)
NEUTROPHILS NFR BLD: 62.9 % (ref 38–73)
NONHDLC SERPL-MCNC: 168 MG/DL
NONHDLC SERPL-MCNC: 168 MG/DL
NRBC BLD-RTO: 0 /100 WBC
PLATELET # BLD AUTO: 180 K/UL (ref 150–350)
PMV BLD AUTO: 9.7 FL (ref 9.2–12.9)
POTASSIUM SERPL-SCNC: 4.4 MMOL/L (ref 3.5–5.1)
POTASSIUM SERPL-SCNC: 4.4 MMOL/L (ref 3.5–5.1)
PROT SERPL-MCNC: 7.5 G/DL (ref 6–8.4)
PROT SERPL-MCNC: 7.5 G/DL (ref 6–8.4)
RBC # BLD AUTO: 5.69 M/UL (ref 4.6–6.2)
SODIUM SERPL-SCNC: 137 MMOL/L (ref 136–145)
SODIUM SERPL-SCNC: 137 MMOL/L (ref 136–145)
TRIGL SERPL-MCNC: 115 MG/DL (ref 30–150)
TRIGL SERPL-MCNC: 115 MG/DL (ref 30–150)
WBC # BLD AUTO: 4.82 K/UL (ref 3.9–12.7)

## 2019-10-25 PROCEDURE — 80061 LIPID PANEL: CPT

## 2019-10-25 PROCEDURE — 83036 HEMOGLOBIN GLYCOSYLATED A1C: CPT

## 2019-10-25 PROCEDURE — 80053 COMPREHEN METABOLIC PANEL: CPT

## 2019-10-25 PROCEDURE — 85025 COMPLETE CBC W/AUTO DIFF WBC: CPT

## 2019-10-25 PROCEDURE — 84153 ASSAY OF PSA TOTAL: CPT

## 2019-10-25 PROCEDURE — 36415 COLL VENOUS BLD VENIPUNCTURE: CPT

## 2019-10-28 DIAGNOSIS — E11.9 TYPE 2 DIABETES MELLITUS WITHOUT COMPLICATION, WITHOUT LONG-TERM CURRENT USE OF INSULIN: ICD-10-CM

## 2019-10-28 RX ORDER — ATORVASTATIN CALCIUM 40 MG/1
40 TABLET, FILM COATED ORAL DAILY
Qty: 90 TABLET | Refills: 11 | Status: SHIPPED | OUTPATIENT
Start: 2019-10-28 | End: 2020-01-23 | Stop reason: SDUPTHER

## 2019-10-28 NOTE — PROGRESS NOTES
The patient presents for preop.    He has symptomatic cerviacl myelopathy and we are planning a C3-6 laminectomy and fusion.    He is a [employer] and refuses blood products.    I had a sit down discussion with the patient regarding the planned operation. We specifically discussed the risks, benefits, and alternatives to surgery. We discussed the surgical procedure including the skin incision, nerve decompression, bone fusion, allograft and/or iliac crest bone graft, and surgical implants including lateral mass screws and pedicle screws as indicated: they understand the risks include but are not limited to death, paralysis, blindness, bleeding, infection, damage to arteries, veins and nerves, vertebral artery injury, dysphagia, spinal fluid leak, continued or worsening pain, no improvement in symptoms, non-union, and the possible need for more surgery in the future, as well as the possibility other unforseen and unknown complications. We talked about expected hospital stay and recovery period. All questions were answered; they understand and wish to proceed    I spent 15 minutes with the patient of which greater than 1/2 the time was devoted to counciling the patient regarding treatment options.

## 2019-10-29 ENCOUNTER — ANESTHESIA (OUTPATIENT)
Dept: SURGERY | Facility: HOSPITAL | Age: 69
DRG: 473 | End: 2019-10-29
Payer: MEDICARE

## 2019-10-29 ENCOUNTER — HOSPITAL ENCOUNTER (INPATIENT)
Facility: HOSPITAL | Age: 69
LOS: 3 days | Discharge: HOME HEALTH | DRG: 473 | End: 2019-11-01
Attending: ORTHOPAEDIC SURGERY | Admitting: ORTHOPAEDIC SURGERY
Payer: MEDICARE

## 2019-10-29 DIAGNOSIS — G95.9 CERVICAL MYELOPATHY: Primary | ICD-10-CM

## 2019-10-29 LAB
ALBUMIN SERPL BCP-MCNC: 3.3 G/DL (ref 3.5–5.2)
ALP SERPL-CCNC: 50 U/L (ref 55–135)
ALT SERPL W/O P-5'-P-CCNC: 22 U/L (ref 10–44)
ANION GAP SERPL CALC-SCNC: 7 MMOL/L (ref 8–16)
AST SERPL-CCNC: 25 U/L (ref 10–40)
BASOPHILS # BLD AUTO: 0 K/UL (ref 0–0.2)
BASOPHILS NFR BLD: 0 % (ref 0–1.9)
BILIRUB SERPL-MCNC: 0.9 MG/DL (ref 0.1–1)
BUN SERPL-MCNC: 11 MG/DL (ref 8–23)
CALCIUM SERPL-MCNC: 8.2 MG/DL (ref 8.7–10.5)
CHLORIDE SERPL-SCNC: 105 MMOL/L (ref 95–110)
CO2 SERPL-SCNC: 24 MMOL/L (ref 23–29)
CREAT SERPL-MCNC: 1.1 MG/DL (ref 0.5–1.4)
DIFFERENTIAL METHOD: ABNORMAL
EOSINOPHIL # BLD AUTO: 0 K/UL (ref 0–0.5)
EOSINOPHIL NFR BLD: 0.4 % (ref 0–8)
ERYTHROCYTE [DISTWIDTH] IN BLOOD BY AUTOMATED COUNT: 12.5 % (ref 11.5–14.5)
EST. GFR  (AFRICAN AMERICAN): >60 ML/MIN/1.73 M^2
EST. GFR  (NON AFRICAN AMERICAN): >60 ML/MIN/1.73 M^2
GLUCOSE SERPL-MCNC: 148 MG/DL (ref 70–110)
HCT VFR BLD AUTO: 45.9 % (ref 40–54)
HGB BLD-MCNC: 15.5 G/DL (ref 14–18)
IMM GRANULOCYTES # BLD AUTO: 0.02 K/UL (ref 0–0.04)
IMM GRANULOCYTES NFR BLD AUTO: 0.4 % (ref 0–0.5)
LYMPHOCYTES # BLD AUTO: 0.5 K/UL (ref 1–4.8)
LYMPHOCYTES NFR BLD: 11.7 % (ref 18–48)
MCH RBC QN AUTO: 30.2 PG (ref 27–31)
MCHC RBC AUTO-ENTMCNC: 33.8 G/DL (ref 32–36)
MCV RBC AUTO: 90 FL (ref 82–98)
MONOCYTES # BLD AUTO: 0.1 K/UL (ref 0.3–1)
MONOCYTES NFR BLD: 2.9 % (ref 4–15)
NEUTROPHILS # BLD AUTO: 3.8 K/UL (ref 1.8–7.7)
NEUTROPHILS NFR BLD: 84.6 % (ref 38–73)
NRBC BLD-RTO: 0 /100 WBC
PLATELET # BLD AUTO: 163 K/UL (ref 150–350)
PMV BLD AUTO: 9.8 FL (ref 9.2–12.9)
POCT GLUCOSE: 127 MG/DL (ref 70–110)
POCT GLUCOSE: 133 MG/DL (ref 70–110)
POCT GLUCOSE: 139 MG/DL (ref 70–110)
POTASSIUM SERPL-SCNC: 3.9 MMOL/L (ref 3.5–5.1)
PROT SERPL-MCNC: 6.7 G/DL (ref 6–8.4)
RBC # BLD AUTO: 5.13 M/UL (ref 4.6–6.2)
SODIUM SERPL-SCNC: 136 MMOL/L (ref 136–145)
WBC # BLD AUTO: 4.52 K/UL (ref 3.9–12.7)

## 2019-10-29 PROCEDURE — 25000003 PHARM REV CODE 250: Performed by: NURSE ANESTHETIST, CERTIFIED REGISTERED

## 2019-10-29 PROCEDURE — 63015 PR LAMINECTOMY,>2 SGMT,CERVICAL: ICD-10-PCS | Mod: 62,,, | Performed by: ORTHOPAEDIC SURGERY

## 2019-10-29 PROCEDURE — 71000039 HC RECOVERY, EACH ADD'L HOUR: Performed by: ORTHOPAEDIC SURGERY

## 2019-10-29 PROCEDURE — 12000002 HC ACUTE/MED SURGE SEMI-PRIVATE ROOM

## 2019-10-29 PROCEDURE — 63015 PR LAMINECTOMY,>2 SGMT,CERVICAL: ICD-10-PCS | Mod: 62,,, | Performed by: NEUROLOGICAL SURGERY

## 2019-10-29 PROCEDURE — 63600175 PHARM REV CODE 636 W HCPCS: Performed by: ANESTHESIOLOGY

## 2019-10-29 PROCEDURE — 36000710: Performed by: ORTHOPAEDIC SURGERY

## 2019-10-29 PROCEDURE — 22614 PR ARTHRODESIS, POST/POSTLAT, SNGL INTERSPACE, EA ADDTL: ICD-10-PCS | Mod: 62,,, | Performed by: ORTHOPAEDIC SURGERY

## 2019-10-29 PROCEDURE — 22842 INSERT SPINE FIXATION DEVICE: CPT | Mod: ,,, | Performed by: ORTHOPAEDIC SURGERY

## 2019-10-29 PROCEDURE — 22842 PR POSTERIOR SEGMENTAL INSTRUMENTATION 3-6 VRT SEG: ICD-10-PCS | Mod: 82,,, | Performed by: NEUROLOGICAL SURGERY

## 2019-10-29 PROCEDURE — 27100088 HC CELL SAVER

## 2019-10-29 PROCEDURE — 22600 ARTHRD PST TQ 1NTRSPC CRV: CPT | Mod: 51,62,, | Performed by: NEUROLOGICAL SURGERY

## 2019-10-29 PROCEDURE — 63600175 PHARM REV CODE 636 W HCPCS: Performed by: ORTHOPAEDIC SURGERY

## 2019-10-29 PROCEDURE — 27201423 OPTIME MED/SURG SUP & DEVICES STERILE SUPPLY: Performed by: ORTHOPAEDIC SURGERY

## 2019-10-29 PROCEDURE — C1713 ANCHOR/SCREW BN/BN,TIS/BN: HCPCS | Performed by: ORTHOPAEDIC SURGERY

## 2019-10-29 PROCEDURE — 37000008 HC ANESTHESIA 1ST 15 MINUTES: Performed by: ORTHOPAEDIC SURGERY

## 2019-10-29 PROCEDURE — 25000003 PHARM REV CODE 250: Performed by: ORTHOPAEDIC SURGERY

## 2019-10-29 PROCEDURE — 20936 PR AUTOGRAFT SPINE SURGERY LOCAL FROM SAME INCISION: ICD-10-PCS | Mod: ,,, | Performed by: ORTHOPAEDIC SURGERY

## 2019-10-29 PROCEDURE — 22614 ARTHRD PST TQ 1NTRSPC EA ADD: CPT | Mod: 62,,, | Performed by: ORTHOPAEDIC SURGERY

## 2019-10-29 PROCEDURE — D9220A PRA ANESTHESIA: ICD-10-PCS | Mod: ANES,,, | Performed by: ANESTHESIOLOGY

## 2019-10-29 PROCEDURE — 22842 PR POSTERIOR SEGMENTAL INSTRUMENTATION 3-6 VRT SEG: ICD-10-PCS | Mod: ,,, | Performed by: ORTHOPAEDIC SURGERY

## 2019-10-29 PROCEDURE — 22842 INSERT SPINE FIXATION DEVICE: CPT | Mod: 82,,, | Performed by: NEUROLOGICAL SURGERY

## 2019-10-29 PROCEDURE — 63015 REMOVE SPINE LAMINA >2 CRVCL: CPT | Mod: 62,,, | Performed by: NEUROLOGICAL SURGERY

## 2019-10-29 PROCEDURE — D9220A PRA ANESTHESIA: Mod: ANES,,, | Performed by: ANESTHESIOLOGY

## 2019-10-29 PROCEDURE — 22614 PR ARTHRODESIS, POST/POSTLAT, SNGL INTERSPACE, EA ADDTL: ICD-10-PCS | Mod: 62,,, | Performed by: NEUROLOGICAL SURGERY

## 2019-10-29 PROCEDURE — 25000003 PHARM REV CODE 250: Performed by: STUDENT IN AN ORGANIZED HEALTH CARE EDUCATION/TRAINING PROGRAM

## 2019-10-29 PROCEDURE — D9220A PRA ANESTHESIA: Mod: CRNA,,, | Performed by: NURSE ANESTHETIST, CERTIFIED REGISTERED

## 2019-10-29 PROCEDURE — 63600175 PHARM REV CODE 636 W HCPCS: Performed by: STUDENT IN AN ORGANIZED HEALTH CARE EDUCATION/TRAINING PROGRAM

## 2019-10-29 PROCEDURE — 27201037 HC PRESSURE MONITORING SET UP

## 2019-10-29 PROCEDURE — 20936 SP BONE AGRFT LOCAL ADD-ON: CPT | Mod: ,,, | Performed by: ORTHOPAEDIC SURGERY

## 2019-10-29 PROCEDURE — 71000033 HC RECOVERY, INTIAL HOUR: Performed by: ORTHOPAEDIC SURGERY

## 2019-10-29 PROCEDURE — 36000711: Performed by: ORTHOPAEDIC SURGERY

## 2019-10-29 PROCEDURE — D9220A PRA ANESTHESIA: ICD-10-PCS | Mod: CRNA,,, | Performed by: NURSE ANESTHETIST, CERTIFIED REGISTERED

## 2019-10-29 PROCEDURE — 22614 ARTHRD PST TQ 1NTRSPC EA ADD: CPT | Mod: 62,,, | Performed by: NEUROLOGICAL SURGERY

## 2019-10-29 PROCEDURE — 80053 COMPREHEN METABOLIC PANEL: CPT

## 2019-10-29 PROCEDURE — 22600 PR ARTHRODESIS, POST/POSTLAT, SNGL INTERSPACE, CERVICAL BELOW C2: ICD-10-PCS | Mod: 51,62,, | Performed by: NEUROLOGICAL SURGERY

## 2019-10-29 PROCEDURE — 94761 N-INVAS EAR/PLS OXIMETRY MLT: CPT

## 2019-10-29 PROCEDURE — 37000009 HC ANESTHESIA EA ADD 15 MINS: Performed by: ORTHOPAEDIC SURGERY

## 2019-10-29 PROCEDURE — 63600175 PHARM REV CODE 636 W HCPCS: Performed by: NURSE ANESTHETIST, CERTIFIED REGISTERED

## 2019-10-29 PROCEDURE — 85025 COMPLETE CBC W/AUTO DIFF WBC: CPT

## 2019-10-29 PROCEDURE — 63015 REMOVE SPINE LAMINA >2 CRVCL: CPT | Mod: 62,,, | Performed by: ORTHOPAEDIC SURGERY

## 2019-10-29 PROCEDURE — 22600 ARTHRD PST TQ 1NTRSPC CRV: CPT | Mod: 51,62,, | Performed by: ORTHOPAEDIC SURGERY

## 2019-10-29 PROCEDURE — 82962 GLUCOSE BLOOD TEST: CPT | Performed by: ORTHOPAEDIC SURGERY

## 2019-10-29 PROCEDURE — 22600 PR ARTHRODESIS, POST/POSTLAT, SNGL INTERSPACE, CERVICAL BELOW C2: ICD-10-PCS | Mod: 51,62,, | Performed by: ORTHOPAEDIC SURGERY

## 2019-10-29 DEVICE — NUT SPINAL OUTER CONNECTOR: Type: IMPLANTABLE DEVICE | Site: SPINE CERVICAL | Status: FUNCTIONAL

## 2019-10-29 DEVICE — CONNECTOR SPINAL CROSS 35MM: Type: IMPLANTABLE DEVICE | Site: SPINE CERVICAL | Status: FUNCTIONAL

## 2019-10-29 DEVICE — SCREW BONE SPINAL 3.5 X 12MM: Type: IMPLANTABLE DEVICE | Site: SPINE CERVICAL | Status: FUNCTIONAL

## 2019-10-29 DEVICE — SET SCREW SPINAL INNER NUT: Type: IMPLANTABLE DEVICE | Site: SPINE CERVICAL | Status: FUNCTIONAL

## 2019-10-29 DEVICE — SET SCREW BONE SPINAL CROSS: Type: IMPLANTABLE DEVICE | Site: SPINE CERVICAL | Status: FUNCTIONAL

## 2019-10-29 RX ORDER — CEFAZOLIN SODIUM 1 G/3ML
2 INJECTION, POWDER, FOR SOLUTION INTRAMUSCULAR; INTRAVENOUS
Status: COMPLETED | OUTPATIENT
Start: 2019-10-29 | End: 2019-10-30

## 2019-10-29 RX ORDER — NICARDIPINE HYDROCHLORIDE 0.2 MG/ML
INJECTION INTRAVENOUS
Status: DISCONTINUED | OUTPATIENT
Start: 2019-10-29 | End: 2019-10-29

## 2019-10-29 RX ORDER — HYDROMORPHONE HYDROCHLORIDE 1 MG/ML
0.2 INJECTION, SOLUTION INTRAMUSCULAR; INTRAVENOUS; SUBCUTANEOUS EVERY 5 MIN PRN
Status: COMPLETED | OUTPATIENT
Start: 2019-10-29 | End: 2019-10-29

## 2019-10-29 RX ORDER — METOCLOPRAMIDE HYDROCHLORIDE 5 MG/ML
10 INJECTION INTRAMUSCULAR; INTRAVENOUS EVERY 10 MIN PRN
Status: DISCONTINUED | OUTPATIENT
Start: 2019-10-29 | End: 2019-10-29 | Stop reason: HOSPADM

## 2019-10-29 RX ORDER — CEFAZOLIN SODIUM 1 G/3ML
INJECTION, POWDER, FOR SOLUTION INTRAMUSCULAR; INTRAVENOUS
Status: DISCONTINUED | OUTPATIENT
Start: 2019-10-29 | End: 2019-10-29

## 2019-10-29 RX ORDER — SODIUM CHLORIDE 9 MG/ML
INJECTION, SOLUTION INTRAVENOUS CONTINUOUS
Status: ACTIVE | OUTPATIENT
Start: 2019-10-29 | End: 2019-10-30

## 2019-10-29 RX ORDER — PHENYLEPHRINE HYDROCHLORIDE 10 MG/ML
INJECTION INTRAVENOUS
Status: DISCONTINUED | OUTPATIENT
Start: 2019-10-29 | End: 2019-10-29

## 2019-10-29 RX ORDER — ACETAMINOPHEN 500 MG
500 TABLET ORAL EVERY 8 HOURS
Status: DISCONTINUED | OUTPATIENT
Start: 2019-10-29 | End: 2019-11-01 | Stop reason: HOSPADM

## 2019-10-29 RX ORDER — ROCURONIUM BROMIDE 10 MG/ML
INJECTION, SOLUTION INTRAVENOUS
Status: DISCONTINUED | OUTPATIENT
Start: 2019-10-29 | End: 2019-10-29

## 2019-10-29 RX ORDER — MUPIROCIN 20 MG/G
OINTMENT TOPICAL
Status: DISCONTINUED | OUTPATIENT
Start: 2019-10-29 | End: 2019-10-29 | Stop reason: HOSPADM

## 2019-10-29 RX ORDER — LORAZEPAM 2 MG/ML
0.25 INJECTION INTRAMUSCULAR ONCE AS NEEDED
Status: COMPLETED | OUTPATIENT
Start: 2019-10-29 | End: 2019-10-29

## 2019-10-29 RX ORDER — VANCOMYCIN HYDROCHLORIDE 1 G/20ML
INJECTION, POWDER, LYOPHILIZED, FOR SOLUTION INTRAVENOUS
Status: DISCONTINUED | OUTPATIENT
Start: 2019-10-29 | End: 2019-10-29 | Stop reason: HOSPADM

## 2019-10-29 RX ORDER — LOSARTAN POTASSIUM AND HYDROCHLOROTHIAZIDE 12.5; 5 MG/1; MG/1
1 TABLET ORAL DAILY
Status: DISCONTINUED | OUTPATIENT
Start: 2019-10-29 | End: 2019-11-01 | Stop reason: HOSPADM

## 2019-10-29 RX ORDER — ATORVASTATIN CALCIUM 10 MG/1
40 TABLET, FILM COATED ORAL DAILY
Status: DISCONTINUED | OUTPATIENT
Start: 2019-10-29 | End: 2019-11-01 | Stop reason: HOSPADM

## 2019-10-29 RX ORDER — ACETAMINOPHEN 10 MG/ML
INJECTION, SOLUTION INTRAVENOUS
Status: DISCONTINUED | OUTPATIENT
Start: 2019-10-29 | End: 2019-10-29

## 2019-10-29 RX ORDER — SODIUM CHLORIDE 0.9 % (FLUSH) 0.9 %
3 SYRINGE (ML) INJECTION
Status: DISCONTINUED | OUTPATIENT
Start: 2019-10-29 | End: 2019-10-29 | Stop reason: HOSPADM

## 2019-10-29 RX ORDER — HYDROMORPHONE HYDROCHLORIDE 1 MG/ML
0.2 INJECTION, SOLUTION INTRAMUSCULAR; INTRAVENOUS; SUBCUTANEOUS
Status: DISCONTINUED | OUTPATIENT
Start: 2019-10-29 | End: 2019-11-01 | Stop reason: HOSPADM

## 2019-10-29 RX ORDER — ONDANSETRON 2 MG/ML
8 INJECTION INTRAMUSCULAR; INTRAVENOUS EVERY 12 HOURS PRN
Status: DISCONTINUED | OUTPATIENT
Start: 2019-10-29 | End: 2019-11-01 | Stop reason: HOSPADM

## 2019-10-29 RX ORDER — GLYCOPYRROLATE 0.2 MG/ML
INJECTION INTRAMUSCULAR; INTRAVENOUS
Status: DISCONTINUED | OUTPATIENT
Start: 2019-10-29 | End: 2019-10-29

## 2019-10-29 RX ORDER — EPHEDRINE SULFATE 50 MG/ML
INJECTION, SOLUTION INTRAVENOUS
Status: DISCONTINUED | OUTPATIENT
Start: 2019-10-29 | End: 2019-10-29

## 2019-10-29 RX ORDER — SODIUM CHLORIDE 9 MG/ML
INJECTION, SOLUTION INTRAVENOUS CONTINUOUS
Status: DISCONTINUED | OUTPATIENT
Start: 2019-10-29 | End: 2019-11-01 | Stop reason: HOSPADM

## 2019-10-29 RX ORDER — LIDOCAINE HYDROCHLORIDE AND EPINEPHRINE 10; 10 MG/ML; UG/ML
INJECTION, SOLUTION INFILTRATION; PERINEURAL
Status: DISCONTINUED | OUTPATIENT
Start: 2019-10-29 | End: 2019-10-29 | Stop reason: HOSPADM

## 2019-10-29 RX ORDER — FENTANYL CITRATE 50 UG/ML
INJECTION, SOLUTION INTRAMUSCULAR; INTRAVENOUS
Status: DISCONTINUED | OUTPATIENT
Start: 2019-10-29 | End: 2019-10-29

## 2019-10-29 RX ORDER — LIDOCAINE HCL/PF 100 MG/5ML
SYRINGE (ML) INTRAVENOUS
Status: DISCONTINUED | OUTPATIENT
Start: 2019-10-29 | End: 2019-10-29

## 2019-10-29 RX ORDER — OXYCODONE HYDROCHLORIDE 5 MG/1
5 TABLET ORAL EVERY 4 HOURS PRN
Status: DISCONTINUED | OUTPATIENT
Start: 2019-10-29 | End: 2019-11-01 | Stop reason: HOSPADM

## 2019-10-29 RX ORDER — SODIUM CHLORIDE 0.9 % (FLUSH) 0.9 %
2 SYRINGE (ML) INJECTION
Status: DISCONTINUED | OUTPATIENT
Start: 2019-10-29 | End: 2019-11-01 | Stop reason: HOSPADM

## 2019-10-29 RX ORDER — FAMOTIDINE 20 MG/1
20 TABLET, FILM COATED ORAL DAILY
Status: DISCONTINUED | OUTPATIENT
Start: 2019-10-29 | End: 2019-11-01 | Stop reason: HOSPADM

## 2019-10-29 RX ORDER — KETAMINE HCL IN 0.9 % NACL 50 MG/5 ML
SYRINGE (ML) INTRAVENOUS
Status: DISCONTINUED | OUTPATIENT
Start: 2019-10-29 | End: 2019-10-29

## 2019-10-29 RX ORDER — BACITRACIN 50000 [IU]/1
INJECTION, POWDER, FOR SOLUTION INTRAMUSCULAR
Status: DISCONTINUED | OUTPATIENT
Start: 2019-10-29 | End: 2019-10-29 | Stop reason: HOSPADM

## 2019-10-29 RX ORDER — SUCCINYLCHOLINE CHLORIDE 20 MG/ML
INJECTION INTRAMUSCULAR; INTRAVENOUS
Status: DISCONTINUED | OUTPATIENT
Start: 2019-10-29 | End: 2019-10-29

## 2019-10-29 RX ORDER — OXYCODONE HYDROCHLORIDE 10 MG/1
10 TABLET ORAL EVERY 4 HOURS PRN
Status: DISCONTINUED | OUTPATIENT
Start: 2019-10-29 | End: 2019-11-01 | Stop reason: HOSPADM

## 2019-10-29 RX ORDER — MIDAZOLAM HYDROCHLORIDE 1 MG/ML
INJECTION, SOLUTION INTRAMUSCULAR; INTRAVENOUS
Status: DISCONTINUED | OUTPATIENT
Start: 2019-10-29 | End: 2019-10-29

## 2019-10-29 RX ORDER — CELECOXIB 200 MG/1
200 CAPSULE ORAL DAILY
Status: DISCONTINUED | OUTPATIENT
Start: 2019-10-29 | End: 2019-11-01 | Stop reason: HOSPADM

## 2019-10-29 RX ORDER — GABAPENTIN 300 MG/1
300 CAPSULE ORAL 3 TIMES DAILY
Status: DISCONTINUED | OUTPATIENT
Start: 2019-10-29 | End: 2019-11-01 | Stop reason: HOSPADM

## 2019-10-29 RX ORDER — METOPROLOL SUCCINATE 25 MG/1
25 TABLET, EXTENDED RELEASE ORAL DAILY
Status: DISCONTINUED | OUTPATIENT
Start: 2019-10-29 | End: 2019-11-01 | Stop reason: HOSPADM

## 2019-10-29 RX ORDER — PROPOFOL 10 MG/ML
VIAL (ML) INTRAVENOUS
Status: DISCONTINUED | OUTPATIENT
Start: 2019-10-29 | End: 2019-10-29

## 2019-10-29 RX ORDER — CABERGOLINE 0.5 MG/1
0.25 TABLET ORAL
Status: DISCONTINUED | OUTPATIENT
Start: 2019-10-29 | End: 2019-11-01 | Stop reason: HOSPADM

## 2019-10-29 RX ORDER — AMOXICILLIN 250 MG
1 CAPSULE ORAL 2 TIMES DAILY
Status: DISCONTINUED | OUTPATIENT
Start: 2019-10-29 | End: 2019-11-01 | Stop reason: HOSPADM

## 2019-10-29 RX ORDER — TRANEXAMIC ACID 100 MG/ML
INJECTION, SOLUTION INTRAVENOUS CONTINUOUS PRN
Status: DISCONTINUED | OUTPATIENT
Start: 2019-10-29 | End: 2019-10-29

## 2019-10-29 RX ORDER — PROPOFOL 10 MG/ML
VIAL (ML) INTRAVENOUS CONTINUOUS PRN
Status: DISCONTINUED | OUTPATIENT
Start: 2019-10-29 | End: 2019-10-29

## 2019-10-29 RX ORDER — POLYETHYLENE GLYCOL 3350 17 G/17G
17 POWDER, FOR SOLUTION ORAL DAILY
Status: DISCONTINUED | OUTPATIENT
Start: 2019-10-29 | End: 2019-11-01 | Stop reason: HOSPADM

## 2019-10-29 RX ORDER — ONDANSETRON 2 MG/ML
INJECTION INTRAMUSCULAR; INTRAVENOUS
Status: DISCONTINUED | OUTPATIENT
Start: 2019-10-29 | End: 2019-10-29

## 2019-10-29 RX ORDER — DEXAMETHASONE SODIUM PHOSPHATE 4 MG/ML
INJECTION, SOLUTION INTRA-ARTICULAR; INTRALESIONAL; INTRAMUSCULAR; INTRAVENOUS; SOFT TISSUE
Status: DISCONTINUED | OUTPATIENT
Start: 2019-10-29 | End: 2019-10-29

## 2019-10-29 RX ORDER — TRANEXAMIC ACID 100 MG/ML
INJECTION, SOLUTION INTRAVENOUS
Status: DISCONTINUED | OUTPATIENT
Start: 2019-10-29 | End: 2019-10-29

## 2019-10-29 RX ORDER — ACETAMINOPHEN 500 MG
500 TABLET ORAL EVERY 8 HOURS
Status: DISCONTINUED | OUTPATIENT
Start: 2019-10-29 | End: 2019-10-29

## 2019-10-29 RX ORDER — METHOCARBAMOL 750 MG/1
750 TABLET, FILM COATED ORAL 3 TIMES DAILY
Status: DISCONTINUED | OUTPATIENT
Start: 2019-10-29 | End: 2019-10-31

## 2019-10-29 RX ADMIN — ATORVASTATIN CALCIUM 40 MG: 20 TABLET, FILM COATED ORAL at 02:10

## 2019-10-29 RX ADMIN — OXYCODONE HYDROCHLORIDE 15 MG: 10 TABLET ORAL at 02:10

## 2019-10-29 RX ADMIN — PROPOFOL 30 MG: 10 INJECTION, EMULSION INTRAVENOUS at 11:10

## 2019-10-29 RX ADMIN — PROPOFOL 70 MG: 10 INJECTION, EMULSION INTRAVENOUS at 11:10

## 2019-10-29 RX ADMIN — LORAZEPAM 0.25 MG: 2 INJECTION INTRAMUSCULAR; INTRAVENOUS at 03:10

## 2019-10-29 RX ADMIN — METHOCARBAMOL TABLETS 750 MG: 750 TABLET, COATED ORAL at 09:10

## 2019-10-29 RX ADMIN — NICARDIPINE HYDROCHLORIDE 600 MCG: 0.2 INJECTION, SOLUTION INTRAVENOUS at 11:10

## 2019-10-29 RX ADMIN — CELECOXIB 200 MG: 200 CAPSULE ORAL at 02:10

## 2019-10-29 RX ADMIN — SODIUM CHLORIDE: 0.9 INJECTION, SOLUTION INTRAVENOUS at 02:10

## 2019-10-29 RX ADMIN — EPHEDRINE SULFATE 10 MG: 50 INJECTION, SOLUTION INTRAMUSCULAR; INTRAVENOUS; SUBCUTANEOUS at 11:10

## 2019-10-29 RX ADMIN — CEFAZOLIN 2 G: 1 INJECTION, POWDER, FOR SOLUTION INTRAMUSCULAR; INTRAVENOUS at 05:10

## 2019-10-29 RX ADMIN — HYDROMORPHONE HYDROCHLORIDE 0.2 MG: 1 INJECTION, SOLUTION INTRAMUSCULAR; INTRAVENOUS; SUBCUTANEOUS at 02:10

## 2019-10-29 RX ADMIN — ONDANSETRON 4 MG: 2 INJECTION INTRAMUSCULAR; INTRAVENOUS at 12:10

## 2019-10-29 RX ADMIN — GABAPENTIN 300 MG: 300 CAPSULE ORAL at 09:10

## 2019-10-29 RX ADMIN — REMIFENTANIL HYDROCHLORIDE 0.2 MCG/KG/MIN: 1 INJECTION, POWDER, LYOPHILIZED, FOR SOLUTION INTRAVENOUS at 10:10

## 2019-10-29 RX ADMIN — SENNOSIDES AND DOCUSATE SODIUM 1 TABLET: 8.6; 5 TABLET ORAL at 09:10

## 2019-10-29 RX ADMIN — SODIUM CHLORIDE: 0.9 INJECTION, SOLUTION INTRAVENOUS at 10:10

## 2019-10-29 RX ADMIN — HYDROMORPHONE HYDROCHLORIDE 0.2 MG: 1 INJECTION, SOLUTION INTRAMUSCULAR; INTRAVENOUS; SUBCUTANEOUS at 03:10

## 2019-10-29 RX ADMIN — PROPOFOL 20 MG: 10 INJECTION, EMULSION INTRAVENOUS at 11:10

## 2019-10-29 RX ADMIN — SUCCINYLCHOLINE CHLORIDE 100 MG: 20 INJECTION, SOLUTION INTRAMUSCULAR; INTRAVENOUS at 10:10

## 2019-10-29 RX ADMIN — DEXAMETHASONE SODIUM PHOSPHATE 8 MG: 4 INJECTION, SOLUTION INTRAMUSCULAR; INTRAVENOUS at 10:10

## 2019-10-29 RX ADMIN — PHENYLEPHRINE HYDROCHLORIDE 50 MCG: 10 INJECTION INTRAVENOUS at 01:10

## 2019-10-29 RX ADMIN — GABAPENTIN 300 MG: 300 CAPSULE ORAL at 02:10

## 2019-10-29 RX ADMIN — GLYCOPYRROLATE 0.2 MG: 0.2 INJECTION, SOLUTION INTRAMUSCULAR; INTRAVENOUS at 10:10

## 2019-10-29 RX ADMIN — CEFAZOLIN 2 G: 330 INJECTION, POWDER, FOR SOLUTION INTRAMUSCULAR; INTRAVENOUS at 10:10

## 2019-10-29 RX ADMIN — SODIUM CHLORIDE 0.3 MCG/KG/MIN: 9 INJECTION, SOLUTION INTRAVENOUS at 10:10

## 2019-10-29 RX ADMIN — LIDOCAINE HYDROCHLORIDE 80 MG: 20 INJECTION, SOLUTION INTRAVENOUS at 10:10

## 2019-10-29 RX ADMIN — TRANEXAMIC ACID 740 MG: 100 INJECTION, SOLUTION INTRAVENOUS at 10:10

## 2019-10-29 RX ADMIN — PROPOFOL 100 MCG/KG/MIN: 10 INJECTION, EMULSION INTRAVENOUS at 10:10

## 2019-10-29 RX ADMIN — ROCURONIUM BROMIDE 20 MG: 10 INJECTION, SOLUTION INTRAVENOUS at 11:10

## 2019-10-29 RX ADMIN — PHENYLEPHRINE HYDROCHLORIDE 100 MCG: 10 INJECTION INTRAVENOUS at 11:10

## 2019-10-29 RX ADMIN — SODIUM CHLORIDE, SODIUM GLUCONATE, SODIUM ACETATE, POTASSIUM CHLORIDE, MAGNESIUM CHLORIDE, SODIUM PHOSPHATE, DIBASIC, AND POTASSIUM PHOSPHATE: .53; .5; .37; .037; .03; .012; .00082 INJECTION, SOLUTION INTRAVENOUS at 11:10

## 2019-10-29 RX ADMIN — SODIUM CHLORIDE 1000 ML: 0.9 INJECTION, SOLUTION INTRAVENOUS at 09:10

## 2019-10-29 RX ADMIN — TRANEXAMIC ACID 1 MG/KG/HR: 100 INJECTION, SOLUTION INTRAVENOUS at 11:10

## 2019-10-29 RX ADMIN — EPHEDRINE SULFATE 5 MG: 50 INJECTION, SOLUTION INTRAMUSCULAR; INTRAVENOUS; SUBCUTANEOUS at 11:10

## 2019-10-29 RX ADMIN — PHENYLEPHRINE HYDROCHLORIDE 200 MCG: 10 INJECTION INTRAVENOUS at 11:10

## 2019-10-29 RX ADMIN — ACETAMINOPHEN 500 MG: 500 TABLET ORAL at 09:10

## 2019-10-29 RX ADMIN — METHOCARBAMOL TABLETS 750 MG: 750 TABLET, COATED ORAL at 02:10

## 2019-10-29 RX ADMIN — Medication 30 MG: at 11:10

## 2019-10-29 RX ADMIN — PHENYLEPHRINE HYDROCHLORIDE 200 MCG: 10 INJECTION INTRAVENOUS at 10:10

## 2019-10-29 RX ADMIN — ACETAMINOPHEN 1000 MG: 10 INJECTION, SOLUTION INTRAVENOUS at 12:10

## 2019-10-29 RX ADMIN — FENTANYL CITRATE 100 MCG: 50 INJECTION, SOLUTION INTRAMUSCULAR; INTRAVENOUS at 10:10

## 2019-10-29 RX ADMIN — PROPOFOL 170 MG: 10 INJECTION, EMULSION INTRAVENOUS at 10:10

## 2019-10-29 RX ADMIN — SUGAMMADEX 200 MG: 100 INJECTION, SOLUTION INTRAVENOUS at 12:10

## 2019-10-29 RX ADMIN — HYDROMORPHONE HYDROCHLORIDE 0.2 MG: 1 INJECTION, SOLUTION INTRAMUSCULAR; INTRAVENOUS; SUBCUTANEOUS at 06:10

## 2019-10-29 RX ADMIN — MIDAZOLAM HYDROCHLORIDE 2 MG: 1 INJECTION, SOLUTION INTRAMUSCULAR; INTRAVENOUS at 10:10

## 2019-10-29 RX ADMIN — MUPIROCIN: 20 OINTMENT TOPICAL at 09:10

## 2019-10-29 NOTE — NURSING TRANSFER
Nursing Transfer Note      10/29/2019     Transfer To: 540B    Transfer via stretcher    Transfer with IVF infusing    Transported by PT Transport    Medicines sent: None    Chart send with patient: Yes    Notified: PT Hodan    Patient reassessed at: 10/29/19 1605    Upon arrival to floor: patient oriented to room, call bell in reach and bed in lowest position

## 2019-10-29 NOTE — HPI
Isma Martin is a 69 y.o. male who returns to me today for MRI results.  He was last seen by me 9/3/2019.  Today he is doing well but continues to have constant pain in his left arm that radiates from his neck down to his hand with tingling and numbness in his fingers.  He has not improved with nsaids, steroids and muscle relaxants.  His injury occurred 7/25/2019 when he fell from a 4 foot ladder and landed on his left arm.  He does report significant weakness in his left arm and hand.  His pain is worse when laying down at night.  He also has significant pain and weakness with shoulder activity such as reaching or lifting.  He denies problems with balance.       The Patient reports myelopathic symptoms such as difficulty with buttons/coins/keys. Denies perineal paresthesias, bowel/bladder dysfunction.

## 2019-10-29 NOTE — TRANSFER OF CARE
"Anesthesia Transfer of Care Note    Patient: Isma Martin    Procedure(s) Performed: Procedure(s) (LRB):  LAMINECTOMY, SPINE, CERVICAL, WITH FUSION C3-6 Depuy SNS: Motors/SSEP [place] + Pads (N/A)    Patient location: PACU    Anesthesia Type: general    Transport from OR: Transported from OR on 6-10 L/min O2 by face mask with adequate spontaneous ventilation    Post pain: adequate analgesia    Post assessment: no apparent anesthetic complications and tolerated procedure well    Post vital signs: stable    Level of consciousness: sedated and responds to stimulation    Nausea/Vomiting: no nausea/vomiting    Complications: none    Transfer of care protocol was followed      Last vitals:   Visit Vitals  BP (!) 140/63 (BP Location: Left arm, Patient Position: Lying)   Pulse 75   Temp 36.2 °C (97.2 °F) (Temporal)   Resp 16   Ht 5' 5" (1.651 m)   Wt 74.8 kg (165 lb)   SpO2 100%   BMI 27.46 kg/m²     "

## 2019-10-29 NOTE — NURSING
Pt confused and tried to get out of bed while bed alarm was on, and pulled out Hemovac from connector. Placed back in place, Ordering teley sitter.

## 2019-10-29 NOTE — PLAN OF CARE
[facility]    HOME HEALTH ORDERS  FACE TO FACE ENCOUNTER    Patient Name: Isma Martin  YOB: 1950    PCP: Anthony Tay MD   PCP Address: [address] / [place] [address]  PCP Phone Number: [phone]  PCP Fax: [phone]    Encounter Date: 10/29/2019    Admit to Home Health    Diagnoses:  Active Hospital Problems    Diagnosis  POA    *Cervical myelopathy [G95.9]  Yes      Resolved Hospital Problems   No resolved problems to display.       Future Appointments   Date Time Provider Department Center   11/26/2019  3:30 PM Thaddeus Perez MD [facility] [place]   12/3/2019 10:30 AM TIP Duckworth MD [facility] SP[facility]TS [place]   1/21/2020  9:20 AM Anthony Tay MD [facility] [address] [facility]           I have seen and examined this patient face to face today. My clinical findings that support the need for the home health skilled services and home bound status are the following:  Weakness/numbness causing balance and gait disturbance due to Surgery making it taxing to leave home.    Allergies:Review of patient's allergies indicates:  No Known Allergies    Diet: regular diet    Activities: activity as tolerated    Nursing:   SN to complete comprehensive assessment including routine vital signs. Instruct on disease process and s/s of complications to report to MD. Follow specific home health arthoplasty protocol. Review/verify medication list sent home with the patient at time of discharge  and instruct patient/caregiver as needed. If coumadin ordered, coumadin clinic to manage INR with INR draws 2x per week with a goal to maintain INR between 1.8 and 2.2. Frequency may be adjusted depending on start of care date.    Notify MD if SBP > 160 or < 90; DBP > 90 or < 50; HR > 120 or < 50; Temp > 101    Home Medical Equipment:  Walker, 3-1 bedside commode, transfer tub bench    CONSULTS:    Physical Therapy to evaluate and treat. Evaluate for home safety and equipment needs;  Establish/upgrade home exercise program. Perform / instruct on therapeutic exercises, gait training, transfer training, and Range of Motion.    OTHER:  Occupational Therapy to evaluate and treat. Evaluate home environment for safety and equipment needs. Perform/Instruct on transfers, ADL training, ROM, and therapeutic exercises.      WOUND CARE ORDERS  Follow Home Health specific protocol.      Medications: Review discharge medications with patient and family and provide education.      Current Discharge Medication List      CONTINUE these medications which have NOT CHANGED    Details   ascorbic acid (VITAMIN C) 500 MG tablet Take 500 mg by mouth Every PM.      cabergoline (DOSTINEX) 0.5 mg tablet Take 0.5 tablets (0.25 mg total) by mouth twice a week.  Qty: 12 tablet, Refills: 3    Associated Diagnoses: Prolactinoma      cholecalciferol, vitamin D3, 2,000 unit Tab Take 1 tablet by mouth Every PM.      cyanocobalamin (VITAMIN B-12) 1000 MCG tablet       !! HYDROcodone-acetaminophen (NORCO) 5-325 mg per tablet Take 1 tablet by mouth every 6 (six) hours as needed for Pain.  Qty: 30 tablet, Refills: 0    Comments: Quantity prescribed more than 7 day supply? Yes, quantity medically necessary      losartan-hydrochlorothiazide 50-12.5 mg (HYZAAR) 50-12.5 mg per tablet Take 1 tablet by mouth once daily.  Qty: 90 tablet, Refills: 11    Associated Diagnoses: Essential hypertension      metoprolol succinate (TOPROL-XL) 25 MG 24 hr tablet Take 1 tablet (25 mg total) by mouth once daily.  Qty: 90 tablet, Refills: 11    Associated Diagnoses: Essential hypertension      omega-3 fatty acids-vitamin E (FISH OIL) 1,000 mg Cap       omeprazole (PRILOSEC) 20 MG capsule Take 1 capsule (20 mg total) by mouth once daily.  Qty: 90 capsule, Refills: 11    Associated Diagnoses: Gastroesophageal reflux disease, esophagitis presence not specified      oxyCODONE-acetaminophen (PERCOCET) 5-325 mg per tablet Take one to two tablets by mouth  every four hours as needed for pain  Qty: 30 tablet, Refills: 0    Comments: Quantity prescribed more than 7 day supply? Yes, quantity medically necessary  Associated Diagnoses: Traumatic complete tear of left rotator cuff, subsequent encounter      VITAMIN E ACETATE (VITAMIN E ORAL) Take by mouth.      atorvastatin (LIPITOR) 40 MG tablet Take 1 tablet (40 mg total) by mouth once daily.  Qty: 90 tablet, Refills: 11    Associated Diagnoses: Type 2 diabetes mellitus without complication, without long-term current use of insulin      !! HYDROcodone-acetaminophen (NORCO) 5-325 mg per tablet Take 1 tablet by mouth every 6 (six) hours as needed for Pain.  Qty: 30 tablet, Refills: 0       !! - Potential duplicate medications found. Please discuss with provider.          I certify that this patient is confined to his home and needs physical therapy and occupational therapy.

## 2019-10-29 NOTE — OP NOTE
DATE OF PROCEDURE:10/29/2019     SURGEON: Ariel Lamar M.D.     CO- SURGEON: Agustin Abreu M.D., Neurosurgery.     PREOPERATIVE DIAGNOSIS:     1:  Cervical spondylitic myelopathy  2:  Jehovah's witness     POSTOPERATIVE DIAGNOSIS:    1:  Cervical spondylitic myelopathy  2:  A Jehovah's witness      PROCEDURES PERFORMED:  1.  Posterior cervical spinal fusion C3-C6  2.  C3-6 laminectomy for decompression of spinal stenosis  3.  Posterior segmental instrumentation C3-C6  4.  Cadaveric and local bone grafting.     ANESTHESIA: General endotracheal anesthesia.     ESTIMATED BLOOD LOSS:  100  mL.     IMPLANTS:  Depuy Mountaineer     SPECIMENS: None.     FINDINGS:  None.     DRAINS: One deep.     COMPLICATIONS: None.     SPONGE AND NEEDLE COUNT: Correct x2.     NEUROLOGICAL MONITORING: Motor evoked potentials, somatosensory evoked    potential, and free-running EMG.  There were no changes to stable and reliable bilateral upper and lower extremity motor and somatosensory potentials that out due entire procedure.     DESCRIPTION INFORMED CONSENT:   please see my last clinic note     REASON FOR OPERATION AND BRIEF HISTORY AND PHYSICAL:  Patient is a 69-year-old male with symptomatic cervical spondylitic myelopathy.  He is also a Jehovah's witness.  He is here for surgery today..     DESCRIPTION OF PROCEDURE: The patient was met in the preoperative area where    her posterior cervical spine was marked as the operative site. Subsequently, they were    brought to the Operating Room where they was placed under general endotracheal    anesthesia. Sequential compression devices were placed in the patient's    bilateral calves and run throughout the case. A Talbot catheter was then sterilely placed. Del Castillo-Wells tongs were then placed in the standard sterile fashion and the patient was positioned prone on a [place] table with 4 post pads.  The head was secured to 15 lb of in-line traction.  The arms were padded talked.  The  shoulders were gently taped down.  The posterior cervical spine was then prepped and draped in a normal sterile fashion     A full timeout was then called, identifying the patient, the procedural site and  levels, the availability of all instruments and implants, and no specific    nursing, surgical, anesthetic, or neurological monitoring concerns. Finally, it  was safe to proceed with surgery and the patient was given weight-appropriate    dose of Ancef by the Anesthesia Service.     I then infiltrated my planned incision with 10 mL of 0.5% Marcaine with    epinephrine.     We then made a midline posterior cervical incision and performed a preliminary subperiosteal exposure with a took to be the C3-C6 lamina transverse processes and relevant intervening facet joints. At the conclusion of my preliminary exposure we placed an elevator and wanted took to be the right C4-5 facet joint took a lateral radiograph and Dr. Abreu and I confirmed the levels. Next we prepared lateral mass screw tracts in the standard fashion from C3-C6 bilaterally but did not place screws to facilitate laminectomy.      We then began the neurological decompression portion of the procedure. A high-speed drill was used to perform a trough style laminectomy from C3-C6 bilaterally.  These laminar fragments were then removed on block.  Epidural hemostasis was achieved with a combination of bipolar electrocautery as well as FloSeal and patties.  Wound was thoroughly irrigated. Screws were placed in the previously prepared tracts from C3-C6.  AP and lateral radiographs then taken demonstrating correct level as well as adequate position of all implants.  All bony surfaces were then decorticated from C3-T6.  Rods were then measured cut contoured and locked into place with many fracture provided set plugs and torque wrench.  A cross-link was placed at the C5 level. A deep drain was then placed.  The patient's local bone graft was milled in a bone mill  and mixed with 1 g of vancomycin powder and laid dorsally along the decorticated surfaces from C3-C6 bilaterally.     At this point the wound was closed in layers using 0 Ethibond for the fascia, 2-0 Vicryl for the dermis and 3-0 Monocryl for the skin.     The patient was then flipped supine the Del Castillo-Wells tongs removed he was extubated and transferred to the recovery room in stable condition.    JUSTIFICATION OF CO-SURGEON:  Given the patient's history of being a Catholic to attending surgeon is indicated to help expedite surgery and decrease blood loss.

## 2019-10-29 NOTE — PLAN OF CARE
Plan of care reviewed and updated.Pt's pain is managed with the medication ordered at this time. Pt's VS are as charted.  No falls this shift. Pt is oriented to room and call system. Will continue to montior.

## 2019-10-29 NOTE — NURSING
Pt arrived to room via stretcher. Pt rates pain as tolerable. Pt denies nausea. Pt denies numbness and tingling. Pt dressing clean dry and intact. Hemovac and alonzo in place. Pt oriented to room and call system. Will continue to monitor.

## 2019-10-29 NOTE — SUBJECTIVE & OBJECTIVE
"Past Medical History:   Diagnosis Date    Arthritis     Cataract     Colon polyp     Diabetes mellitus     GERD (gastroesophageal reflux disease)     Hyperlipidemia     Hypertension     Hypogonadism male     Obesity     Prolactinoma        Past Surgical History:   Procedure Laterality Date    CAROTID ENDARTERECTOMY Left     FOOT SURGERY  14    right    TRANSPHENOIDAL PITUITARY RESECTION      pituitary tumor       Review of patient's allergies indicates:  No Known Allergies    Current Facility-Administered Medications   Medication    0.9%  NaCl infusion    mupirocin 2 % ointment     Family History     Problem Relation (Age of Onset)    Glaucoma Mother    Heart attack Sister    Heart disease Brother, Sister        Tobacco Use    Smoking status: Former Smoker     Packs/day: 1.50     Years: 35.00     Pack years: 52.50     Last attempt to quit: 1995     Years since quittin.8    Smokeless tobacco: Never Used   Substance and Sexual Activity    Alcohol use: Yes     Alcohol/week: 0.8 standard drinks     Types: 1 Standard drinks or equivalent per week     Comment: rare    Drug use: No    Sexual activity: Never     ROS   Constitutional: negative for fevers  Eyes: negative visual changes  ENT: negative for hearing loss  Respiratory: negative for dyspnea  Cardiovascular: negative for chest pain  Gastrointestinal: negative for abdominal pain  Genitourinary: negative for dysuria  Neurological: negative for headaches  Behavioral/Psych: negative for hallucinations  Endocrine: negative for temperature intolerance    Objective:     Vital Signs (Most Recent):  Temp: 97.6 °F (36.4 °C) (10/29/19 0911)  Pulse: (!) 55 (10/29/19 0911)  Resp: 18 (10/29/19 0911)  BP: 126/71 (10/29/19 0911)  SpO2: 100 % (10/29/19 0911) Vital Signs (24h Range):  Temp:  [97.6 °F (36.4 °C)] 97.6 °F (36.4 °C)  Pulse:  [55] 55  Resp:  [18] 18  SpO2:  [100 %] 100 %  BP: (126)/(71) 126/71     Weight: 74.8 kg (165 lb)  Height: 5' 5" " (165.1 cm)  Body mass index is 27.46 kg/m².    No intake or output data in the 24 hours ending 10/29/19 0929    Ortho/SPM Exam   Unchanged from last clinic appt    Significant Labs: All pertinent labs within the past 24 hours have been reviewed.    Significant Imaging: I have reviewed all pertinent imaging results/findings.

## 2019-10-29 NOTE — ASSESSMENT & PLAN NOTE
Isma Martin is a 69 y.o. male with cervical myelopathy who presents for C3-6 laminectomy and PSF. He has been marked, booked, and consented.

## 2019-10-29 NOTE — H&P
[facility]  Orthopedics  H&P    Patient Name: Isma Martin  MRN: [id number]  Admission Date: 10/29/2019  Primary Care Provider: Anthony Tay MD        Subjective:     Principal Problem:Cervical myelopathy    Chief Complaint: No chief complaint on file.       HPI: Isma Martin is a 69 y.o. male who returns to me today for MRI results.  He was last seen by me 9/3/2019.  Today he is doing well but continues to have constant pain in his left arm that radiates from his neck down to his hand with tingling and numbness in his fingers.  He has not improved with nsaids, steroids and muscle relaxants.  His injury occurred 7/25/2019 when he fell from a 4 foot ladder and landed on his left arm.  He does report significant weakness in his left arm and hand.  His pain is worse when laying down at night.  He also has significant pain and weakness with shoulder activity such as reaching or lifting.  He denies problems with balance.       The Patient reports myelopathic symptoms such as difficulty with buttons/coins/keys. Denies perineal paresthesias, bowel/bladder dysfunction.    Past Medical History:   Diagnosis Date    Arthritis     Cataract     Colon polyp     Diabetes mellitus     GERD (gastroesophageal reflux disease)     Hyperlipidemia     Hypertension     Hypogonadism male     Obesity     Prolactinoma        Past Surgical History:   Procedure Laterality Date    CAROTID ENDARTERECTOMY Left     FOOT SURGERY  4-23-14    right    TRANSPHENOIDAL PITUITARY RESECTION      pituitary tumor       Review of patient's allergies indicates:  No Known Allergies    Current Facility-Administered Medications   Medication    0.9%  NaCl infusion    mupirocin 2 % ointment     Family History     Problem Relation (Age of Onset)    Glaucoma Mother    Heart attack Sister    Heart disease Brother, Sister        Tobacco Use    Smoking status: Former Smoker     Packs/day: 1.50     Years: 35.00     Pack  "years: 52.50     Last attempt to quit: 1995     Years since quittin.8    Smokeless tobacco: Never Used   Substance and Sexual Activity    Alcohol use: Yes     Alcohol/week: 0.8 standard drinks     Types: 1 Standard drinks or equivalent per week     Comment: rare    Drug use: No    Sexual activity: Never     ROS   Constitutional: negative for fevers  Eyes: negative visual changes  ENT: negative for hearing loss  Respiratory: negative for dyspnea  Cardiovascular: negative for chest pain  Gastrointestinal: negative for abdominal pain  Genitourinary: negative for dysuria  Neurological: negative for headaches  Behavioral/Psych: negative for hallucinations  Endocrine: negative for temperature intolerance    Objective:     Vital Signs (Most Recent):  Temp: 97.6 °F (36.4 °C) (10/29/19 0911)  Pulse: (!) 55 (10/29/19 0911)  Resp: 18 (10/29/19 0911)  BP: 126/71 (10/29/19 0911)  SpO2: 100 % (10/29/19 0911) Vital Signs (24h Range):  Temp:  [97.6 °F (36.4 °C)] 97.6 °F (36.4 °C)  Pulse:  [55] 55  Resp:  [18] 18  SpO2:  [100 %] 100 %  BP: (126)/(71) 126/71     Weight: 74.8 kg (165 lb)  Height: 5' 5" (165.1 cm)  Body mass index is 27.46 kg/m².    No intake or output data in the 24 hours ending 10/29/19 0929    Ortho/SPM Exam   Unchanged from last clinic appt    Significant Labs: All pertinent labs within the past 24 hours have been reviewed.    Significant Imaging: I have reviewed all pertinent imaging results/findings.    Assessment/Plan:     * Cervical myelopathy  Isma Martin is a 69 y.o. male with cervical myelopathy who presents for C3-6 laminectomy and PSF. He has been marked, booked, and consented.         Epifanio Stuart MD  Orthopedics  [facility]    "

## 2019-10-29 NOTE — PROGRESS NOTES
"MD Sam at bedside. Made aware patient urine cloudy in color. Per MD Sam" We will just watch it for a few days and see how he does".   "

## 2019-10-30 LAB
BASOPHILS # BLD AUTO: 0.01 K/UL (ref 0–0.2)
BASOPHILS NFR BLD: 0.1 % (ref 0–1.9)
DIFFERENTIAL METHOD: ABNORMAL
EOSINOPHIL # BLD AUTO: 0 K/UL (ref 0–0.5)
EOSINOPHIL NFR BLD: 0 % (ref 0–8)
ERYTHROCYTE [DISTWIDTH] IN BLOOD BY AUTOMATED COUNT: 12.6 % (ref 11.5–14.5)
HCT VFR BLD AUTO: 46.2 % (ref 40–54)
HGB BLD-MCNC: 14.8 G/DL (ref 14–18)
IMM GRANULOCYTES # BLD AUTO: 0.02 K/UL (ref 0–0.04)
IMM GRANULOCYTES NFR BLD AUTO: 0.2 % (ref 0–0.5)
LYMPHOCYTES # BLD AUTO: 0.7 K/UL (ref 1–4.8)
LYMPHOCYTES NFR BLD: 7.9 % (ref 18–48)
MCH RBC QN AUTO: 29.7 PG (ref 27–31)
MCHC RBC AUTO-ENTMCNC: 32 G/DL (ref 32–36)
MCV RBC AUTO: 93 FL (ref 82–98)
MONOCYTES # BLD AUTO: 0.7 K/UL (ref 0.3–1)
MONOCYTES NFR BLD: 8 % (ref 4–15)
NEUTROPHILS # BLD AUTO: 7.3 K/UL (ref 1.8–7.7)
NEUTROPHILS NFR BLD: 83.8 % (ref 38–73)
NRBC BLD-RTO: 0 /100 WBC
PLATELET # BLD AUTO: 156 K/UL (ref 150–350)
PMV BLD AUTO: 10.5 FL (ref 9.2–12.9)
RBC # BLD AUTO: 4.99 M/UL (ref 4.6–6.2)
WBC # BLD AUTO: 8.65 K/UL (ref 3.9–12.7)

## 2019-10-30 PROCEDURE — 63600175 PHARM REV CODE 636 W HCPCS: Performed by: STUDENT IN AN ORGANIZED HEALTH CARE EDUCATION/TRAINING PROGRAM

## 2019-10-30 PROCEDURE — 97116 GAIT TRAINING THERAPY: CPT

## 2019-10-30 PROCEDURE — 36415 COLL VENOUS BLD VENIPUNCTURE: CPT

## 2019-10-30 PROCEDURE — 97535 SELF CARE MNGMENT TRAINING: CPT

## 2019-10-30 PROCEDURE — 25000003 PHARM REV CODE 250: Performed by: STUDENT IN AN ORGANIZED HEALTH CARE EDUCATION/TRAINING PROGRAM

## 2019-10-30 PROCEDURE — 97165 OT EVAL LOW COMPLEX 30 MIN: CPT

## 2019-10-30 PROCEDURE — 85025 COMPLETE CBC W/AUTO DIFF WBC: CPT

## 2019-10-30 PROCEDURE — 12000002 HC ACUTE/MED SURGE SEMI-PRIVATE ROOM

## 2019-10-30 PROCEDURE — 97161 PT EVAL LOW COMPLEX 20 MIN: CPT

## 2019-10-30 RX ORDER — CELECOXIB 200 MG/1
200 CAPSULE ORAL DAILY
Qty: 14 CAPSULE | Refills: 0 | Status: SHIPPED | OUTPATIENT
Start: 2019-10-30 | End: 2019-11-12 | Stop reason: SDUPTHER

## 2019-10-30 RX ORDER — GABAPENTIN 100 MG/1
100 CAPSULE ORAL 3 TIMES DAILY
Qty: 42 CAPSULE | Refills: 0 | Status: SHIPPED | OUTPATIENT
Start: 2019-10-30 | End: 2020-07-23

## 2019-10-30 RX ORDER — OXYCODONE HYDROCHLORIDE 5 MG/1
5 TABLET ORAL EVERY 4 HOURS PRN
Qty: 20 TABLET | Refills: 0 | Status: SHIPPED | OUTPATIENT
Start: 2019-10-30 | End: 2019-11-08 | Stop reason: SDUPTHER

## 2019-10-30 RX ORDER — DEXTROMETHORPHAN HYDROBROMIDE, GUAIFENESIN 5; 100 MG/5ML; MG/5ML
650 LIQUID ORAL EVERY 6 HOURS PRN
Qty: 56 TABLET | Refills: 0 | Status: SHIPPED | OUTPATIENT
Start: 2019-10-30

## 2019-10-30 RX ORDER — METHOCARBAMOL 500 MG/1
1000 TABLET, FILM COATED ORAL 3 TIMES DAILY
Qty: 84 TABLET | Refills: 0 | Status: SHIPPED | OUTPATIENT
Start: 2019-10-30 | End: 2020-01-20

## 2019-10-30 RX ADMIN — SODIUM CHLORIDE: 0.9 INJECTION, SOLUTION INTRAVENOUS at 02:10

## 2019-10-30 RX ADMIN — METHOCARBAMOL TABLETS 750 MG: 750 TABLET, COATED ORAL at 09:10

## 2019-10-30 RX ADMIN — ACETAMINOPHEN 500 MG: 500 TABLET ORAL at 03:10

## 2019-10-30 RX ADMIN — METHOCARBAMOL TABLETS 750 MG: 750 TABLET, COATED ORAL at 03:10

## 2019-10-30 RX ADMIN — SENNOSIDES AND DOCUSATE SODIUM 1 TABLET: 8.6; 5 TABLET ORAL at 08:10

## 2019-10-30 RX ADMIN — POLYETHYLENE GLYCOL 3350 17 G: 17 POWDER, FOR SOLUTION ORAL at 08:10

## 2019-10-30 RX ADMIN — OXYCODONE HYDROCHLORIDE 15 MG: 10 TABLET ORAL at 05:10

## 2019-10-30 RX ADMIN — CEFAZOLIN 2 G: 1 INJECTION, POWDER, FOR SOLUTION INTRAMUSCULAR; INTRAVENOUS at 02:10

## 2019-10-30 RX ADMIN — ACETAMINOPHEN 500 MG: 500 TABLET ORAL at 05:10

## 2019-10-30 RX ADMIN — GABAPENTIN 300 MG: 300 CAPSULE ORAL at 03:10

## 2019-10-30 RX ADMIN — CELECOXIB 200 MG: 200 CAPSULE ORAL at 08:10

## 2019-10-30 RX ADMIN — SENNOSIDES AND DOCUSATE SODIUM 1 TABLET: 8.6; 5 TABLET ORAL at 09:10

## 2019-10-30 RX ADMIN — ACETAMINOPHEN 500 MG: 500 TABLET ORAL at 09:10

## 2019-10-30 RX ADMIN — GABAPENTIN 300 MG: 300 CAPSULE ORAL at 09:10

## 2019-10-30 RX ADMIN — OXYCODONE HYDROCHLORIDE 15 MG: 10 TABLET ORAL at 11:10

## 2019-10-30 RX ADMIN — FAMOTIDINE 20 MG: 20 TABLET ORAL at 08:10

## 2019-10-30 RX ADMIN — LOSARTAN POTASSIUM AND HYDROCHLOROTHIAZIDE 1 TABLET: 50; 12.5 TABLET, FILM COATED ORAL at 08:10

## 2019-10-30 RX ADMIN — METHOCARBAMOL TABLETS 750 MG: 750 TABLET, COATED ORAL at 08:10

## 2019-10-30 RX ADMIN — CEFAZOLIN 2 G: 1 INJECTION, POWDER, FOR SOLUTION INTRAMUSCULAR; INTRAVENOUS at 11:10

## 2019-10-30 RX ADMIN — OXYCODONE HYDROCHLORIDE 15 MG: 10 TABLET ORAL at 12:10

## 2019-10-30 RX ADMIN — OXYCODONE HYDROCHLORIDE 5 MG: 5 TABLET ORAL at 03:10

## 2019-10-30 RX ADMIN — ATORVASTATIN CALCIUM 40 MG: 20 TABLET, FILM COATED ORAL at 08:10

## 2019-10-30 NOTE — PLAN OF CARE
CM completed d/c assessment w/pt and brother at bedside. Pt wearing cervical collar and has wound drain in place. Pt independent prior to admit w/no DME in place. Wife in w/c and poor health so prob need HH. Pt confirmed [facility]. Pt lives in 1 story home w/1 stair access. CM provided anticipated MIL which will be updated by nursing staff. Pt verbalized understanding.    SYDNI Arellano Mavis GODOYGarima [id number]       10/30/19 1445   Discharge Assessment   Assessment Type Discharge Planning Assessment   Confirmed/corrected address and phone number on facesheet? Yes   Assessment information obtained from? Patient   Expected Length of Stay (days) 2   Communicated expected length of stay with patient/caregiver yes   Prior to hospitilization cognitive status: Alert/Oriented   Prior to hospitalization functional status: Independent   Current cognitive status: Alert/Oriented   Current Functional Status: Assistive Equipment;Needs Assistance   Lives With spouse   Able to Return to Prior Arrangements yes   Is patient able to care for self after discharge? Unable to determine at this time (comments)   Who are your caregiver(s) and their phone number(s)? Wife, Alea [phone]; brother, Mega [phone]   Patient's perception of discharge disposition home or selfcare;home health   Readmission Within the Last 30 Days no previous admission in last 30 days   Patient currently being followed by outpatient case management? No   Patient currently receives any other outside agency services? No   Equipment Currently Used at Home none;other (see comments)  (Wife has raised toilet seats in place)   Do you have any problems affording any of your prescribed medications? No   Is the patient taking medications as prescribed? yes   Does the patient have transportation home? Yes   Transportation Anticipated family or friend will provide   Does the patient receive services at the [facility]? No   Discharge Plan A Home;Home Health   DME Needed Upon  Discharge  other (see comments)  (TBD)   Patient/Family in Agreement with Plan yes

## 2019-10-30 NOTE — HOSPITAL COURSE
On 10/29/19, the patient arrived to the [facility] for proper pre-operative management.  Upon completion of pre-operative preparation, the patient was taken back to the operative theatre.  A C3-6 laminectomy and PSF was performed without complication and the patient was transported to the post anesthesia care unit in stable condition.  After appropriate recovery from the anaesthetic agents used during the surgery, the patient was then transported to the hospital inpatient floor.  The interim of the hospital stay from arrival on the floor up to discharge has been uncomplicated. The patient has tolerated regular diet with no nausea or vomiting.  The patient's pain has been controlled using a multimodal approach. Currently, the patient's pain is well controlled on an oral regimen.  The patient has been voiding without difficulty.  The patient began participation in physical therapy after surgery and has progressed throughout the entire hospital stay.  Currently, the physical therapy team feels that the patient's progress is sufficient to allow the patient to be discharged to home safely.  The patient agrees with this assessment and desires a discharge today.

## 2019-10-30 NOTE — ASSESSMENT & PLAN NOTE
Isma Martin is a 69 y.o. male s/p C3-6 PSF on 10/29/19  - surgical dressing clean, dry, and intact  - C-collar in place at all times  - PT/OT daily, WBAT  - antibiotics:  Postop Ancef  - pain control multimodal  - drain:  130 cc    Will continue monitor drain output and obtain x-rays when strains or pull.  Will follow-up PT recommendations for placement.

## 2019-10-30 NOTE — OP NOTE
DATE OF PROCEDURE: 10/29/19    PREOPERATIVE DIAGNOSIS:  1:  Cervical spondylitic myelopathy  2:  Religion     POSTOPERATIVE DIAGNOSIS:  Same.     PROCEDURE PERFORMED:  1. Posterior spinal fusion, C3-C6  2. Posterior segmental spinal fixation, C3-C6 (Depuy)  3. Decompression of thecal sac and nerve roots via laminectomy, C3-6  4. Use of intraoperative flouroscopy  5. Use of intraoperative neuromonitoring with MEPs  6. Local bone grafting     SURGEON: Agustin Abreu M.D. -- I assisted with the placement of all spinal instrumentation because a qualified resident was not available for this portion of the procedure.    CO-SURGEON: Ariel Lamar M.D.     ANESTHESIA: GETA     ESTIMATED BLOOD LOSS: 100mL     COMPLICATIONS: None     DRAINS: One deep.     SPECIMENS SENT: None     FINDINGS: None     INDICATIONS:     See Dr. Lamar's note.    PROCEDURE:     For details about patient intubation, anesthesia induction, positioning, and localization please see Dr. Lamar's separate operative note.  My involvement in this case began at the time of the incision.     A linear incision was made with a 10 blade from approximately C3-C6.  Supra and subfascial dissection was carried out in the midline with Bovie electrocautery. Subfascial dissection was carried out with Bovie electrocautery and Parisi elevators to expose the posterior elements from C3-C6. Levels were confirmed with lateral xray.     First, lateral mass screw tracts were prepared bilaterally from C3-C6 using freehand technique and anatomic landmarks. A decompressive laminectomy was then performed from C3-C6 using the Leksell rongeur, high speed drill and Kerrison punches. Adequate decompression was confirmed with the Ralls probe.     Lateral mass screws were then placed in the previously prepared tracts. AP and lateral xray showed excellent position of all screws. Titanium rods were sized, contoured and reduced into the tulip heads. Set screws were finally  tightened. A crosslink was placed and tightened at C5. Final xrays showed excellent spinal alignment and hardware position.     The wound was copiously irrigated with a dilute Betadine solution and normal saline.   Exposed bony surfaces from C3-C6 were decorticated with a high-speed drill.  Local bone from the laminectomy was morcellized and placed posteriorly for arthrodesis from C3-C6.       This concludes my involvement in this case.  For details about wound closure, drain placement, patient extubation and OR disposition please see Dr. Lamar's separate operative note.  During my involvement the patient appeared to tolerate the procedure well from a hemodynamic and neuro monitoring standpoint, and all counts were correct.    JUSTIFICATION OF CO-SURGEON:  Given the patient's history of being a Gnosticism two attending surgeons were indicated to help expedite surgery and decrease blood loss.

## 2019-10-30 NOTE — PT/OT/SLP EVAL
Physical Therapy Evaluation    Patient Name:  Isma Martin   MRN:  [id number]    Recommendations:     Discharge Recommendations:  home with home health, home health PT   Discharge Equipment Recommendations: walker, rolling   Barriers to discharge: Decreased caregiver support    Assessment:     Isma Martin is a 69 y.o. male admitted with a medical diagnosis of Cervical myelopathy.  He presents with the following impairments/functional limitations:  weakness, impaired endurance, impaired functional mobilty, gait instability, impaired balance, decreased upper extremity function, pain, decreased safety awareness Patient performs transfers w/ RW and CGA; ambulates 120 feet w/ RW and CGA for safety, mildly unsteady at times, no LOB or buckling.    Rehab Prognosis: Good; patient would benefit from acute skilled PT services to address these deficits and reach maximum level of function.    Recent Surgery: Procedure(s) (LRB):  LAMINECTOMY, SPINE, CERVICAL, WITH FUSION C3-6 Depuy SNS: Motors/SSEP [place] + Pads (N/A) 1 Day Post-Op    Plan:     During this hospitalization, patient to be seen 4 x/week to address the identified rehab impairments via gait training, therapeutic activities, therapeutic exercises and progress toward the following goals:    · Plan of Care Expires:  11/29/19    Subjective     Chief Complaint: pain  Patient/Family Comments/goals: return home to [facility]; reports he has a 'fresh fx' of left shoulder (from 5 months prior) and will have surgery later for that (fell of ladder)  Pain/Comfort:  Pain Rating 1: 8/10  Location - Side 1: Left  Location 1: shoulder  Pain Addressed 1: Pre-medicate for activity, Reposition, Distraction  Pain Rating Post-Intervention 1: 6/10    Patients cultural, spiritual, Mu-ism conflicts given the current situation:      Living Environment:  Patient lives w/wife in [facility] w/ one RADHA. Wife can not assist.   Prior to admission, patients level of function was independent.   Equipment used at home: none.  DME owned (not currently used): none.  Upon discharge, patient will have assistance from TBD (brother present today, but does not live close by).    Objective:     Communicated with nurse prior to session.  Patient found HOB elevated with SCD, hemovac, cervical collar(camera)  upon PT entry to room.    General Precautions: Standard, fall   Orthopedic Precautions:spinal precautions   Braces: Aspen collar     Exams:  · Cognitive Exam:  Patient is oriented to Person, Place, Time and Situation  · Gross Motor Coordination:  WFL  · Postural Exam:  Patient presented with the following abnormalities:    · -       No postural abnormalities identified  · Sensation:    · -       Intact light touch LEs  · RLE ROM: WFL  · RLE Strength: WFL  · LLE ROM: WFL  · LLE Strength: WFL    Functional Mobility:  · Bed Mobility:     · Supine to Sit: contact guard assistance with cues for log roll  · Transfers:     · Sit to Stand:  contact guard assistance with rolling walker  · Bed to Chair: contact guard assistance with  rolling walker  using  Step Transfer  · Gait: ambulates w/ RW and CGA to bathroom. Ambulates w/ RW and  feet. No LOB, no buckling, slightly unsteady intermittently  · Balance: stands w/ RW w/ CGA; sits EOB w/ mod(I)      Therapeutic Activities and Exercises:   Patient educated on spinal prcautions and can name 3/3 after instruction.  Patient educated on PT POC, gait and trf tech, spinal precautions  AM-PAC 6 CLICK MOBILITY  Total Score:18     Patient left up in chair with all lines intact, call button in reach, brother present and camera called to confirm visulatization.    GOALS:   Multidisciplinary Problems     Physical Therapy Goals        Problem: Physical Therapy Goal    Goal Priority Disciplines Outcome Goal Variances Interventions   Physical Therapy Goal     PT, PT/OT      Description:  Goals to be met by: 11/14/19     Patient will increase functional independence with mobility  by performin. Supine to sit with Modified [place]  2. Sit to supine with Modified [place]  3. Sit to stand transfer with Supervision  4. Bed to chair transfer with Supervision using Rolling Walker  5. Gait  x 150 feet with Stand-by Assistance using Rolling Walker.   6. Ascend/Descend 6 inch curb step with Contact Guard Assistance using Rolling Walker.  7. Lower extremity exercise program x15 reps  with assistance as needed                      History:     Past Medical History:   Diagnosis Date    Arthritis     Cataract     Colon polyp     Diabetes mellitus     GERD (gastroesophageal reflux disease)     Hyperlipidemia     Hypertension     Hypogonadism male     Obesity     Prolactinoma        Past Surgical History:   Procedure Laterality Date    CAROTID ENDARTERECTOMY Left     FOOT SURGERY  14    right    TRANSPHENOIDAL PITUITARY RESECTION      pituitary tumor       Time Tracking:     PT Received On: 10/30/19  PT Start Time: 929     PT Stop Time: 1002  PT Total Time (min): 33 min     Billable Minutes: Evaluation 10 and Gait Training 10      Rosalba Marks, PT  10/30/2019

## 2019-10-30 NOTE — PT/OT/SLP EVAL
Occupational Therapy   Evaluation    Name: Isma Martin  MRN: [id number]  Admitting Diagnosis:  Cervical myelopathy 1 Day Post-Op    Recommendations:     Discharge Recommendations: home with home health, home health PT  Discharge Equipment Recommendations:  walker, rolling  Barriers to discharge:       Assessment:     Isma Martin is a 69 y.o. male with a medical diagnosis of Cervical myelopathy.  He presents with the following. Performance deficits affecting function: weakness, impaired endurance, impaired self care skills, gait instability, impaired functional mobilty, decreased upper extremity function, decreased lower extremity function, decreased safety awareness, pain.    OT Acute eval complete. Goals established. Pt. Is CGA in bed mobility, t/f's, and ambulation due to decreased balance. Pt required Supervision in toileting and CGA in toilet transfer and grooming due to decreased balance. Pt ambulated ~ 120ft CGA using RW to increase activity tolerance required for functional mobility and adls. Continue OT POC.      Rehab Prognosis: Good; patient would benefit from acute skilled OT services to address these deficits and reach maximum level of function.       Plan:     Patient to be seen 4 x/week to address the above listed problems via self-care/home management, therapeutic activities, therapeutic exercises  · Plan of Care Expires:    · Plan of Care Reviewed with: patient, sibling    Subjective     Chief Complaint: pain  Patient/Family Comments/goals: to go home    Occupational Profile:  Living Environment: Pt lives with spouse in [facility] [address]  Previous level of function: Independent  Roles and Routines: still drives  Equipment Used at Home:  none  Assistance upon Discharge: Pt will not have assistance upon d/c. Wife is handicapped and unable to help    Pain/Comfort:  · Pain Rating 1: 8/10  · Location 1: neck  · Pain Addressed 1: Pre-medicate for activity, Reposition, Distraction    Patients cultural,  spiritual, Evangelical conflicts given the current situation:      Objective:     Communicated with: RN prior to session.  Patient found supine with hemovac, SCD, cervical collar upon OT entry to room.    General Precautions: Standard, fall   Orthopedic Precautions:spinal precautions   Braces: Aspen collar     Occupational Performance:    Bed Mobility:    · Patient completed Supine to Sit with contact guard assistance    Functional Mobility/Transfers:  · Patient completed Sit <> Stand Transfer with contact guard assistance  with  rolling walker   · Patient completed Bed <> Chair Transfer using Stand Pivot technique with contact guard assistance with rolling walker  · Patient completed Toilet Transfer Stand Pivot technique with contact guard assistance with  rolling walker  Functional Mobility:  Pt. Is CGA in bed mobility, t/f's, and ambulation due to decreased balance. Pt required Supervision in toileting and CGA in toilet transfer and grooming due to decreased balance. Pt ambulated ~ 120ft CGA using RW to increase activity tolerance required for functional mobility and adls.     Activities of Daily Living:  · Grooming: contact guard assistance standing at sink  · Toileting: supervision toilet    Cognitive/Visual Perceptual:  Cognitive/Psychosocial Skills:     -       Oriented to: Person, Place, Time and Situation   -       Safety awareness/insight to disability: intact     Physical Exam: - Pt. Has left shoulder fx - will have surgery once neck is healed  Upper Extremity Range of Motion:     -       Right Upper Extremity: WFL  -       Left Upper Extremity: not tested due to pain  Upper Extremity Strength:    -       Right Upper Extremity: WFL  -       Left Upper Extremity: not tested due to pain   Strength:    -       Right Upper Extremity: WFL  -       Left Upper Extremity: WFL    AMPAC 6 Click ADL:  AMPAC Total Score: 20    Treatment & Education:  - OT/POC-  - Importance of mobility to maximize recovery.  - Log  rolling for bed mobility  - safety w/ functional mobility; hand placement to ensure safe transfers to various surfaces in prep for ADLs  - Reviewed gait sequence and RW management via verbalization and demonstration       Education:    Patient left up in chair with all lines intact, call button in reach, RN notified and brother present    GOALS:   Multidisciplinary Problems     Occupational Therapy Goals        Problem: Occupational Therapy Goal    Goal Priority Disciplines Outcome Interventions   Occupational Therapy Goal     OT, PT/OT Ongoing, Progressing    Description:  Goals to be met by: 11/30/19     Patient will increase functional independence with ADLs by performing:    UE Dressing with Modified [place].  LE Dressing with Modified [place].  Grooming while standing with Modified [place].  Toilet transfer to toilet with Modified [place].                      History:     Past Medical History:   Diagnosis Date    Arthritis     Cataract     Colon polyp     Diabetes mellitus     GERD (gastroesophageal reflux disease)     Hyperlipidemia     Hypertension     Hypogonadism male     Obesity     Prolactinoma        Past Surgical History:   Procedure Laterality Date    CAROTID ENDARTERECTOMY Left     FOOT SURGERY  4-23-14    right    TRANSPHENOIDAL PITUITARY RESECTION      pituitary tumor       Time Tracking:     OT Date of Treatment: 10/30/19  OT Start Time: 0929  OT Stop Time: 1002  OT Total Time (min): 33 min    Billable Minutes:Evaluation 10  Self Care/Home Management 23    Lakesha Clayton OT  10/30/2019

## 2019-10-30 NOTE — PROGRESS NOTES
[facility]  Orthopedics  Progress Note    Patient Name: Isma Martin  MRN: [id number]  Admission Date: 10/29/2019  Hospital Length of Stay: 1 days  Attending Provider: Ariel Lamar MD  Primary Care Provider: Anthony Tay MD  Follow-up For: Procedure(s) (LRB):  LAMINECTOMY, SPINE, CERVICAL, WITH FUSION C3-6 Depuy SNS: Motors/SSEP [place] + Pads (N/A)    Post-Operative Day: 1 Day Post-Op  Subjective:     Principal Problem:Cervical myelopathy    Principal Orthopedic Problem:  Same    Interval History:  Patient seen and examined at bedside.  Patient had periods of disorientation after surgery last night.  This morning is more oriented.  His pain is controlled.    Review of patient's allergies indicates:  No Known Allergies    Current Facility-Administered Medications   Medication    0.9%  NaCl infusion    0.9%  NaCl infusion    0.9%  NaCl infusion    acetaminophen tablet 500 mg    atorvastatin tablet 40 mg    cabergoline tablet 0.5 mg    ceFAZolin injection 2 g    celecoxib capsule 200 mg    famotidine tablet 20 mg    gabapentin capsule 300 mg    HYDROmorphone injection 0.2 mg    losartan-hydrochlorothiazide 50-12.5 mg per tablet 1 tablet    methocarbamol tablet 750 mg    metoprolol succinate (TOPROL-XL) 24 hr tablet 25 mg    ondansetron injection 8 mg    oxyCODONE immediate release tablet 10 mg    oxyCODONE immediate release tablet 15 mg    oxyCODONE immediate release tablet 5 mg    polyethylene glycol packet 17 g    promethazine (PHENERGAN) 6.25 mg in dextrose 5 % 50 mL IVPB    senna-docusate 8.6-50 mg per tablet 1 tablet    sodium chloride 0.9% flush 2 mL     Objective:     Vital Signs (Most Recent):  Temp: 97.5 °F (36.4 °C) (10/30/19 0457)  Pulse: (!) 52 (10/30/19 0823)  Resp: 18 (10/30/19 0823)  BP: (!) 140/7 (10/30/19 0823)  SpO2: 98 % (10/30/19 0823) Vital Signs (24h Range):  Temp:  [97 °F (36.1 °C)-97.9 °F (36.6 °C)] 97.5 °F (36.4 °C)  Pulse:  [52-80]  "52  Resp:  [11-20] 18  SpO2:  [95 %-100 %] 98 %  BP: ()/(7-87) 140/7     Weight: 74.8 kg (165 lb)  Height: 5' 5" (165.1 cm)  Body mass index is 27.46 kg/m².      Intake/Output Summary (Last 24 hours) at 10/30/2019 0825  Last data filed at 10/30/2019 0500  Gross per 24 hour   Intake 4220 ml   Output 780 ml   Net 3440 ml       Ortho/SPM Exam   Surgical dressing clean, dry, and intact  C-collar in place  SILT throughout  Motor intact throughout    Significant Labs: All pertinent labs within the past 24 hours have been reviewed.    Significant Imaging: I have reviewed all pertinent imaging results/findings.    Assessment/Plan:     * Cervical myelopathy  Isma Martin is a 69 y.o. male s/p C3-6 PSF on 10/29/19  - surgical dressing clean, dry, and intact  - C-collar in place at all times  - PT/OT daily, WBAT  - antibiotics:  Postop Ancef  - pain control multimodal  - drain:  130 cc    Will continue monitor drain output and obtain x-rays when strains or pull.  Will follow-up PT recommendations for placement.          Epifanio Stuart MD  Orthopedics  [facility]  "

## 2019-10-30 NOTE — SUBJECTIVE & OBJECTIVE
"Principal Problem:Cervical myelopathy    Principal Orthopedic Problem:  Same    Interval History:  Patient seen and examined at bedside.  Patient had periods of disorientation after surgery last night.  This morning is more oriented.  His pain is controlled.    Review of patient's allergies indicates:  No Known Allergies    Current Facility-Administered Medications   Medication    0.9%  NaCl infusion    0.9%  NaCl infusion    0.9%  NaCl infusion    acetaminophen tablet 500 mg    atorvastatin tablet 40 mg    cabergoline tablet 0.5 mg    ceFAZolin injection 2 g    celecoxib capsule 200 mg    famotidine tablet 20 mg    gabapentin capsule 300 mg    HYDROmorphone injection 0.2 mg    losartan-hydrochlorothiazide 50-12.5 mg per tablet 1 tablet    methocarbamol tablet 750 mg    metoprolol succinate (TOPROL-XL) 24 hr tablet 25 mg    ondansetron injection 8 mg    oxyCODONE immediate release tablet 10 mg    oxyCODONE immediate release tablet 15 mg    oxyCODONE immediate release tablet 5 mg    polyethylene glycol packet 17 g    promethazine (PHENERGAN) 6.25 mg in dextrose 5 % 50 mL IVPB    senna-docusate 8.6-50 mg per tablet 1 tablet    sodium chloride 0.9% flush 2 mL     Objective:     Vital Signs (Most Recent):  Temp: 97.5 °F (36.4 °C) (10/30/19 0457)  Pulse: (!) 52 (10/30/19 0823)  Resp: 18 (10/30/19 0823)  BP: (!) 140/7 (10/30/19 0823)  SpO2: 98 % (10/30/19 0823) Vital Signs (24h Range):  Temp:  [97 °F (36.1 °C)-97.9 °F (36.6 °C)] 97.5 °F (36.4 °C)  Pulse:  [52-80] 52  Resp:  [11-20] 18  SpO2:  [95 %-100 %] 98 %  BP: ()/(7-87) 140/7     Weight: 74.8 kg (165 lb)  Height: 5' 5" (165.1 cm)  Body mass index is 27.46 kg/m².      Intake/Output Summary (Last 24 hours) at 10/30/2019 0825  Last data filed at 10/30/2019 0500  Gross per 24 hour   Intake 4220 ml   Output 780 ml   Net 3440 ml       Ortho/SPM Exam   Surgical dressing clean, dry, and intact  C-collar in place  SILT throughout  Motor intact " throughout    Significant Labs: All pertinent labs within the past 24 hours have been reviewed.    Significant Imaging: I have reviewed all pertinent imaging results/findings.

## 2019-10-30 NOTE — PLAN OF CARE
OT Acute eval complete. Goals established. Pt. Is CGA in bed mobility, t/f's, and ambulation due to decreased balance. Pt required Supervision in toileting and CGA in toilet transfer and grooming due to decreased balance. Pt ambulated ~ 120ft CGA using RW to increase activity tolerance required for functional mobility and adls. Continue OT POC.    Problem: Occupational Therapy Goal  Goal: Occupational Therapy Goal  Description  Goals to be met by: 11/30/19     Patient will increase functional independence with ADLs by performing:    UE Dressing with Modified [place].  LE Dressing with Modified [place].  Grooming while standing with Modified [place].  Toilet transfer to toilet with Modified [place].     Outcome: Ongoing, Progressing

## 2019-10-30 NOTE — PLAN OF CARE
Quiet hours. More oriented this am. Pain controlled with current meds. C collar remains intact. Hemovac drain intact with bloody drainage. [place] cath dc'd. VSS. Safety maintained.

## 2019-10-30 NOTE — PLAN OF CARE
Report from Felisa KRUSE. Noted patient in bed awake, alert, and oriented. Denies pain and dyspnea. Bed in lowest position and call light within reach. AVASYS at bedside. Board updated and patient encouraged to notify staff when he needs assistance. Denies questions. Assistance per flow sheets. Will continue to monitor  Problem: Adult Inpatient Plan of Care  Goal: Plan of Care Review  Outcome: Ongoing, Progressing     Problem: Adult Inpatient Plan of Care  Goal: Absence of Hospital-Acquired Illness or Injury  Outcome: Ongoing, Progressing     Problem: Fall Injury Risk  Goal: Absence of Fall and Fall-Related Injury  Outcome: Ongoing, Progressing

## 2019-10-31 PROCEDURE — 97116 GAIT TRAINING THERAPY: CPT

## 2019-10-31 PROCEDURE — 25000003 PHARM REV CODE 250: Performed by: STUDENT IN AN ORGANIZED HEALTH CARE EDUCATION/TRAINING PROGRAM

## 2019-10-31 PROCEDURE — 97535 SELF CARE MNGMENT TRAINING: CPT

## 2019-10-31 PROCEDURE — 12000002 HC ACUTE/MED SURGE SEMI-PRIVATE ROOM

## 2019-10-31 RX ADMIN — SENNOSIDES AND DOCUSATE SODIUM 1 TABLET: 8.6; 5 TABLET ORAL at 08:10

## 2019-10-31 RX ADMIN — METOPROLOL SUCCINATE 25 MG: 25 TABLET, EXTENDED RELEASE ORAL at 08:10

## 2019-10-31 RX ADMIN — ACETAMINOPHEN 500 MG: 500 TABLET ORAL at 09:10

## 2019-10-31 RX ADMIN — SENNOSIDES AND DOCUSATE SODIUM 1 TABLET: 8.6; 5 TABLET ORAL at 09:10

## 2019-10-31 RX ADMIN — METHOCARBAMOL TABLETS 750 MG: 750 TABLET, COATED ORAL at 08:10

## 2019-10-31 RX ADMIN — ACETAMINOPHEN 500 MG: 500 TABLET ORAL at 01:10

## 2019-10-31 RX ADMIN — FAMOTIDINE 20 MG: 20 TABLET ORAL at 08:10

## 2019-10-31 RX ADMIN — ATORVASTATIN CALCIUM 40 MG: 20 TABLET, FILM COATED ORAL at 08:10

## 2019-10-31 RX ADMIN — GABAPENTIN 300 MG: 300 CAPSULE ORAL at 03:10

## 2019-10-31 RX ADMIN — ACETAMINOPHEN 500 MG: 500 TABLET ORAL at 06:10

## 2019-10-31 RX ADMIN — POLYETHYLENE GLYCOL 3350 17 G: 17 POWDER, FOR SOLUTION ORAL at 08:10

## 2019-10-31 RX ADMIN — OXYCODONE HYDROCHLORIDE 15 MG: 10 TABLET ORAL at 09:10

## 2019-10-31 RX ADMIN — GABAPENTIN 300 MG: 300 CAPSULE ORAL at 08:10

## 2019-10-31 RX ADMIN — OXYCODONE HYDROCHLORIDE 10 MG: 10 TABLET ORAL at 05:10

## 2019-10-31 RX ADMIN — CELECOXIB 200 MG: 200 CAPSULE ORAL at 08:10

## 2019-10-31 RX ADMIN — LOSARTAN POTASSIUM AND HYDROCHLOROTHIAZIDE 1 TABLET: 50; 12.5 TABLET, FILM COATED ORAL at 08:10

## 2019-10-31 RX ADMIN — OXYCODONE HYDROCHLORIDE 15 MG: 10 TABLET ORAL at 06:10

## 2019-10-31 RX ADMIN — GABAPENTIN 300 MG: 300 CAPSULE ORAL at 09:10

## 2019-10-31 NOTE — PLAN OF CARE
Quiet hours. Pain controlled with current meds. C collar remains intact. Ambulating well with standby assist. VSS. Hemovac drain remains intact with bloody drainage. Safety maintained..

## 2019-10-31 NOTE — PT/OT/SLP PROGRESS
Occupational Therapy   Treatment    Name: Isma Martin  MRN: [id number]  Admitting Diagnosis:  Cervical myelopathy  2 Days Post-Op    Recommendations:     Discharge Recommendations: home with home health, home health PT  Discharge Equipment Recommendations:  walker, rolling  Barriers to discharge:  Decreased caregiver support    Assessment:     Isma Martin is a 69 y.o. male with a medical diagnosis of Cervical myelopathy.  He presents with the following Performance deficits affecting function are weakness, impaired endurance, impaired self care skills, gait instability, impaired functional mobilty, impaired balance, decreased lower extremity function, decreased upper extremity function, decreased safety awareness, pain.     Pt motivated for therapy and tolerated session well. Pt. Required SBA-CGA in t/f's, ambulation, and adls due to decreased balance and pain. Pt ambulated ~ 20 ft CGA with RW to in-room bathroom to complete adls. Pt with c/o of numbness in B hands and neck pain. Continue OT POC.    Rehab Prognosis:  Good; patient would benefit from acute skilled OT services to address these deficits and reach maximum level of function.       Plan:     Patient to be seen 4 x/week to address the above listed problems via self-care/home management, therapeutic activities, therapeutic exercises  · Plan of Care Expires:    · Plan of Care Reviewed with: patient    Subjective     Pain/Comfort:  · Pain Rating 1: 8/10  · Location - Side 1: Left  · Location 1: neck  · Pain Addressed 1: Pre-medicate for activity, Reposition, Distraction  · Pain Rating Post-Intervention 1: 6/10    Objective:     Communicated with: RN prior to session.  Patient found supine with hemovac, cervical collar upon OT entry to room.    General Precautions: Standard, fall   Orthopedic Precautions:spinal precautions   Braces: Cervical collar     Occupational Performance:     Bed Mobility:    · Patient completed Supine to Sit with stand by  assistance     Functional Mobility/Transfers:  · Patient completed Sit <> Stand Transfer with contact guard assistance  with  rolling walker   · Patient completed Bed <> Chair Transfer using Stand Pivot technique with contact guard assistance with rolling walker  · Patient completed Toilet Transfer Stand Pivot technique with stand by assistance with  rolling walker  · Functional Mobility:  Pt. Required SBA-CGA in t/f's, ambulation, and adls due to decreased balance and pain. Pt ambulated ~ 20 ft CGA with RW to in-room bathroom to complete adls. Pt with c/o of numbness in B hands and neck pain.     Activities of Daily Living:  · Grooming: stand by assistance standing at sink  · Toileting: stand by assistance standing at toilet      [facility] 6 Click ADL: 21    Treatment & Education:  - OT/POC-  - Importance of mobility to maximize recovery.  - Educated pt. On spinal precautions - Pt. Verbalized understanding.  - safety w/ functional mobility; hand placement to ensure safe transfers to various surfaces in prep for ADLs  - Reviewed gait sequence and RW management via verbalization and demonstration   - encouraged to ambulate within household environment at least every hour to hour 1/2      Patient left up in chair with all lines intact, call button in reach, RN notified and brother presentEducation:      GOALS:   Multidisciplinary Problems     Occupational Therapy Goals        Problem: Occupational Therapy Goal    Goal Priority Disciplines Outcome Interventions   Occupational Therapy Goal     OT, PT/OT Ongoing, Progressing    Description:  Goals to be met by: 11/30/19     Patient will increase functional independence with ADLs by performing:    UE Dressing with Modified [place].  LE Dressing with Modified [place].  Grooming while standing with Modified [place].  Toilet transfer to toilet with Modified [place].                      Time Tracking:     OT Date of Treatment: 10/31/19  OT Start Time:  0733  OT Stop Time: 0753  OT Total Time (min): 20 min    Billable Minutes:Self Care/Home Management 20    Lakesha Clayton OT  10/31/2019

## 2019-10-31 NOTE — ASSESSMENT & PLAN NOTE
Isma Martin is a 69 y.o. male s/p C3-6 PSF on 10/29/19  - surgical dressing clean, dry, and intact  - C-collar in place at all times  - PT/OT daily, WBAT  - antibiotics:  Postop Ancef  - pain control multimodal  - drain:  200 cc    Will continue monitor drain output and obtain x-rays when strains or pull.  Therapy recommending home w/ HH upon discharge

## 2019-10-31 NOTE — PROGRESS NOTES
[facility]  Orthopedics  Progress Note    Patient Name: Isma Martin  MRN: [id number]  Admission Date: 10/29/2019  Hospital Length of Stay: 2 days  Attending Provider: Ariel Lamar MD  Primary Care Provider: Anthony Tay MD  Follow-up For: Procedure(s) (LRB):  LAMINECTOMY, SPINE, CERVICAL, WITH FUSION C3-6 Depuy SNS: Motors/SSEP [place] + Pads (N/A)    Post-Operative Day: 2 Days Post-Op  Subjective:     Principal Problem:Cervical myelopathy    Principal Orthopedic Problem:  Same    Interval History:  Patient seen and examined at bedside.  His pain is controlled. Drain output remains high. To continue to work with PT. C-collar in place.     Review of patient's allergies indicates:  No Known Allergies    Current Facility-Administered Medications   Medication    0.9%  NaCl infusion    0.9%  NaCl infusion    acetaminophen tablet 500 mg    atorvastatin tablet 40 mg    cabergoline tablet 0.5 mg    celecoxib capsule 200 mg    famotidine tablet 20 mg    gabapentin capsule 300 mg    HYDROmorphone injection 0.2 mg    losartan-hydrochlorothiazide 50-12.5 mg per tablet 1 tablet    methocarbamol tablet 750 mg    metoprolol succinate (TOPROL-XL) 24 hr tablet 25 mg    ondansetron injection 8 mg    oxyCODONE immediate release tablet 10 mg    oxyCODONE immediate release tablet 15 mg    oxyCODONE immediate release tablet 5 mg    polyethylene glycol packet 17 g    promethazine (PHENERGAN) 6.25 mg in dextrose 5 % 50 mL IVPB    senna-docusate 8.6-50 mg per tablet 1 tablet    sodium chloride 0.9% flush 2 mL     Objective:     Vital Signs (Most Recent):  Temp: 96.5 °F (35.8 °C) (10/31/19 0503)  Pulse: 60 (10/31/19 0503)  Resp: 18 (10/31/19 0503)  BP: 137/65 (10/31/19 0503)  SpO2: 99 % (10/31/19 0503) Vital Signs (24h Range):  Temp:  [96.5 °F (35.8 °C)-98.4 °F (36.9 °C)] 96.5 °F (35.8 °C)  Pulse:  [52-68] 60  Resp:  [18] 18  SpO2:  [95 %-99 %] 99 %  BP: (118-142)/(7-65) 137/65  "    Weight: 74.8 kg (165 lb)  Height: 5' 5" (165.1 cm)  Body mass index is 27.46 kg/m².      Intake/Output Summary (Last 24 hours) at 10/31/2019 0813  Last data filed at 10/31/2019 0600  Gross per 24 hour   Intake --   Output 200 ml   Net -200 ml       Ortho/SPM Exam     Surgical dressing clean, dry, and intact  C-collar in place  SILT throughout  Motor intact throughout    Significant Labs: All pertinent labs within the past 24 hours have been reviewed.    Significant Imaging: I have reviewed all pertinent imaging results/findings.    Assessment/Plan:     * Cervical myelopathy  Isma Martin is a 69 y.o. male s/p C3-6 PSF on 10/29/19  - surgical dressing clean, dry, and intact  - C-collar in place at all times  - PT/OT daily, WBAT  - antibiotics:  Postop Ancef  - pain control multimodal  - drain:  200 cc    Will continue monitor drain output and obtain x-rays when strains or pull.  Therapy recommending home w/ HH upon discharge          Epifanio Stuart MD  Orthopedics  [facility]  "

## 2019-10-31 NOTE — PLAN OF CARE
Pt verbalized understanding plan of care. Frequent assessments done and fall precautions maintained. Pain and discomfort controlled. Will continue to monitor.

## 2019-10-31 NOTE — PLAN OF CARE
Pt motivated for therapy and tolerated session well. Pt. Required SBA-CGA in t/f's, ambulation, and adls due to decreased balance and pain. Pt ambulated ~ 20 ft CGA with RW to in-room bathroom to complete adls. Pt with c/o of numbness in B hands and neck pain. Continue OT POC.      Problem: Occupational Therapy Goal  Goal: Occupational Therapy Goal  Description  Goals to be met by: 11/30/19     Patient will increase functional independence with ADLs by performing:    UE Dressing with Modified [place].  LE Dressing with Modified [place].  Grooming while standing with Modified [place].  Toilet transfer to toilet with Modified [place].     Outcome: Ongoing, Progressing

## 2019-10-31 NOTE — PT/OT/SLP PROGRESS
Physical Therapy Treatment    Patient Name:  Isma Martin   MRN:  [id number]    Recommendations:     Discharge Recommendations:  home health PT   Discharge Equipment Recommendations: none   Barriers to discharge: Decreased caregiver support    Assessment:     Isma Martin is a 69 y.o. male admitted with a medical diagnosis of Cervical myelopathy.  He presents with the following impairments/functional limitations:  weakness, impaired endurance, impaired functional mobilty, gait instability, impaired cognition. Pt mandy treatment better today being able to perform transfers and gait training with CGA with no AD. Pt would benefit from skilled PT services to improve physical function. Pt is s/p cervical laminectomy and fusion C3-6 (10/29).    Rehab Prognosis: Good; patient would benefit from acute skilled PT services to address these deficits and reach maximum level of function.    Recent Surgery: Procedure(s) (LRB):  LAMINECTOMY, SPINE, CERVICAL, WITH FUSION C3-6 Depuy SNS: Motors/SSEP [place] + Pads (N/A) 2 Days Post-Op    Plan:     During this hospitalization, patient to be seen 4 x/week to address the identified rehab impairments via gait training, therapeutic activities, therapeutic exercises, wheelchair management/training and progress toward the following goals:    · Plan of Care Expires:  11/29/19    Subjective     Chief Complaint: pt c/o pain  Patient/Family Comments/goals: to get better and go home  Pain/Comfort:  Pain Rating 1: 5/10  Location - Orientation 1: posterior  Location 1: neck  Pain Rating Post-Intervention 1: 5/10      Objective:     Communicated with nurse prior to session.  Patient found up in chair with aspen collar and (heplock IV) upon PT entry to room.     General Precautions: Standard, fall   Orthopedic Precautions:spinal precautions   Braces: Aspen collar     Functional Mobility:  · Bed Mobility:   Not performed d/t pt already sitting up in chair  ·   · Transfers:     · Sit to  "Stand:  stand by assistance with no AD  ·   · Gait: pt mandy ~174ft of gait training with CGA and aspen brace. Pt complained of dizziness and needed VCs to maintain eyes open  ·   · Stairs:  Pt ascended/descended 6" curb step with No Assistive Device with no handrails with Contact Guard Assistance.       AM-PAC 6 CLICK MOBILITY  Turning over in bed (including adjusting bedclothes, sheets and blankets)?: 4  Sitting down on and standing up from a chair with arms (e.g., wheelchair, bedside commode, etc.): 3  Moving from lying on back to sitting on the side of the bed?: 3  Moving to and from a bed to a chair (including a wheelchair)?: 3  Need to walk in hospital room?: 3  Climbing 3-5 steps with a railing?: 3  Basic Mobility Total Score: 19       Therapeutic Activities and Exercises:  Pt educated on importance of OOB and safety with ambulation. Pt verbalized comprehension.    Patient left up in chair with all lines intact, call button in reach and brother and friend present..    GOALS:   Multidisciplinary Problems     Physical Therapy Goals        Problem: Physical Therapy Goal    Goal Priority Disciplines Outcome Goal Variances Interventions   Physical Therapy Goal     PT, PT/OT Ongoing, Progressing     Description:  Goals to be met by: 19     Patient will increase functional independence with mobility by performin. Supine to sit with Modified [place] - not met  2. Sit to supine with Modified [place] - not met  3. Sit to stand transfer with Supervision - not met  4. Bed to chair transfer with Supervision using Rolling Walker - not met  5. Gait  x 150 feet with Stand-by Assistance using Rolling Walker.  Revised: Gait x 150ft with CGA without AD - met 10/31/2019  6. Ascend/Descend 6 inch curb step with Contact Guard Assistance using Rolling Walker. - met 10/31/2019 (no AD)  7. Lower extremity exercise program x15 reps  with assistance as needed - not met                       Time Tracking:     PT " Received On: 10/31/19  PT Start Time: 1035     PT Stop Time: 1045  PT Total Time (min): 10 min     Billable Minutes: Gait Training 10 min    Treatment Type: Treatment  PT/PTA: PT     PTA Visit Number: 0     Michelle Tsang, [facility]  10/31/2019

## 2019-10-31 NOTE — PLAN OF CARE
Problem: Physical Therapy Goal  Goal: Physical Therapy Goal  Description  Goals to be met by: 19     Patient will increase functional independence with mobility by performin. Supine to sit with Modified [place] - not met  2. Sit to supine with Modified [place] - not met  3. Sit to stand transfer with Supervision - not met  4. Bed to chair transfer with Supervision using Rolling Walker - not met  5. Gait  x 150 feet with Stand-by Assistance using Rolling Walker.  Revised: Gait x 150ft with CGA without AD - met 10/31/2019  6. Ascend/Descend 6 inch curb step with Contact Guard Assistance using Rolling Walker. - met 10/31/2019 (no AD)  7. Lower extremity exercise program x15 reps  with assistance as needed - not met      Outcome: Ongoing, Progressing   Goals updated and appropriate. Michelle Tsang 10/31/2019

## 2019-11-01 VITALS
SYSTOLIC BLOOD PRESSURE: 187 MMHG | WEIGHT: 165 LBS | RESPIRATION RATE: 18 BRPM | HEIGHT: 65 IN | TEMPERATURE: 97 F | HEART RATE: 60 BPM | OXYGEN SATURATION: 96 % | BODY MASS INDEX: 27.49 KG/M2 | DIASTOLIC BLOOD PRESSURE: 83 MMHG

## 2019-11-01 PROCEDURE — 97116 GAIT TRAINING THERAPY: CPT

## 2019-11-01 PROCEDURE — 25000003 PHARM REV CODE 250: Performed by: STUDENT IN AN ORGANIZED HEALTH CARE EDUCATION/TRAINING PROGRAM

## 2019-11-01 RX ADMIN — GABAPENTIN 300 MG: 300 CAPSULE ORAL at 02:11

## 2019-11-01 RX ADMIN — POLYETHYLENE GLYCOL 3350 17 G: 17 POWDER, FOR SOLUTION ORAL at 08:11

## 2019-11-01 RX ADMIN — LOSARTAN POTASSIUM AND HYDROCHLOROTHIAZIDE 1 TABLET: 50; 12.5 TABLET, FILM COATED ORAL at 08:11

## 2019-11-01 RX ADMIN — ATORVASTATIN CALCIUM 40 MG: 20 TABLET, FILM COATED ORAL at 08:11

## 2019-11-01 RX ADMIN — SENNOSIDES AND DOCUSATE SODIUM 1 TABLET: 8.6; 5 TABLET ORAL at 08:11

## 2019-11-01 RX ADMIN — GABAPENTIN 300 MG: 300 CAPSULE ORAL at 08:11

## 2019-11-01 RX ADMIN — ACETAMINOPHEN 500 MG: 500 TABLET ORAL at 06:11

## 2019-11-01 RX ADMIN — OXYCODONE HYDROCHLORIDE 15 MG: 10 TABLET ORAL at 08:11

## 2019-11-01 RX ADMIN — OXYCODONE HYDROCHLORIDE 15 MG: 10 TABLET ORAL at 12:11

## 2019-11-01 RX ADMIN — FAMOTIDINE 20 MG: 20 TABLET ORAL at 08:11

## 2019-11-01 RX ADMIN — ACETAMINOPHEN 500 MG: 500 TABLET ORAL at 02:11

## 2019-11-01 RX ADMIN — CELECOXIB 200 MG: 200 CAPSULE ORAL at 08:11

## 2019-11-01 RX ADMIN — METOPROLOL SUCCINATE 25 MG: 25 TABLET, EXTENDED RELEASE ORAL at 08:11

## 2019-11-01 NOTE — PT/OT/SLP PROGRESS
Physical Therapy Treatment/Discharge    Patient Name:  Isma Martin   MRN:  [id number]    Recommendations:     Discharge Recommendations:  home health PT   Discharge Equipment Recommendations: none   Barriers to discharge: None    Assessment:     Isma Martin is a 69 y.o. male admitted with a medical diagnosis of Cervical myelopathy.  He presents with the following impairments/functional limitations:  (no rehab concerns). Pt mandy treatment better today being able to perform bed mobility and transfers Mod I as well as ambulate further distances with SBA wearing aspen collar. Pt is appropriate to discharge from PT. Pt would benefit from HHPT once medically stable. Pt is s/p C3-6 laminectomy with fusion (10/30).    Rehab Prognosis: Good; patient would benefit from acute skilled PT services to address these deficits and reach maximum level of function.    Recent Surgery: Procedure(s) (LRB):  LAMINECTOMY, SPINE, CERVICAL, WITH FUSION C3-6 Depuy SNS: Motors/SSEP [place] + Pads (N/A) 3 Days Post-Op    Plan:     During this hospitalization, patient to be seen 4 x/week to address the identified rehab impairments via gait training, therapeutic activities, therapeutic exercises, wheelchair management/training and progress toward the following goals:    · Plan of Care Expires:  11/29/19    Subjective     Chief Complaint: pt c/o pain  Patient/Family Comments/goals: to get better and go home  Pain/Comfort:  · Pain Rating 1: 5/10  · Location - Orientation 1: posterior  · Location 1: neck  · Pain Rating Post-Intervention 1: 5/10      Objective:     Communicated with nurse prior to session.  Patient found in bathroom performing ADLs independently with (heplock IV) upon PT entry to room.     General Precautions: Standard, fall   Orthopedic Precautions:spinal precautions   Braces: Aspen collar     Functional Mobility:  · Bed Mobility:   VCs for sequencing  · Supine to Sit: modified independence wearing aspen collar  · Sit  to Supine: modified independence wearing aspen collar  ·   · Transfers:     · Sit to Stand:  modified independence with no AD wearing aspen collar  ·   · Gait: pt mandy ~250ft of gait training with SBA wearing aspen collar        AM-PAC 6 CLICK MOBILITY  Turning over in bed (including adjusting bedclothes, sheets and blankets)?: 4  Sitting down on and standing up from a chair with arms (e.g., wheelchair, bedside commode, etc.): 4  Moving from lying on back to sitting on the side of the bed?: 4  Moving to and from a bed to a chair (including a wheelchair)?: 4  Need to walk in hospital room?: 3  Climbing 3-5 steps with a railing?: 3  Basic Mobility Total Score: 22       Therapeutic Activities and Exercises:  Pt educated on logroll technique for bed mobility and importance of OOB activity following surgery. Pt verbalized comprehension.    Patient left seated EOB with all lines intact, call button in reach and MD present..    GOALS:   Multidisciplinary Problems     Physical Therapy Goals     Not on file          Multidisciplinary Problems (Resolved)        Problem: Physical Therapy Goal    Goal Priority Disciplines Outcome Goal Variances Interventions   Physical Therapy Goal   (Resolved)     PT, PT/OT Met     Description:  Goals to be met by: 19     Patient will increase functional independence with mobility by performin. Supine to sit with Modified [place] - not met  2. Sit to supine with Modified [place] - not met  3. Sit to stand transfer with Supervision - not met  4. Bed to chair transfer with Supervision using Rolling Walker - not met  5. Gait  x 150 feet with Stand-by Assistance using Rolling Walker.  Revised: Gait x 150ft with CGA without AD - met 10/31/2019  6. Ascend/Descend 6 inch curb step with Contact Guard Assistance using Rolling Walker. - met 10/31/2019 (no AD)  7. Lower extremity exercise program x15 reps  with assistance as needed - not met                       Time Tracking:      PT Received On: 11/01/19  PT Start Time: 0736     PT Stop Time: 0748  PT Total Time (min): 12 min     Billable Minutes: Gait Training 12 min    Treatment Type: Treatment  PT/PTA: PT     PTA Visit Number: 0     Michelle Tsang, SPT  11/01/2019

## 2019-11-01 NOTE — PLAN OF CARE
Quiet hours. Pain controlled with current meds. Hemovac drain remains intact with minimal drainage. VSS. Safety maintained.

## 2019-11-01 NOTE — PROGRESS NOTES
"[facility]  Orthopedics  Progress Note    Patient Name: Isma Martin  MRN: [id number]  Admission Date: 10/29/2019  Hospital Length of Stay: 3 days  Attending Provider: Ariel Lamar MD  Primary Care Provider: Anthony Tay MD  Follow-up For: Procedure(s) (LRB):  LAMINECTOMY, SPINE, CERVICAL, WITH FUSION C3-6 Depuy SNS: Motors/SSEP [place] + Pads (N/A)    Post-Operative Day: 3 Days Post-Op  Subjective:     Principal Problem:Cervical myelopathy    Principal Orthopedic Problem:  Same    Interval History:  Patient seen and examined at bedside.  His pain is controlled. Walked 200 feet with PT yesterday. C-collar in place.     Review of patient's allergies indicates:  No Known Allergies    Current Facility-Administered Medications   Medication    0.9%  NaCl infusion    0.9%  NaCl infusion    acetaminophen tablet 500 mg    atorvastatin tablet 40 mg    cabergoline tablet 0.5 mg    celecoxib capsule 200 mg    famotidine tablet 20 mg    gabapentin capsule 300 mg    HYDROmorphone injection 0.2 mg    losartan-hydrochlorothiazide 50-12.5 mg per tablet 1 tablet    metoprolol succinate (TOPROL-XL) 24 hr tablet 25 mg    ondansetron injection 8 mg    oxyCODONE immediate release tablet 10 mg    oxyCODONE immediate release tablet 15 mg    oxyCODONE immediate release tablet 5 mg    polyethylene glycol packet 17 g    promethazine (PHENERGAN) 6.25 mg in dextrose 5 % 50 mL IVPB    senna-docusate 8.6-50 mg per tablet 1 tablet    sodium chloride 0.9% flush 2 mL     Objective:     Vital Signs (Most Recent):  Temp: 96.4 °F (35.8 °C) (11/01/19 0457)  Pulse: 60 (11/01/19 0457)  Resp: 18 (11/01/19 0457)  BP: 130/71 (11/01/19 0457)  SpO2: 97 % (11/01/19 0457) Vital Signs (24h Range):  Temp:  [96.4 °F (35.8 °C)-98.2 °F (36.8 °C)] 96.4 °F (35.8 °C)  Pulse:  [60-70] 60  Resp:  [18] 18  SpO2:  [95 %-100 %] 97 %  BP: (112-153)/(60-90) 130/71     Weight: 74.8 kg (165 lb)  Height: 5' 5" (165.1 cm)  Body " mass index is 27.46 kg/m².      Intake/Output Summary (Last 24 hours) at 11/1/2019 0602  Last data filed at 11/1/2019 0400  Gross per 24 hour   Intake 1744 ml   Output 45 ml   Net 1699 ml       Ortho/SPM Exam     Surgical dressing clean, dry, and intact  C-collar in place  SILT throughout  Motor intact throughout    Significant Labs: All pertinent labs within the past 24 hours have been reviewed.    Significant Imaging: I have reviewed all pertinent imaging results/findings.    Assessment/Plan:     * Cervical myelopathy  Isma Martin is a 69 y.o. male s/p C3-6 PSF on 10/29/19  - surgical dressing clean, dry, and intact  - C-collar in place at all times  - PT/OT daily, WBAT  - antibiotics:  Postop Ancef completed  - pain control multimodal  - drain: 45 cc over 24 hours, will pull today    X-rays today  Likely home with HH today    Hypertension  Home meds          Abad Cruz MD  Orthopedics  [facility]

## 2019-11-01 NOTE — DISCHARGE SUMMARY
[facility]  Orthopedics  Discharge Summary      Patient Name: Isma Martin  MRN: [id number]  Admission Date: 10/29/2019  Hospital Length of Stay: 3 days  Discharge Date and Time:  11/01/2019  Attending Physician: Ariel Lamar MD  Discharging Provider: Abad Cruz MD  Primary Care Provider: Anthony Tay MD    HPI:   Isma Martin is a 69 y.o. male who returns to me today for MRI results.  He was last seen by me 9/3/2019.  Today he is doing well but continues to have constant pain in his left arm that radiates from his neck down to his hand with tingling and numbness in his fingers.  He has not improved with nsaids, steroids and muscle relaxants.  His injury occurred 7/25/2019 when he fell from a 4 foot ladder and landed on his left arm.  He does report significant weakness in his left arm and hand.  His pain is worse when laying down at night.  He also has significant pain and weakness with shoulder activity such as reaching or lifting.  He denies problems with balance.       The Patient reports myelopathic symptoms such as difficulty with buttons/coins/keys. Denies perineal paresthesias, bowel/bladder dysfunction.    Procedure(s) (LRB):  LAMINECTOMY, SPINE, CERVICAL, WITH FUSION C3-6 Depuy SNS: Motors/SSEP [place] + Pads (N/A)      Hospital Course:  On 10/29/19, the patient arrived to the [facility] for proper pre-operative management.  Upon completion of pre-operative preparation, the patient was taken back to the operative theatre.  A C3-6 laminectomy and PSF was performed without complication and the patient was transported to the post anesthesia care unit in stable condition.  After appropriate recovery from the anaesthetic agents used during the surgery, the patient was then transported to the hospital inpatient floor.  The interim of the hospital stay from arrival on the floor up to discharge has been uncomplicated. The patient has tolerated  regular diet with no nausea or vomiting.  The patient's pain has been controlled using a multimodal approach. Currently, the patient's pain is well controlled on an oral regimen.  The patient has been voiding without difficulty.  The patient began participation in physical therapy after surgery and has progressed throughout the entire hospital stay.  Currently, the physical therapy team feels that the patient's progress is sufficient to allow the patient to be discharged to home safely.  The patient agrees with this assessment and desires a discharge today.          Significant Diagnostic Studies: Labs: BMP: No results for input(s): GLU, NA, K, CL, CO2, BUN, CREATININE, CALCIUM, MG in the last 48 hours. and CBC No results for input(s): WBC, HGB, HCT, PLT in the last 48 hours.  Radiology: X-Ray: cervical    Pending Diagnostic Studies:     None        Final Active Diagnoses:    Diagnosis Date Noted POA    PRINCIPAL PROBLEM:  Cervical myelopathy [G95.9] 10/02/2019 Yes    Hypertension [I10] 08/01/2012 Yes      Problems Resolved During this Admission:      Discharged Condition: good    Disposition: Home or Self Care    Follow Up:  Follow-up Information     [facility].    Specialties:  DME Provider, Home Health Services  Why:  Home Health  Contact information:  [address] [facility]  [place] [id number] [phone]                 Patient Instructions:      Call MD for:  temperature >100.4     Call MD for:  persistent nausea and vomiting or diarrhea     Call MD for:  redness, tenderness, or signs of infection (pain, swelling, redness, odor or green/yellow discharge around incision site)     Call MD for:  difficulty breathing or increased cough     Call MD for:  severe persistent headache     Call MD for:  worsening rash     Call MD for:  persistent dizziness, light-headedness, or visual disturbances     Call MD for:  increased confusion or weakness     Leave dressing on - Keep it clean, dry, and  intact until clinic visit     Other restrictions (specify):   Order Comments: DR. FÉLIX KERR'S POSTOPERATIVE INSTRUCTIONS -   POSTERIOR CERVICAL LAMINECTOMY AND FUSION       Antibiotics: You do not need additional antibiotics at home.    NSAIDs: Please refrain from taking ibuprofen (Advil), naproxen (Aleve), and other non steroidal anti-inflammatory medications as they may inhibit bone healing in the postoperative period.    Wound Care: You may remove your dressing and shower 7 days after surgery. Until then please keep your wound clean and dry. Sponge baths are acceptable. Do not go in a pool or hot tub until seen in clinic. Please leave the small steri-strips covering your wound in place until they fall off naturally (2 weeks). You may notice clear suture ends hanging from the sides of your incision after the steri-strips are removed, it is ok to clip these with scissors.    Cervical Collar: If given a collar after surgery you should wear it at all times. The only times it may be removed are for sleeping, eating and showering.    Pain: You will be given a prescription for pain medicine before discharge. If your pain is not adequately controlled with these medications, please call [phone]. Your pain medicine will be gradually decreased over the following 8 weeks.  Per our department policy, we will only provide pain medication for 2 months after your date of surgery.  If you require additional pain medication after this date, you will need to schedule an appointment with pain management or your PCP to provide future prescriptions.  A copy of the medication policy is attached.      Difficulty Breathing: This is uncommon after surgery and may represent dangerous swelling in your neck. If you experience difficulty breathing please call [phone] immediately.    Infection: Signs of infection include increasing wound drainage and redness around the wound, as well as a temperature over 101.5 degrees. It is  unnecessary to take your temperature on a routine basis. Please call the above number if you are concerned about an infection.    Driving and Work: It is ok to return to driving and work as long as you are not taking narcotic pain medications or wearing your cervical collar. Please do not lift over 5 pounds or participate in exercise or sports until cleared by Dr. Lamar.    Deep Venous Thrombosis (Blood Clots): Symptoms include swelling in the legs and shortness of breath. Please call the office or proceed to the nearest emergency room if you have any of these symptoms.    Physical Therapy: The best physical therapy immediately after surgery is walking. Please try to walk as much as possible.    Follow-up: You will be scheduled for a follow-up appointment in 2-3 weeks with either Dr. Lamar or his physician assistant, Kelsey Panda PA-C.    Questions: During business hours please call [phone] or [phone] for routine questions. For after hours questions please call [phone] and ask to speak with the Orthopaedic resident on call.    Disability: If you submitted short term disability paperwork for us to complete and would like to check the status, please call the Disability Department at [phone].  You may fax any necessary paperwork to [phone].     Medications:  Reconciled Home Medications:      Medication List      START taking these medications    acetaminophen 650 MG Tbsr  Commonly known as:  TYLENOL  Take 1 tablet (650 mg total) by mouth every 6 (six) hours as needed (pain).     celecoxib 200 MG capsule  Commonly known as:  CeleBREX  Take 1 capsule (200 mg total) by mouth once daily.     gabapentin 100 MG capsule  Commonly known as:  NEURONTIN  Take 1 capsule (100 mg total) by mouth 3 (three) times daily.     methocarbamol 500 MG Tab  Commonly known as:  ROBAXIN  Take 2 tablets (1,000 mg total) by mouth 3 (three) times daily.     oxyCODONE 5 MG immediate release  tablet  Commonly known as:  ROXICODONE  Take 1 tablet (5 mg total) by mouth every 4 (four) hours as needed for Pain.        CONTINUE taking these medications    ascorbic acid (vitamin C) 500 MG tablet  Commonly known as:  VITAMIN C  Take 500 mg by mouth Every PM.     atorvastatin 40 MG tablet  Commonly known as:  LIPITOR  Take 1 tablet (40 mg total) by mouth once daily.     cabergoline 0.5 mg tablet  Commonly known as:  DOSTINEX  Take 0.5 tablets (0.25 mg total) by mouth twice a week.     cholecalciferol (vitamin D3) 2,000 unit Tab  Commonly known as:  VITAMIN D3  Take 1 tablet by mouth Every PM.     FISH OIL 1,000 mg Cap  Generic drug:  omega-3 fatty acids-vitamin E     losartan-hydrochlorothiazide 50-12.5 mg 50-12.5 mg per tablet  Commonly known as:  HYZAAR  Take 1 tablet by mouth once daily.     metoprolol succinate 25 MG 24 hr tablet  Commonly known as:  TOPROL-XL  Take 1 tablet (25 mg total) by mouth once daily.     omeprazole 20 MG capsule  Commonly known as:  PRILOSEC  Take 1 capsule (20 mg total) by mouth once daily.     VITAMIN B-12 1000 MCG tablet  Generic drug:  cyanocobalamin     VITAMIN E ORAL  Take by mouth.        STOP taking these medications    HYDROcodone-acetaminophen 5-325 mg per tablet  Commonly known as:  NORCO     oxyCODONE-acetaminophen 5-325 mg per tablet  Commonly known as:  PERCOCET            Abad Cruz MD  Orthopedics  [facility]

## 2019-11-01 NOTE — SUBJECTIVE & OBJECTIVE
"Principal Problem:Cervical myelopathy    Principal Orthopedic Problem:  Same    Interval History:  Patient seen and examined at bedside.  His pain is controlled. Walked 200 feet with PT yesterday. C-collar in place.     Review of patient's allergies indicates:  No Known Allergies    Current Facility-Administered Medications   Medication    0.9%  NaCl infusion    0.9%  NaCl infusion    acetaminophen tablet 500 mg    atorvastatin tablet 40 mg    cabergoline tablet 0.5 mg    celecoxib capsule 200 mg    famotidine tablet 20 mg    gabapentin capsule 300 mg    HYDROmorphone injection 0.2 mg    losartan-hydrochlorothiazide 50-12.5 mg per tablet 1 tablet    metoprolol succinate (TOPROL-XL) 24 hr tablet 25 mg    ondansetron injection 8 mg    oxyCODONE immediate release tablet 10 mg    oxyCODONE immediate release tablet 15 mg    oxyCODONE immediate release tablet 5 mg    polyethylene glycol packet 17 g    promethazine (PHENERGAN) 6.25 mg in dextrose 5 % 50 mL IVPB    senna-docusate 8.6-50 mg per tablet 1 tablet    sodium chloride 0.9% flush 2 mL     Objective:     Vital Signs (Most Recent):  Temp: 96.4 °F (35.8 °C) (11/01/19 0457)  Pulse: 60 (11/01/19 0457)  Resp: 18 (11/01/19 0457)  BP: 130/71 (11/01/19 0457)  SpO2: 97 % (11/01/19 0457) Vital Signs (24h Range):  Temp:  [96.4 °F (35.8 °C)-98.2 °F (36.8 °C)] 96.4 °F (35.8 °C)  Pulse:  [60-70] 60  Resp:  [18] 18  SpO2:  [95 %-100 %] 97 %  BP: (112-153)/(60-90) 130/71     Weight: 74.8 kg (165 lb)  Height: 5' 5" (165.1 cm)  Body mass index is 27.46 kg/m².      Intake/Output Summary (Last 24 hours) at 11/1/2019 0602  Last data filed at 11/1/2019 0400  Gross per 24 hour   Intake 1744 ml   Output 45 ml   Net 1699 ml       Ortho/SPM Exam     Surgical dressing clean, dry, and intact  C-collar in place  SILT throughout  Motor intact throughout    Significant Labs: All pertinent labs within the past 24 hours have been reviewed.    Significant Imaging: I have reviewed " all pertinent imaging results/findings.

## 2019-11-01 NOTE — PROGRESS NOTES
Pt ready for discharge. Discharge instructions given and explained to pt. Pt verbalizes understanding. [facility] to deliver prescriptions. PIV removed. No further questions from pt at this time. Pt to be discharged via wheelchair when transport home arrives around 1:00 pm (per patient). Will cont to monitor until discharge.

## 2019-11-01 NOTE — PT/OT/SLP PROGRESS
Occupational Therapy Discharge      Patient Name:  Isma Martin   MRN:  [id number]    Patient not seen today secondary to pt sitting on couch w/ family member awaiting for transport to bring him a wheelchair as he has been discharged from the hospital. Pt is completely dressed and stated he was able to complete all tasks w/ mod I. Pt is knowledgeable of his spinal precautions and adaptive technique to safely perform bed mobility and self-care tasks. He will continue therapy w/ HH services. Pt will be d/c'ed from OT and orders to be discontinued.     Martina Tariq, OT  11/1/2019

## 2019-11-01 NOTE — PLAN OF CARE
Pt being d/c to home today. HH referral sent to [facility] for facility assignment. CM verified available d/c transportation. CM provided pt w/non-covered dme purchase/rental info. Pt verbalized understanding.       11/01/19 1106   Final Note   Assessment Type Final Discharge Note   Anticipated Discharge Disposition Home-Health   Right Care Referral Info   Post Acute Recommendation Home-care   Referral Type Sent to [facility], agency to be assigned

## 2019-11-01 NOTE — ASSESSMENT & PLAN NOTE
Isma Martin is a 69 y.o. male s/p C3-6 PSF on 10/29/19  - surgical dressing clean, dry, and intact  - C-collar in place at all times  - PT/OT daily, WBAT  - antibiotics:  Postop Ancef completed  - pain control multimodal  - drain: 45 cc over 24 hours, will pull today    X-rays today  Likely home with  today

## 2019-11-04 PROCEDURE — G0180 PR HOME HEALTH MD CERTIFICATION: ICD-10-PCS | Mod: ,,, | Performed by: ORTHOPAEDIC SURGERY

## 2019-11-04 PROCEDURE — G0180 MD CERTIFICATION HHA PATIENT: HCPCS | Mod: ,,, | Performed by: ORTHOPAEDIC SURGERY

## 2019-11-05 NOTE — ANESTHESIA POSTPROCEDURE EVALUATION
Anesthesia Post Evaluation    Patient: Isma Martin    Procedure(s) Performed: Procedure(s) (LRB):  LAMINECTOMY, SPINE, CERVICAL, WITH FUSION C3-6 Depuy SNS: Motors/SSEP [place] + Pads (N/A)    Final Anesthesia Type: general  Patient location during evaluation: PACU  Patient participation: Yes- Able to Participate  Level of consciousness: awake and alert and oriented  Post-procedure vital signs: reviewed and stable  Pain management: adequate  Airway patency: patent  PONV status at discharge: No PONV  Anesthetic complications: no      Cardiovascular status: hemodynamically stable  Respiratory status: unassisted, spontaneous ventilation and room air  Hydration status: euvolemic  Follow-up not needed.        Event Time     Out of Recovery 15:04:00          Pain/Jf Score: No data recorded

## 2019-11-07 RX ORDER — OXYCODONE HYDROCHLORIDE 5 MG/1
5 TABLET ORAL EVERY 4 HOURS PRN
Qty: 20 TABLET | Refills: 0 | Status: CANCELLED | OUTPATIENT
Start: 2019-11-07

## 2019-11-08 DIAGNOSIS — Z98.890 S/P SPINAL SURGERY: Primary | ICD-10-CM

## 2019-11-08 RX ORDER — OXYCODONE HYDROCHLORIDE 5 MG/1
5 TABLET ORAL EVERY 4 HOURS PRN
Qty: 20 TABLET | Refills: 0 | Status: SHIPPED | OUTPATIENT
Start: 2019-11-08 | End: 2020-01-20

## 2019-11-08 NOTE — TELEPHONE ENCOUNTER
----- Message from Noreen Benites sent at 11/8/2019 10:35 AM CST -----  Contact: pt   Can the clinic reply in [facility]: no      Please refill the medication(s) listed below. The patient can be reached at this phone number (___) once it is called into the pharmacy. Patient state he's in a lot of pain from back of head to shoulder blade to left arm    Medication #1oxyCODONE (ROXICODONE) 5 MG immediate release tablet      Preferred Pharmacy:[facility] #[id number] [address] [address] AT [facility] [place] [address] [facility] [address]

## 2019-11-08 NOTE — TELEPHONE ENCOUNTER
Called patient to advise that his prescription has been sent to his pharmacy, but there was no answer or voice mail.

## 2019-11-12 DIAGNOSIS — Z98.890 S/P SPINAL SURGERY: Primary | ICD-10-CM

## 2019-11-12 RX ORDER — CELECOXIB 200 MG/1
200 CAPSULE ORAL DAILY
Qty: 14 CAPSULE | Refills: 0 | Status: SHIPPED | OUTPATIENT
Start: 2019-11-12 | End: 2020-07-23

## 2019-11-12 NOTE — TELEPHONE ENCOUNTER
----- Message from David Lauren sent at 11/12/2019 11:14 AM CST -----  Contact: [facility]- Alyssia Be has been having consistent severe headaches in the back of his head since Friday that isn't being helped by medication. Pt is also out of celecoxib (CELEBREX) 200 MG capsule 14 capsule . Please give Alyssia a call back at  [phone] for advice.      [id number] - [place] - [address]  [address]  [place] [address]  Phone: [phone] Fax: [phone]    [facility] - [place], [place] - [address] AT [facility] to [facility] Sites  [address]  [facility] [address]  Phone: [phone] Fax: [phone]    [facility] #[id number] - [place] - [address] [facility] [address]  [address]  [place] [address]  Phone: [phone] Fax: [phone]

## 2019-11-12 NOTE — TELEPHONE ENCOUNTER
Spoke with Alyssia and advised that I have sent her message to Dr. Lamar and as soon as he is out of surgery and is able to address it, I will give her a call and let her know what he says. I also advised that I will confirm with Dr. Lamar that he wants to continue the Celebrex, and if he does, we will send it to the pharmacy.

## 2019-11-20 ENCOUNTER — OFFICE VISIT (OUTPATIENT)
Dept: ORTHOPEDICS | Facility: CLINIC | Age: 69
End: 2019-11-20
Payer: MEDICARE

## 2019-11-20 VITALS — HEIGHT: 65 IN | WEIGHT: 165 LBS | BODY MASS INDEX: 27.49 KG/M2

## 2019-11-20 DIAGNOSIS — Z98.1 S/P CERVICAL SPINAL FUSION: Primary | ICD-10-CM

## 2019-11-20 PROCEDURE — 99999 PR PBB SHADOW E&M-EST. PATIENT-LVL III: ICD-10-PCS | Mod: PBBFAC,,, | Performed by: PHYSICIAN ASSISTANT

## 2019-11-20 PROCEDURE — 99999 PR PBB SHADOW E&M-EST. PATIENT-LVL III: CPT | Mod: PBBFAC,,, | Performed by: PHYSICIAN ASSISTANT

## 2019-11-20 PROCEDURE — 99024 PR POST-OP FOLLOW-UP VISIT: ICD-10-PCS | Mod: S$GLB,,, | Performed by: PHYSICIAN ASSISTANT

## 2019-11-20 PROCEDURE — 99024 POSTOP FOLLOW-UP VISIT: CPT | Mod: S$GLB,,, | Performed by: PHYSICIAN ASSISTANT

## 2019-11-20 RX ORDER — HYDROCODONE BITARTRATE AND ACETAMINOPHEN 5; 325 MG/1; MG/1
1 TABLET ORAL EVERY 12 HOURS PRN
Qty: 42 TABLET | Refills: 0 | Status: SHIPPED | OUTPATIENT
Start: 2019-11-20 | End: 2020-01-20

## 2019-11-20 NOTE — PROGRESS NOTES
Date: 11/20/2019    Supervising Physician: Ariel Lamar M.D.    Date of Surgery: 10/29/2019    Procedure: C3-6 posterior laminectomy and fusion for LUE cervical radiculopathy and myelopathy.    History: Isma Martin is seen today for follow-up following the above listed procedure. Overall the patient is doing well but today notes the pain in his left arm is significantly improved but he does have some trapezial neck pain.  He is pleased.  He is in a c collar.  Pain is well controlled with current pain medication.  he denies fever, chills, and sweats since the time of the surgery.       Exam:  Incision is healing well, clean, dry and intact.   There is no sign of infection. Neuro exam is stable. No signs of DVT.    Radiographs: no new imaging today.     Assessment/Plan: 3 weeks post op.    Doing well postoperatively.  reviewed.  Prescription for norco given to be taken at night.  Continue c collar.      I will plan to see the patient back for the next postop visit in 3 weeks with imaging.     Thank you for the opportunity to participate in this patient's care. Please give me a call if there are any concerns or questions.

## 2019-11-26 ENCOUNTER — TELEPHONE (OUTPATIENT)
Dept: UROLOGY | Facility: CLINIC | Age: 69
End: 2019-11-26

## 2019-11-26 NOTE — TELEPHONE ENCOUNTER
----- Message from Sarita Goldstein sent at 11/26/2019  1:28 PM CST -----  Contact: [phone]/ self   Patient requesting to speak with you regarding getting appointment rescheduled. Please call.

## 2019-12-03 ENCOUNTER — OFFICE VISIT (OUTPATIENT)
Dept: SPORTS MEDICINE | Facility: CLINIC | Age: 69
End: 2019-12-03
Payer: MEDICARE

## 2019-12-03 ENCOUNTER — EXTERNAL HOME HEALTH (OUTPATIENT)
Dept: HOME HEALTH SERVICES | Facility: HOSPITAL | Age: 69
End: 2019-12-03
Payer: MEDICARE

## 2019-12-03 VITALS
HEIGHT: 65 IN | BODY MASS INDEX: 27.49 KG/M2 | HEART RATE: 72 BPM | SYSTOLIC BLOOD PRESSURE: 115 MMHG | DIASTOLIC BLOOD PRESSURE: 67 MMHG | WEIGHT: 165 LBS

## 2019-12-03 DIAGNOSIS — M75.22 BICEPS TENDINITIS OF LEFT UPPER EXTREMITY: ICD-10-CM

## 2019-12-03 DIAGNOSIS — M75.122 NONTRAUMATIC COMPLETE TEAR OF LEFT ROTATOR CUFF: Primary | ICD-10-CM

## 2019-12-03 PROCEDURE — 99999 PR PBB SHADOW E&M-EST. PATIENT-LVL III: CPT | Mod: PBBFAC,,, | Performed by: ORTHOPAEDIC SURGERY

## 2019-12-03 PROCEDURE — 1101F PR PT FALLS ASSESS DOC 0-1 FALLS W/OUT INJ PAST YR: ICD-10-PCS | Mod: CPTII,S$GLB,, | Performed by: ORTHOPAEDIC SURGERY

## 2019-12-03 PROCEDURE — 99214 OFFICE O/P EST MOD 30 MIN: CPT | Mod: S$GLB,,, | Performed by: ORTHOPAEDIC SURGERY

## 2019-12-03 PROCEDURE — 1159F MED LIST DOCD IN RCRD: CPT | Mod: S$GLB,,, | Performed by: ORTHOPAEDIC SURGERY

## 2019-12-03 PROCEDURE — 1125F AMNT PAIN NOTED PAIN PRSNT: CPT | Mod: S$GLB,,, | Performed by: ORTHOPAEDIC SURGERY

## 2019-12-03 PROCEDURE — 3078F PR MOST RECENT DIASTOLIC BLOOD PRESSURE < 80 MM HG: ICD-10-PCS | Mod: CPTII,S$GLB,, | Performed by: ORTHOPAEDIC SURGERY

## 2019-12-03 PROCEDURE — 1101F PT FALLS ASSESS-DOCD LE1/YR: CPT | Mod: CPTII,S$GLB,, | Performed by: ORTHOPAEDIC SURGERY

## 2019-12-03 PROCEDURE — 3078F DIAST BP <80 MM HG: CPT | Mod: CPTII,S$GLB,, | Performed by: ORTHOPAEDIC SURGERY

## 2019-12-03 PROCEDURE — 1159F PR MEDICATION LIST DOCUMENTED IN MEDICAL RECORD: ICD-10-PCS | Mod: S$GLB,,, | Performed by: ORTHOPAEDIC SURGERY

## 2019-12-03 PROCEDURE — 99214 PR OFFICE/OUTPT VISIT, EST, LEVL IV, 30-39 MIN: ICD-10-PCS | Mod: S$GLB,,, | Performed by: ORTHOPAEDIC SURGERY

## 2019-12-03 PROCEDURE — 99999 PR PBB SHADOW E&M-EST. PATIENT-LVL III: ICD-10-PCS | Mod: PBBFAC,,, | Performed by: ORTHOPAEDIC SURGERY

## 2019-12-03 PROCEDURE — 3074F PR MOST RECENT SYSTOLIC BLOOD PRESSURE < 130 MM HG: ICD-10-PCS | Mod: CPTII,S$GLB,, | Performed by: ORTHOPAEDIC SURGERY

## 2019-12-03 PROCEDURE — 1125F PR PAIN SEVERITY QUANTIFIED, PAIN PRESENT: ICD-10-PCS | Mod: S$GLB,,, | Performed by: ORTHOPAEDIC SURGERY

## 2019-12-03 PROCEDURE — 3074F SYST BP LT 130 MM HG: CPT | Mod: CPTII,S$GLB,, | Performed by: ORTHOPAEDIC SURGERY

## 2019-12-03 NOTE — PROGRESS NOTES
CC: LEFT shoulder pain     69 y.o. Male RHD retired  who returns today for follow up for his L Shoulder. He is 5 wk s/p C3-6 fusion with Dr. Ariel Lamar & presents today in a neck brace. Doing well in that regard. Complaint today is continued L shoulder pain which  began acutely when he fell off a ladder in August. MRI was discussed last visit which showed a full thickness rotator cuff tear. Treatment thus far has included formal physical therapy which has been of no significant benefit, 1 SA steroid injection which gave him good relief for about 3/4 days. This has become a quality of life issue for him & inquiring today about surgery which was previously discussed.     Positive for DM: Last A1C: 5.9 in 10/2019.     Pain Score:   5    PAST MEDICAL HISTORY:   Past Medical History:   Diagnosis Date    Arthritis     Cataract     Colon polyp     Diabetes mellitus     GERD (gastroesophageal reflux disease)     Hyperlipidemia     Hypertension     Hypogonadism male     Obesity     Prolactinoma      PAST SURGICAL HISTORY:  Past Surgical History:   Procedure Laterality Date    CAROTID ENDARTERECTOMY Left     CERVICAL LAMINECTOMY WITH SPINAL FUSION N/A 10/29/2019    Procedure: LAMINECTOMY, SPINE, CERVICAL, WITH FUSION C3-6 Depuy SNS: [employer]/SSEP [place] + Pads;  Surgeon: Ariel Lamar MD;  Location: [facility] OR [address] [place] [address];  Service: Neurosurgery;  Laterality: N/A;    FOOT SURGERY  4-23-14    right    TRANSPHENOIDAL PITUITARY RESECTION      pituitary tumor     FAMILY HISTORY:  Family History   Problem Relation Age of Onset    Glaucoma Mother     Heart disease Brother     Heart attack Sister     Heart disease Sister     Cancer Neg Hx     Diabetes Neg Hx     Colon polyps Neg Hx     Blindness Neg Hx     Amblyopia Neg Hx     Cataracts Neg Hx     Hypertension Neg Hx     Macular degeneration Neg Hx     Retinal detachment Neg Hx     Strabismus Neg Hx     Stroke Neg Hx     Thyroid disease Neg  Hx        MEDICATIONS:    Current Outpatient Medications:     acetaminophen (TYLENOL) 650 MG TbSR, Take 1 tablet (650 mg total) by mouth every 6 (six) hours as needed (pain)., Disp: 56 tablet, Rfl: 0    ascorbic acid (VITAMIN C) 500 MG tablet, Take 500 mg by mouth Every PM., Disp: , Rfl:     atorvastatin (LIPITOR) 40 MG tablet, Take 1 tablet (40 mg total) by mouth once daily., Disp: 90 tablet, Rfl: 11    cabergoline (DOSTINEX) 0.5 mg tablet, Take 0.5 tablets (0.25 mg total) by mouth twice a week., Disp: 12 tablet, Rfl: 3    celecoxib (CELEBREX) 200 MG capsule, Take 1 capsule (200 mg total) by mouth once daily., Disp: 14 capsule, Rfl: 0    cholecalciferol, vitamin D3, 2,000 unit Tab, Take 1 tablet by mouth Every PM., Disp: , Rfl:     cyanocobalamin (VITAMIN B-12) 1000 MCG tablet, , Disp: , Rfl:     gabapentin (NEURONTIN) 100 MG capsule, Take 1 capsule (100 mg total) by mouth 3 (three) times daily., Disp: 42 capsule, Rfl: 0    HYDROcodone-acetaminophen (NORCO) 5-325 mg per tablet, Take 1 tablet by mouth every 12 (twelve) hours as needed., Disp: 42 tablet, Rfl: 0    losartan-hydrochlorothiazide 50-12.5 mg (HYZAAR) 50-12.5 mg per tablet, Take 1 tablet by mouth once daily., Disp: 90 tablet, Rfl: 11    methocarbamol (ROBAXIN) 500 MG Tab, Take 2 tablets (1,000 mg total) by mouth 3 (three) times daily., Disp: 84 tablet, Rfl: 0    metoprolol succinate (TOPROL-XL) 25 MG 24 hr tablet, Take 1 tablet (25 mg total) by mouth once daily., Disp: 90 tablet, Rfl: 11    omega-3 fatty acids-vitamin E (FISH OIL) 1,000 mg Cap, , Disp: , Rfl:     omeprazole (PRILOSEC) 20 MG capsule, Take 1 capsule (20 mg total) by mouth once daily., Disp: 90 capsule, Rfl: 11    oxyCODONE (ROXICODONE) 5 MG immediate release tablet, Take 1 tablet (5 mg total) by mouth every 4 (four) hours as needed for Pain., Disp: 20 tablet, Rfl: 0    VITAMIN E ACETATE (VITAMIN E ORAL), Take by mouth., Disp: , Rfl:     ALLERGIES:  Review of patient's  "allergies indicates:  No Known Allergies     REVIEW OF SYSTEMS:  Constitution: Negative. Negative for chills, fever and night sweats.    Hematologic/Lymphatic: Negative for bleeding problem. Does not bruise/bleed easily.   Skin: Negative for dry skin, itching and rash.   Musculoskeletal: Negative for falls. Positive for left shoulder pain and muscle weakness and neck pain with associated radicular symptoms.     All other review of symptoms were reviewed and found to be noncontributory.    PHYSICAL EXAMINATION:  Vitals:  /67   Pulse 72   Ht 5' 5" (1.651 m)   Wt 74.8 kg (165 lb)   BMI 27.46 kg/m²    General: Well-developed well-nourished 69 y.o. malein no acute distress   Cardiovascular: Regular rhythm by palpation of distal pulse, normal color and temperature, no concerning varicosities on symptomatic side   Lungs: No labored breathing or wheezing appreciated   Neuro: Alert and oriented ×3   Psychiatric: well oriented to person, place and time, demonstrates normal mood and affect   Skin: No rashes, lesions or ulcers, normal temperature, turgor, and texture on uninvolved extremity    Ortho/SPM Exam  Examination of the left shoulder demonstrates pain with active and passive range of motion. Active forward elevation in scapular plane with shoulder hiking to 80°.  Passive to 160° with pain and guarding.  Active external rotation with arm at side limited to 60°. Tender over the proximal biceps and codman's point. Negative AC tenderness. 4 out of 5 resisted scaption. Negative drop-arm test. 5-/5 resisted ER at side. Negative ER lag sign.     IMAGING:  Xrays including AP, Outlet and Axillary Lateral of Left shoulder are ordered / images reviewed by me:   No fracture or acute change appreciated. Mild glenohumeral degenerative changes. Mild to moderate AC joint arthritis. Normal acromiohumeral distance.     MRI of Left shoulder reviewed by me and discussed with patient. Study shows:    1. Full-thickness tear of the " supraspinatus tendon with retraction to the level of the glenoid.   2. Fraying of the inferior labrum.   3. Glenohumeral cartilage loss.   4. Small joint effusion with subacromial-subdeltoid bursitis.    Well-maintained musculature on my read    MRI C/S reviewed by me and discussed with patient. Study shows:    Cervical spondylosis resulting in severe spinal canal stenosis at C3-C4 and moderate at C4 through C6.  Abnormal cord signal at the level of C5-C6 consistent with myelomalacia.  Additional multilevel neural foraminal narrowing as above.    ASSESSMENT:      ICD-10-CM ICD-9-CM   1. Nontraumatic complete tear of left rotator cuff M75.122 727.61   2. Biceps tendinitis of left upper extremity M75.22 726.12       PLAN:     -Findings and treatment options were discussed with the patient, both operative and non operative   -Patient continues to have pain despite conservative treatment to include injections and physical therapy.  He is a candidate for surgery at this time.  -Plan for a Left shoulder arthroscopic rotator cuff repair, SAD and biceps tenodesis vs tenotomy. The details of such were discussed to include the expected postop rehab and recovery course. We have discussed the inherent risk for tissue nonhealing or retear.  Also discussed the risk for infection.  He understands that the shoulder surgery will not address any residual neurogenic complaints or weakness attributable to the neck that he has.  Overall previous neurogenic complaints much better from compared to prior to his neck surgery.  -Planned DOS pending clearance from Dr. Ariel Lamar due to him being 5 wk s/p C3-6 fusion. ------ per his spine surgeon, can proceed now  -Clearance Needed - PCP  -RTC for preoperative appointment   -All questions answered    Informed Consent:    The details of the surgical procedure were explained, including the location of probable incisions and a description of possible hardware and/or grafts to be used.  Alternatives to both operative and non-operative options with associated risks and benefits were discussed. The patient understands the likely convalescence after surgery and, in particular, the expected postop rehab and recovery course. The outlined risks and potential complications of the proposed procedure include but are not limited to: infection, poor wound healing, scarring, deformity, stiffness, swelling, continued or recurrent pain, instability, hardware or prosthetic failure if implanted, symptomatic hardware requiring removal, weakness, neurovascular injury, numbness, chronic regional pain disorder, tissue nonhealing/irreparability/retear, subsequent contralateral limb injury or pathology, chondral injury, arthritis, fracture, blood clot formation, inability to return to previous level of activity, anesthetic or regional block complication up to death, need for additional procedure as indicated intraoperatively, and potential need for further surgery.    The patient was also informed and understands that the risks of surgery are greater for patients with a current condition or history of heart disease, obesity, clotting disorders, recurrent infections, steroid use, current or past smoking, and factors such as sedentary lifestyle and noncompliance with medications, therapy or follow-up. The degree of the increased risk is hard to estimate with any degree of precision. If applicable, smoking cessation was discussed.     All questions were answered. The patient has verbalized understanding of these issues and wishes to proceed with the surgery as discussed.          Procedures

## 2019-12-04 ENCOUNTER — OFFICE VISIT (OUTPATIENT)
Dept: UROLOGY | Facility: CLINIC | Age: 69
End: 2019-12-04
Payer: MEDICARE

## 2019-12-04 VITALS
OXYGEN SATURATION: 99 % | HEIGHT: 65 IN | TEMPERATURE: 99 F | BODY MASS INDEX: 27.49 KG/M2 | HEART RATE: 57 BPM | DIASTOLIC BLOOD PRESSURE: 56 MMHG | SYSTOLIC BLOOD PRESSURE: 107 MMHG | RESPIRATION RATE: 18 BRPM | WEIGHT: 165 LBS

## 2019-12-04 DIAGNOSIS — N52.9 ERECTILE DYSFUNCTION, UNSPECIFIED ERECTILE DYSFUNCTION TYPE: Primary | ICD-10-CM

## 2019-12-04 PROCEDURE — 3078F PR MOST RECENT DIASTOLIC BLOOD PRESSURE < 80 MM HG: ICD-10-PCS | Mod: CPTII,S$GLB,, | Performed by: NURSE PRACTITIONER

## 2019-12-04 PROCEDURE — 1159F PR MEDICATION LIST DOCUMENTED IN MEDICAL RECORD: ICD-10-PCS | Mod: S$GLB,,, | Performed by: NURSE PRACTITIONER

## 2019-12-04 PROCEDURE — 99204 OFFICE O/P NEW MOD 45 MIN: CPT | Mod: S$GLB,,, | Performed by: NURSE PRACTITIONER

## 2019-12-04 PROCEDURE — 99499 UNLISTED E&M SERVICE: CPT | Mod: S$GLB,,, | Performed by: NURSE PRACTITIONER

## 2019-12-04 PROCEDURE — 1159F MED LIST DOCD IN RCRD: CPT | Mod: S$GLB,,, | Performed by: NURSE PRACTITIONER

## 2019-12-04 PROCEDURE — 99204 PR OFFICE/OUTPT VISIT, NEW, LEVL IV, 45-59 MIN: ICD-10-PCS | Mod: S$GLB,,, | Performed by: NURSE PRACTITIONER

## 2019-12-04 PROCEDURE — 3074F SYST BP LT 130 MM HG: CPT | Mod: CPTII,S$GLB,, | Performed by: NURSE PRACTITIONER

## 2019-12-04 PROCEDURE — 1101F PT FALLS ASSESS-DOCD LE1/YR: CPT | Mod: CPTII,S$GLB,, | Performed by: NURSE PRACTITIONER

## 2019-12-04 PROCEDURE — 99999 PR PBB SHADOW E&M-EST. PATIENT-LVL V: ICD-10-PCS | Mod: PBBFAC,,, | Performed by: NURSE PRACTITIONER

## 2019-12-04 PROCEDURE — 3078F DIAST BP <80 MM HG: CPT | Mod: CPTII,S$GLB,, | Performed by: NURSE PRACTITIONER

## 2019-12-04 PROCEDURE — 99499 RISK ADDL DX/OHS AUDIT: ICD-10-PCS | Mod: S$GLB,,, | Performed by: NURSE PRACTITIONER

## 2019-12-04 PROCEDURE — 99999 PR PBB SHADOW E&M-EST. PATIENT-LVL V: CPT | Mod: PBBFAC,,, | Performed by: NURSE PRACTITIONER

## 2019-12-04 PROCEDURE — 1101F PR PT FALLS ASSESS DOC 0-1 FALLS W/OUT INJ PAST YR: ICD-10-PCS | Mod: CPTII,S$GLB,, | Performed by: NURSE PRACTITIONER

## 2019-12-04 PROCEDURE — 1126F PR PAIN SEVERITY QUANTIFIED, NO PAIN PRESENT: ICD-10-PCS | Mod: S$GLB,,, | Performed by: NURSE PRACTITIONER

## 2019-12-04 PROCEDURE — 3074F PR MOST RECENT SYSTOLIC BLOOD PRESSURE < 130 MM HG: ICD-10-PCS | Mod: CPTII,S$GLB,, | Performed by: NURSE PRACTITIONER

## 2019-12-04 PROCEDURE — 1126F AMNT PAIN NOTED NONE PRSNT: CPT | Mod: S$GLB,,, | Performed by: NURSE PRACTITIONER

## 2019-12-04 RX ORDER — SILDENAFIL CITRATE 20 MG/1
20 TABLET ORAL DAILY PRN
Qty: 15 TABLET | Refills: 0 | Status: SHIPPED | OUTPATIENT
Start: 2019-12-04 | End: 2020-03-30 | Stop reason: SDUPTHER

## 2019-12-04 NOTE — PATIENT INSTRUCTIONS
1. Start trial of sildenafil 20 mg (1 pill) daily as needed (30 minutes prior to sexual activity). If 1 pill is not effective may gradually increase dosage up to 4 pills in 1 day. Do not exceed 4 pills daily.   2. Follow-up in 4 weeks.

## 2019-12-04 NOTE — PROGRESS NOTES
Subjective:       Patient ID: Isma Martin is a 69 y.o. male.    Chief Complaint: Erectile Dysfunction    Patient is new to me. He is a 68 yo AAM who is here today for evaluation of ED. Pt reports hx of pituitary tumor with removal in 1998. Regrowth of pituitary tumor noted about 3 years ago and he is being retreated by Endocrinology with cabergoline to help shrink tumor. He reports medication is effective. Since re-treatment he reports his sex drive has improved, but he has a issue with maintaining an erection. He has never tried in medication in the past for this issue.     Erectile Dysfunction   This is a chronic problem. Episode onset: about 5 years  The problem is unchanged. The nature of his difficulty is maintaining erection and penetration. He reports no anxiety, decreased libido or performance anxiety. He reports his erection duration to be 5 to 10 minutes. Irritative symptoms include nocturia (x2) and urgency (sometimes). Irritative symptoms do not include frequency. Obstructive symptoms include dribbling. Obstructive symptoms do not include incomplete emptying, an intermittent stream, a slower stream, straining or a weak stream. Pertinent negatives include no chills, dysuria, genital pain, hematuria, hesitancy or inability to urinate.     Review of Systems   Constitutional: Negative for appetite change, chills, fatigue and fever.   Gastrointestinal: Negative for abdominal pain, constipation, diarrhea, nausea and vomiting.   Genitourinary: Positive for nocturia (x2) and urgency (sometimes). Negative for decreased libido, decreased urine volume, difficulty urinating, discharge, dysuria, flank pain, frequency, hematuria, hesitancy, incomplete emptying, penile pain, penile swelling, scrotal swelling and testicular pain.        Nocturia x2   Musculoskeletal: Negative.    Neurological: Negative for dizziness and headaches.   Psychiatric/Behavioral: The patient is not nervous/anxious.        Objective:       Physical Exam   Constitutional: He is oriented to person, place, and time. He appears well-developed and well-nourished. No distress.   HENT:   Head: Normocephalic and atraumatic.   Eyes: Pupils are equal, round, and reactive to light. EOM are normal.   Neck: Normal range of motion.   Cardiovascular: Normal rate.   Pulmonary/Chest: Effort normal. No respiratory distress.   Abdominal: Soft. There is no tenderness.   Musculoskeletal: Normal range of motion. He exhibits no edema.   Neurological: He is alert and oriented to person, place, and time. Coordination normal.   Skin: Skin is warm and dry.   Psychiatric: He has a normal mood and affect. His behavior is normal. Judgment and thought content normal.   Nursing note and vitals reviewed.      Assessment:       1. Erectile dysfunction, unspecified erectile dysfunction type        Plan:       Isma was seen today for erectile dysfunction.    Diagnoses and all orders for this visit:    Erectile dysfunction, unspecified erectile dysfunction type  -     sildenafil (REVATIO) 20 mg Tab; Take 1 tablet (20 mg total) by mouth daily as needed (erectile dysfunction).    Other orders  1. Start trial of sildenafil 20 mg (1 pill) daily as needed (30 minutes prior to sexual activity). If 1 pill is not effective may gradually increase dosage up to 4 pills in 1 day. Do not exceed 4 pills daily.     Follow-up in 4 weeks.     Jakob Salomon NP

## 2019-12-13 ENCOUNTER — HOSPITAL ENCOUNTER (OUTPATIENT)
Dept: RADIOLOGY | Facility: HOSPITAL | Age: 69
Discharge: HOME | End: 2019-12-13
Attending: PHYSICIAN ASSISTANT
Payer: MEDICARE

## 2019-12-13 ENCOUNTER — OFFICE VISIT (OUTPATIENT)
Dept: ORTHOPEDICS | Facility: CLINIC | Age: 69
End: 2019-12-13
Payer: MEDICARE

## 2019-12-13 ENCOUNTER — TELEPHONE (OUTPATIENT)
Dept: SPORTS MEDICINE | Facility: CLINIC | Age: 69
End: 2019-12-13

## 2019-12-13 ENCOUNTER — PATIENT MESSAGE (OUTPATIENT)
Dept: SPORTS MEDICINE | Facility: CLINIC | Age: 69
End: 2019-12-13

## 2019-12-13 VITALS — WEIGHT: 165 LBS | HEIGHT: 65 IN | BODY MASS INDEX: 27.49 KG/M2

## 2019-12-13 DIAGNOSIS — Z98.1 S/P CERVICAL SPINAL FUSION: Primary | ICD-10-CM

## 2019-12-13 DIAGNOSIS — Z98.1 S/P CERVICAL SPINAL FUSION: ICD-10-CM

## 2019-12-13 PROCEDURE — 99024 POSTOP FOLLOW-UP VISIT: CPT | Mod: S$GLB,,, | Performed by: PHYSICIAN ASSISTANT

## 2019-12-13 PROCEDURE — 99999 PR PBB SHADOW E&M-EST. PATIENT-LVL III: ICD-10-PCS | Mod: PBBFAC,,, | Performed by: PHYSICIAN ASSISTANT

## 2019-12-13 PROCEDURE — 99999 PR PBB SHADOW E&M-EST. PATIENT-LVL III: CPT | Mod: PBBFAC,,, | Performed by: PHYSICIAN ASSISTANT

## 2019-12-13 PROCEDURE — 99024 PR POST-OP FOLLOW-UP VISIT: ICD-10-PCS | Mod: S$GLB,,, | Performed by: PHYSICIAN ASSISTANT

## 2019-12-13 PROCEDURE — 72040 X-RAY EXAM NECK SPINE 2-3 VW: CPT | Mod: 26,,, | Performed by: RADIOLOGY

## 2019-12-13 PROCEDURE — 72040 X-RAY EXAM NECK SPINE 2-3 VW: CPT | Mod: TC

## 2019-12-13 PROCEDURE — 72040 XR CERVICAL SPINE AP LATERAL: ICD-10-PCS | Mod: 26,,, | Performed by: RADIOLOGY

## 2019-12-13 NOTE — PROGRESS NOTES
Date: 12/13/2019    Supervising Physician: Ariel Lamar M.D.    Date of Surgery: 10/29/2019    Procedure: C3-6 posterior laminectomy and fusion for LUE cervical radiculopathy and myelopathy.    History: Isma Martin is seen today for follow-up following the above listed procedure. Overall the patient is doing well but today notes the pain in his left arm is significantly improved.  He does report some trapezial weakness and soreness, especially when he takes the collar off.  He is pleased.  He is in a c collar today.  He is not taking anything for pain. he denies fever, chills, and sweats since the time of the surgery.       Exam:  Incision is healing well.   There is no sign of infection. Neuro exam is stable. No signs of DVT.    Radiographs:  imaging today shows hardware in place, no evidence of failure.     Assessment/Plan: 6 weeks post op.    Doing well postoperatively. No refills needed. Continue c collar.      I will plan to see the patient back for the next postop visit in 6 weeks with imaging.     Questionnaire at next visit.     Thank you for the opportunity to participate in this patient's care. Please give me a call if there are any concerns or questions.

## 2019-12-13 NOTE — TELEPHONE ENCOUNTER
Tried calling pt again to schedule surgery. I got the message saying call can not be completed at this time. Please call again later. I will try to reach patient via portal.

## 2019-12-30 ENCOUNTER — TELEPHONE (OUTPATIENT)
Dept: SPORTS MEDICINE | Facility: CLINIC | Age: 69
End: 2019-12-30

## 2019-12-30 NOTE — TELEPHONE ENCOUNTER
Pt called to let me know his wife was having medical problems and he can not schedule surgery at this time. He will call back when he is ready to schedule.

## 2020-01-20 ENCOUNTER — OFFICE VISIT (OUTPATIENT)
Dept: ORTHOPEDICS | Facility: CLINIC | Age: 70
End: 2020-01-20
Payer: MEDICARE

## 2020-01-20 ENCOUNTER — HOSPITAL ENCOUNTER (OUTPATIENT)
Dept: RADIOLOGY | Facility: HOSPITAL | Age: 70
Discharge: HOME | End: 2020-01-20
Attending: PHYSICIAN ASSISTANT
Payer: MEDICARE

## 2020-01-20 VITALS — WEIGHT: 165 LBS | HEIGHT: 65 IN | BODY MASS INDEX: 27.49 KG/M2

## 2020-01-20 DIAGNOSIS — Z98.890 S/P SPINAL SURGERY: ICD-10-CM

## 2020-01-20 DIAGNOSIS — Z98.1 S/P CERVICAL SPINAL FUSION: Primary | ICD-10-CM

## 2020-01-20 DIAGNOSIS — Z98.1 S/P CERVICAL SPINAL FUSION: ICD-10-CM

## 2020-01-20 PROCEDURE — 72040 X-RAY EXAM NECK SPINE 2-3 VW: CPT | Mod: 26,,, | Performed by: RADIOLOGY

## 2020-01-20 PROCEDURE — 99999 PR PBB SHADOW E&M-EST. PATIENT-LVL III: ICD-10-PCS | Mod: PBBFAC,,, | Performed by: PHYSICIAN ASSISTANT

## 2020-01-20 PROCEDURE — 99024 PR POST-OP FOLLOW-UP VISIT: ICD-10-PCS | Mod: S$GLB,,, | Performed by: PHYSICIAN ASSISTANT

## 2020-01-20 PROCEDURE — 99024 POSTOP FOLLOW-UP VISIT: CPT | Mod: S$GLB,,, | Performed by: PHYSICIAN ASSISTANT

## 2020-01-20 PROCEDURE — 72040 XR CERVICAL SPINE AP LATERAL: ICD-10-PCS | Mod: 26,,, | Performed by: RADIOLOGY

## 2020-01-20 PROCEDURE — 72040 X-RAY EXAM NECK SPINE 2-3 VW: CPT | Mod: TC

## 2020-01-20 PROCEDURE — 99999 PR PBB SHADOW E&M-EST. PATIENT-LVL III: CPT | Mod: PBBFAC,,, | Performed by: PHYSICIAN ASSISTANT

## 2020-01-20 RX ORDER — OXYCODONE HYDROCHLORIDE 5 MG/1
5 TABLET ORAL EVERY 4 HOURS PRN
Qty: 20 TABLET | Refills: 0 | Status: SHIPPED | OUTPATIENT
Start: 2020-01-20 | End: 2021-11-06

## 2020-01-20 NOTE — PROGRESS NOTES
Date: 01/20/2020    Supervising Physician: Ariel Lamar M.D.    Date of Surgery: 10/29/2019    Procedure: C3-6 posterior laminectomy and fusion for LUE cervical radiculopathy and myelopathy.    History: Isma Martin is seen today for follow-up following the above listed procedure. Overall the patient is doing well but today notes the pain in his left arm is significantly improved.  He does report some trapezial weakness and soreness, especially when he takes the collar off.  He is pleased.  He is in a c collar today.  He takes the collar off at home and puts it back on when his neck starts to hurt.  He takes oxycodone occasionally for pain. He notes improvement with buttons.  he denies fever, chills, and sweats since the time of the surgery.       Exam:  Incision is healing well.   There is no sign of infection. Neuro exam is stable. No signs of DVT.    Radiographs:  imaging today shows hardware in place, no evidence of failure.     Assessment/Plan: 12 weeks post op.    Doing well postoperatively. I have given him one final prescription of oxycodone to be taken as needed for pain.  He may wean c collar.      I will plan to see the patient back for the next postop visit in 2 months.    Questionnaire at next visit.     Thank you for the opportunity to participate in this patient's care. Please give me a call if there are any concerns or questions.

## 2020-01-23 ENCOUNTER — OFFICE VISIT (OUTPATIENT)
Dept: INTERNAL MEDICINE | Facility: CLINIC | Age: 70
End: 2020-01-23
Payer: MEDICARE

## 2020-01-23 VITALS
HEIGHT: 65 IN | WEIGHT: 169.31 LBS | BODY MASS INDEX: 28.21 KG/M2 | SYSTOLIC BLOOD PRESSURE: 130 MMHG | DIASTOLIC BLOOD PRESSURE: 80 MMHG | OXYGEN SATURATION: 99 % | HEART RATE: 68 BPM

## 2020-01-23 DIAGNOSIS — I10 ESSENTIAL HYPERTENSION: ICD-10-CM

## 2020-01-23 DIAGNOSIS — E11.9 TYPE 2 DIABETES MELLITUS WITHOUT COMPLICATION, WITHOUT LONG-TERM CURRENT USE OF INSULIN: ICD-10-CM

## 2020-01-23 DIAGNOSIS — D35.2 PROLACTINOMA: ICD-10-CM

## 2020-01-23 DIAGNOSIS — K21.9 GASTROESOPHAGEAL REFLUX DISEASE, ESOPHAGITIS PRESENCE NOT SPECIFIED: ICD-10-CM

## 2020-01-23 PROBLEM — G95.9 CERVICAL MYELOPATHY: Status: RESOLVED | Noted: 2019-10-02 | Resolved: 2020-01-23

## 2020-01-23 PROCEDURE — 3079F PR MOST RECENT DIASTOLIC BLOOD PRESSURE 80-89 MM HG: ICD-10-PCS | Mod: CPTII,S$GLB,, | Performed by: INTERNAL MEDICINE

## 2020-01-23 PROCEDURE — 99499 UNLISTED E&M SERVICE: CPT | Mod: S$GLB,,, | Performed by: INTERNAL MEDICINE

## 2020-01-23 PROCEDURE — 1101F PR PT FALLS ASSESS DOC 0-1 FALLS W/OUT INJ PAST YR: ICD-10-PCS | Mod: CPTII,S$GLB,, | Performed by: INTERNAL MEDICINE

## 2020-01-23 PROCEDURE — 99499 RISK ADDL DX/OHS AUDIT: ICD-10-PCS | Mod: S$GLB,,, | Performed by: INTERNAL MEDICINE

## 2020-01-23 PROCEDURE — 1159F MED LIST DOCD IN RCRD: CPT | Mod: S$GLB,,, | Performed by: INTERNAL MEDICINE

## 2020-01-23 PROCEDURE — 1125F AMNT PAIN NOTED PAIN PRSNT: CPT | Mod: S$GLB,,, | Performed by: INTERNAL MEDICINE

## 2020-01-23 PROCEDURE — 99214 PR OFFICE/OUTPT VISIT, EST, LEVL IV, 30-39 MIN: ICD-10-PCS | Mod: S$GLB,,, | Performed by: INTERNAL MEDICINE

## 2020-01-23 PROCEDURE — 3044F HG A1C LEVEL LT 7.0%: CPT | Mod: CPTII,S$GLB,, | Performed by: INTERNAL MEDICINE

## 2020-01-23 PROCEDURE — 3079F DIAST BP 80-89 MM HG: CPT | Mod: CPTII,S$GLB,, | Performed by: INTERNAL MEDICINE

## 2020-01-23 PROCEDURE — 1101F PT FALLS ASSESS-DOCD LE1/YR: CPT | Mod: CPTII,S$GLB,, | Performed by: INTERNAL MEDICINE

## 2020-01-23 PROCEDURE — 3075F PR MOST RECENT SYSTOLIC BLOOD PRESS GE 130-139MM HG: ICD-10-PCS | Mod: CPTII,S$GLB,, | Performed by: INTERNAL MEDICINE

## 2020-01-23 PROCEDURE — 3044F PR MOST RECENT HEMOGLOBIN A1C LEVEL <7.0%: ICD-10-PCS | Mod: CPTII,S$GLB,, | Performed by: INTERNAL MEDICINE

## 2020-01-23 PROCEDURE — 1125F PR PAIN SEVERITY QUANTIFIED, PAIN PRESENT: ICD-10-PCS | Mod: S$GLB,,, | Performed by: INTERNAL MEDICINE

## 2020-01-23 PROCEDURE — 99999 PR PBB SHADOW E&M-EST. PATIENT-LVL IV: ICD-10-PCS | Mod: PBBFAC,,, | Performed by: INTERNAL MEDICINE

## 2020-01-23 PROCEDURE — 3075F SYST BP GE 130 - 139MM HG: CPT | Mod: CPTII,S$GLB,, | Performed by: INTERNAL MEDICINE

## 2020-01-23 PROCEDURE — 1159F PR MEDICATION LIST DOCUMENTED IN MEDICAL RECORD: ICD-10-PCS | Mod: S$GLB,,, | Performed by: INTERNAL MEDICINE

## 2020-01-23 PROCEDURE — 99214 OFFICE O/P EST MOD 30 MIN: CPT | Mod: S$GLB,,, | Performed by: INTERNAL MEDICINE

## 2020-01-23 PROCEDURE — 99999 PR PBB SHADOW E&M-EST. PATIENT-LVL IV: CPT | Mod: PBBFAC,,, | Performed by: INTERNAL MEDICINE

## 2020-01-23 RX ORDER — METOPROLOL SUCCINATE 25 MG/1
25 TABLET, EXTENDED RELEASE ORAL DAILY
Qty: 90 TABLET | Refills: 11 | Status: SHIPPED | OUTPATIENT
Start: 2020-01-23 | End: 2021-12-20 | Stop reason: SDUPTHER

## 2020-01-23 RX ORDER — ATORVASTATIN CALCIUM 40 MG/1
40 TABLET, FILM COATED ORAL DAILY
Qty: 90 TABLET | Refills: 11 | Status: SHIPPED | OUTPATIENT
Start: 2020-01-23 | End: 2021-12-19 | Stop reason: SDUPTHER

## 2020-01-23 RX ORDER — OMEPRAZOLE 20 MG/1
20 CAPSULE, DELAYED RELEASE ORAL DAILY
Qty: 90 CAPSULE | Refills: 11 | Status: SHIPPED | OUTPATIENT
Start: 2020-01-23 | End: 2021-12-20 | Stop reason: SDUPTHER

## 2020-01-23 RX ORDER — LOSARTAN POTASSIUM AND HYDROCHLOROTHIAZIDE 12.5; 5 MG/1; MG/1
1 TABLET ORAL DAILY
Qty: 90 TABLET | Refills: 11 | Status: SHIPPED | OUTPATIENT
Start: 2020-01-23 | End: 2021-12-20 | Stop reason: SDUPTHER

## 2020-01-23 NOTE — PATIENT INSTRUCTIONS
Neck Exercises: Active Neck Rotation    To start, lie on your back, knees bent and feet flat on the floor. Keep your ears, shoulders, and hips aligned, but dont press your lower back to the floor. Rest your hands on your pelvis. Breathe deeply and relax.              · Use your neck muscles to turn your head to one side until you feel a stretch in the muscles.  · Hold for 5 seconds. Then turn to the other side.  · Repeat 5 times on each side.  Note: Keep your shoulders on the floor. Dont lift or tuck your chin as you turn your head.  © 4668-4274 [employer]. [address] [place] [address]. All rights reserved. This information is not intended as a substitute for professional medical care. Always follow your healthcare professional's instructions.        Neck Exercises: Neck Flex                            To start, sit in a chair with your feet flat on the floor. Your weight should be slightly forward so that youre balanced evenly on your buttocks. Relax your shoulders and keep your head level. Avoid arching your back or rounding your shoulders. Using a chair with arms may help you keep your balance:  · Rest the back of your left hand against your lower back. Place your right palm on the top of your head.  · Gently pull your head forward and down until you feel a stretch in the muscles in the back of your neck. Dont force the motion.  · Hold for 20 seconds, then return to starting position. Switch arms.  © 3528-4214 [employer]. [address] [place] [address]. All rights reserved. This information is not intended as a substitute for professional medical care. Always follow your healthcare professional's instructions.        Neck Exercises: Passive Neck Rotation        To start, lie on your back, knees bent and feet flat on the floor. Keep your ears, shoulders, and hips aligned, but dont press your lower back to the floor. Rest your hands on your pelvis. Breathe  deeply and relax.  · With your neck relaxed, place the palm of one hand on your forehead. Use your hand to turn your head to one side until you feel a stretch in the neck muscles. Do not push through pain.  · Hold for 5 seconds. Then turn to the other side.  · Repeat 5 times on each side.   Note: Keep your shoulders on the floor. Dont lift your chin as you turn your head.  © 6012-0635 [employer]. [address] [place] [address], [place] [address]. All rights reserved. This information is not intended as a substitute for professional medical care. Always follow your healthcare professional's instructions.            Exercises for Shoulder Flexibility: Internal Rotation    This stretch can help restore shoulder flexibility and relieve pain over time. When stretching, be sure to breathe deeply. And follow any special instructions from your doctor or physical therapist.  · While seated, move the arm on the side you want to stretch toward the middle of your back. The palm of your hand should face out.  · Cup your other hand under the hand thats behind your back. Gently push your cupped hand upward until you feel the stretch in the shoulder. Try to hold the stretch for 5 seconds.  · Work up to doing 3 sets of this stretch, 3 times a day. Work up to holding the stretch for 30-60 seconds.  Note: Keep your back straight. Its okay if your hand cant reach the middle of your back. Instead, start the stretch with your hand as close as you can get it to the middle of your back.     Frozen Shoulder  Frozen shoulder is another name for adhesive capsulitis, which causes restricted movement in the shoulder. If you have frozen shoulder, this stretch may cause discomfort, especially when you first get started. A few months may pass before you achieve the results you want. But once your shoulder heals, it almost never becomes frozen again. So stick to your stretching program. If you have any questions, be sure to ask your  doctor.   © 2576-6604 [employer]. [address] [place] [address]. All rights reserved. This information is not intended as a substitute for professional medical care. Always follow your healthcare professional's instructions.        Exercises for Shoulder Flexibility: Wall Walk  Improving your flexibility can reduce pain. Stretching exercises also can help increase your range of pain-free motion. Breathe normally when you exercise. And try to use smooth, fluid movements.  Note: Follow any special instructions you are given. If you feel pain, stop the exercise. If the pain continues after stopping, call your healthcare provider.  · Stand with your shoulder about 2 feet from the wall.  · Raise your arm to shoulder level and gently walk your fingers up the wall as high as you can.  · Hold for a few seconds. Then walk your fingers back down.  · Repeat 3 times. Move closer to the wall as you repeat.  · Build up to holding each stretch for 30 seconds.  CAUTION: Do this stretch only if your healthcare provider recommends it. Dont do it when you are first injured.          © 3151-9772 [employer]. [address] [place] [address]. All rights reserved. This information is not intended as a substitute for professional medical care. Always follow your healthcare professional's instructions.        Exercises for Shoulder Flexibility: Pendulum Exercise   Improving your flexibility can reduce pain. Stretching exercises also can help increase your range of pain-free motion. Breathe normally when you exercise. And try to use smooth, fluid movements.  Follow any special instructions you are given. If you feel pain, stop the exercise. If the pain continues after stopping, call your health care provider.  · Lean over with your good arm supported on a table or chair.  · Relax the arm on the painful side, letting it hang straight down.  · Slowly begin to swing the relaxed arm. Move it  in a small [place], gradually making it bigger if you can. Then reverse the direction. Next, move it backward and forward. Finally, move it side to side.     Note: Spend about 5 minutes doing the exercise, 3 times a day. Change direction after 1 minute of motion.   © 8653-8589 [employer]. [address] [place] [address]. All rights reserved. This information is not intended as a substitute for professional medical care. Always follow your healthcare professional's instructions.        Exercises for Shoulder Flexibility: Broom Stretch    Improving your flexibility can reduce pain. Stretching exercises also can help increase your range of pain-free motion. Breathe normally when you exercise. Try to use smooth, fluid movements. Never force a stretch.  · Stand up or lie on the floor. Place the palm of your hand over the end of a broomstick or cane. Grasp the stick farther down with the other hand, palm facing down.  · Push the end of the stick up on the side of your injured shoulder as high as is comfortable.  · Hold for a few seconds. Return to the starting position.  · Repeat 3-5 times. Build up to holding each stretch for 10-30  seconds.  Note: Follow any special instructions you are given. If you feel pain, stop the exercise. If the pain continues after stopping, call your healthcare provider.   © 2000-2014 [employer]. [address] [place] [address]. All rights reserved. This information is not intended as a substitute for professional medical care. Always follow your healthcare professional's instructions.

## 2020-01-23 NOTE — PROGRESS NOTES
Subjective:       Patient ID: Isma Martin is a 69 y.o. male.    Chief Complaint: Follow-up    Patient is here for followup for chronic conditions.    When he takes off C collar still in pain. Spine surg has not required that he continue the collar though. He has not been able to do PT since he has had to help his wife.    Still chronic L shoulder pains, cannot have surgery now due to neck and wife's health.    Review of Systems   Constitutional: Negative for activity change and appetite change.   HENT: Negative for congestion and sinus pressure.    Eyes: Negative for visual disturbance.   Respiratory: Negative for chest tightness, shortness of breath and wheezing.    Cardiovascular: Negative for chest pain, palpitations and leg swelling.   Gastrointestinal: Negative for abdominal distention and abdominal pain.   Endocrine: Negative for polyuria.   Genitourinary: Negative for decreased urine volume, dysuria and urgency.   Musculoskeletal: Positive for arthralgias (L shoulder) and neck stiffness. Negative for back pain, joint swelling and neck pain.   Skin: Negative for rash.   Neurological: Negative for tremors, syncope and weakness.   Hematological: Negative for adenopathy. Does not bruise/bleed easily.   Psychiatric/Behavioral: The patient is not nervous/anxious.        Objective:      Physical Exam   Constitutional: He appears well-developed and well-nourished. No distress.   HENT:   Head: Normocephalic and atraumatic.   Mouth/Throat: No oropharyngeal exudate.   Eyes: Pupils are equal, round, and reactive to light. Conjunctivae are normal. No scleral icterus.   Neck: Normal range of motion. Neck supple. No thyromegaly present.   Cardiovascular: Regular rhythm and normal heart sounds.   Mild mary   Pulmonary/Chest: Effort normal and breath sounds normal.   Abdominal: Soft. Bowel sounds are normal.   Musculoskeletal: He exhibits no edema.   Painful L shoulder rom, and very decreased rom 2/2 pain and  weakness. There is clear weakness of the L shoulder rotator cuff.    No midline spine tenderness to deep palpation.    There is decreased spine rom    C spine scar CDI    Protective Sensation (w/ 10 gram monofilament):  Right: Intact  Left: Intact    Visual Inspection:  Prev surgery medial R foot, decreased rom at 1st MTP    Pedal Pulses:   Right: Present  Left: Present    Posterior tibialis:   Right:Present  Left: Present     Lymphadenopathy:     He has no cervical adenopathy.   Neurological:   Normal  strength bilat, no hand muscle weakness seen or atrophy.  nl lower extrem strength/sense/DTRs     Skin: No rash noted. He is not diaphoretic.   Psychiatric: He has a normal mood and affect. His behavior is normal.       Assessment:       1. Type 2 diabetes mellitus without complication, without long-term current use of insulin    2. Essential hypertension    3. Gastroesophageal reflux disease, esophagitis presence not specified        Plan:       Isma was seen today for follow-up.    Diagnoses and all orders for this visit:    Type 2 diabetes mellitus without complication, without long-term current use of insulin  -     atorvastatin (LIPITOR) 40 MG tablet; Take 1 tablet (40 mg total) by mouth once daily.  a1c next time    Essential hypertension  -     metoprolol succinate (TOPROL-XL) 25 MG 24 hr tablet; Take 1 tablet (25 mg total) by mouth once daily.  -     losartan-hydrochlorothiazide 50-12.5 mg (HYZAAR) 50-12.5 mg per tablet; Take 1 tablet by mouth once daily.  controlled    Gastroesophageal reflux disease, esophagitis presence not specified  -     omeprazole (PRILOSEC) 20 MG capsule; Take 1 capsule (20 mg total) by mouth once daily.    I advised that he stop wearing surg collar if OK with spine dept and he start doing rom exercises and heating pad for pain.  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, currently asymptomatic        Health  Maintenance       Date Due Completion Date    Sign Pain Contract 05/24/1970 ---    Complete Opioid Risk Tool 05/24/1970 ---    Aspirin/Antiplatelet Therapy 05/24/1970 ---    Shingles Vaccine (2 of 3) 06/28/2016 5/3/2016    Hemoglobin A1c 04/25/2020 10/25/2019    Eye Exam 08/08/2020 8/8/2019    Override on 8/10/2015: Done    Colonoscopy 09/08/2020 9/8/2015    Override on 5/1/2008: Done    Lipid Panel 10/25/2020 10/25/2019    High Dose Statin 01/23/2021 1/23/2020    Foot Exam 01/23/2021 1/23/2020 (Done)    Override on 1/23/2020: Done    Override on 7/28/2016: Done    Override on 8/10/2015: Done    Pneumococcal Vaccine (65+ Low/Medium Risk) (2 of 2 - PPSV23) 07/05/2021 7/5/2016    TETANUS VACCINE 05/03/2026 5/3/2016          Follow up in about 6 months (around 7/23/2020).    Future Appointments   Date Time Provider Department Center   3/25/2020  1:00 PM Ariel Lamar MD [facility] [place] [facility]   7/23/2020 10:00 AM Anthony Tay MD [facility] [place] [facility]

## 2020-02-04 DIAGNOSIS — D35.2 PROLACTINOMA: ICD-10-CM

## 2020-02-04 RX ORDER — CABERGOLINE 0.5 MG/1
0.25 TABLET ORAL
Qty: 12 TABLET | Refills: 3 | Status: SHIPPED | OUTPATIENT
Start: 2020-02-06 | End: 2020-03-30 | Stop reason: SDUPTHER

## 2020-02-04 NOTE — TELEPHONE ENCOUNTER
----- Message from Alisia Peters sent at 2/4/2020 10:56 AM CST -----  Contact: Pt [phone]  Pt needs refill on   cabergoline (DOSTINEX) 0.5 mg tablet  Please send to new pharmacy mail order   [facility] [phone]

## 2020-03-30 DIAGNOSIS — D35.2 PROLACTINOMA: ICD-10-CM

## 2020-03-30 DIAGNOSIS — N52.9 ERECTILE DYSFUNCTION, UNSPECIFIED ERECTILE DYSFUNCTION TYPE: ICD-10-CM

## 2020-03-30 RX ORDER — SILDENAFIL CITRATE 20 MG/1
20 TABLET ORAL DAILY PRN
Qty: 15 TABLET | Refills: 0 | Status: SHIPPED | OUTPATIENT
Start: 2020-03-30 | End: 2023-01-17

## 2020-03-30 RX ORDER — CABERGOLINE 0.5 MG/1
0.25 TABLET ORAL
Qty: 12 TABLET | Refills: 3 | Status: SHIPPED | OUTPATIENT
Start: 2020-03-30 | End: 2020-07-01 | Stop reason: SDUPTHER

## 2020-04-24 ENCOUNTER — PATIENT MESSAGE (OUTPATIENT)
Dept: ORTHOPEDICS | Facility: CLINIC | Age: 70
End: 2020-04-24

## 2020-07-23 ENCOUNTER — OFFICE VISIT (OUTPATIENT)
Dept: INTERNAL MEDICINE | Facility: CLINIC | Age: 70
End: 2020-07-23
Payer: MEDICARE

## 2020-07-23 ENCOUNTER — LAB VISIT (OUTPATIENT)
Dept: LAB | Facility: HOSPITAL | Age: 70
End: 2020-07-23
Attending: INTERNAL MEDICINE
Payer: MEDICARE

## 2020-07-23 VITALS
SYSTOLIC BLOOD PRESSURE: 122 MMHG | OXYGEN SATURATION: 99 % | WEIGHT: 170.63 LBS | HEART RATE: 59 BPM | HEIGHT: 65 IN | DIASTOLIC BLOOD PRESSURE: 76 MMHG | BODY MASS INDEX: 28.43 KG/M2

## 2020-07-23 DIAGNOSIS — Z12.5 SCREENING PSA (PROSTATE SPECIFIC ANTIGEN): ICD-10-CM

## 2020-07-23 DIAGNOSIS — I10 ESSENTIAL HYPERTENSION: ICD-10-CM

## 2020-07-23 DIAGNOSIS — E11.9 TYPE 2 DIABETES MELLITUS WITHOUT COMPLICATION, WITHOUT LONG-TERM CURRENT USE OF INSULIN: ICD-10-CM

## 2020-07-23 DIAGNOSIS — S46.012D TRAUMATIC COMPLETE TEAR OF LEFT ROTATOR CUFF, SUBSEQUENT ENCOUNTER: ICD-10-CM

## 2020-07-23 DIAGNOSIS — D35.2 PROLACTINOMA: ICD-10-CM

## 2020-07-23 DIAGNOSIS — E11.9 TYPE 2 DIABETES MELLITUS WITHOUT COMPLICATION, WITHOUT LONG-TERM CURRENT USE OF INSULIN: Primary | ICD-10-CM

## 2020-07-23 DIAGNOSIS — M19.079 ARTHRITIS OF BIG TOE: ICD-10-CM

## 2020-07-23 LAB
ALBUMIN SERPL BCP-MCNC: 3.6 G/DL (ref 3.5–5.2)
ALP SERPL-CCNC: 58 U/L (ref 55–135)
ALT SERPL W/O P-5'-P-CCNC: 32 U/L (ref 10–44)
ANION GAP SERPL CALC-SCNC: 6 MMOL/L (ref 8–16)
AST SERPL-CCNC: 28 U/L (ref 10–40)
BASOPHILS # BLD AUTO: 0.01 K/UL (ref 0–0.2)
BASOPHILS NFR BLD: 0.2 % (ref 0–1.9)
BILIRUB SERPL-MCNC: 0.7 MG/DL (ref 0.1–1)
BUN SERPL-MCNC: 12 MG/DL (ref 8–23)
CALCIUM SERPL-MCNC: 9.1 MG/DL (ref 8.7–10.5)
CHLORIDE SERPL-SCNC: 105 MMOL/L (ref 95–110)
CHOLEST SERPL-MCNC: 171 MG/DL (ref 120–199)
CHOLEST/HDLC SERPL: 4.1 {RATIO} (ref 2–5)
CO2 SERPL-SCNC: 28 MMOL/L (ref 23–29)
CREAT SERPL-MCNC: 1 MG/DL (ref 0.5–1.4)
DIFFERENTIAL METHOD: NORMAL
EOSINOPHIL # BLD AUTO: 0.2 K/UL (ref 0–0.5)
EOSINOPHIL NFR BLD: 2.5 % (ref 0–8)
ERYTHROCYTE [DISTWIDTH] IN BLOOD BY AUTOMATED COUNT: 12.8 % (ref 11.5–14.5)
EST. GFR  (AFRICAN AMERICAN): >60 ML/MIN/1.73 M^2
EST. GFR  (NON AFRICAN AMERICAN): >60 ML/MIN/1.73 M^2
ESTIMATED AVG GLUCOSE: 120 MG/DL (ref 68–131)
GLUCOSE SERPL-MCNC: 104 MG/DL (ref 70–110)
HBA1C MFR BLD HPLC: 5.8 % (ref 4–5.6)
HCT VFR BLD AUTO: 49.3 % (ref 40–54)
HDLC SERPL-MCNC: 42 MG/DL (ref 40–75)
HDLC SERPL: 24.6 % (ref 20–50)
HGB BLD-MCNC: 15.8 G/DL (ref 14–18)
IMM GRANULOCYTES # BLD AUTO: 0.01 K/UL (ref 0–0.04)
IMM GRANULOCYTES NFR BLD AUTO: 0.2 % (ref 0–0.5)
LDLC SERPL CALC-MCNC: 115.2 MG/DL (ref 63–159)
LYMPHOCYTES # BLD AUTO: 1.4 K/UL (ref 1–4.8)
LYMPHOCYTES NFR BLD: 22.3 % (ref 18–48)
MCH RBC QN AUTO: 29.6 PG (ref 27–31)
MCHC RBC AUTO-ENTMCNC: 32 G/DL (ref 32–36)
MCV RBC AUTO: 92 FL (ref 82–98)
MONOCYTES # BLD AUTO: 0.6 K/UL (ref 0.3–1)
MONOCYTES NFR BLD: 9 % (ref 4–15)
NEUTROPHILS # BLD AUTO: 4 K/UL (ref 1.8–7.7)
NEUTROPHILS NFR BLD: 65.8 % (ref 38–73)
NONHDLC SERPL-MCNC: 129 MG/DL
NRBC BLD-RTO: 0 /100 WBC
PLATELET # BLD AUTO: 205 K/UL (ref 150–350)
PMV BLD AUTO: 10 FL (ref 9.2–12.9)
POTASSIUM SERPL-SCNC: 3.7 MMOL/L (ref 3.5–5.1)
PROLACTIN SERPL IA-MCNC: 12.5 NG/ML (ref 3.5–19.4)
PROT SERPL-MCNC: 7.4 G/DL (ref 6–8.4)
RBC # BLD AUTO: 5.34 M/UL (ref 4.6–6.2)
SODIUM SERPL-SCNC: 139 MMOL/L (ref 136–145)
TRIGL SERPL-MCNC: 69 MG/DL (ref 30–150)
WBC # BLD AUTO: 6.1 K/UL (ref 3.9–12.7)

## 2020-07-23 PROCEDURE — 99214 PR OFFICE/OUTPT VISIT, EST, LEVL IV, 30-39 MIN: ICD-10-PCS | Mod: S$GLB,,, | Performed by: INTERNAL MEDICINE

## 2020-07-23 PROCEDURE — 3044F PR MOST RECENT HEMOGLOBIN A1C LEVEL <7.0%: ICD-10-PCS | Mod: CPTII,S$GLB,, | Performed by: INTERNAL MEDICINE

## 2020-07-23 PROCEDURE — 3008F BODY MASS INDEX DOCD: CPT | Mod: CPTII,S$GLB,, | Performed by: INTERNAL MEDICINE

## 2020-07-23 PROCEDURE — 3074F PR MOST RECENT SYSTOLIC BLOOD PRESSURE < 130 MM HG: ICD-10-PCS | Mod: CPTII,S$GLB,, | Performed by: INTERNAL MEDICINE

## 2020-07-23 PROCEDURE — 1100F PTFALLS ASSESS-DOCD GE2>/YR: CPT | Mod: CPTII,S$GLB,, | Performed by: INTERNAL MEDICINE

## 2020-07-23 PROCEDURE — 99999 PR PBB SHADOW E&M-EST. PATIENT-LVL V: CPT | Mod: PBBFAC,,, | Performed by: INTERNAL MEDICINE

## 2020-07-23 PROCEDURE — 3078F DIAST BP <80 MM HG: CPT | Mod: CPTII,S$GLB,, | Performed by: INTERNAL MEDICINE

## 2020-07-23 PROCEDURE — 80061 LIPID PANEL: CPT

## 2020-07-23 PROCEDURE — 36415 COLL VENOUS BLD VENIPUNCTURE: CPT

## 2020-07-23 PROCEDURE — 1125F PR PAIN SEVERITY QUANTIFIED, PAIN PRESENT: ICD-10-PCS | Mod: S$GLB,,, | Performed by: INTERNAL MEDICINE

## 2020-07-23 PROCEDURE — 99999 PR PBB SHADOW E&M-EST. PATIENT-LVL V: ICD-10-PCS | Mod: PBBFAC,,, | Performed by: INTERNAL MEDICINE

## 2020-07-23 PROCEDURE — 99214 OFFICE O/P EST MOD 30 MIN: CPT | Mod: S$GLB,,, | Performed by: INTERNAL MEDICINE

## 2020-07-23 PROCEDURE — 99499 RISK ADDL DX/OHS AUDIT: ICD-10-PCS | Mod: S$GLB,,, | Performed by: INTERNAL MEDICINE

## 2020-07-23 PROCEDURE — 80053 COMPREHEN METABOLIC PANEL: CPT

## 2020-07-23 PROCEDURE — 3078F PR MOST RECENT DIASTOLIC BLOOD PRESSURE < 80 MM HG: ICD-10-PCS | Mod: CPTII,S$GLB,, | Performed by: INTERNAL MEDICINE

## 2020-07-23 PROCEDURE — 83036 HEMOGLOBIN GLYCOSYLATED A1C: CPT

## 2020-07-23 PROCEDURE — 85025 COMPLETE CBC W/AUTO DIFF WBC: CPT

## 2020-07-23 PROCEDURE — 1159F PR MEDICATION LIST DOCUMENTED IN MEDICAL RECORD: ICD-10-PCS | Mod: S$GLB,,, | Performed by: INTERNAL MEDICINE

## 2020-07-23 PROCEDURE — 1100F PR PT FALLS ASSESS DOC 2+ FALLS/FALL W/INJURY/YR: ICD-10-PCS | Mod: CPTII,S$GLB,, | Performed by: INTERNAL MEDICINE

## 2020-07-23 PROCEDURE — 3074F SYST BP LT 130 MM HG: CPT | Mod: CPTII,S$GLB,, | Performed by: INTERNAL MEDICINE

## 2020-07-23 PROCEDURE — 3044F HG A1C LEVEL LT 7.0%: CPT | Mod: CPTII,S$GLB,, | Performed by: INTERNAL MEDICINE

## 2020-07-23 PROCEDURE — 3008F PR BODY MASS INDEX (BMI) DOCUMENTED: ICD-10-PCS | Mod: CPTII,S$GLB,, | Performed by: INTERNAL MEDICINE

## 2020-07-23 PROCEDURE — 3288F FALL RISK ASSESSMENT DOCD: CPT | Mod: CPTII,S$GLB,, | Performed by: INTERNAL MEDICINE

## 2020-07-23 PROCEDURE — 1125F AMNT PAIN NOTED PAIN PRSNT: CPT | Mod: S$GLB,,, | Performed by: INTERNAL MEDICINE

## 2020-07-23 PROCEDURE — 1159F MED LIST DOCD IN RCRD: CPT | Mod: S$GLB,,, | Performed by: INTERNAL MEDICINE

## 2020-07-23 PROCEDURE — 84146 ASSAY OF PROLACTIN: CPT

## 2020-07-23 PROCEDURE — 99499 UNLISTED E&M SERVICE: CPT | Mod: S$GLB,,, | Performed by: INTERNAL MEDICINE

## 2020-07-23 PROCEDURE — 3288F PR FALLS RISK ASSESSMENT DOCUMENTED: ICD-10-PCS | Mod: CPTII,S$GLB,, | Performed by: INTERNAL MEDICINE

## 2020-07-23 RX ORDER — TRAMADOL HYDROCHLORIDE 50 MG/1
50 TABLET ORAL 2 TIMES DAILY PRN
Qty: 40 TABLET | Refills: 2 | Status: SHIPPED | OUTPATIENT
Start: 2020-07-23 | End: 2021-11-06

## 2020-07-23 NOTE — PROGRESS NOTES
Subjective:       Patient ID: Isma Martin is a 70 y.o. male.    Chief Complaint: Follow-up    Patient is here for followup for chronic conditions.    DM2:  good med adherence  Stable, no changed Diet  Not chekcing sugars, re 140 AM sugars  no Post-prandial sugars  no Numbness in feet  Some rash between toes, sores in feet  no Visual changes  no Polyuria/polydipsia.    L shoulder pains still bothering him, makes it diff to rest.    Poor sleep at noc 2/2 shoulder pains.    Review of Systems   Constitutional: Negative for activity change and unexpected weight change.   HENT: Negative for hearing loss, rhinorrhea and trouble swallowing.    Eyes: Negative for discharge and visual disturbance.   Respiratory: Negative for chest tightness and wheezing.    Cardiovascular: Negative for chest pain and palpitations.   Gastrointestinal: Negative for blood in stool, constipation, diarrhea and vomiting.   Endocrine: Positive for polydipsia and polyuria.   Genitourinary: Positive for urgency. Negative for difficulty urinating and hematuria.   Musculoskeletal: Positive for arthralgias. Negative for joint swelling and neck pain.   Neurological: Negative for weakness and headaches.   Psychiatric/Behavioral: Positive for dysphoric mood (related to covid 19, shoulder pain, some family members have passed recently). Negative for confusion.           Objective:      Physical Exam  Constitutional:       General: He is not in acute distress.     Appearance: He is well-developed. He is not diaphoretic.   HENT:      Head: Normocephalic and atraumatic.      Mouth/Throat:      Pharynx: No oropharyngeal exudate.   Eyes:      General: No scleral icterus.     Conjunctiva/sclera: Conjunctivae normal.      Pupils: Pupils are equal, round, and reactive to light.   Neck:      Musculoskeletal: Normal range of motion and neck supple.      Thyroid: No thyromegaly.   Cardiovascular:      Rate and Rhythm: Regular rhythm.      Heart sounds: Normal  heart sounds.      Comments: Mild mary  Pulmonary:      Effort: Pulmonary effort is normal.      Breath sounds: Normal breath sounds.   Abdominal:      General: Bowel sounds are normal.      Palpations: Abdomen is soft.   Musculoskeletal:      Comments: Painful L shoulder rom, and very decreased rom 2/2 pain and weakness. There is clear weakness of the L shoulder rotator cuff.    No midline spine tenderness to deep palpation.    There is decreased spine rom    C spine scar CDI    Protective Sensation (w/ 10 gram monofilament):  Right: Intact  Left: Intact    Visual Inspection:  Prev surgery medial R foot, decreased rom at 1st MTP  Mild tinea seen between the toes    Pedal Pulses:   Right: Present  Left: Present    Posterior tibialis:   Right:Present  Left: Present     Lymphadenopathy:      Cervical: No cervical adenopathy.   Skin:     Findings: No rash.   Neurological:      Comments: Normal  strength bilat, no hand muscle weakness seen or atrophy.  nl lower extrem strength/sense/DTRs     Psychiatric:         Behavior: Behavior normal.         Assessment:       1. Type 2 diabetes mellitus without complication, without long-term current use of insulin    2. Essential hypertension    3. Prolactinoma    4. Screening PSA (prostate specific antigen)    5. Traumatic complete tear of left rotator cuff, subsequent encounter    6. Arthritis of big toe        Plan:       Isma was seen today for follow-up.    Diagnoses and all orders for this visit:    Type 2 diabetes mellitus without complication, without long-term current use of insulin  -     CBC auto differential; Future  -     Comprehensive metabolic panel; Future  -     Lipid Panel; Future  -     Hemoglobin A1C; Future    Essential hypertension  controkked    Prolactinoma  -     Prolactin; Future    Screening PSA (prostate specific antigen)  utd    Traumatic complete tear of left rotator cuff, subsequent encounter  Arthritis of big toe  -     traMADoL (ULTRAM) 50  mg tablet; Take 1 tablet (50 mg total) by mouth 2 (two) times daily as needed for Pain.  He cannot commit to surgery at this time, due to caring for his wife      Health Maintenance       Date Due Completion Date    Aspirin/Antiplatelet Therapy 05/24/1970 ---    Shingles Vaccine (2 of 3) 06/28/2016 5/3/2016    Hemoglobin A1c 07/23/2020 1/23/2020    Eye Exam 08/08/2020 8/8/2019    Override on 8/10/2015: Done    Influenza Vaccine (1) 09/01/2020 10/1/2019    Colorectal Cancer Screening 09/08/2020 9/8/2015    Override on 5/1/2008: Done    Lipid Panel 10/25/2020 10/25/2019    Foot Exam 01/23/2021 1/23/2020    Override on 1/23/2020: Done    Override on 7/28/2016: Done    Override on 8/10/2015: Done    Pneumococcal Vaccine (65+ Low/Medium Risk) (2 of 2 - PPSV23) 07/05/2021 7/5/2016    TETANUS VACCINE 05/03/2026 5/3/2016          Follow up in about 6 months (around 1/23/2021).    Future Appointments   Date Time Provider Department Center   1/19/2021  9:00 AM Anthony Tay MD [facility] [place]

## 2020-12-01 DIAGNOSIS — D35.2 PROLACTINOMA: ICD-10-CM

## 2020-12-01 RX ORDER — CABERGOLINE 0.5 MG/1
0.25 TABLET ORAL
Qty: 12 TABLET | Refills: 3 | Status: SHIPPED | OUTPATIENT
Start: 2020-12-03 | End: 2020-12-11 | Stop reason: SDUPTHER

## 2020-12-01 NOTE — TELEPHONE ENCOUNTER
----- Message from Ciarra Castro sent at 12/1/2020 11:26 AM CST -----  Contact: [phone]  Requesting an RX refill or new RX.  Is this a refill or new RX: refill 1  RX name and strength:cabergoline (DOSTINEX) 0.5 mg tablet  Is this a 30 day or 90 day RX:   Pharmacy name and phone # (copy/paste from chart): [facility] [address] [facility] [address] [facility] [address] [phone] (Phone) [phone] (Fax)   Comments:

## 2020-12-10 ENCOUNTER — PATIENT OUTREACH (OUTPATIENT)
Dept: ADMINISTRATIVE | Facility: OTHER | Age: 70
End: 2020-12-10

## 2020-12-10 DIAGNOSIS — E11.9 TYPE 2 DIABETES MELLITUS WITHOUT COMPLICATION, UNSPECIFIED WHETHER LONG TERM INSULIN USE: Primary | ICD-10-CM

## 2020-12-10 PROBLEM — E55.9 VITAMIN D DEFICIENCY: Status: ACTIVE | Noted: 2020-12-10

## 2020-12-10 NOTE — PROGRESS NOTES
Care Everywhere: updated  Immunization: updated,links delay   Health Maintenance: updated  Media Review:   Legacy Review:   Order placed: eye screening photo   Upcoming appts:

## 2020-12-11 ENCOUNTER — OFFICE VISIT (OUTPATIENT)
Dept: ENDOCRINOLOGY | Facility: CLINIC | Age: 70
End: 2020-12-11
Payer: MEDICARE

## 2020-12-11 VITALS
BODY MASS INDEX: 28.69 KG/M2 | HEIGHT: 65 IN | DIASTOLIC BLOOD PRESSURE: 77 MMHG | SYSTOLIC BLOOD PRESSURE: 126 MMHG | OXYGEN SATURATION: 98 % | WEIGHT: 172.19 LBS | HEART RATE: 57 BPM

## 2020-12-11 DIAGNOSIS — C71.9 MALIGNANT NEOPLASM OF BRAIN, UNSPECIFIED LOCATION: ICD-10-CM

## 2020-12-11 DIAGNOSIS — M81.0 AGE-RELATED OSTEOPOROSIS WITHOUT CURRENT PATHOLOGICAL FRACTURE: ICD-10-CM

## 2020-12-11 DIAGNOSIS — D35.2 PROLACTINOMA: Primary | ICD-10-CM

## 2020-12-11 DIAGNOSIS — E55.9 VITAMIN D DEFICIENCY: ICD-10-CM

## 2020-12-11 DIAGNOSIS — E11.9 TYPE 2 DIABETES MELLITUS WITHOUT COMPLICATION, WITHOUT LONG-TERM CURRENT USE OF INSULIN: ICD-10-CM

## 2020-12-11 PROCEDURE — 99214 PR OFFICE/OUTPT VISIT, EST, LEVL IV, 30-39 MIN: ICD-10-PCS | Mod: GC,S$GLB,, | Performed by: STUDENT IN AN ORGANIZED HEALTH CARE EDUCATION/TRAINING PROGRAM

## 2020-12-11 PROCEDURE — 3078F PR MOST RECENT DIASTOLIC BLOOD PRESSURE < 80 MM HG: ICD-10-PCS | Mod: CPTII,GC,S$GLB, | Performed by: STUDENT IN AN ORGANIZED HEALTH CARE EDUCATION/TRAINING PROGRAM

## 2020-12-11 PROCEDURE — 3078F DIAST BP <80 MM HG: CPT | Mod: CPTII,GC,S$GLB, | Performed by: STUDENT IN AN ORGANIZED HEALTH CARE EDUCATION/TRAINING PROGRAM

## 2020-12-11 PROCEDURE — 99999 PR PBB SHADOW E&M-EST. PATIENT-LVL IV: ICD-10-PCS | Mod: PBBFAC,GC,, | Performed by: STUDENT IN AN ORGANIZED HEALTH CARE EDUCATION/TRAINING PROGRAM

## 2020-12-11 PROCEDURE — 99999 PR PBB SHADOW E&M-EST. PATIENT-LVL IV: CPT | Mod: PBBFAC,GC,, | Performed by: STUDENT IN AN ORGANIZED HEALTH CARE EDUCATION/TRAINING PROGRAM

## 2020-12-11 PROCEDURE — 1159F PR MEDICATION LIST DOCUMENTED IN MEDICAL RECORD: ICD-10-PCS | Mod: GC,S$GLB,, | Performed by: STUDENT IN AN ORGANIZED HEALTH CARE EDUCATION/TRAINING PROGRAM

## 2020-12-11 PROCEDURE — 3074F SYST BP LT 130 MM HG: CPT | Mod: CPTII,GC,S$GLB, | Performed by: STUDENT IN AN ORGANIZED HEALTH CARE EDUCATION/TRAINING PROGRAM

## 2020-12-11 PROCEDURE — 99214 OFFICE O/P EST MOD 30 MIN: CPT | Mod: GC,S$GLB,, | Performed by: STUDENT IN AN ORGANIZED HEALTH CARE EDUCATION/TRAINING PROGRAM

## 2020-12-11 PROCEDURE — 3044F PR MOST RECENT HEMOGLOBIN A1C LEVEL <7.0%: ICD-10-PCS | Mod: CPTII,GC,S$GLB, | Performed by: STUDENT IN AN ORGANIZED HEALTH CARE EDUCATION/TRAINING PROGRAM

## 2020-12-11 PROCEDURE — 1159F MED LIST DOCD IN RCRD: CPT | Mod: GC,S$GLB,, | Performed by: STUDENT IN AN ORGANIZED HEALTH CARE EDUCATION/TRAINING PROGRAM

## 2020-12-11 PROCEDURE — 3044F HG A1C LEVEL LT 7.0%: CPT | Mod: CPTII,GC,S$GLB, | Performed by: STUDENT IN AN ORGANIZED HEALTH CARE EDUCATION/TRAINING PROGRAM

## 2020-12-11 PROCEDURE — 3074F PR MOST RECENT SYSTOLIC BLOOD PRESSURE < 130 MM HG: ICD-10-PCS | Mod: CPTII,GC,S$GLB, | Performed by: STUDENT IN AN ORGANIZED HEALTH CARE EDUCATION/TRAINING PROGRAM

## 2020-12-11 RX ORDER — CABERGOLINE 0.5 MG/1
0.25 TABLET ORAL
Qty: 12 TABLET | Refills: 3 | Status: SHIPPED | OUTPATIENT
Start: 2020-12-14 | End: 2021-12-19 | Stop reason: SDUPTHER

## 2020-12-11 NOTE — ASSESSMENT & PLAN NOTE
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    MRI pit - 06/21/16 -  Remote operative change from transsphenoidal sellar lesion resection with continued heterogeneous probable fat packing material within sphenoid sinus. Stable  No evidence for new signal abnormality or enhancement to suggest worsening residual  lesion.     Prior labs not suggestive of co-secreting tumor, no signs of subclinical cushings.    Although no available inital imaging, a 2014 MRI (concurrent elevated PRL) without stalk compression and lesion present, left hypodense area, starte on DA at that time, and comparison f/u 2016 MRI with some  resolution of lesion and PRL levels suggestive of macroprolactinoma requiring DA    If repeat PRL levels elevated with changes in HA or peripheral vision, may require f/u MRI for comparison    Will get the following labs:   PRL   TSH/Free T4   Chem panel, CBC   DXA (last performed 2016 normal BMD)   Testosterone    Continue Cabergoline 0.25 mg twice weekly - refilled      I reviewed benefits and risks of DA agonist and indications for therapy including macroadenoma, galactorrhea, infertility and hypogonadism which can negatively impact bone health.      Take DA at bed with snack - call if nausea or orthostatic    Recurrence rates 20% over 10 years.  Indications for surgery: increasing tumor size despite therapy, apoplexy, C/I to DA, CSF leak, optic chiasm compression. Could consider radiotherapy of residual suspected prolactinoma tissue.

## 2020-12-11 NOTE — PROGRESS NOTES
Normal IGF-1 in 2015 and 2016    MRI brain 6/21/2016 - on cabergoline w/ normal PRL    MRI 2014 - off cabergoline

## 2020-12-11 NOTE — PATIENT INSTRUCTIONS
Continue Cabergoline at current dose 0.25 mg twice weekly.    Please obtain fasting 8 am labs today and I will contact you once I have the results of all labs.    Please obtain bone scan/DXA prior to next appt and I will call you with results    We will monitor prolactin annually unless there is some abnormality    Please contact us if you are having significant change in headaches worsening or changes in peripheral vision

## 2020-12-11 NOTE — ASSESSMENT & PLAN NOTE
Diet controlled T2DM - not on medication    Hemoglobin A1C   Date Value Ref Range Status   07/23/2020 5.8 (H) 4.0 - 5.6 % Final     Consider ESTELLE  On ARB  Lipid panel reviewed - on statin  Eye exam and foot exam per pcp

## 2020-12-11 NOTE — PROGRESS NOTES
Subjective:      HPI:   Isma Martin is a 70 y.o. male who presents for follow up for prolactinoma.     PMHx: T2DM-controlled, prolactinoma, HLD, HTN, Gerd, BPH, chronic headaches     Last seen in clinic on 09/24/19.    Has been on cabergoline 0.25 mg twice weekly, resumed medication in August 2016.    Initial presentation:  Age 40s presented with headache and found to have a pituitary tumor.    Interventions have included:   Surgery 1996 at [facility] - he was advised that he needed sugery even though he was only on DA for a few days based on the size and local invasion.  Peripheral changes occurred at the time with worsening headaches.  After surgery his HA resolved, vision improved, libido returned and was doing well.  No medication postoperatively.   No Radiation pre/post operatively      Age 50s began experiencing low libido, headaches returned  Age 60s initiated on testosterone. Started with IM and was switched to gel but stopped with no improvement of symptoms.  No testosterone supplementation      Also has type 2 DM, controlled with Diet, managed by PCP. Last A1c 5.8% 07/2020 - On statin & ARB      Initial presentation:   Age 40s    MRI 6/21/16  Remote operative change from transsphenoidal sellar lesion resection with continued heterogeneous probable fat packing material within sphenoid sinus.  Otherwise stable configuration of the sella with enhancing material along the floor and left aspect of the sella which may represent residual pituitary tissue underlying residual lesion not excluded. No evidence for new signal abnormality or enhancement to suggest worsening residual  lesion.     C/O  Headache:    Intermittent, right-sided, dull achy, pressure dissimilar to prior headaches  Vision change:   unchanged  Formal Visual fields:   yes  Galactorrhea:    None nor breast tenderness      With regards to the pituitary adenoma:      Functional labs:    Interventions have included: medical management,  surgery    The patient is not currently using any medication known to cause hyperprolactinemia such as: antipsychotics (risperidone, phenothiazines, haloperidol and butyrophenones),gastric motility drugs (metoclopramide and domperidone), or antihypertensives (methyldopa, reserpine, and verapamil).      Hormonal deficiencies include:  Secondary hypogonadism     Last visual fields were: < 5 years ago    Positive nausea  No unexplained weight changes.  Positive orthostatic symptoms       Current symptoms:    Hyperprolactinemia:  []  breast tenderness  []  nipple discharge    []  Menstrual Irregularity  [x] Denies  [x]  Headaches  []  Denies     Thyroid:  [x]  Fatigue  []  weight change []  temp intolerance   []  Denies    []  BM change []  skin/hair change [] Palpitations   []  tremor [x]  Denies     Growth Hormone Excess:  []  increase hand/foot size []  teeth spacing change    [x]  Denies    []  worse glycemic control []  joint pain     [x]  Denies      Cushing's syndrome:   []  Easy bruising []  Weight gain  []  Worse glycemic control   []  HTN  []  Acne  []  Hirsutism   []  Striae  []  Menstrual change [x]  Denies    Gonadotrophs:     []  Irregular menses []  Postmenopausal  [x]  Decreased libido   [x]  ED   []  Denies    DI:   [x]  Polyuria  []  Polydipsia  []  Nocturia   []  Denies       12/8/2014 8/12/2015   Growth Hormone 0.3    Somatomedin (IGF-I) 169 173        9/25/2019 7/23/2020   Prolactin 5.1 12.5   Sex Hormone Binding Globulin 53    Testosterone 536    Testosterone, Bioavailable 81.7 (L)    Testosterone, Free 50.4         9/25/2019   TSH 2.020   Free T4 1.00        12/8/2014 1/19/2015 3/10/2015   FSH 0.20 (L)  11.00   LH <0.1 (L)  5.0   Prolactin 38.1 (H) 2.3 (L)        Past Medical History:   Diagnosis Date    Arthritis     Cataract     Colon polyp     Diabetes mellitus     GERD (gastroesophageal reflux disease)     Hyperlipidemia     Hypertension     Hypogonadism male     Obesity      Prolactinoma     Urinary tract infection        Past Surgical History:   Procedure Laterality Date    CAROTID ENDARTERECTOMY Left     CERVICAL LAMINECTOMY WITH SPINAL FUSION N/A 10/29/2019    Procedure: LAMINECTOMY, SPINE, CERVICAL, WITH FUSION C3-6 Depuy SNS: [employer] [place] + Pads;  Surgeon: Ariel Lamar MD;  Location: [facility] OR [address] [place] [address];  Service: Neurosurgery;  Laterality: N/A;    FOOT SURGERY  4-23-14    right    TRANSPHENOIDAL PITUITARY RESECTION      pituitary tumor       Review of patient's allergies indicates:  No Known Allergies      Current Outpatient Medications:     acetaminophen (TYLENOL) 650 MG TbSR, Take 1 tablet (650 mg total) by mouth every 6 (six) hours as needed (pain)., Disp: 56 tablet, Rfl: 0    ascorbic acid (VITAMIN C) 500 MG tablet, Take 500 mg by mouth Every PM., Disp: , Rfl:     atorvastatin (LIPITOR) 40 MG tablet, Take 1 tablet (40 mg total) by mouth once daily., Disp: 90 tablet, Rfl: 11    [START ON 12/14/2020] cabergoline (DOSTINEX) 0.5 mg tablet, Take 0.5 tablets (0.25 mg total) by mouth twice a week., Disp: 12 tablet, Rfl: 3    cholecalciferol, vitamin D3, 2,000 unit Tab, Take 1 tablet by mouth Every PM., Disp: , Rfl:     cyanocobalamin (VITAMIN B-12) 1000 MCG tablet, , Disp: , Rfl:     losartan-hydrochlorothiazide 50-12.5 mg (HYZAAR) 50-12.5 mg per tablet, Take 1 tablet by mouth once daily., Disp: 90 tablet, Rfl: 11    metoprolol succinate (TOPROL-XL) 25 MG 24 hr tablet, Take 1 tablet (25 mg total) by mouth once daily., Disp: 90 tablet, Rfl: 11    omega-3 fatty acids-vitamin E (FISH OIL) 1,000 mg Cap, , Disp: , Rfl:     omeprazole (PRILOSEC) 20 MG capsule, Take 1 capsule (20 mg total) by mouth once daily., Disp: 90 capsule, Rfl: 11    VITAMIN E ACETATE (VITAMIN E ORAL), Take by mouth., Disp: , Rfl:     oxyCODONE (ROXICODONE) 5 MG immediate release tablet, Take 1 tablet (5 mg total) by mouth every 4 (four) hours as needed for Pain. (Patient not taking:  Reported on 2020), Disp: 20 tablet, Rfl: 0    sildenafil (REVATIO) 20 mg Tab, Take 1 tablet (20 mg total) by mouth daily as needed (erectile dysfunction)., Disp: 15 tablet, Rfl: 0    traMADoL (ULTRAM) 50 mg tablet, Take 1 tablet (50 mg total) by mouth 2 (two) times daily as needed for Pain. 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      Employer: [employer]     Employer: [employer]   Social Needs    Financial resource strain: Somewhat hard    Food insecurity     Worry: Never true     Inability: Never true    Transportation needs     Medical: No     Non-medical: No   Tobacco Use    Smoking status: Former Smoker     Packs/day: 1.50     Years: 35.00     Pack years: 52.50     Quit date: 1995     Years since quittin.9    Smokeless tobacco: Never Used   Substance and Sexual Activity    Alcohol use: Yes     Alcohol/week: 0.8 standard drinks     Types: 1 Standard drinks or equivalent per week     Frequency: Monthly or less     Drinks per session: 1 or 2     Binge frequency: Never     Comment: rare    Drug use: No    Sexual activity: Not Currently     Partners: Female   Lifestyle    Physical activity     Days per week: 2 days     Minutes per session: 30 min    Stress: To some extent   Relationships    Social connections     Talks on phone: Three times a week     Gets together: Never     Attends Restorationism service: Not on file     Active member of club or organization: Yes     Attends meetings of clubs or organizations: More than 4 times per year     Relationship status:    Other Topics Concern    Not on file   Social History Narrative    , on ssdi for his toe.    . 1 kid still  "with them. Wife dm and in WC.    3 kids total.       Family History   Problem Relation Age of Onset    Glaucoma Mother     Heart disease Brother     Heart attack Sister     Heart disease Sister     Cancer Neg Hx     Diabetes Neg Hx     Colon polyps Neg Hx     Blindness Neg Hx     Amblyopia Neg Hx     Cataracts Neg Hx     Hypertension Neg Hx     Macular degeneration Neg Hx     Retinal detachment Neg Hx     Strabismus Neg Hx     Stroke Neg Hx     Thyroid disease Neg Hx     Prostate cancer Neg Hx     Kidney disease Neg Hx        ROS:  14 point review of systems was reviewed.  Pertinent positives and negatives per reviewed in detail outline above    Objective:   Physical Exam   /77   Pulse (!) 57   Ht 5' 5" (1.651 m)   Wt 78.1 kg (172 lb 2.9 oz)   SpO2 98%   BMI 28.65 kg/m²   Wt Readings from Last 3 Encounters:   12/11/20 78.1 kg (172 lb 2.9 oz)   07/23/20 77.4 kg (170 lb 10.2 oz)   01/23/20 76.8 kg (169 lb 5 oz)   ]    General: alert and oriented ×3,  well-developed,  no apparent distress,  conversant,  nontoxic-appearing,  not agitated or confused,  cooperative  HEENT: NC/AT,  conjunctivae clear,  PERRL,  auditory intact,  dentition adequate,  moist mucous membranes,  no lymphadenopathy, nontender thyroid normal size and consistency  Respiratory: clear to auscultation bilaterally,  no wheeze,  no crackles,  nonlabored,  no accessory muscle use,  no signs of respiratory distress  Cardio: Regular rate and rhythm,  no murmurs,  rubs, or  gallops,  no carotid bruit,  pedal pulses 2+ bilaterally and equal  Gastroenterology: soft,  nontender,  nondistended,  no guarding, or rebound,  positive bowel sounds  Musculoskeletal: strength 5/5 bilaterally in all extremities,  full range of motion appropriate for age,  normal inspection and palpation  Neuro: cranial nerves II through XII grossly intact,  Sensation intact to touch,  no gross focal deficits  Psych: appropriate affect,  judgment and insight " intact,  recent and remote memory intact,  thought content normal,  maintains adequate eye contact  Extremities: no clubbing or cyanosis,  no lower extremity edema,  no rash or atypical lesions,  extremities are warm to touch      LABORATORY REVIEW:    Chemistry        Component Value Date/Time     07/23/2020 1032    K 3.7 07/23/2020 1032     07/23/2020 1032    CO2 28 07/23/2020 1032    BUN 12 07/23/2020 1032    CREATININE 1.0 07/23/2020 1032     07/23/2020 1032        Component Value Date/Time    CALCIUM 9.1 07/23/2020 1032    ALKPHOS 58 07/23/2020 1032    AST 28 07/23/2020 1032    ALT 32 07/23/2020 1032    BILITOT 0.7 07/23/2020 1032    ESTGFRAFRICA >60.0 07/23/2020 1032    EGFRNONAA >60.0 07/23/2020 1032          Lab Results   Component Value Date    PROLACTIN 12.5 07/23/2020    PROLACTIN 5.1 09/25/2019    PROLACTIN 9.3 10/22/2018    PROLACTIN 12.5 08/17/2018    TSH 2.020 09/25/2019    TSH 1.003 10/22/2018    TSH 0.715 05/08/2018    TSH 0.664 05/03/2016    FREET4 1.00 09/25/2019    FREET4 0.80 10/22/2018    FREET4 1.02 05/08/2018    FREET4 0.93 12/08/2014    CORTISOL 9.4 12/08/2014    LABLH 2.9 08/12/2015    LABLH 5.0 03/10/2015    LABLH <0.1 (L) 12/08/2014    FSH 11.00 03/10/2015    FSH 0.20 (L) 12/08/2014    SOMATMDN 173 08/12/2015    SOMATMDN 169 12/08/2014     07/23/2020     10/29/2019     10/25/2019     10/25/2019    K 3.7 07/23/2020    K 3.9 10/29/2019    K 4.4 10/25/2019    K 4.4 10/25/2019    CALCIUM 9.1 07/23/2020    CALCIUM 8.2 (L) 10/29/2019    CALCIUM 9.8 10/25/2019    CALCIUM 9.8 10/25/2019    ALBUMIN 3.6 07/23/2020    ALBUMIN 3.3 (L) 10/29/2019    ALBUMIN 3.6 10/25/2019    ALBUMIN 3.6 10/25/2019    ESTGFRAFRICA >60.0 07/23/2020    ESTGFRAFRICA >60.0 10/29/2019    ESTGFRAFRICA 59.7 (A) 10/25/2019    ESTGFRAFRICA 59.7 (A) 10/25/2019    EGFRNONAA >60.0 07/23/2020    EGFRNONAA >60.0 10/29/2019    EGFRNONAA 51.6 (A) 10/25/2019    EGFRNONAA 51.6 (A) 10/25/2019          Assessment/Plan:     Problem List Items Addressed This Visit        Oncology    Prolactinoma - Primary     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    MRI pit - 06/21/16 -  Remote operative change from transsphenoidal sellar lesion resection with continued heterogeneous probable fat packing material within sphenoid sinus. Stable  No evidence for new signal abnormality or enhancement to suggest worsening residual  lesion.     Prior labs not suggestive of co-secreting tumor, no signs of subclinical cushings.    Although no available inital imaging, a 2014 MRI (concurrent elevated PRL) without stalk compression and lesion present, left hypodense area, starte on DA at that time, and comparison f/u 2016 MRI with some  resolution of lesion and PRL levels suggestive of macroprolactinoma requiring DA    If repeat PRL levels elevated with changes in HA or peripheral vision, may require f/u MRI for comparison    Continue Cabergoline 0.25 mg twice weekly - refilled  Annual PRL      I reviewed benefits and risks of DA agonist and indications for therapy including macroadenoma, galactorrhea, infertility and hypogonadism which can negatively impact bone health.      Take DA at bed with snack - call if nausea or orthostatic    Recurrence rates 20% over 10 years.  Indications for surgery: increasing tumor size despite therapy, apoplexy, C/I to DA, CSF leak, optic chiasm compression. Could consider radiotherapy of residual suspected prolactinoma tissue.         Relevant Medications    cabergoline (DOSTINEX) 0.5 mg tablet (Start on 12/14/2020)    Other Relevant Orders    DXA Bone Density Spine And Hip    Prolactin    TSH    Comprehensive Metabolic Panel    Testosterone    T4, Free    CBC Auto Differential        Endocrine    Type 2 diabetes mellitus without complication     Diet controlled T2DM - not on medication    Hemoglobin A1C   Date Value Ref Range Status   07/23/2020 5.8 (H) 4.0 - 5.6 % Final     Consider ESTELLE  On ARB  Lipid panel reviewed - on statin  Eye exam and foot exam per pcp         Vitamin D deficiency     Last Vit D in 2019 low  Continue supplementation Vit D3 2,000 IU daily  Follow up in 6-12 months           Other Visit Diagnoses     Malignant neoplasm of brain, unspecified location        Age-related osteoporosis without current pathological fracture         Relevant Orders    DXA Bone Density Spine And Hip          Return to clinic in 1 year    [facility] Endocrinology [facility], 6th Floor  [address] [place], [place], [address]    Office: [phone]  Fax: [phone]        I have independently reviewed the pituitary MRI from 2014 and 2016

## 2020-12-14 NOTE — PROGRESS NOTES
I have reviewed and concur with Dr. Lynch's history, physical, assessment, and plan.  I have personally interviewed and examined the patient.  See below addendum for my evaluation and additional findings.    I personally reviewed the previous MRI from 2013 and 2018 which showed that in 2016 when prolactin was elevated off of cabergoline there was a small hypointense adenoma in the left side of the sella.  After normalization of prolactin with cabergoline this area has significantly reduced in size on 2018 imaging consistent with prolactinoma.  As prolactin has been normal or only very slightly elevated since 2018 on cabergoline, no indication for repeat imaging unless prolactin rises or there are new clinical symptoms headache or vision change.  Will continue to periodically monitor pituitary labs.  Will check testosterone and bone density scan due to history of hypogonadism    Venecia Sanchez MD

## 2021-01-01 NOTE — SUBJECTIVE & OBJECTIVE
"Principal Problem:Cervical myelopathy    Principal Orthopedic Problem:  Same    Interval History:  Patient seen and examined at bedside.  His pain is controlled. Drain output remains high. To continue to work with PT. C-collar in place.     Review of patient's allergies indicates:  No Known Allergies    Current Facility-Administered Medications   Medication    0.9%  NaCl infusion    0.9%  NaCl infusion    acetaminophen tablet 500 mg    atorvastatin tablet 40 mg    cabergoline tablet 0.5 mg    celecoxib capsule 200 mg    famotidine tablet 20 mg    gabapentin capsule 300 mg    HYDROmorphone injection 0.2 mg    losartan-hydrochlorothiazide 50-12.5 mg per tablet 1 tablet    methocarbamol tablet 750 mg    metoprolol succinate (TOPROL-XL) 24 hr tablet 25 mg    ondansetron injection 8 mg    oxyCODONE immediate release tablet 10 mg    oxyCODONE immediate release tablet 15 mg    oxyCODONE immediate release tablet 5 mg    polyethylene glycol packet 17 g    promethazine (PHENERGAN) 6.25 mg in dextrose 5 % 50 mL IVPB    senna-docusate 8.6-50 mg per tablet 1 tablet    sodium chloride 0.9% flush 2 mL     Objective:     Vital Signs (Most Recent):  Temp: 96.5 °F (35.8 °C) (10/31/19 0503)  Pulse: 60 (10/31/19 0503)  Resp: 18 (10/31/19 0503)  BP: 137/65 (10/31/19 0503)  SpO2: 99 % (10/31/19 0503) Vital Signs (24h Range):  Temp:  [96.5 °F (35.8 °C)-98.4 °F (36.9 °C)] 96.5 °F (35.8 °C)  Pulse:  [52-68] 60  Resp:  [18] 18  SpO2:  [95 %-99 %] 99 %  BP: (118-142)/(7-65) 137/65     Weight: 74.8 kg (165 lb)  Height: 5' 5" (165.1 cm)  Body mass index is 27.46 kg/m².      Intake/Output Summary (Last 24 hours) at 10/31/2019 0813  Last data filed at 10/31/2019 0600  Gross per 24 hour   Intake --   Output 200 ml   Net -200 ml       Ortho/SPM Exam     Surgical dressing clean, dry, and intact  C-collar in place  SILT throughout  Motor intact throughout    Significant Labs: All pertinent labs within the past 24 hours have been " reviewed.    Significant Imaging: I have reviewed all pertinent imaging results/findings.   Statement Selected

## 2021-01-05 ENCOUNTER — PATIENT MESSAGE (OUTPATIENT)
Dept: ADMINISTRATIVE | Facility: HOSPITAL | Age: 71
End: 2021-01-05

## 2021-01-05 ENCOUNTER — PATIENT OUTREACH (OUTPATIENT)
Dept: ADMINISTRATIVE | Facility: HOSPITAL | Age: 71
End: 2021-01-05

## 2021-01-05 DIAGNOSIS — Z12.11 SCREENING FOR COLON CANCER: Primary | ICD-10-CM

## 2021-01-19 ENCOUNTER — OFFICE VISIT (OUTPATIENT)
Dept: INTERNAL MEDICINE | Facility: CLINIC | Age: 71
End: 2021-01-19
Payer: MEDICARE

## 2021-01-19 ENCOUNTER — IMMUNIZATION (OUTPATIENT)
Dept: PHARMACY | Facility: CLINIC | Age: 71
End: 2021-01-19

## 2021-01-19 ENCOUNTER — PATIENT MESSAGE (OUTPATIENT)
Dept: PHARMACY | Facility: CLINIC | Age: 71
End: 2021-01-19

## 2021-01-19 ENCOUNTER — IMMUNIZATION (OUTPATIENT)
Dept: PHARMACY | Facility: CLINIC | Age: 71
End: 2021-01-19
Payer: MEDICARE

## 2021-01-19 ENCOUNTER — HOSPITAL ENCOUNTER (OUTPATIENT)
Dept: RADIOLOGY | Facility: CLINIC | Age: 71
Discharge: HOME | End: 2021-01-19
Attending: STUDENT IN AN ORGANIZED HEALTH CARE EDUCATION/TRAINING PROGRAM
Payer: MEDICARE

## 2021-01-19 VITALS
HEART RATE: 51 BPM | WEIGHT: 178.56 LBS | HEIGHT: 65 IN | BODY MASS INDEX: 29.75 KG/M2 | DIASTOLIC BLOOD PRESSURE: 70 MMHG | OXYGEN SATURATION: 99 % | SYSTOLIC BLOOD PRESSURE: 130 MMHG

## 2021-01-19 DIAGNOSIS — Z12.11 COLON CANCER SCREENING: ICD-10-CM

## 2021-01-19 DIAGNOSIS — D35.2 PROLACTINOMA: ICD-10-CM

## 2021-01-19 DIAGNOSIS — M81.0 AGE-RELATED OSTEOPOROSIS WITHOUT CURRENT PATHOLOGICAL FRACTURE: ICD-10-CM

## 2021-01-19 DIAGNOSIS — E11.9 TYPE 2 DIABETES MELLITUS WITHOUT COMPLICATION, WITHOUT LONG-TERM CURRENT USE OF INSULIN: ICD-10-CM

## 2021-01-19 DIAGNOSIS — I10 ESSENTIAL HYPERTENSION: ICD-10-CM

## 2021-01-19 DIAGNOSIS — D35.2 PROLACTINOMA: Primary | ICD-10-CM

## 2021-01-19 PROCEDURE — 3075F SYST BP GE 130 - 139MM HG: CPT | Mod: CPTII,S$GLB,, | Performed by: INTERNAL MEDICINE

## 2021-01-19 PROCEDURE — 1101F PT FALLS ASSESS-DOCD LE1/YR: CPT | Mod: CPTII,S$GLB,, | Performed by: INTERNAL MEDICINE

## 2021-01-19 PROCEDURE — 3044F PR MOST RECENT HEMOGLOBIN A1C LEVEL <7.0%: ICD-10-PCS | Mod: CPTII,S$GLB,, | Performed by: INTERNAL MEDICINE

## 2021-01-19 PROCEDURE — 3008F PR BODY MASS INDEX (BMI) DOCUMENTED: ICD-10-PCS | Mod: CPTII,S$GLB,, | Performed by: INTERNAL MEDICINE

## 2021-01-19 PROCEDURE — 1101F PR PT FALLS ASSESS DOC 0-1 FALLS W/OUT INJ PAST YR: ICD-10-PCS | Mod: CPTII,S$GLB,, | Performed by: INTERNAL MEDICINE

## 2021-01-19 PROCEDURE — 1125F AMNT PAIN NOTED PAIN PRSNT: CPT | Mod: S$GLB,,, | Performed by: INTERNAL MEDICINE

## 2021-01-19 PROCEDURE — 3078F DIAST BP <80 MM HG: CPT | Mod: CPTII,S$GLB,, | Performed by: INTERNAL MEDICINE

## 2021-01-19 PROCEDURE — 1157F PR ADVANCE CARE PLAN OR EQUIV PRESENT IN MEDICAL RECORD: ICD-10-PCS | Mod: S$GLB,,, | Performed by: INTERNAL MEDICINE

## 2021-01-19 PROCEDURE — 99999 PR PBB SHADOW E&M-EST. PATIENT-LVL V: CPT | Mod: PBBFAC,,, | Performed by: INTERNAL MEDICINE

## 2021-01-19 PROCEDURE — 99499 RISK ADDL DX/OHS AUDIT: ICD-10-PCS | Mod: S$GLB,,, | Performed by: INTERNAL MEDICINE

## 2021-01-19 PROCEDURE — 3288F FALL RISK ASSESSMENT DOCD: CPT | Mod: CPTII,S$GLB,, | Performed by: INTERNAL MEDICINE

## 2021-01-19 PROCEDURE — 1157F ADVNC CARE PLAN IN RCRD: CPT | Mod: S$GLB,,, | Performed by: INTERNAL MEDICINE

## 2021-01-19 PROCEDURE — 99214 PR OFFICE/OUTPT VISIT, EST, LEVL IV, 30-39 MIN: ICD-10-PCS | Mod: S$GLB,,, | Performed by: INTERNAL MEDICINE

## 2021-01-19 PROCEDURE — 77080 DXA BONE DENSITY AXIAL: CPT | Mod: 26,,, | Performed by: INTERNAL MEDICINE

## 2021-01-19 PROCEDURE — 3008F BODY MASS INDEX DOCD: CPT | Mod: CPTII,S$GLB,, | Performed by: INTERNAL MEDICINE

## 2021-01-19 PROCEDURE — 1125F PR PAIN SEVERITY QUANTIFIED, PAIN PRESENT: ICD-10-PCS | Mod: S$GLB,,, | Performed by: INTERNAL MEDICINE

## 2021-01-19 PROCEDURE — 1159F MED LIST DOCD IN RCRD: CPT | Mod: S$GLB,,, | Performed by: INTERNAL MEDICINE

## 2021-01-19 PROCEDURE — 3078F PR MOST RECENT DIASTOLIC BLOOD PRESSURE < 80 MM HG: ICD-10-PCS | Mod: CPTII,S$GLB,, | Performed by: INTERNAL MEDICINE

## 2021-01-19 PROCEDURE — 99214 OFFICE O/P EST MOD 30 MIN: CPT | Mod: S$GLB,,, | Performed by: INTERNAL MEDICINE

## 2021-01-19 PROCEDURE — 99499 UNLISTED E&M SERVICE: CPT | Mod: S$GLB,,, | Performed by: INTERNAL MEDICINE

## 2021-01-19 PROCEDURE — 99999 PR PBB SHADOW E&M-EST. PATIENT-LVL V: ICD-10-PCS | Mod: PBBFAC,,, | Performed by: INTERNAL MEDICINE

## 2021-01-19 PROCEDURE — 3288F PR FALLS RISK ASSESSMENT DOCUMENTED: ICD-10-PCS | Mod: CPTII,S$GLB,, | Performed by: INTERNAL MEDICINE

## 2021-01-19 PROCEDURE — 77080 DXA BONE DENSITY AXIAL: CPT | Mod: TC

## 2021-01-19 PROCEDURE — 1159F PR MEDICATION LIST DOCUMENTED IN MEDICAL RECORD: ICD-10-PCS | Mod: S$GLB,,, | Performed by: INTERNAL MEDICINE

## 2021-01-19 PROCEDURE — 3075F PR MOST RECENT SYSTOLIC BLOOD PRESS GE 130-139MM HG: ICD-10-PCS | Mod: CPTII,S$GLB,, | Performed by: INTERNAL MEDICINE

## 2021-01-19 PROCEDURE — 77080 DEXA BONE DENSITY SPINE HIP: ICD-10-PCS | Mod: 26,,, | Performed by: INTERNAL MEDICINE

## 2021-01-19 PROCEDURE — 3044F HG A1C LEVEL LT 7.0%: CPT | Mod: CPTII,S$GLB,, | Performed by: INTERNAL MEDICINE

## 2021-03-20 ENCOUNTER — IMMUNIZATION (OUTPATIENT)
Dept: PRIMARY CARE CLINIC | Facility: CLINIC | Age: 71
End: 2021-03-20

## 2021-03-20 DIAGNOSIS — Z23 NEED FOR VACCINATION: Primary | ICD-10-CM

## 2021-03-20 PROCEDURE — 0001A PR IMMUNIZ ADMIN, SARS-COV-2 COVID-19 VACC, 30MCG/0.3ML, 1ST DOSE: ICD-10-PCS | Mod: CV19,S$GLB,, | Performed by: INTERNAL MEDICINE

## 2021-03-20 PROCEDURE — 91300 PR SARS-COV- 2 COVID-19 VACCINE, NO PRSV, 30MCG/0.3ML, IM: CPT | Mod: S$GLB,,, | Performed by: INTERNAL MEDICINE

## 2021-03-20 PROCEDURE — 91300 PR SARS-COV- 2 COVID-19 VACCINE, NO PRSV, 30MCG/0.3ML, IM: ICD-10-PCS | Mod: S$GLB,,, | Performed by: INTERNAL MEDICINE

## 2021-03-20 PROCEDURE — 0001A PR IMMUNIZ ADMIN, SARS-COV-2 COVID-19 VACC, 30MCG/0.3ML, 1ST DOSE: CPT | Mod: CV19,S$GLB,, | Performed by: INTERNAL MEDICINE

## 2021-03-20 RX ADMIN — Medication 0.3 ML: at 11:03

## 2021-03-27 ENCOUNTER — PATIENT OUTREACH (OUTPATIENT)
Dept: ADMINISTRATIVE | Facility: OTHER | Age: 71
End: 2021-03-27

## 2021-04-11 ENCOUNTER — IMMUNIZATION (OUTPATIENT)
Dept: PRIMARY CARE CLINIC | Facility: CLINIC | Age: 71
End: 2021-04-11
Payer: MEDICARE

## 2021-04-11 DIAGNOSIS — Z23 NEED FOR VACCINATION: Primary | ICD-10-CM

## 2021-04-11 PROCEDURE — 0002A PR IMMUNIZ ADMIN, SARS-COV-2 COVID-19 VACC, 30MCG/0.3ML, 2ND DOSE: ICD-10-PCS | Mod: CV19,S$GLB,, | Performed by: INTERNAL MEDICINE

## 2021-04-11 PROCEDURE — 91300 PR SARS-COV- 2 COVID-19 VACCINE, NO PRSV, 30MCG/0.3ML, IM: CPT | Mod: S$GLB,,, | Performed by: INTERNAL MEDICINE

## 2021-04-11 PROCEDURE — 0002A PR IMMUNIZ ADMIN, SARS-COV-2 COVID-19 VACC, 30MCG/0.3ML, 2ND DOSE: CPT | Mod: CV19,S$GLB,, | Performed by: INTERNAL MEDICINE

## 2021-04-11 PROCEDURE — 91300 PR SARS-COV- 2 COVID-19 VACCINE, NO PRSV, 30MCG/0.3ML, IM: ICD-10-PCS | Mod: S$GLB,,, | Performed by: INTERNAL MEDICINE

## 2021-04-11 RX ADMIN — Medication 0.3 ML: at 10:04

## 2021-05-05 DIAGNOSIS — E11.9 TYPE 2 DIABETES MELLITUS WITHOUT COMPLICATION: ICD-10-CM

## 2021-05-12 DIAGNOSIS — E11.9 TYPE 2 DIABETES MELLITUS WITHOUT COMPLICATION: ICD-10-CM

## 2021-05-19 ENCOUNTER — PATIENT OUTREACH (OUTPATIENT)
Dept: ADMINISTRATIVE | Facility: OTHER | Age: 71
End: 2021-05-19

## 2021-05-24 ENCOUNTER — OFFICE VISIT (OUTPATIENT)
Dept: OPTOMETRY | Facility: CLINIC | Age: 71
End: 2021-05-24
Payer: MEDICARE

## 2021-05-24 DIAGNOSIS — H25.13 NUCLEAR SCLEROSIS OF BOTH EYES: ICD-10-CM

## 2021-05-24 DIAGNOSIS — H52.4 HYPEROPIA WITH ASTIGMATISM AND PRESBYOPIA, BILATERAL: ICD-10-CM

## 2021-05-24 DIAGNOSIS — H04.123 DRY EYE SYNDROME OF BOTH EYES: ICD-10-CM

## 2021-05-24 DIAGNOSIS — H52.203 HYPEROPIA WITH ASTIGMATISM AND PRESBYOPIA, BILATERAL: ICD-10-CM

## 2021-05-24 DIAGNOSIS — E11.9 TYPE 2 DIABETES MELLITUS WITHOUT OPHTHALMIC MANIFESTATIONS: Primary | ICD-10-CM

## 2021-05-24 DIAGNOSIS — H52.03 HYPEROPIA WITH ASTIGMATISM AND PRESBYOPIA, BILATERAL: ICD-10-CM

## 2021-05-24 PROCEDURE — 1101F PT FALLS ASSESS-DOCD LE1/YR: CPT | Mod: CPTII,S$GLB,, | Performed by: OPTOMETRIST

## 2021-05-24 PROCEDURE — 1101F PR PT FALLS ASSESS DOC 0-1 FALLS W/OUT INJ PAST YR: ICD-10-PCS | Mod: CPTII,S$GLB,, | Performed by: OPTOMETRIST

## 2021-05-24 PROCEDURE — 1157F ADVNC CARE PLAN IN RCRD: CPT | Mod: S$GLB,,, | Performed by: OPTOMETRIST

## 2021-05-24 PROCEDURE — 3288F PR FALLS RISK ASSESSMENT DOCUMENTED: ICD-10-PCS | Mod: CPTII,S$GLB,, | Performed by: OPTOMETRIST

## 2021-05-24 PROCEDURE — 1157F PR ADVANCE CARE PLAN OR EQUIV PRESENT IN MEDICAL RECORD: ICD-10-PCS | Mod: S$GLB,,, | Performed by: OPTOMETRIST

## 2021-05-24 PROCEDURE — 99499 RISK ADDL DX/OHS AUDIT: ICD-10-PCS | Mod: S$GLB,,, | Performed by: OPTOMETRIST

## 2021-05-24 PROCEDURE — 92015 PR REFRACTION: ICD-10-PCS | Mod: S$GLB,,, | Performed by: OPTOMETRIST

## 2021-05-24 PROCEDURE — 2023F DILAT RTA XM W/O RTNOPTHY: CPT | Mod: S$GLB,,, | Performed by: OPTOMETRIST

## 2021-05-24 PROCEDURE — 92014 COMPRE OPH EXAM EST PT 1/>: CPT | Mod: S$GLB,,, | Performed by: OPTOMETRIST

## 2021-05-24 PROCEDURE — 3288F FALL RISK ASSESSMENT DOCD: CPT | Mod: CPTII,S$GLB,, | Performed by: OPTOMETRIST

## 2021-05-24 PROCEDURE — 2023F PR DILATED RETINAL EXAM W/O EVID OF RETINOPATHY: ICD-10-PCS | Mod: S$GLB,,, | Performed by: OPTOMETRIST

## 2021-05-24 PROCEDURE — 92014 PR EYE EXAM, EST PATIENT,COMPREHESV: ICD-10-PCS | Mod: S$GLB,,, | Performed by: OPTOMETRIST

## 2021-05-24 PROCEDURE — 99499 UNLISTED E&M SERVICE: CPT | Mod: S$GLB,,, | Performed by: OPTOMETRIST

## 2021-05-24 PROCEDURE — 3044F PR MOST RECENT HEMOGLOBIN A1C LEVEL <7.0%: ICD-10-PCS | Mod: CPTII,S$GLB,, | Performed by: OPTOMETRIST

## 2021-05-24 PROCEDURE — 99999 PR PBB SHADOW E&M-EST. PATIENT-LVL III: CPT | Mod: PBBFAC,,, | Performed by: OPTOMETRIST

## 2021-05-24 PROCEDURE — 99999 PR PBB SHADOW E&M-EST. PATIENT-LVL III: ICD-10-PCS | Mod: PBBFAC,,, | Performed by: OPTOMETRIST

## 2021-05-24 PROCEDURE — 1126F PR PAIN SEVERITY QUANTIFIED, NO PAIN PRESENT: ICD-10-PCS | Mod: S$GLB,,, | Performed by: OPTOMETRIST

## 2021-05-24 PROCEDURE — 92015 DETERMINE REFRACTIVE STATE: CPT | Mod: S$GLB,,, | Performed by: OPTOMETRIST

## 2021-05-24 PROCEDURE — 1126F AMNT PAIN NOTED NONE PRSNT: CPT | Mod: S$GLB,,, | Performed by: OPTOMETRIST

## 2021-05-24 PROCEDURE — 3044F HG A1C LEVEL LT 7.0%: CPT | Mod: CPTII,S$GLB,, | Performed by: OPTOMETRIST

## 2021-06-03 ENCOUNTER — TELEPHONE (OUTPATIENT)
Dept: ENDOSCOPY | Facility: HOSPITAL | Age: 71
End: 2021-06-03

## 2021-06-10 ENCOUNTER — PATIENT MESSAGE (OUTPATIENT)
Dept: ENDOSCOPY | Facility: HOSPITAL | Age: 71
End: 2021-06-10

## 2021-06-10 DIAGNOSIS — Z12.11 COLON CANCER SCREENING: Primary | ICD-10-CM

## 2021-06-10 RX ORDER — SODIUM, POTASSIUM,MAG SULFATES 17.5-3.13G
1 SOLUTION, RECONSTITUTED, ORAL ORAL DAILY
Qty: 1 KIT | Refills: 0 | Status: SHIPPED | OUTPATIENT
Start: 2021-06-10 | End: 2021-06-12

## 2021-07-06 ENCOUNTER — PATIENT MESSAGE (OUTPATIENT)
Dept: ADMINISTRATIVE | Facility: HOSPITAL | Age: 71
End: 2021-07-06

## 2021-07-08 ENCOUNTER — HOSPITAL ENCOUNTER (OUTPATIENT)
Facility: HOSPITAL | Age: 71
Discharge: HOME | End: 2021-07-08
Attending: COLON & RECTAL SURGERY | Admitting: COLON & RECTAL SURGERY
Payer: MEDICARE

## 2021-07-08 ENCOUNTER — ANESTHESIA (OUTPATIENT)
Dept: ENDOSCOPY | Facility: HOSPITAL | Age: 71
End: 2021-07-08
Payer: MEDICARE

## 2021-07-08 ENCOUNTER — ANESTHESIA EVENT (OUTPATIENT)
Dept: ENDOSCOPY | Facility: HOSPITAL | Age: 71
End: 2021-07-08
Payer: MEDICARE

## 2021-07-08 VITALS
RESPIRATION RATE: 16 BRPM | DIASTOLIC BLOOD PRESSURE: 64 MMHG | TEMPERATURE: 98 F | SYSTOLIC BLOOD PRESSURE: 113 MMHG | WEIGHT: 190 LBS | HEIGHT: 65 IN | OXYGEN SATURATION: 99 % | BODY MASS INDEX: 31.65 KG/M2 | HEART RATE: 49 BPM

## 2021-07-08 DIAGNOSIS — Z12.11 SCREENING FOR MALIGNANT NEOPLASM OF COLON: Primary | ICD-10-CM

## 2021-07-08 PROCEDURE — G0105 COLORECTAL SCRN; HI RISK IND: ICD-10-PCS | Mod: ,,, | Performed by: COLON & RECTAL SURGERY

## 2021-07-08 PROCEDURE — E9220 PRA ENDO ANESTHESIA: HCPCS | Mod: ,,, | Performed by: NURSE ANESTHETIST, CERTIFIED REGISTERED

## 2021-07-08 PROCEDURE — G0105 COLORECTAL SCRN; HI RISK IND: HCPCS | Mod: ,,, | Performed by: COLON & RECTAL SURGERY

## 2021-07-08 PROCEDURE — G0105 COLORECTAL SCRN; HI RISK IND: HCPCS | Performed by: COLON & RECTAL SURGERY

## 2021-07-08 PROCEDURE — 63600175 PHARM REV CODE 636 W HCPCS: Performed by: NURSE ANESTHETIST, CERTIFIED REGISTERED

## 2021-07-08 PROCEDURE — 37000008 HC ANESTHESIA 1ST 15 MINUTES: Performed by: COLON & RECTAL SURGERY

## 2021-07-08 PROCEDURE — E9220 PRA ENDO ANESTHESIA: ICD-10-PCS | Mod: ,,, | Performed by: NURSE ANESTHETIST, CERTIFIED REGISTERED

## 2021-07-08 PROCEDURE — 37000009 HC ANESTHESIA EA ADD 15 MINS: Performed by: COLON & RECTAL SURGERY

## 2021-07-08 PROCEDURE — 25000003 PHARM REV CODE 250: Performed by: COLON & RECTAL SURGERY

## 2021-07-08 PROCEDURE — 25000003 PHARM REV CODE 250: Performed by: NURSE ANESTHETIST, CERTIFIED REGISTERED

## 2021-07-08 RX ORDER — PROPOFOL 10 MG/ML
VIAL (ML) INTRAVENOUS
Status: DISCONTINUED | OUTPATIENT
Start: 2021-07-08 | End: 2021-07-08

## 2021-07-08 RX ORDER — PROPOFOL 10 MG/ML
VIAL (ML) INTRAVENOUS CONTINUOUS PRN
Status: DISCONTINUED | OUTPATIENT
Start: 2021-07-08 | End: 2021-07-08

## 2021-07-08 RX ORDER — LIDOCAINE HYDROCHLORIDE 20 MG/ML
INJECTION INTRAVENOUS
Status: DISCONTINUED | OUTPATIENT
Start: 2021-07-08 | End: 2021-07-08

## 2021-07-08 RX ORDER — PHENYLEPHRINE HYDROCHLORIDE 10 MG/ML
INJECTION INTRAVENOUS
Status: DISCONTINUED | OUTPATIENT
Start: 2021-07-08 | End: 2021-07-08

## 2021-07-08 RX ORDER — SODIUM CHLORIDE 9 MG/ML
INJECTION, SOLUTION INTRAVENOUS CONTINUOUS
Status: DISCONTINUED | OUTPATIENT
Start: 2021-07-08 | End: 2021-07-08 | Stop reason: HOSPADM

## 2021-07-08 RX ADMIN — PHENYLEPHRINE HYDROCHLORIDE 100 MCG: 10 INJECTION INTRAVENOUS at 11:07

## 2021-07-08 RX ADMIN — LIDOCAINE HYDROCHLORIDE 60 MG: 20 INJECTION, SOLUTION INTRAVENOUS at 11:07

## 2021-07-08 RX ADMIN — Medication 200 MCG/KG/MIN: at 11:07

## 2021-07-08 RX ADMIN — PROPOFOL 60 MG: 10 INJECTION, EMULSION INTRAVENOUS at 11:07

## 2021-07-08 RX ADMIN — SODIUM CHLORIDE: 0.9 INJECTION, SOLUTION INTRAVENOUS at 11:07

## 2021-07-08 RX ADMIN — SODIUM CHLORIDE: 0.9 INJECTION, SOLUTION INTRAVENOUS at 10:07

## 2021-07-13 ENCOUNTER — OFFICE VISIT (OUTPATIENT)
Dept: INTERNAL MEDICINE | Facility: CLINIC | Age: 71
End: 2021-07-13
Payer: MEDICARE

## 2021-07-13 ENCOUNTER — LAB VISIT (OUTPATIENT)
Dept: LAB | Facility: HOSPITAL | Age: 71
End: 2021-07-13
Attending: INTERNAL MEDICINE
Payer: MEDICARE

## 2021-07-13 VITALS
SYSTOLIC BLOOD PRESSURE: 108 MMHG | BODY MASS INDEX: 28.14 KG/M2 | WEIGHT: 168.88 LBS | HEART RATE: 55 BPM | HEIGHT: 65 IN | DIASTOLIC BLOOD PRESSURE: 63 MMHG | OXYGEN SATURATION: 98 %

## 2021-07-13 DIAGNOSIS — Z12.5 SCREENING PSA (PROSTATE SPECIFIC ANTIGEN): ICD-10-CM

## 2021-07-13 DIAGNOSIS — M19.079 ARTHRITIS OF BIG TOE: ICD-10-CM

## 2021-07-13 DIAGNOSIS — I10 ESSENTIAL HYPERTENSION: ICD-10-CM

## 2021-07-13 DIAGNOSIS — D35.2 PROLACTINOMA: Primary | ICD-10-CM

## 2021-07-13 DIAGNOSIS — E11.9 TYPE 2 DIABETES MELLITUS WITHOUT COMPLICATION, WITHOUT LONG-TERM CURRENT USE OF INSULIN: ICD-10-CM

## 2021-07-13 LAB
CHOLEST SERPL-MCNC: 171 MG/DL (ref 120–199)
CHOLEST/HDLC SERPL: 4.3 {RATIO} (ref 2–5)
COMPLEXED PSA SERPL-MCNC: 0.79 NG/ML (ref 0–4)
ESTIMATED AVG GLUCOSE: 126 MG/DL (ref 68–131)
HBA1C MFR BLD: 6 % (ref 4–5.6)
HDLC SERPL-MCNC: 40 MG/DL (ref 40–75)
HDLC SERPL: 23.4 % (ref 20–50)
LDLC SERPL CALC-MCNC: 108.6 MG/DL (ref 63–159)
NONHDLC SERPL-MCNC: 131 MG/DL
TRIGL SERPL-MCNC: 112 MG/DL (ref 30–150)

## 2021-07-13 PROCEDURE — 99499 UNLISTED E&M SERVICE: CPT | Mod: S$GLB,,, | Performed by: INTERNAL MEDICINE

## 2021-07-13 PROCEDURE — 99499 RISK ADDL DX/OHS AUDIT: ICD-10-PCS | Mod: S$GLB,,, | Performed by: INTERNAL MEDICINE

## 2021-07-13 PROCEDURE — 36415 COLL VENOUS BLD VENIPUNCTURE: CPT | Performed by: INTERNAL MEDICINE

## 2021-07-13 PROCEDURE — 3044F HG A1C LEVEL LT 7.0%: CPT | Mod: CPTII,S$GLB,, | Performed by: INTERNAL MEDICINE

## 2021-07-13 PROCEDURE — 1157F ADVNC CARE PLAN IN RCRD: CPT | Mod: S$GLB,,, | Performed by: INTERNAL MEDICINE

## 2021-07-13 PROCEDURE — 1157F PR ADVANCE CARE PLAN OR EQUIV PRESENT IN MEDICAL RECORD: ICD-10-PCS | Mod: S$GLB,,, | Performed by: INTERNAL MEDICINE

## 2021-07-13 PROCEDURE — 3008F PR BODY MASS INDEX (BMI) DOCUMENTED: ICD-10-PCS | Mod: CPTII,S$GLB,, | Performed by: INTERNAL MEDICINE

## 2021-07-13 PROCEDURE — 3008F BODY MASS INDEX DOCD: CPT | Mod: CPTII,S$GLB,, | Performed by: INTERNAL MEDICINE

## 2021-07-13 PROCEDURE — 1126F AMNT PAIN NOTED NONE PRSNT: CPT | Mod: S$GLB,,, | Performed by: INTERNAL MEDICINE

## 2021-07-13 PROCEDURE — 3288F FALL RISK ASSESSMENT DOCD: CPT | Mod: CPTII,S$GLB,, | Performed by: INTERNAL MEDICINE

## 2021-07-13 PROCEDURE — 1159F MED LIST DOCD IN RCRD: CPT | Mod: S$GLB,,, | Performed by: INTERNAL MEDICINE

## 2021-07-13 PROCEDURE — 99214 PR OFFICE/OUTPT VISIT, EST, LEVL IV, 30-39 MIN: ICD-10-PCS | Mod: S$GLB,,, | Performed by: INTERNAL MEDICINE

## 2021-07-13 PROCEDURE — 80061 LIPID PANEL: CPT | Performed by: INTERNAL MEDICINE

## 2021-07-13 PROCEDURE — 84153 ASSAY OF PSA TOTAL: CPT | Performed by: INTERNAL MEDICINE

## 2021-07-13 PROCEDURE — 3044F PR MOST RECENT HEMOGLOBIN A1C LEVEL <7.0%: ICD-10-PCS | Mod: CPTII,S$GLB,, | Performed by: INTERNAL MEDICINE

## 2021-07-13 PROCEDURE — 3078F DIAST BP <80 MM HG: CPT | Mod: CPTII,S$GLB,, | Performed by: INTERNAL MEDICINE

## 2021-07-13 PROCEDURE — 3074F SYST BP LT 130 MM HG: CPT | Mod: CPTII,S$GLB,, | Performed by: INTERNAL MEDICINE

## 2021-07-13 PROCEDURE — 1159F PR MEDICATION LIST DOCUMENTED IN MEDICAL RECORD: ICD-10-PCS | Mod: S$GLB,,, | Performed by: INTERNAL MEDICINE

## 2021-07-13 PROCEDURE — 3288F PR FALLS RISK ASSESSMENT DOCUMENTED: ICD-10-PCS | Mod: CPTII,S$GLB,, | Performed by: INTERNAL MEDICINE

## 2021-07-13 PROCEDURE — 1126F PR PAIN SEVERITY QUANTIFIED, NO PAIN PRESENT: ICD-10-PCS | Mod: S$GLB,,, | Performed by: INTERNAL MEDICINE

## 2021-07-13 PROCEDURE — 99214 OFFICE O/P EST MOD 30 MIN: CPT | Mod: S$GLB,,, | Performed by: INTERNAL MEDICINE

## 2021-07-13 PROCEDURE — 3074F PR MOST RECENT SYSTOLIC BLOOD PRESSURE < 130 MM HG: ICD-10-PCS | Mod: CPTII,S$GLB,, | Performed by: INTERNAL MEDICINE

## 2021-07-13 PROCEDURE — 99999 PR PBB SHADOW E&M-EST. PATIENT-LVL IV: CPT | Mod: PBBFAC,,, | Performed by: INTERNAL MEDICINE

## 2021-07-13 PROCEDURE — 3078F PR MOST RECENT DIASTOLIC BLOOD PRESSURE < 80 MM HG: ICD-10-PCS | Mod: CPTII,S$GLB,, | Performed by: INTERNAL MEDICINE

## 2021-07-13 PROCEDURE — 99999 PR PBB SHADOW E&M-EST. PATIENT-LVL IV: ICD-10-PCS | Mod: PBBFAC,,, | Performed by: INTERNAL MEDICINE

## 2021-07-13 PROCEDURE — 1101F PT FALLS ASSESS-DOCD LE1/YR: CPT | Mod: CPTII,S$GLB,, | Performed by: INTERNAL MEDICINE

## 2021-07-13 PROCEDURE — 83036 HEMOGLOBIN GLYCOSYLATED A1C: CPT | Performed by: INTERNAL MEDICINE

## 2021-07-13 PROCEDURE — 1101F PR PT FALLS ASSESS DOC 0-1 FALLS W/OUT INJ PAST YR: ICD-10-PCS | Mod: CPTII,S$GLB,, | Performed by: INTERNAL MEDICINE

## 2021-10-04 ENCOUNTER — PATIENT MESSAGE (OUTPATIENT)
Dept: ADMINISTRATIVE | Facility: HOSPITAL | Age: 71
End: 2021-10-04

## 2021-11-06 ENCOUNTER — OFFICE VISIT (OUTPATIENT)
Dept: FAMILY MEDICINE | Facility: CLINIC | Age: 71
End: 2021-11-06
Payer: MEDICARE

## 2021-11-06 DIAGNOSIS — M54.32 SCIATICA OF LEFT SIDE: Primary | ICD-10-CM

## 2021-11-06 DIAGNOSIS — E11.9 TYPE 2 DIABETES MELLITUS WITHOUT COMPLICATION, WITHOUT LONG-TERM CURRENT USE OF INSULIN: ICD-10-CM

## 2021-11-06 PROCEDURE — 1159F MED LIST DOCD IN RCRD: CPT | Mod: CPTII,95,, | Performed by: PHYSICIAN ASSISTANT

## 2021-11-06 PROCEDURE — 1157F ADVNC CARE PLAN IN RCRD: CPT | Mod: CPTII,95,, | Performed by: PHYSICIAN ASSISTANT

## 2021-11-06 PROCEDURE — 99499 RISK ADDL DX/OHS AUDIT: ICD-10-PCS | Mod: 95,,, | Performed by: PHYSICIAN ASSISTANT

## 2021-11-06 PROCEDURE — 1159F PR MEDICATION LIST DOCUMENTED IN MEDICAL RECORD: ICD-10-PCS | Mod: CPTII,95,, | Performed by: PHYSICIAN ASSISTANT

## 2021-11-06 PROCEDURE — 1160F PR REVIEW ALL MEDS BY PRESCRIBER/CLIN PHARMACIST DOCUMENTED: ICD-10-PCS | Mod: CPTII,95,, | Performed by: PHYSICIAN ASSISTANT

## 2021-11-06 PROCEDURE — 99499 UNLISTED E&M SERVICE: CPT | Mod: 95,,, | Performed by: PHYSICIAN ASSISTANT

## 2021-11-06 PROCEDURE — 1157F PR ADVANCE CARE PLAN OR EQUIV PRESENT IN MEDICAL RECORD: ICD-10-PCS | Mod: CPTII,95,, | Performed by: PHYSICIAN ASSISTANT

## 2021-11-06 PROCEDURE — 99202 PR OFFICE/OUTPT VISIT, NEW, LEVL II, 15-29 MIN: ICD-10-PCS | Mod: 95,,, | Performed by: PHYSICIAN ASSISTANT

## 2021-11-06 PROCEDURE — 3044F HG A1C LEVEL LT 7.0%: CPT | Mod: CPTII,95,, | Performed by: PHYSICIAN ASSISTANT

## 2021-11-06 PROCEDURE — 3044F PR MOST RECENT HEMOGLOBIN A1C LEVEL <7.0%: ICD-10-PCS | Mod: CPTII,95,, | Performed by: PHYSICIAN ASSISTANT

## 2021-11-06 PROCEDURE — 99202 OFFICE O/P NEW SF 15 MIN: CPT | Mod: 95,,, | Performed by: PHYSICIAN ASSISTANT

## 2021-11-06 PROCEDURE — 1160F RVW MEDS BY RX/DR IN RCRD: CPT | Mod: CPTII,95,, | Performed by: PHYSICIAN ASSISTANT

## 2021-11-06 RX ORDER — HYDROCODONE BITARTRATE AND ACETAMINOPHEN 7.5; 325 MG/1; MG/1
1 TABLET ORAL EVERY 6 HOURS PRN
Qty: 27 TABLET | Refills: 0 | Status: SHIPPED | OUTPATIENT
Start: 2021-11-06 | End: 2021-11-13

## 2021-11-06 RX ORDER — NABUMETONE 500 MG/1
500 TABLET, FILM COATED ORAL 2 TIMES DAILY
Qty: 30 TABLET | Refills: 0 | Status: SHIPPED | OUTPATIENT
Start: 2021-11-06 | End: 2022-01-18

## 2021-11-08 ENCOUNTER — IMMUNIZATION (OUTPATIENT)
Dept: PRIMARY CARE CLINIC | Facility: CLINIC | Age: 71
End: 2021-11-08
Payer: MEDICARE

## 2021-11-08 DIAGNOSIS — Z23 NEED FOR VACCINATION: Primary | ICD-10-CM

## 2021-11-08 PROCEDURE — 0004A COVID-19, MRNA, LNP-S, PF, 30 MCG/0.3 ML DOSE VACCINE: CPT | Mod: CV19,PBBFAC | Performed by: INTERNAL MEDICINE

## 2021-11-15 ENCOUNTER — OFFICE VISIT (OUTPATIENT)
Dept: INTERNAL MEDICINE | Facility: CLINIC | Age: 71
End: 2021-11-15
Payer: MEDICARE

## 2021-11-15 VITALS
BODY MASS INDEX: 27.4 KG/M2 | WEIGHT: 164.44 LBS | HEIGHT: 65 IN | OXYGEN SATURATION: 98 % | SYSTOLIC BLOOD PRESSURE: 168 MMHG | HEART RATE: 83 BPM | DIASTOLIC BLOOD PRESSURE: 82 MMHG

## 2021-11-15 DIAGNOSIS — M25.552 LEFT HIP PAIN: Primary | ICD-10-CM

## 2021-11-15 DIAGNOSIS — M54.50 ACUTE LEFT-SIDED LOW BACK PAIN WITHOUT SCIATICA: ICD-10-CM

## 2021-11-15 PROCEDURE — 1125F PR PAIN SEVERITY QUANTIFIED, PAIN PRESENT: ICD-10-PCS | Mod: CPTII,S$GLB,, | Performed by: PHYSICIAN ASSISTANT

## 2021-11-15 PROCEDURE — 3288F FALL RISK ASSESSMENT DOCD: CPT | Mod: CPTII,S$GLB,, | Performed by: PHYSICIAN ASSISTANT

## 2021-11-15 PROCEDURE — 3077F SYST BP >= 140 MM HG: CPT | Mod: CPTII,S$GLB,, | Performed by: PHYSICIAN ASSISTANT

## 2021-11-15 PROCEDURE — 99999 PR PBB SHADOW E&M-EST. PATIENT-LVL III: ICD-10-PCS | Mod: PBBFAC,,, | Performed by: PHYSICIAN ASSISTANT

## 2021-11-15 PROCEDURE — 1101F PT FALLS ASSESS-DOCD LE1/YR: CPT | Mod: CPTII,S$GLB,, | Performed by: PHYSICIAN ASSISTANT

## 2021-11-15 PROCEDURE — 1157F PR ADVANCE CARE PLAN OR EQUIV PRESENT IN MEDICAL RECORD: ICD-10-PCS | Mod: CPTII,S$GLB,, | Performed by: PHYSICIAN ASSISTANT

## 2021-11-15 PROCEDURE — 1101F PR PT FALLS ASSESS DOC 0-1 FALLS W/OUT INJ PAST YR: ICD-10-PCS | Mod: CPTII,S$GLB,, | Performed by: PHYSICIAN ASSISTANT

## 2021-11-15 PROCEDURE — 99213 OFFICE O/P EST LOW 20 MIN: CPT | Mod: 25,S$GLB,, | Performed by: PHYSICIAN ASSISTANT

## 2021-11-15 PROCEDURE — 3044F PR MOST RECENT HEMOGLOBIN A1C LEVEL <7.0%: ICD-10-PCS | Mod: CPTII,S$GLB,, | Performed by: PHYSICIAN ASSISTANT

## 2021-11-15 PROCEDURE — 3077F PR MOST RECENT SYSTOLIC BLOOD PRESSURE >= 140 MM HG: ICD-10-PCS | Mod: CPTII,S$GLB,, | Performed by: PHYSICIAN ASSISTANT

## 2021-11-15 PROCEDURE — 3079F PR MOST RECENT DIASTOLIC BLOOD PRESSURE 80-89 MM HG: ICD-10-PCS | Mod: CPTII,S$GLB,, | Performed by: PHYSICIAN ASSISTANT

## 2021-11-15 PROCEDURE — 99999 PR PBB SHADOW E&M-EST. PATIENT-LVL III: CPT | Mod: PBBFAC,,, | Performed by: PHYSICIAN ASSISTANT

## 2021-11-15 PROCEDURE — 96372 THER/PROPH/DIAG INJ SC/IM: CPT | Mod: S$GLB,,, | Performed by: PHYSICIAN ASSISTANT

## 2021-11-15 PROCEDURE — 3288F PR FALLS RISK ASSESSMENT DOCUMENTED: ICD-10-PCS | Mod: CPTII,S$GLB,, | Performed by: PHYSICIAN ASSISTANT

## 2021-11-15 PROCEDURE — 3008F BODY MASS INDEX DOCD: CPT | Mod: CPTII,S$GLB,, | Performed by: PHYSICIAN ASSISTANT

## 2021-11-15 PROCEDURE — 1125F AMNT PAIN NOTED PAIN PRSNT: CPT | Mod: CPTII,S$GLB,, | Performed by: PHYSICIAN ASSISTANT

## 2021-11-15 PROCEDURE — 1157F ADVNC CARE PLAN IN RCRD: CPT | Mod: CPTII,S$GLB,, | Performed by: PHYSICIAN ASSISTANT

## 2021-11-15 PROCEDURE — 3044F HG A1C LEVEL LT 7.0%: CPT | Mod: CPTII,S$GLB,, | Performed by: PHYSICIAN ASSISTANT

## 2021-11-15 PROCEDURE — 99213 PR OFFICE/OUTPT VISIT, EST, LEVL III, 20-29 MIN: ICD-10-PCS | Mod: 25,S$GLB,, | Performed by: PHYSICIAN ASSISTANT

## 2021-11-15 PROCEDURE — 96372 PR INJECTION,THERAP/PROPH/DIAG2ST, IM OR SUBCUT: ICD-10-PCS | Mod: S$GLB,,, | Performed by: PHYSICIAN ASSISTANT

## 2021-11-15 PROCEDURE — 3008F PR BODY MASS INDEX (BMI) DOCUMENTED: ICD-10-PCS | Mod: CPTII,S$GLB,, | Performed by: PHYSICIAN ASSISTANT

## 2021-11-15 PROCEDURE — 3079F DIAST BP 80-89 MM HG: CPT | Mod: CPTII,S$GLB,, | Performed by: PHYSICIAN ASSISTANT

## 2021-11-15 RX ORDER — KETOROLAC TROMETHAMINE 30 MG/ML
30 INJECTION, SOLUTION INTRAMUSCULAR; INTRAVENOUS
Status: COMPLETED | OUTPATIENT
Start: 2021-11-15 | End: 2021-11-15

## 2021-11-15 RX ORDER — TIZANIDINE 4 MG/1
4 TABLET ORAL NIGHTLY PRN
Qty: 20 TABLET | Refills: 0 | Status: SHIPPED | OUTPATIENT
Start: 2021-11-15 | End: 2021-11-25

## 2021-11-15 RX ADMIN — KETOROLAC TROMETHAMINE 30 MG: 30 INJECTION, SOLUTION INTRAMUSCULAR; INTRAVENOUS at 04:11

## 2021-11-17 ENCOUNTER — OFFICE VISIT (OUTPATIENT)
Dept: INTERNAL MEDICINE | Facility: CLINIC | Age: 71
End: 2021-11-17
Payer: MEDICARE

## 2021-11-17 VITALS
BODY MASS INDEX: 26.93 KG/M2 | DIASTOLIC BLOOD PRESSURE: 75 MMHG | HEART RATE: 58 BPM | HEIGHT: 65 IN | OXYGEN SATURATION: 98 % | WEIGHT: 161.63 LBS | SYSTOLIC BLOOD PRESSURE: 133 MMHG

## 2021-11-17 DIAGNOSIS — M54.42 ACUTE LEFT-SIDED LOW BACK PAIN WITH LEFT-SIDED SCIATICA: Primary | ICD-10-CM

## 2021-11-17 PROCEDURE — 96372 THER/PROPH/DIAG INJ SC/IM: CPT | Mod: S$GLB,,, | Performed by: INTERNAL MEDICINE

## 2021-11-17 PROCEDURE — 99999 PR PBB SHADOW E&M-EST. PATIENT-LVL IV: ICD-10-PCS | Mod: PBBFAC,,, | Performed by: INTERNAL MEDICINE

## 2021-11-17 PROCEDURE — 96372 PR INJECTION,THERAP/PROPH/DIAG2ST, IM OR SUBCUT: ICD-10-PCS | Mod: S$GLB,,, | Performed by: INTERNAL MEDICINE

## 2021-11-17 PROCEDURE — 99999 PR PBB SHADOW E&M-EST. PATIENT-LVL IV: CPT | Mod: PBBFAC,,, | Performed by: INTERNAL MEDICINE

## 2021-11-17 PROCEDURE — 99213 PR OFFICE/OUTPT VISIT, EST, LEVL III, 20-29 MIN: ICD-10-PCS | Mod: 25,S$GLB,, | Performed by: INTERNAL MEDICINE

## 2021-11-17 PROCEDURE — 99213 OFFICE O/P EST LOW 20 MIN: CPT | Mod: 25,S$GLB,, | Performed by: INTERNAL MEDICINE

## 2021-11-17 RX ORDER — OXYCODONE AND ACETAMINOPHEN 10; 325 MG/1; MG/1
1 TABLET ORAL EVERY 4 HOURS PRN
Qty: 20 TABLET | Refills: 0 | Status: SHIPPED | OUTPATIENT
Start: 2021-11-17 | End: 2022-01-18

## 2021-11-17 RX ORDER — KETOROLAC TROMETHAMINE 30 MG/ML
30 INJECTION, SOLUTION INTRAMUSCULAR; INTRAVENOUS
Status: DISCONTINUED | OUTPATIENT
Start: 2021-11-17 | End: 2021-11-17

## 2021-11-17 RX ORDER — KETOROLAC TROMETHAMINE 30 MG/ML
30 INJECTION, SOLUTION INTRAMUSCULAR; INTRAVENOUS
Status: COMPLETED | OUTPATIENT
Start: 2021-11-17 | End: 2021-11-17

## 2021-11-17 RX ORDER — CYCLOBENZAPRINE HCL 10 MG
10 TABLET ORAL 3 TIMES DAILY PRN
Qty: 30 TABLET | Refills: 0 | Status: SHIPPED | OUTPATIENT
Start: 2021-11-17 | End: 2021-11-27

## 2021-11-17 RX ADMIN — KETOROLAC TROMETHAMINE 30 MG: 30 INJECTION, SOLUTION INTRAMUSCULAR; INTRAVENOUS at 05:11

## 2021-12-17 ENCOUNTER — HOSPITAL ENCOUNTER (EMERGENCY)
Facility: HOSPITAL | Age: 71
Discharge: HOME | End: 2021-12-17
Attending: EMERGENCY MEDICINE
Payer: MEDICARE

## 2021-12-17 VITALS
RESPIRATION RATE: 14 BRPM | HEART RATE: 84 BPM | HEIGHT: 65 IN | BODY MASS INDEX: 29.82 KG/M2 | SYSTOLIC BLOOD PRESSURE: 181 MMHG | DIASTOLIC BLOOD PRESSURE: 86 MMHG | OXYGEN SATURATION: 99 % | WEIGHT: 179 LBS | TEMPERATURE: 98 F

## 2021-12-17 DIAGNOSIS — M54.9 BACK PAIN, UNSPECIFIED BACK LOCATION, UNSPECIFIED BACK PAIN LATERALITY, UNSPECIFIED CHRONICITY: Primary | ICD-10-CM

## 2021-12-17 PROCEDURE — 99284 EMERGENCY DEPT VISIT MOD MDM: CPT | Mod: ,,, | Performed by: EMERGENCY MEDICINE

## 2021-12-17 PROCEDURE — 96372 THER/PROPH/DIAG INJ SC/IM: CPT

## 2021-12-17 PROCEDURE — 63600175 PHARM REV CODE 636 W HCPCS: Performed by: EMERGENCY MEDICINE

## 2021-12-17 PROCEDURE — 99284 PR EMERGENCY DEPT VISIT,LEVEL IV: ICD-10-PCS | Mod: ,,, | Performed by: EMERGENCY MEDICINE

## 2021-12-17 PROCEDURE — 99284 EMERGENCY DEPT VISIT MOD MDM: CPT | Mod: 25

## 2021-12-17 PROCEDURE — 25000003 PHARM REV CODE 250: Performed by: EMERGENCY MEDICINE

## 2021-12-17 RX ORDER — METHOCARBAMOL 500 MG/1
500 TABLET, FILM COATED ORAL
Status: COMPLETED | OUTPATIENT
Start: 2021-12-17 | End: 2021-12-17

## 2021-12-17 RX ORDER — METHOCARBAMOL 500 MG/1
1000 TABLET, FILM COATED ORAL 3 TIMES DAILY
Qty: 30 TABLET | Refills: 0 | Status: SHIPPED | OUTPATIENT
Start: 2021-12-17 | End: 2021-12-20 | Stop reason: SDUPTHER

## 2021-12-17 RX ORDER — KETOROLAC TROMETHAMINE 30 MG/ML
15 INJECTION, SOLUTION INTRAMUSCULAR; INTRAVENOUS
Status: COMPLETED | OUTPATIENT
Start: 2021-12-17 | End: 2021-12-17

## 2021-12-17 RX ORDER — NAPROXEN 375 MG/1
375 TABLET ORAL 2 TIMES DAILY WITH MEALS
Qty: 60 TABLET | Refills: 0 | Status: SHIPPED | OUTPATIENT
Start: 2021-12-17 | End: 2022-01-18

## 2021-12-17 RX ADMIN — METHOCARBAMOL 500 MG: 500 TABLET ORAL at 04:12

## 2021-12-17 RX ADMIN — KETOROLAC TROMETHAMINE 15 MG: 30 INJECTION, SOLUTION INTRAMUSCULAR; INTRAVENOUS at 04:12

## 2021-12-19 DIAGNOSIS — E11.9 TYPE 2 DIABETES MELLITUS WITHOUT COMPLICATION, WITHOUT LONG-TERM CURRENT USE OF INSULIN: ICD-10-CM

## 2021-12-19 DIAGNOSIS — D35.2 PROLACTINOMA: ICD-10-CM

## 2021-12-19 DIAGNOSIS — K21.9 GASTROESOPHAGEAL REFLUX DISEASE: ICD-10-CM

## 2021-12-19 DIAGNOSIS — I10 ESSENTIAL HYPERTENSION: ICD-10-CM

## 2021-12-20 RX ORDER — LOSARTAN POTASSIUM AND HYDROCHLOROTHIAZIDE 12.5; 5 MG/1; MG/1
1 TABLET ORAL DAILY
Qty: 90 TABLET | Refills: 11 | Status: SHIPPED | OUTPATIENT
Start: 2021-12-20 | End: 2023-03-04

## 2021-12-20 RX ORDER — METOPROLOL SUCCINATE 25 MG/1
25 TABLET, EXTENDED RELEASE ORAL DAILY
Qty: 90 TABLET | Refills: 11 | Status: SHIPPED | OUTPATIENT
Start: 2021-12-20 | End: 2023-03-04

## 2021-12-20 RX ORDER — METHOCARBAMOL 500 MG/1
1000 TABLET, FILM COATED ORAL 3 TIMES DAILY
Qty: 30 TABLET | Refills: 0 | Status: SHIPPED | OUTPATIENT
Start: 2021-12-20 | End: 2021-12-20 | Stop reason: SDUPTHER

## 2021-12-20 RX ORDER — OMEPRAZOLE 20 MG/1
20 CAPSULE, DELAYED RELEASE ORAL DAILY
Qty: 90 CAPSULE | Refills: 11 | Status: SHIPPED | OUTPATIENT
Start: 2021-12-20 | End: 2023-03-04

## 2021-12-20 RX ORDER — CABERGOLINE 0.5 MG/1
0.25 TABLET ORAL
Qty: 8 TABLET | Refills: 1 | Status: SHIPPED | OUTPATIENT
Start: 2021-12-20 | End: 2022-05-16

## 2021-12-20 RX ORDER — METHOCARBAMOL 500 MG/1
1000 TABLET, FILM COATED ORAL 3 TIMES DAILY
Qty: 30 TABLET | Refills: 0 | Status: SHIPPED | OUTPATIENT
Start: 2021-12-20 | End: 2021-12-25

## 2021-12-20 RX ORDER — ATORVASTATIN CALCIUM 40 MG/1
40 TABLET, FILM COATED ORAL DAILY
Qty: 90 TABLET | Refills: 11 | Status: SHIPPED | OUTPATIENT
Start: 2021-12-20 | End: 2023-01-05

## 2022-01-18 ENCOUNTER — LAB VISIT (OUTPATIENT)
Dept: LAB | Facility: HOSPITAL | Age: 72
End: 2022-01-18
Attending: INTERNAL MEDICINE
Payer: MEDICARE

## 2022-01-18 ENCOUNTER — OFFICE VISIT (OUTPATIENT)
Dept: INTERNAL MEDICINE | Facility: CLINIC | Age: 72
End: 2022-01-18
Payer: MEDICARE

## 2022-01-18 ENCOUNTER — HOSPITAL ENCOUNTER (OUTPATIENT)
Dept: RADIOLOGY | Facility: HOSPITAL | Age: 72
Discharge: HOME | End: 2022-01-18
Attending: INTERNAL MEDICINE
Payer: MEDICARE

## 2022-01-18 VITALS
BODY MASS INDEX: 26.52 KG/M2 | SYSTOLIC BLOOD PRESSURE: 130 MMHG | HEART RATE: 65 BPM | DIASTOLIC BLOOD PRESSURE: 80 MMHG | RESPIRATION RATE: 18 BRPM | HEIGHT: 65 IN | WEIGHT: 159.19 LBS | OXYGEN SATURATION: 98 %

## 2022-01-18 DIAGNOSIS — M79.605 LEG PAIN, LEFT: ICD-10-CM

## 2022-01-18 DIAGNOSIS — M10.9 PODAGRA: Primary | ICD-10-CM

## 2022-01-18 DIAGNOSIS — D35.2 PROLACTINOMA: ICD-10-CM

## 2022-01-18 DIAGNOSIS — M10.9 PODAGRA: ICD-10-CM

## 2022-01-18 DIAGNOSIS — E11.9 TYPE 2 DIABETES MELLITUS WITHOUT COMPLICATION, WITHOUT LONG-TERM CURRENT USE OF INSULIN: ICD-10-CM

## 2022-01-18 LAB
ALBUMIN SERPL BCP-MCNC: 3.7 G/DL (ref 3.5–5.2)
ALP SERPL-CCNC: 49 U/L (ref 55–135)
ALT SERPL W/O P-5'-P-CCNC: 19 U/L (ref 10–44)
ANION GAP SERPL CALC-SCNC: 8 MMOL/L (ref 8–16)
AST SERPL-CCNC: 23 U/L (ref 10–40)
BASOPHILS # BLD AUTO: 0.02 K/UL (ref 0–0.2)
BASOPHILS NFR BLD: 0.5 % (ref 0–1.9)
BILIRUB SERPL-MCNC: 0.9 MG/DL (ref 0.1–1)
BUN SERPL-MCNC: 10 MG/DL (ref 8–23)
CALCIUM SERPL-MCNC: 9.5 MG/DL (ref 8.7–10.5)
CHLORIDE SERPL-SCNC: 106 MMOL/L (ref 95–110)
CHOLEST SERPL-MCNC: 187 MG/DL (ref 120–199)
CHOLEST/HDLC SERPL: 3.6 {RATIO} (ref 2–5)
CO2 SERPL-SCNC: 25 MMOL/L (ref 23–29)
CREAT SERPL-MCNC: 0.9 MG/DL (ref 0.5–1.4)
DIFFERENTIAL METHOD: ABNORMAL
EOSINOPHIL # BLD AUTO: 0.1 K/UL (ref 0–0.5)
EOSINOPHIL NFR BLD: 3 % (ref 0–8)
ERYTHROCYTE [DISTWIDTH] IN BLOOD BY AUTOMATED COUNT: 13.1 % (ref 11.5–14.5)
EST. GFR  (AFRICAN AMERICAN): >60 ML/MIN/1.73 M^2
EST. GFR  (NON AFRICAN AMERICAN): >60 ML/MIN/1.73 M^2
ESTIMATED AVG GLUCOSE: 128 MG/DL (ref 68–131)
GLUCOSE SERPL-MCNC: 96 MG/DL (ref 70–110)
HBA1C MFR BLD: 6.1 % (ref 4–5.6)
HCT VFR BLD AUTO: 48.9 % (ref 40–54)
HDLC SERPL-MCNC: 52 MG/DL (ref 40–75)
HDLC SERPL: 27.8 % (ref 20–50)
HGB BLD-MCNC: 15.5 G/DL (ref 14–18)
IMM GRANULOCYTES # BLD AUTO: 0.01 K/UL (ref 0–0.04)
IMM GRANULOCYTES NFR BLD AUTO: 0.3 % (ref 0–0.5)
LDLC SERPL CALC-MCNC: 120.8 MG/DL (ref 63–159)
LYMPHOCYTES # BLD AUTO: 1.3 K/UL (ref 1–4.8)
LYMPHOCYTES NFR BLD: 32.1 % (ref 18–48)
MCH RBC QN AUTO: 29.2 PG (ref 27–31)
MCHC RBC AUTO-ENTMCNC: 31.7 G/DL (ref 32–36)
MCV RBC AUTO: 92 FL (ref 82–98)
MONOCYTES # BLD AUTO: 0.5 K/UL (ref 0.3–1)
MONOCYTES NFR BLD: 12.1 % (ref 4–15)
NEUTROPHILS # BLD AUTO: 2.1 K/UL (ref 1.8–7.7)
NEUTROPHILS NFR BLD: 52 % (ref 38–73)
NONHDLC SERPL-MCNC: 135 MG/DL
NRBC BLD-RTO: 0 /100 WBC
PLATELET # BLD AUTO: 227 K/UL (ref 150–450)
PMV BLD AUTO: 9.5 FL (ref 9.2–12.9)
POTASSIUM SERPL-SCNC: 4.1 MMOL/L (ref 3.5–5.1)
PROT SERPL-MCNC: 7.5 G/DL (ref 6–8.4)
RBC # BLD AUTO: 5.3 M/UL (ref 4.6–6.2)
SODIUM SERPL-SCNC: 139 MMOL/L (ref 136–145)
TRIGL SERPL-MCNC: 71 MG/DL (ref 30–150)
URATE SERPL-MCNC: 5.1 MG/DL (ref 3.4–7)
WBC # BLD AUTO: 3.96 K/UL (ref 3.9–12.7)

## 2022-01-18 PROCEDURE — 99214 OFFICE O/P EST MOD 30 MIN: CPT | Mod: S$GLB,,, | Performed by: INTERNAL MEDICINE

## 2022-01-18 PROCEDURE — 36415 COLL VENOUS BLD VENIPUNCTURE: CPT | Performed by: INTERNAL MEDICINE

## 2022-01-18 PROCEDURE — 85025 COMPLETE CBC W/AUTO DIFF WBC: CPT | Performed by: INTERNAL MEDICINE

## 2022-01-18 PROCEDURE — 3079F DIAST BP 80-89 MM HG: CPT | Mod: CPTII,S$GLB,, | Performed by: INTERNAL MEDICINE

## 2022-01-18 PROCEDURE — 1159F MED LIST DOCD IN RCRD: CPT | Mod: CPTII,S$GLB,, | Performed by: INTERNAL MEDICINE

## 2022-01-18 PROCEDURE — 1125F PR PAIN SEVERITY QUANTIFIED, PAIN PRESENT: ICD-10-PCS | Mod: CPTII,S$GLB,, | Performed by: INTERNAL MEDICINE

## 2022-01-18 PROCEDURE — 1160F PR REVIEW ALL MEDS BY PRESCRIBER/CLIN PHARMACIST DOCUMENTED: ICD-10-PCS | Mod: CPTII,S$GLB,, | Performed by: INTERNAL MEDICINE

## 2022-01-18 PROCEDURE — 3288F FALL RISK ASSESSMENT DOCD: CPT | Mod: CPTII,S$GLB,, | Performed by: INTERNAL MEDICINE

## 2022-01-18 PROCEDURE — 3075F PR MOST RECENT SYSTOLIC BLOOD PRESS GE 130-139MM HG: ICD-10-PCS | Mod: CPTII,S$GLB,, | Performed by: INTERNAL MEDICINE

## 2022-01-18 PROCEDURE — 99999 PR PBB SHADOW E&M-EST. PATIENT-LVL IV: CPT | Mod: PBBFAC,,, | Performed by: INTERNAL MEDICINE

## 2022-01-18 PROCEDURE — 1157F ADVNC CARE PLAN IN RCRD: CPT | Mod: CPTII,S$GLB,, | Performed by: INTERNAL MEDICINE

## 2022-01-18 PROCEDURE — 1101F PT FALLS ASSESS-DOCD LE1/YR: CPT | Mod: CPTII,S$GLB,, | Performed by: INTERNAL MEDICINE

## 2022-01-18 PROCEDURE — 3079F PR MOST RECENT DIASTOLIC BLOOD PRESSURE 80-89 MM HG: ICD-10-PCS | Mod: CPTII,S$GLB,, | Performed by: INTERNAL MEDICINE

## 2022-01-18 PROCEDURE — 1160F RVW MEDS BY RX/DR IN RCRD: CPT | Mod: CPTII,S$GLB,, | Performed by: INTERNAL MEDICINE

## 2022-01-18 PROCEDURE — 83036 HEMOGLOBIN GLYCOSYLATED A1C: CPT | Performed by: INTERNAL MEDICINE

## 2022-01-18 PROCEDURE — 3288F PR FALLS RISK ASSESSMENT DOCUMENTED: ICD-10-PCS | Mod: CPTII,S$GLB,, | Performed by: INTERNAL MEDICINE

## 2022-01-18 PROCEDURE — 3044F HG A1C LEVEL LT 7.0%: CPT | Mod: CPTII,S$GLB,, | Performed by: INTERNAL MEDICINE

## 2022-01-18 PROCEDURE — 3072F LOW RISK FOR RETINOPATHY: CPT | Mod: CPTII,S$GLB,, | Performed by: INTERNAL MEDICINE

## 2022-01-18 PROCEDURE — 72100 XR LUMBAR SPINE AP AND LATERAL: ICD-10-PCS | Mod: 26,,, | Performed by: RADIOLOGY

## 2022-01-18 PROCEDURE — 3072F PR LOW RISK FOR RETINOPATHY: ICD-10-PCS | Mod: CPTII,S$GLB,, | Performed by: INTERNAL MEDICINE

## 2022-01-18 PROCEDURE — 1101F PR PT FALLS ASSESS DOC 0-1 FALLS W/OUT INJ PAST YR: ICD-10-PCS | Mod: CPTII,S$GLB,, | Performed by: INTERNAL MEDICINE

## 2022-01-18 PROCEDURE — 3008F PR BODY MASS INDEX (BMI) DOCUMENTED: ICD-10-PCS | Mod: CPTII,S$GLB,, | Performed by: INTERNAL MEDICINE

## 2022-01-18 PROCEDURE — 3075F SYST BP GE 130 - 139MM HG: CPT | Mod: CPTII,S$GLB,, | Performed by: INTERNAL MEDICINE

## 2022-01-18 PROCEDURE — 3044F PR MOST RECENT HEMOGLOBIN A1C LEVEL <7.0%: ICD-10-PCS | Mod: CPTII,S$GLB,, | Performed by: INTERNAL MEDICINE

## 2022-01-18 PROCEDURE — 99499 RISK ADDL DX/OHS AUDIT: ICD-10-PCS | Mod: S$GLB,,, | Performed by: INTERNAL MEDICINE

## 2022-01-18 PROCEDURE — 99499 UNLISTED E&M SERVICE: CPT | Mod: S$GLB,,, | Performed by: INTERNAL MEDICINE

## 2022-01-18 PROCEDURE — 3008F BODY MASS INDEX DOCD: CPT | Mod: CPTII,S$GLB,, | Performed by: INTERNAL MEDICINE

## 2022-01-18 PROCEDURE — 99214 PR OFFICE/OUTPT VISIT, EST, LEVL IV, 30-39 MIN: ICD-10-PCS | Mod: S$GLB,,, | Performed by: INTERNAL MEDICINE

## 2022-01-18 PROCEDURE — 84550 ASSAY OF BLOOD/URIC ACID: CPT | Performed by: INTERNAL MEDICINE

## 2022-01-18 PROCEDURE — 1157F PR ADVANCE CARE PLAN OR EQUIV PRESENT IN MEDICAL RECORD: ICD-10-PCS | Mod: CPTII,S$GLB,, | Performed by: INTERNAL MEDICINE

## 2022-01-18 PROCEDURE — 1159F PR MEDICATION LIST DOCUMENTED IN MEDICAL RECORD: ICD-10-PCS | Mod: CPTII,S$GLB,, | Performed by: INTERNAL MEDICINE

## 2022-01-18 PROCEDURE — 1125F AMNT PAIN NOTED PAIN PRSNT: CPT | Mod: CPTII,S$GLB,, | Performed by: INTERNAL MEDICINE

## 2022-01-18 PROCEDURE — 99999 PR PBB SHADOW E&M-EST. PATIENT-LVL IV: ICD-10-PCS | Mod: PBBFAC,,, | Performed by: INTERNAL MEDICINE

## 2022-01-18 PROCEDURE — 80061 LIPID PANEL: CPT | Performed by: INTERNAL MEDICINE

## 2022-01-18 PROCEDURE — 72100 X-RAY EXAM L-S SPINE 2/3 VWS: CPT | Mod: 26,,, | Performed by: RADIOLOGY

## 2022-01-18 PROCEDURE — 73502 X-RAY EXAM HIP UNI 2-3 VIEWS: CPT | Mod: TC,FY,LT

## 2022-01-18 PROCEDURE — 73502 XR HIP WITH PELVIS WHEN PERFORMED, 2 OR 3 VIEWS LEFT: ICD-10-PCS | Mod: 26,LT,, | Performed by: RADIOLOGY

## 2022-01-18 PROCEDURE — 80053 COMPREHEN METABOLIC PANEL: CPT | Performed by: INTERNAL MEDICINE

## 2022-01-18 PROCEDURE — 73502 X-RAY EXAM HIP UNI 2-3 VIEWS: CPT | Mod: 26,LT,, | Performed by: RADIOLOGY

## 2022-01-18 PROCEDURE — 72100 X-RAY EXAM L-S SPINE 2/3 VWS: CPT | Mod: TC,FY

## 2022-01-18 RX ORDER — OXYCODONE AND ACETAMINOPHEN 5; 325 MG/1; MG/1
1 TABLET ORAL EVERY 12 HOURS PRN
Qty: 30 TABLET | Refills: 0 | Status: SHIPPED | OUTPATIENT
Start: 2022-01-18 | End: 2022-08-23
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
## 2022-01-25 ENCOUNTER — TELEPHONE (OUTPATIENT)
Dept: INTERNAL MEDICINE | Facility: CLINIC | Age: 72
End: 2022-01-25
Payer: MEDICARE

## 2022-01-25 DIAGNOSIS — M79.605 LEG PAIN, LEFT: Primary | ICD-10-CM

## 2022-01-25 NOTE — TELEPHONE ENCOUNTER
Hi, please call him -- his xrays from last week shows that he has arthritis of his lumbar back and the left hip xray looked OK.  I think his pains are coming from the low back.  I recommend that he see a spine specialist. Let me know if he would like to see one.    Also, his blood work was in a good range and the diabetes is well controlled.    Let me know if patient has any questions.  Thank you, Anthony Tay

## 2022-01-26 NOTE — TELEPHONE ENCOUNTER
Hi, please contact the patient to assist in scheduling    Orders Placed This Encounter    [facility] referral[facility]cons[facility] to [facility]     Also, I sent in 30 tabs of the percocet, he should not take it more than twice per day. Hopefully that can last him for 1 more week. If he has run out by next week I can send in a refill (he could call or email for a refill).    Let me know if patient has any questions.  Thank you, Anthony Tay

## 2022-01-26 NOTE — TELEPHONE ENCOUNTER
Spoke to patient and advised of x ray results, labs and recommendation to be seen by a spine specialist. Patient verbalized understanding.      Patient agreed to see a spine specialist.  Patient would like to know I anything else can be proscribed for his pain bc he's almost out of hid pain medication.     Please advise..

## 2022-01-26 NOTE — TELEPHONE ENCOUNTER
Spoke to patient and advised of recommendation. Patient verbalized understanding.      appt sceduled for back and spine

## 2022-01-28 ENCOUNTER — TELEPHONE (OUTPATIENT)
Dept: ORTHOPEDICS | Facility: CLINIC | Age: 72
End: 2022-01-28
Payer: MEDICARE

## 2022-01-28 ENCOUNTER — PATIENT MESSAGE (OUTPATIENT)
Dept: ORTHOPEDICS | Facility: CLINIC | Age: 72
End: 2022-01-28
Payer: MEDICARE

## 2022-01-28 DIAGNOSIS — M51.36 DDD (DEGENERATIVE DISC DISEASE), LUMBAR: Primary | ICD-10-CM

## 2022-01-31 ENCOUNTER — PES CALL (OUTPATIENT)
Dept: ADMINISTRATIVE | Facility: CLINIC | Age: 72
End: 2022-01-31
Payer: MEDICARE

## 2022-02-01 ENCOUNTER — OFFICE VISIT (OUTPATIENT)
Dept: ORTHOPEDICS | Facility: CLINIC | Age: 72
End: 2022-02-01
Payer: MEDICARE

## 2022-02-01 ENCOUNTER — HOSPITAL ENCOUNTER (OUTPATIENT)
Dept: RADIOLOGY | Facility: HOSPITAL | Age: 72
Discharge: HOME | End: 2022-02-01
Attending: PHYSICIAN ASSISTANT
Payer: MEDICARE

## 2022-02-01 VITALS — BODY MASS INDEX: 26.68 KG/M2 | HEIGHT: 66 IN | WEIGHT: 166 LBS

## 2022-02-01 DIAGNOSIS — M54.16 LUMBAR RADICULOPATHY: Primary | ICD-10-CM

## 2022-02-01 DIAGNOSIS — M51.36 DDD (DEGENERATIVE DISC DISEASE), LUMBAR: ICD-10-CM

## 2022-02-01 DIAGNOSIS — M79.605 LEG PAIN, LEFT: ICD-10-CM

## 2022-02-01 PROCEDURE — 1157F PR ADVANCE CARE PLAN OR EQUIV PRESENT IN MEDICAL RECORD: ICD-10-PCS | Mod: CPTII,S$GLB,, | Performed by: PHYSICIAN ASSISTANT

## 2022-02-01 PROCEDURE — 99999 PR PBB SHADOW E&M-EST. PATIENT-LVL III: CPT | Mod: PBBFAC,,, | Performed by: PHYSICIAN ASSISTANT

## 2022-02-01 PROCEDURE — 3072F PR LOW RISK FOR RETINOPATHY: ICD-10-PCS | Mod: CPTII,S$GLB,, | Performed by: PHYSICIAN ASSISTANT

## 2022-02-01 PROCEDURE — 99214 OFFICE O/P EST MOD 30 MIN: CPT | Mod: S$GLB,,, | Performed by: PHYSICIAN ASSISTANT

## 2022-02-01 PROCEDURE — 3044F PR MOST RECENT HEMOGLOBIN A1C LEVEL <7.0%: ICD-10-PCS | Mod: CPTII,S$GLB,, | Performed by: PHYSICIAN ASSISTANT

## 2022-02-01 PROCEDURE — 1125F PR PAIN SEVERITY QUANTIFIED, PAIN PRESENT: ICD-10-PCS | Mod: CPTII,S$GLB,, | Performed by: PHYSICIAN ASSISTANT

## 2022-02-01 PROCEDURE — 3008F PR BODY MASS INDEX (BMI) DOCUMENTED: ICD-10-PCS | Mod: CPTII,S$GLB,, | Performed by: PHYSICIAN ASSISTANT

## 2022-02-01 PROCEDURE — 1125F AMNT PAIN NOTED PAIN PRSNT: CPT | Mod: CPTII,S$GLB,, | Performed by: PHYSICIAN ASSISTANT

## 2022-02-01 PROCEDURE — 1159F PR MEDICATION LIST DOCUMENTED IN MEDICAL RECORD: ICD-10-PCS | Mod: CPTII,S$GLB,, | Performed by: PHYSICIAN ASSISTANT

## 2022-02-01 PROCEDURE — 3288F FALL RISK ASSESSMENT DOCD: CPT | Mod: CPTII,S$GLB,, | Performed by: PHYSICIAN ASSISTANT

## 2022-02-01 PROCEDURE — 3072F LOW RISK FOR RETINOPATHY: CPT | Mod: CPTII,S$GLB,, | Performed by: PHYSICIAN ASSISTANT

## 2022-02-01 PROCEDURE — 72120 X-RAY BEND ONLY L-S SPINE: CPT | Mod: TC

## 2022-02-01 PROCEDURE — 99214 PR OFFICE/OUTPT VISIT, EST, LEVL IV, 30-39 MIN: ICD-10-PCS | Mod: S$GLB,,, | Performed by: PHYSICIAN ASSISTANT

## 2022-02-01 PROCEDURE — 3044F HG A1C LEVEL LT 7.0%: CPT | Mod: CPTII,S$GLB,, | Performed by: PHYSICIAN ASSISTANT

## 2022-02-01 PROCEDURE — 99999 PR PBB SHADOW E&M-EST. PATIENT-LVL III: ICD-10-PCS | Mod: PBBFAC,,, | Performed by: PHYSICIAN ASSISTANT

## 2022-02-01 PROCEDURE — 72120 X-RAY BEND ONLY L-S SPINE: CPT | Mod: 26,,, | Performed by: RADIOLOGY

## 2022-02-01 PROCEDURE — 3008F BODY MASS INDEX DOCD: CPT | Mod: CPTII,S$GLB,, | Performed by: PHYSICIAN ASSISTANT

## 2022-02-01 PROCEDURE — 1101F PT FALLS ASSESS-DOCD LE1/YR: CPT | Mod: CPTII,S$GLB,, | Performed by: PHYSICIAN ASSISTANT

## 2022-02-01 PROCEDURE — 1157F ADVNC CARE PLAN IN RCRD: CPT | Mod: CPTII,S$GLB,, | Performed by: PHYSICIAN ASSISTANT

## 2022-02-01 PROCEDURE — 72120 XR LUMBAR SPINE FLEXION AND EXTENSION ONLY: ICD-10-PCS | Mod: 26,,, | Performed by: RADIOLOGY

## 2022-02-01 PROCEDURE — 1159F MED LIST DOCD IN RCRD: CPT | Mod: CPTII,S$GLB,, | Performed by: PHYSICIAN ASSISTANT

## 2022-02-01 PROCEDURE — 1101F PR PT FALLS ASSESS DOC 0-1 FALLS W/OUT INJ PAST YR: ICD-10-PCS | Mod: CPTII,S$GLB,, | Performed by: PHYSICIAN ASSISTANT

## 2022-02-01 PROCEDURE — 3288F PR FALLS RISK ASSESSMENT DOCUMENTED: ICD-10-PCS | Mod: CPTII,S$GLB,, | Performed by: PHYSICIAN ASSISTANT

## 2022-02-01 RX ORDER — GABAPENTIN 100 MG/1
100 CAPSULE ORAL 3 TIMES DAILY
Qty: 90 CAPSULE | Refills: 0 | Status: SHIPPED | OUTPATIENT
Start: 2022-02-01

## 2022-02-01 NOTE — PROGRESS NOTES
DATE: 2/1/2022  PATIENT: Isma Martin    Supervising Physician: Ariel Lamar M.D.    CHIEF COMPLAINT: back and left leg pain    HISTORY:  Isma Martin is a 71 y.o. male with PMH of posterior cervical fusion in 2019 here for initial evaluation of low back and left posterolateral leg pain (Back - 5, Leg - 5).  The pain in the leg is what bothers him most.  The pain has been present for 3 months after he lifted a 2 ton soha. The patient describes the pain as throbbing.  The pain is worse with standing, walking and at night and improved by sitting. There is no associated numbness and tingling. There is subjective weakness. Prior treatments have included percocet and a muscle relaxer, but no ESIs or lumbar surgery.    The patient denies myelopathic symptoms such as handwriting changes or difficulty with buttons/coins/keys. Denies perineal paresthesias, bowel/bladder dysfunction.    PAST MEDICAL/SURGICAL HISTORY:  Past Medical History:   Diagnosis Date    Arthritis     Bilateral dry eyes     Cataract     Colon polyp     Diabetes mellitus     GERD (gastroesophageal reflux disease)     Hyperlipidemia     Hypertension     Hypogonadism male     Obesity     Prolactinoma     Urinary tract infection      Past Surgical History:   Procedure Laterality Date    CAROTID ENDARTERECTOMY Left     CERVICAL LAMINECTOMY WITH SPINAL FUSION N/A 10/29/2019    Procedure: LAMINECTOMY, SPINE, CERVICAL, WITH FUSION C3-6 Depuy SNS: [employer] [place] + Pads;  Surgeon: Ariel Lamar MD;  Location: [facility] OR [address] [place] [address];  Service: Neurosurgery;  Laterality: N/A;    COLONOSCOPY N/A 7/8/2021    Procedure: COLONOSCOPY;  Surgeon: Veena Power MD;  Location: [facility] (4TH FLR);  Service: Endoscopy;  Laterality: N/A;  fully vaccinated 4/11/21, instructions sent to [facility]    FOOT SURGERY  4-23-14    right    TRANSPHENOIDAL PITUITARY RESECTION      pituitary tumor       Medications:   Current Outpatient  Medications on File Prior to Visit   Medication Sig Dispense Refill    acetaminophen (TYLENOL) 650 MG TbSR Take 1 tablet (650 mg total) by mouth every 6 (six) hours as needed (pain). 56 tablet 0    ascorbic acid (VITAMIN C) 500 MG tablet Take 500 mg by mouth Every PM.      atorvastatin (LIPITOR) 40 MG tablet Take 1 tablet (40 mg total) by mouth once daily. 90 tablet 11    cabergoline (DOSTINEX) 0.5 mg tablet Take 0.5 tablets (0.25 mg total) by mouth twice a week. 8 tablet 1    cholecalciferol, vitamin D3, 2,000 unit Tab Take 1 tablet by mouth Every PM.      cyanocobalamin (VITAMIN B-12) 1000 MCG tablet       losartan-hydrochlorothiazide 50-12.5 mg (HYZAAR) 50-12.5 mg per tablet Take 1 tablet by mouth once daily. 90 tablet 11    metoprolol succinate (TOPROL-XL) 25 MG 24 hr tablet Take 1 tablet (25 mg total) by mouth once daily. 90 tablet 11    omega-3 fatty acids-vitamin E 1,000 mg Cap       omeprazole (PRILOSEC) 20 MG capsule Take 1 capsule (20 mg total) by mouth once daily. 90 capsule 11    oxyCODONE-acetaminophen (PERCOCET) 5-325 mg per tablet Take 1 tablet by mouth every 12 (twelve) hours as needed for Pain. 30 tablet 0    VITAMIN E ACETATE (VITAMIN E ORAL) Take by mouth.      sildenafil (REVATIO) 20 mg Tab Take 1 tablet (20 mg total) by mouth daily as needed (erectile dysfunction). 15 tablet 0     No current facility-administered medications on file prior to visit.       Social History:   Social History     Socioeconomic History    Marital status:      Spouse name: Brenda   Occupational History    Occupation: [employer]      Employer: [employer]     Employer: [employer]   Tobacco Use    Smoking status: Former Smoker     Packs/day: 1.50     Years: 35.00     Pack years: 52.50     Quit date: 1995     Years since quittin.1    Smokeless tobacco: Never Used   Substance and Sexual Activity    Alcohol use: Yes     Alcohol/week: 0.8 standard drinks     Types: 1 Standard  drinks or equivalent per week     Comment: rare    Drug use: No    Sexual activity: Not Currently     Partners: Female   Social History Narrative    , on ssdi for his toe.    . 1 kid still with them. Wife dm and in WC.    3 kids total.     Social Determinants of Health     Financial Resource Strain: Medium Risk    Difficulty of Paying Living Expenses: Somewhat hard   Food Insecurity: No Food Insecurity    Worried About Running Out of Food in the Last Year: Never true    Ran Out of Food in the Last Year: Never true   Transportation Needs: No Transportation Needs    Lack of Transportation (Medical): No    Lack of Transportation (Non-Medical): No   Physical Activity: Sufficiently Active    Days of Exercise per Week: 7 days    Minutes of Exercise per Session: 60 min   Stress: Stress Concern Present    Feeling of Stress : Rather much   Social Connections: Unknown    Frequency of Communication with Friends and Family: More than three times a week    Frequency of Social Gatherings with Friends and Family: Twice a week    Active Member of Clubs or Organizations: Yes    Attends Club or Organization Meetings: More than 4 times per year    Marital Status:    Housing Stability: High Risk    Unable to Pay for Housing in the Last Year: No    Number of Places Lived in the Last Year: 4    Unstable Housing in the Last Year: Yes       REVIEW OF SYSTEMS:  Constitution: Negative. Negative for chills, fever and night sweats.   Cardiovascular: Negative for chest pain and syncope.   Respiratory: Negative for cough and shortness of breath.   Gastrointestinal: See HPI. Negative for nausea/vomiting. Negative for abdominal pain.  Genitourinary: See HPI. Negative for discoloration or dysuria.  Skin: Negative for dry skin, itching and rash.   Hematologic/Lymphatic: Negative for bleeding problem. Does not bruise/bleed easily.   Musculoskeletal: Negative for falls and muscle weakness.   Neurological: See  "HPI. No seizures.   Endocrine: Negative for polydipsia, polyphagia and polyuria.   Allergic/Immunologic: Negative for hives and persistent infections.     EXAM:  Ht 5' 6" (1.676 m)   Wt 75.3 kg (166 lb 0.1 oz)   BMI 26.79 kg/m²     General: The patient is a very pleasant 71 y.o. male in no apparent distress, the patient is oriented to person, place and time.  Psych: Normal mood and affect  HEENT: Vision grossly intact, hearing intact to the spoken word.  Lungs: Respirations unlabored.  Gait: Antalgic station and gait, unable to perform toe or heel walk.   Skin: Dorsal lumbar skin negative for rashes, lesions, hairy patches and surgical scars. There is mild lumbar tenderness to palpation.  Range of motion: Lumbar range of motion is acceptable.  Spinal Balance: Global saggital and coronal spinal balance acceptable, not significant for scoliosis and kyphosis.  Musculoskeletal: No pain with the range of motion of the bilateral hips. No trochanteric tenderness to palpation.  Vascular: Bilateral lower extremities warm and well perfused, dorsalis pedis pulses 2+ bilaterally.  Neurological: Normal strength and tone in all major motor groups in the bilateral lower extremities with the exception of diffusely decreased strength in the left lower extremity. Normal sensation to light touch in the L2-S1 dermatomes bilaterally.  Deep tendon reflexes symmetric 2+ in the bilateral lower extremities.  Negative Babinski bilaterally. Straight leg raise positive on the left, negative on the right.    IMAGING:      Today I personally reviewed AP, Lat and Flex/Ex  upright L-spine films that demonstrate mild to moderate degenerative changes.      Body mass index is 26.79 kg/m².    Hemoglobin A1C   Date Value Ref Range Status   01/18/2022 6.1 (H) 4.0 - 5.6 % Final     Comment:     ADA Screening Guidelines:  5.7-6.4%  Consistent with prediabetes  >or=6.5%  Consistent with diabetes    High levels of fetal hemoglobin interfere with the " HbA1C  assay. Heterozygous hemoglobin variants (HbS, HgC, etc)do  not significantly interfere with this assay.   However, presence of multiple variants may affect accuracy.     07/13/2021 6.0 (H) 4.0 - 5.6 % Final     Comment:     ADA Screening Guidelines:  5.7-6.4%  Consistent with prediabetes  >or=6.5%  Consistent with diabetes    High levels of fetal hemoglobin interfere with the HbA1C  assay. Heterozygous hemoglobin variants (HbS, HgC, etc)do  not significantly interfere with this assay.   However, presence of multiple variants may affect accuracy.     01/21/2021 5.7 (H) 4.0 - 5.6 % Final     Comment:     ADA Screening Guidelines:  5.7-6.4%  Consistent with prediabetes  >or=6.5%  Consistent with diabetes  High levels of fetal hemoglobin interfere with the HbA1C  assay. Heterozygous hemoglobin variants (HbS, HgC, etc)do  not significantly interfere with this assay.   However, presence of multiple variants may affect accuracy.             ASSESSMENT/PLAN:    Isma was seen today for follow-up and pain.    Diagnoses and all orders for this visit:    Lumbar radiculopathy  -     MRI Lumbar Spine Without Contrast; Future    Leg pain, left  -     [facility]ulatory referral[facility]consult [facility]    Other orders  -     gabapentin (NEURONTIN) 100 MG capsule; Take 1 capsule (100 mg total) by mouth 3 (three) times daily.      Today we discussed at length all of the different treatment options including anti-inflammatories, acetaminophen, rest, ice, heat, physical therapy including strengthening and stretching exercises, home exercises, ROM, aerobic conditioning, aqua therapy, other modalities including ultrasound, massage, and dry needling, epidural steroid injections and finally surgical intervention.      We will get an MRI lumbar spine. I will see him back after to discuss results and further treatment.

## 2022-02-10 ENCOUNTER — HOSPITAL ENCOUNTER (OUTPATIENT)
Dept: RADIOLOGY | Facility: HOSPITAL | Age: 72
Discharge: HOME | End: 2022-02-10
Attending: PHYSICIAN ASSISTANT
Payer: MEDICARE

## 2022-02-10 DIAGNOSIS — M54.16 LUMBAR RADICULOPATHY: ICD-10-CM

## 2022-02-10 PROCEDURE — 72148 MRI LUMBAR SPINE WITHOUT CONTRAST: ICD-10-PCS | Mod: 26,,, | Performed by: RADIOLOGY

## 2022-02-10 PROCEDURE — 72148 MRI LUMBAR SPINE W/O DYE: CPT | Mod: 26,,, | Performed by: RADIOLOGY

## 2022-02-10 PROCEDURE — 72148 MRI LUMBAR SPINE W/O DYE: CPT | Mod: TC

## 2022-02-14 ENCOUNTER — TELEPHONE (OUTPATIENT)
Dept: ORTHOPEDICS | Facility: CLINIC | Age: 72
End: 2022-02-14
Payer: MEDICARE

## 2022-02-20 DIAGNOSIS — N52.9 ERECTILE DYSFUNCTION, UNSPECIFIED ERECTILE DYSFUNCTION TYPE: ICD-10-CM

## 2022-02-20 DIAGNOSIS — M79.605 LEG PAIN, LEFT: ICD-10-CM

## 2022-02-20 RX ORDER — OXYCODONE AND ACETAMINOPHEN 5; 325 MG/1; MG/1
1 TABLET ORAL EVERY 12 HOURS PRN
Qty: 30 TABLET | Refills: 0 | Status: CANCELLED | OUTPATIENT
Start: 2022-02-20

## 2022-02-20 NOTE — TELEPHONE ENCOUNTER
No new care gaps identified.  Powered by [employer]. Reference number: [id number].   2/20/2022 5:10:26 PM CST

## 2022-02-21 RX ORDER — OXYCODONE AND ACETAMINOPHEN 5; 325 MG/1; MG/1
1 TABLET ORAL EVERY 12 HOURS PRN
Qty: 30 TABLET | Refills: 0 | Status: CANCELLED | OUTPATIENT
Start: 2022-02-21

## 2022-02-22 DIAGNOSIS — M79.605 LEG PAIN, LEFT: ICD-10-CM

## 2022-02-22 NOTE — TELEPHONE ENCOUNTER
Spoke to patient stated that he haven't heard back from them yet.   Stated he is currently taking aleve and it has been helping a little bit

## 2022-02-22 NOTE — TELEPHONE ENCOUNTER
Hi, please call him --  I do not think it is a good idea to stay on the pain medicine percocet. Please ask him what was the spine clinic specialist idea about how to best help treat his pain going forward.  Also, please ask whether he has tried over the counter nsaids like ibuprofen or aleve.  Thank you, Anthony Tay

## 2022-02-22 NOTE — TELEPHONE ENCOUNTER
No new care gaps identified.  Powered by [employer]. Reference number: [id number].   2/22/2022 4:59:56 PM CST

## 2022-02-23 RX ORDER — OXYCODONE AND ACETAMINOPHEN 5; 325 MG/1; MG/1
1 TABLET ORAL EVERY 12 HOURS PRN
Qty: 30 TABLET | Refills: 0 | OUTPATIENT
Start: 2022-02-23

## 2022-02-23 RX ORDER — NAPROXEN 500 MG/1
500 TABLET ORAL 2 TIMES DAILY WITH MEALS
Qty: 60 TABLET | Refills: 1 | Status: SHIPPED | OUTPATIENT
Start: 2022-02-23 | End: 2023-12-07 | Stop reason: SDUPTHER

## 2022-02-23 NOTE — TELEPHONE ENCOUNTER
If he would like I could send in a stronger type of Aleve 500mg instead of the over the counter which I think is 220mg. He can take the 500mg tab twice per day as needed. Let me know if he wants me to send it in.  Let me know if patient has any more questions.  Thank you, Anthony Tay

## 2022-02-23 NOTE — TELEPHONE ENCOUNTER
----- Message from Annemarie Neville sent at 2/23/2022 11:13 AM CST -----  Contact: [phone]  Patient is returning a phone call.  Who left a message for the patient: n/a   Does patient know what this is regarding:  n/a  Would you like a call back, or a response through your [facility] portal?:  call  Comments:

## 2022-04-21 ENCOUNTER — PATIENT OUTREACH (OUTPATIENT)
Dept: ADMINISTRATIVE | Facility: OTHER | Age: 72
End: 2022-04-21
Payer: MEDICARE

## 2022-04-21 NOTE — PROGRESS NOTES
Health Maintenance Due   Topic Date Due    Diabetes Urine Screening  08/12/2016    Influenza Vaccine (1) 09/01/2021     Updates were requested from care everywhere.  Chart was reviewed for overdue [facility]active [facility] Encounters ([facility]) topics (CRS, Breast Cancer Screening, Eye exam)  Health Maintenance has been updated.  LINKS immunization registry triggered.  Immunizations were reconciled.

## 2022-05-16 DIAGNOSIS — D35.2 PROLACTINOMA: ICD-10-CM

## 2022-05-16 RX ORDER — CABERGOLINE 0.5 MG/1
TABLET ORAL
Qty: 8 TABLET | Refills: 1 | Status: SHIPPED | OUTPATIENT
Start: 2022-05-16 | End: 2022-11-21

## 2022-05-16 NOTE — TELEPHONE ENCOUNTER
Overdue for follow-up, please schedule prolactin level visit with me in the next 2-3 months (general clinic or pituitary Clinic).    1. Prolactinoma  - cabergoline (DOSTINEX) 0.5 mg tablet; TAKE 1/2 TABLET(0.25 MG) BY MOUTH 2 TIMES A WEEK  Dispense: 8 tablet; Refill: 1  - Prolactin; Future    Venecia Sanchez MD

## 2022-05-30 ENCOUNTER — PATIENT MESSAGE (OUTPATIENT)
Dept: ADMINISTRATIVE | Facility: HOSPITAL | Age: 72
End: 2022-05-30
Payer: MEDICARE

## 2022-07-13 DIAGNOSIS — E11.9 TYPE 2 DIABETES MELLITUS WITHOUT COMPLICATION: ICD-10-CM

## 2022-08-02 ENCOUNTER — OFFICE VISIT (OUTPATIENT)
Dept: ENDOCRINOLOGY | Facility: CLINIC | Age: 72
End: 2022-08-02
Payer: MEDICARE

## 2022-08-02 VITALS
HEIGHT: 66 IN | SYSTOLIC BLOOD PRESSURE: 170 MMHG | BODY MASS INDEX: 24.66 KG/M2 | HEART RATE: 56 BPM | DIASTOLIC BLOOD PRESSURE: 85 MMHG | OXYGEN SATURATION: 98 % | WEIGHT: 153.44 LBS

## 2022-08-02 DIAGNOSIS — E29.1 HYPOGONADISM MALE: ICD-10-CM

## 2022-08-02 DIAGNOSIS — D35.2 PROLACTINOMA: Primary | ICD-10-CM

## 2022-08-02 DIAGNOSIS — E78.5 HYPERLIPIDEMIA, UNSPECIFIED HYPERLIPIDEMIA TYPE: ICD-10-CM

## 2022-08-02 DIAGNOSIS — E11.9 TYPE 2 DIABETES MELLITUS WITHOUT COMPLICATION, WITHOUT LONG-TERM CURRENT USE OF INSULIN: ICD-10-CM

## 2022-08-02 PROCEDURE — 3008F PR BODY MASS INDEX (BMI) DOCUMENTED: ICD-10-PCS | Mod: CPTII,S$GLB,, | Performed by: INTERNAL MEDICINE

## 2022-08-02 PROCEDURE — 99999 PR PBB SHADOW E&M-EST. PATIENT-LVL IV: ICD-10-PCS | Mod: PBBFAC,,, | Performed by: INTERNAL MEDICINE

## 2022-08-02 PROCEDURE — 3077F PR MOST RECENT SYSTOLIC BLOOD PRESSURE >= 140 MM HG: ICD-10-PCS | Mod: CPTII,S$GLB,, | Performed by: INTERNAL MEDICINE

## 2022-08-02 PROCEDURE — 1157F PR ADVANCE CARE PLAN OR EQUIV PRESENT IN MEDICAL RECORD: ICD-10-PCS | Mod: CPTII,S$GLB,, | Performed by: INTERNAL MEDICINE

## 2022-08-02 PROCEDURE — 3044F HG A1C LEVEL LT 7.0%: CPT | Mod: CPTII,S$GLB,, | Performed by: INTERNAL MEDICINE

## 2022-08-02 PROCEDURE — 99499 RISK ADDL DX/OHS AUDIT: ICD-10-PCS | Mod: S$GLB,,, | Performed by: INTERNAL MEDICINE

## 2022-08-02 PROCEDURE — 3008F BODY MASS INDEX DOCD: CPT | Mod: CPTII,S$GLB,, | Performed by: INTERNAL MEDICINE

## 2022-08-02 PROCEDURE — 1101F PT FALLS ASSESS-DOCD LE1/YR: CPT | Mod: CPTII,S$GLB,, | Performed by: INTERNAL MEDICINE

## 2022-08-02 PROCEDURE — 1157F ADVNC CARE PLAN IN RCRD: CPT | Mod: CPTII,S$GLB,, | Performed by: INTERNAL MEDICINE

## 2022-08-02 PROCEDURE — 1126F AMNT PAIN NOTED NONE PRSNT: CPT | Mod: CPTII,S$GLB,, | Performed by: INTERNAL MEDICINE

## 2022-08-02 PROCEDURE — 3072F LOW RISK FOR RETINOPATHY: CPT | Mod: CPTII,S$GLB,, | Performed by: INTERNAL MEDICINE

## 2022-08-02 PROCEDURE — 1101F PR PT FALLS ASSESS DOC 0-1 FALLS W/OUT INJ PAST YR: ICD-10-PCS | Mod: CPTII,S$GLB,, | Performed by: INTERNAL MEDICINE

## 2022-08-02 PROCEDURE — 1159F PR MEDICATION LIST DOCUMENTED IN MEDICAL RECORD: ICD-10-PCS | Mod: CPTII,S$GLB,, | Performed by: INTERNAL MEDICINE

## 2022-08-02 PROCEDURE — 3079F DIAST BP 80-89 MM HG: CPT | Mod: CPTII,S$GLB,, | Performed by: INTERNAL MEDICINE

## 2022-08-02 PROCEDURE — 3077F SYST BP >= 140 MM HG: CPT | Mod: CPTII,S$GLB,, | Performed by: INTERNAL MEDICINE

## 2022-08-02 PROCEDURE — 3288F PR FALLS RISK ASSESSMENT DOCUMENTED: ICD-10-PCS | Mod: CPTII,S$GLB,, | Performed by: INTERNAL MEDICINE

## 2022-08-02 PROCEDURE — 3288F FALL RISK ASSESSMENT DOCD: CPT | Mod: CPTII,S$GLB,, | Performed by: INTERNAL MEDICINE

## 2022-08-02 PROCEDURE — 3044F PR MOST RECENT HEMOGLOBIN A1C LEVEL <7.0%: ICD-10-PCS | Mod: CPTII,S$GLB,, | Performed by: INTERNAL MEDICINE

## 2022-08-02 PROCEDURE — 99214 OFFICE O/P EST MOD 30 MIN: CPT | Mod: S$GLB,,, | Performed by: INTERNAL MEDICINE

## 2022-08-02 PROCEDURE — 3079F PR MOST RECENT DIASTOLIC BLOOD PRESSURE 80-89 MM HG: ICD-10-PCS | Mod: CPTII,S$GLB,, | Performed by: INTERNAL MEDICINE

## 2022-08-02 PROCEDURE — 3072F PR LOW RISK FOR RETINOPATHY: ICD-10-PCS | Mod: CPTII,S$GLB,, | Performed by: INTERNAL MEDICINE

## 2022-08-02 PROCEDURE — 99999 PR PBB SHADOW E&M-EST. PATIENT-LVL IV: CPT | Mod: PBBFAC,,, | Performed by: INTERNAL MEDICINE

## 2022-08-02 PROCEDURE — 99499 UNLISTED E&M SERVICE: CPT | Mod: S$GLB,,, | Performed by: INTERNAL MEDICINE

## 2022-08-02 PROCEDURE — 1126F PR PAIN SEVERITY QUANTIFIED, NO PAIN PRESENT: ICD-10-PCS | Mod: CPTII,S$GLB,, | Performed by: INTERNAL MEDICINE

## 2022-08-02 PROCEDURE — 1159F MED LIST DOCD IN RCRD: CPT | Mod: CPTII,S$GLB,, | Performed by: INTERNAL MEDICINE

## 2022-08-02 PROCEDURE — 99214 PR OFFICE/OUTPT VISIT, EST, LEVL IV, 30-39 MIN: ICD-10-PCS | Mod: S$GLB,,, | Performed by: INTERNAL MEDICINE

## 2022-08-02 NOTE — PROGRESS NOTES
PITUITARY [facility] ENDOCRINOLOGY FOLLOW UP VISIT  08/02/2022       Subjective:   New to me, Last seen in clinic on December 2020 by Dr. Lynch.     HPI:   Isma Martin is a 72 y.o. male who presents for evaluation of prolactinoma.  Has been on cabergoline 0.25 mg twice weekly (resumed medication in August 2016) which he takes on Monday and Friday    Initial presentation:  - He presented with headache and decreased libido in his 40's and was found to have a pituitary tumor.  - He was initiated on testosterone when he was in his 60's (started with IM and was switched to gel) but stopped with no improvement of symptoms.  - Currently not on testosterone supplementation but has decreased libido  - At last visit cabergoline was continued, he was found to have normal testosterone level and normal dexa scan   - Interventions have included a Surgery in 1996 at [facility]     - He currently denies symptoms of adrenal insufficiency (no lightheadedness, N/V/abd pain, hypotension, unexplained weight loss).  He denies symptoms of Diabetes Insipidus (no polyuria/polydipsia).       Headache:    No, sometimes temporal headache    Vision change:   Sometimes blurry    Formal Visual fields:   Not recently      Current symptoms:  Hyperprolactinemia:  []  breast tenderness []  nipple discharge [x]  Denies     Lab Results   Component Value Date    PROLACTIN 23.8 (H) 12/12/2020    PROLACTIN 12.5 07/23/2020    PROLACTIN 5.1 09/25/2019    PROLACTIN 9.3 10/22/2018    PROLACTIN 12.5 08/17/2018    PROLACTIN 10.3 05/08/2018       Thyroid:  []  fatigue []  weight change []  temp intolerance     [x]  Denies    []  BM change []  skin/hair change [] palpitations []  tremor [x]  Denies    Lab Results   Component Value Date    TSH 0.571 12/12/2020    FREET4 0.98 12/12/2020          Growth Hormone Excess:  []  increase hand/foot size []  teeth spacing change    [x]  Denies    []  worse glycemic control []  joint pain     [x]  Denies    Last  IGF-1:   Lab Results   Component Value Date    SOMATMDN 173 08/12/2015    SOMATMDN 169 12/08/2014          Regarding hypogonadotropic hypogonadism:     [x]  Decreased libido   []  ED   []  Denies      DEXA Scan on January 2021:    FINDINGS:  Lumbar spine (L1-L4):              BMD is 1.061 g/cm2, T-score is -0.3, and Z-score is -0.3.   Total hip:                               BMD is 1.105 g/cm2, T-score is 0.5, and Z-score is 0.6.  Femoral neck:                         BMD is 0.975 g/cm2, T-score is 0.3, and Z-score is 0.8.     FRAX:  1.6% risk of a major osteoporotic fracture in the next 10 years.   0.1% risk of hip fracture in the next 10 years.     Impression:  1. Normal BMD  2. Compared with previous DXA, BMD at the lumbar spine has remained stable, and the BMD at the total hip has increased 3.2%.        Past Medical History:   Diagnosis Date    Arthritis     Bilateral dry eyes     Cataract     Colon polyp     Diabetes mellitus     GERD (gastroesophageal reflux disease)     Hyperlipidemia     Hypertension     Hypogonadism male     Obesity     Prolactinoma     Urinary tract infection        Past Surgical History:   Procedure Laterality Date    CAROTID ENDARTERECTOMY Left     CERVICAL LAMINECTOMY WITH SPINAL FUSION N/A 10/29/2019    Procedure: LAMINECTOMY, SPINE, CERVICAL, WITH FUSION C3-6 Depuy SNS: [employer] [place] + Pads;  Surgeon: Ariel Lamar MD;  Location: [facility] OR [address] [place] [address];  Service: Neurosurgery;  Laterality: N/A;    COLONOSCOPY N/A 7/8/2021    Procedure: COLONOSCOPY;  Surgeon: Veena Power MD;  Location: [facility] ([facility]);  Service: Endoscopy;  Laterality: N/A;  fully vaccinated 4/11/21, instructions sent to [facility]    FOOT SURGERY  4-23-14    right    TRANSPHENOIDAL PITUITARY RESECTION      pituitary tumor       Review of patient's allergies indicates:  No Known Allergies      Current Outpatient Medications:     acetaminophen (TYLENOL) 650 MG TbSR, Take 1 tablet  (650 mg total) by mouth every 6 (six) hours as needed (pain)., Disp: 56 tablet, Rfl: 0    ascorbic acid (VITAMIN C) 500 MG tablet, Take 500 mg by mouth Every PM., Disp: , Rfl:     atorvastatin (LIPITOR) 40 MG tablet, Take 1 tablet (40 mg total) by mouth once daily., Disp: 90 tablet, Rfl: 11    cabergoline (DOSTINEX) 0.5 mg tablet, TAKE 1/2 TABLET(0.25 MG) BY MOUTH 2 TIMES A WEEK, Disp: 8 tablet, Rfl: 1    cholecalciferol, vitamin D3, 2,000 unit Tab, Take 1 tablet by mouth Every PM., Disp: , Rfl:     cyanocobalamin (VITAMIN B-12) 1000 MCG tablet, , Disp: , Rfl:     gabapentin (NEURONTIN) 100 MG capsule, Take 1 capsule (100 mg total) by mouth 3 (three) times daily., Disp: 90 capsule, Rfl: 0    losartan-hydrochlorothiazide 50-12.5 mg (HYZAAR) 50-12.5 mg per tablet, Take 1 tablet by mouth once daily., Disp: 90 tablet, Rfl: 11    metoprolol succinate (TOPROL-XL) 25 MG 24 hr tablet, Take 1 tablet (25 mg total) by mouth once daily., Disp: 90 tablet, Rfl: 11    naproxen (NAPROSYN) 500 MG tablet, Take 1 tablet (500 mg total) by mouth 2 (two) times daily with meals., Disp: 60 tablet, Rfl: 1    omega-3 fatty acids-vitamin E 1,000 mg Cap, , Disp: , Rfl:     omeprazole (PRILOSEC) 20 MG capsule, Take 1 capsule (20 mg total) by mouth once daily., Disp: 90 capsule, Rfl: 11    VITAMIN E ACETATE (VITAMIN E ORAL), Take by mouth., Disp: , Rfl:     oxyCODONE-acetaminophen (PERCOCET) 5-325 mg per tablet, Take 1 tablet by mouth every 12 (twelve) hours as needed for Pain., Disp: 30 tablet, Rfl: 0    sildenafil (REVATIO) 20 mg Tab, Take 1 tablet (20 mg total) by mouth daily as needed (erectile dysfunction)., Disp: 15 tablet, Rfl: 0    Social History     Socioeconomic History    Marital status:      Spouse name: Brenda   Occupational History    Occupation: [employer]      Employer: [employer]     Employer: [employer]   Tobacco Use    Smoking status: Former Smoker     Packs/day: 1.50     Years: 35.00      Pack years: 52.50     Quit date: 1995     Years since quittin.6    Smokeless tobacco: Never Used   Substance and Sexual Activity    Alcohol use: Yes     Alcohol/week: 0.8 standard drinks     Types: 1 Standard drinks or equivalent per week     Comment: rare    Drug use: No    Sexual activity: Not Currently     Partners: Female   Social History Narrative    , on ssdi for his toe.    . 1 kid still with them. Wife dm and in WC.    3 kids total.     Social Determinants of Health     Financial Resource Strain: High Risk    Difficulty of Paying Living Expenses: Very hard   Food Insecurity: No Food Insecurity    Worried About Running Out of Food in the Last Year: Never true    Ran Out of Food in the Last Year: Never true   Transportation Needs: No Transportation Needs    Lack of Transportation (Medical): No    Lack of Transportation (Non-Medical): No   Physical Activity: Sufficiently Active    Days of Exercise per Week: 3 days    Minutes of Exercise per Session: 60 min   Stress: Stress Concern Present    Feeling of Stress : Rather much   Social Connections: Unknown    Frequency of Communication with Friends and Family: Once a week    Frequency of Social Gatherings with Friends and Family: Patient refused    Active Member of Clubs or Organizations: No    Attends Club or Organization Meetings: Patient refused    Marital Status:    Housing Stability: Low Risk     Unable to Pay for Housing in the Last Year: No    Number of Places Lived in the Last Year: 2    Unstable Housing in the Last Year: No       Family History   Problem Relation Age of Onset    Glaucoma Mother     Heart disease Brother     Heart attack Sister     Heart disease Sister     Cancer Neg Hx     Diabetes Neg Hx     Colon polyps Neg Hx     Blindness Neg Hx     Amblyopia Neg Hx     Cataracts Neg Hx     Hypertension Neg Hx     Macular degeneration Neg Hx     Retinal detachment Neg Hx      "Strabismus Neg Hx     Stroke Neg Hx     Thyroid disease Neg Hx     Prostate cancer Neg Hx     Kidney disease Neg Hx        ROS:  see HPI    Objective:   Physical Exam   BP (!) 170/85 (BP Location: Left arm, Patient Position: Sitting, BP Method: Small (Automatic))   Pulse (!) 56   Ht 5' 6" (1.676 m)   Wt 69.6 kg (153 lb 7 oz)   SpO2 98%   BMI 24.77 kg/m²   Wt Readings from Last 3 Encounters:   08/02/22 69.6 kg (153 lb 7 oz)   02/01/22 75.3 kg (166 lb 0.1 oz)   01/18/22 72.2 kg (159 lb 2.8 oz)   ]    Constitutional:  Pleasant,  in no acute distress.   HENT:   Head:    Normocephalic and atraumatic.   Eyes:    EOMI. No scleral icterus.   Neck:    No thyromegaly, no prominent DC or SC fat pads  Cardiovascular:  Normal rate, regular rhythm, 2+ radial pulses blx   Respiratory:   Effort normal   Gastrointestinal: Soft, nontender  Neurological:  No tremor, normal speech  Skin:    Skin is warm, dry, no striae  Psych:  Normal mood and affect.   Extremity:  No edema or wounds, no deformity     LABORATORY REVIEW:    Chemistry        Component Value Date/Time     01/18/2022 1255    K 4.1 01/18/2022 1255     01/18/2022 1255    CO2 25 01/18/2022 1255    BUN 10 01/18/2022 1255    CREATININE 0.9 01/18/2022 1255    GLU 96 01/18/2022 1255        Component Value Date/Time    CALCIUM 9.5 01/18/2022 1255    ALKPHOS 49 (L) 01/18/2022 1255    AST 23 01/18/2022 1255    ALT 19 01/18/2022 1255    BILITOT 0.9 01/18/2022 1255    ESTGFRAFRICA >60.0 01/18/2022 1255    EGFRNONAA >60.0 01/18/2022 1255          Lab Results   Component Value Date    PROLACTIN 23.8 (H) 12/12/2020    PROLACTIN 12.5 07/23/2020    PROLACTIN 5.1 09/25/2019    PROLACTIN 9.3 10/22/2018    TSH 0.571 12/12/2020    TSH 2.020 09/25/2019    TSH 1.003 10/22/2018    TSH 0.715 05/08/2018    FREET4 0.98 12/12/2020    FREET4 1.00 09/25/2019    FREET4 0.80 10/22/2018    FREET4 1.02 05/08/2018    CORTISOL 9.4 12/08/2014    LABLH 2.9 08/12/2015    LABLH 5.0 " 03/10/2015    LABLH <0.1 (L) 12/08/2014    FSH 11.00 03/10/2015    FSH 0.20 (L) 12/08/2014    SOMATMDN 173 08/12/2015    SOMATMDN 169 12/08/2014     01/18/2022     12/12/2020     07/23/2020     10/29/2019    K 4.1 01/18/2022    K 3.9 12/12/2020    K 3.7 07/23/2020    K 3.9 10/29/2019    CALCIUM 9.5 01/18/2022    CALCIUM 9.1 12/12/2020    CALCIUM 9.1 07/23/2020    CALCIUM 8.2 (L) 10/29/2019    ALBUMIN 3.7 01/18/2022    ALBUMIN 3.9 12/12/2020    ALBUMIN 3.6 07/23/2020    ALBUMIN 3.3 (L) 10/29/2019    ESTGFRAFRICA >60.0 01/18/2022    ESTGFRAFRICA >60.0 12/12/2020    ESTGFRAFRICA >60.0 07/23/2020    ESTGFRAFRICA >60.0 10/29/2019    EGFRNONAA >60.0 01/18/2022    EGFRNONAA >60.0 12/12/2020    EGFRNONAA >60.0 07/23/2020    EGFRNONAA >60.0 10/29/2019     MRI brain 6/21/2016 - on cabergoline w/ normal PRL    MRI 2014 - off cabergoline       Assessment/Plan:     Problem List Items Addressed This Visit        Endocrine    Prolactinoma - Primary     - will check 8 am morning fasting labs to check for prolactin and testosterone levels   - s/p resection at [facility] 1996 with symptoms of diminished libido in the setting of previously normal testosterone level  - Appears to have recurred in age 50s (2014) after which he resumed dopamine agonish which has improved prolactin levels and symptoms.  - normal DEXA scan reviewed   - Continue Cabergoline 0.25 mg twice weekly, would likely continue for life considering slightly high prolactin levels on previous check  - The benefits and risks of DA agonist and indications for therapy including macroadenoma, galactorrhea, infertility and hypogonadism which can negatively impact bone health. Take at bedtime with snack, he currently denies nausea or orthostatic symptoms  - Recurrence rates 20% over 10 years.           Relevant Orders    Prolactin    Testosterone    Type 2 diabetes mellitus without complication     - not on medication  - continue diet  control  - on atorvastatin 40 mg, LDL above goal, needs to be compliant and if it continues to be high would add to the therapy  - on losartan  - eye exam due, he will make appointment with his ophthalmologist.             Hypogonadism male     - will check for morning 8 am testosterone levels and prolactin levels             Other Visit Diagnoses     Hyperlipidemia, unspecified hyperlipidemia type              Follow up in 12 months    Dr. Qasim Zafar Iqbal [facility], 6th Floor  [address] [place], [address]    Office: [phone]  Fax: [phone]    The above history labs imaging impression and plan were discussed with attending physician who is in agreement and also took part in this patient's care.  I personally reviewed all of the patients available medications, labs, imaging, vitals, allergies, medical history.

## 2022-08-02 NOTE — ASSESSMENT & PLAN NOTE
- will check 8 am morning fasting labs to check for prolactin and testosterone levels   - s/p resection at [facility] 1996 with symptoms of diminished libido in the setting of previously normal testosterone level  - Appears to have recurred in age 50s (2014) after which he resumed dopamine agonish which has improved prolactin levels and symptoms.  - normal DEXA scan reviewed   - Continue Cabergoline 0.25 mg twice weekly, would likely continue for life considering slightly high prolactin levels on previous check  - The benefits and risks of DA agonist and indications for therapy including macroadenoma, galactorrhea, infertility and hypogonadism which can negatively impact bone health. Take at bedtime with snack, he currently denies nausea or orthostatic symptoms  - Recurrence rates 20% over 10 years.

## 2022-08-02 NOTE — ASSESSMENT & PLAN NOTE
- not on medication  - continue diet control  - on atorvastatin 40 mg, LDL above goal, needs to be compliant and if it continues to be high would add to the therapy  - on losartan  - eye exam due, he will make appointment with his ophthalmologist.

## 2022-08-03 DIAGNOSIS — E11.9 TYPE 2 DIABETES MELLITUS WITHOUT COMPLICATION: ICD-10-CM

## 2022-08-22 NOTE — PROGRESS NOTES
I have reviewed and concur with Dr. Nolan Clark's history, physical, assessment, and plan.  I have personally interviewed and examined the patient.     Venecia Sanchez MD

## 2022-08-23 ENCOUNTER — OFFICE VISIT (OUTPATIENT)
Dept: INTERNAL MEDICINE | Facility: CLINIC | Age: 72
End: 2022-08-23
Payer: MEDICARE

## 2022-08-23 ENCOUNTER — LAB VISIT (OUTPATIENT)
Dept: LAB | Facility: HOSPITAL | Age: 72
End: 2022-08-23
Payer: MEDICARE

## 2022-08-23 VITALS
BODY MASS INDEX: 24.45 KG/M2 | OXYGEN SATURATION: 99 % | SYSTOLIC BLOOD PRESSURE: 110 MMHG | RESPIRATION RATE: 18 BRPM | HEIGHT: 66 IN | WEIGHT: 152.13 LBS | DIASTOLIC BLOOD PRESSURE: 60 MMHG | HEART RATE: 55 BPM

## 2022-08-23 DIAGNOSIS — E11.9 TYPE 2 DIABETES MELLITUS WITHOUT COMPLICATION, WITHOUT LONG-TERM CURRENT USE OF INSULIN: ICD-10-CM

## 2022-08-23 DIAGNOSIS — D35.2 PROLACTINOMA: ICD-10-CM

## 2022-08-23 DIAGNOSIS — M25.50 POLYARTHRALGIA: Primary | ICD-10-CM

## 2022-08-23 LAB
PROLACTIN SERPL IA-MCNC: 9 NG/ML (ref 3.5–19.4)
TESTOST SERPL-MCNC: 684 NG/DL (ref 304–1227)

## 2022-08-23 PROCEDURE — 3072F LOW RISK FOR RETINOPATHY: CPT | Mod: CPTII,S$GLB,, | Performed by: INTERNAL MEDICINE

## 2022-08-23 PROCEDURE — 99999 PR PBB SHADOW E&M-EST. PATIENT-LVL V: ICD-10-PCS | Mod: PBBFAC,,, | Performed by: INTERNAL MEDICINE

## 2022-08-23 PROCEDURE — 1157F ADVNC CARE PLAN IN RCRD: CPT | Mod: CPTII,S$GLB,, | Performed by: INTERNAL MEDICINE

## 2022-08-23 PROCEDURE — 3008F PR BODY MASS INDEX (BMI) DOCUMENTED: ICD-10-PCS | Mod: CPTII,S$GLB,, | Performed by: INTERNAL MEDICINE

## 2022-08-23 PROCEDURE — 99214 OFFICE O/P EST MOD 30 MIN: CPT | Mod: S$GLB,,, | Performed by: INTERNAL MEDICINE

## 2022-08-23 PROCEDURE — 1101F PR PT FALLS ASSESS DOC 0-1 FALLS W/OUT INJ PAST YR: ICD-10-PCS | Mod: CPTII,S$GLB,, | Performed by: INTERNAL MEDICINE

## 2022-08-23 PROCEDURE — 1125F AMNT PAIN NOTED PAIN PRSNT: CPT | Mod: CPTII,S$GLB,, | Performed by: INTERNAL MEDICINE

## 2022-08-23 PROCEDURE — 1159F MED LIST DOCD IN RCRD: CPT | Mod: CPTII,S$GLB,, | Performed by: INTERNAL MEDICINE

## 2022-08-23 PROCEDURE — 84403 ASSAY OF TOTAL TESTOSTERONE: CPT | Performed by: INTERNAL MEDICINE

## 2022-08-23 PROCEDURE — 99214 PR OFFICE/OUTPT VISIT, EST, LEVL IV, 30-39 MIN: ICD-10-PCS | Mod: S$GLB,,, | Performed by: INTERNAL MEDICINE

## 2022-08-23 PROCEDURE — 3044F HG A1C LEVEL LT 7.0%: CPT | Mod: CPTII,S$GLB,, | Performed by: INTERNAL MEDICINE

## 2022-08-23 PROCEDURE — 1125F PR PAIN SEVERITY QUANTIFIED, PAIN PRESENT: ICD-10-PCS | Mod: CPTII,S$GLB,, | Performed by: INTERNAL MEDICINE

## 2022-08-23 PROCEDURE — 1160F PR REVIEW ALL MEDS BY PRESCRIBER/CLIN PHARMACIST DOCUMENTED: ICD-10-PCS | Mod: CPTII,S$GLB,, | Performed by: INTERNAL MEDICINE

## 2022-08-23 PROCEDURE — 99999 PR PBB SHADOW E&M-EST. PATIENT-LVL V: CPT | Mod: PBBFAC,,, | Performed by: INTERNAL MEDICINE

## 2022-08-23 PROCEDURE — 3288F FALL RISK ASSESSMENT DOCD: CPT | Mod: CPTII,S$GLB,, | Performed by: INTERNAL MEDICINE

## 2022-08-23 PROCEDURE — 1157F PR ADVANCE CARE PLAN OR EQUIV PRESENT IN MEDICAL RECORD: ICD-10-PCS | Mod: CPTII,S$GLB,, | Performed by: INTERNAL MEDICINE

## 2022-08-23 PROCEDURE — 3072F PR LOW RISK FOR RETINOPATHY: ICD-10-PCS | Mod: CPTII,S$GLB,, | Performed by: INTERNAL MEDICINE

## 2022-08-23 PROCEDURE — 3074F SYST BP LT 130 MM HG: CPT | Mod: CPTII,S$GLB,, | Performed by: INTERNAL MEDICINE

## 2022-08-23 PROCEDURE — 3008F BODY MASS INDEX DOCD: CPT | Mod: CPTII,S$GLB,, | Performed by: INTERNAL MEDICINE

## 2022-08-23 PROCEDURE — 3078F DIAST BP <80 MM HG: CPT | Mod: CPTII,S$GLB,, | Performed by: INTERNAL MEDICINE

## 2022-08-23 PROCEDURE — 1159F PR MEDICATION LIST DOCUMENTED IN MEDICAL RECORD: ICD-10-PCS | Mod: CPTII,S$GLB,, | Performed by: INTERNAL MEDICINE

## 2022-08-23 PROCEDURE — 84146 ASSAY OF PROLACTIN: CPT | Performed by: INTERNAL MEDICINE

## 2022-08-23 PROCEDURE — 3288F PR FALLS RISK ASSESSMENT DOCUMENTED: ICD-10-PCS | Mod: CPTII,S$GLB,, | Performed by: INTERNAL MEDICINE

## 2022-08-23 PROCEDURE — 1160F RVW MEDS BY RX/DR IN RCRD: CPT | Mod: CPTII,S$GLB,, | Performed by: INTERNAL MEDICINE

## 2022-08-23 PROCEDURE — 1101F PT FALLS ASSESS-DOCD LE1/YR: CPT | Mod: CPTII,S$GLB,, | Performed by: INTERNAL MEDICINE

## 2022-08-23 PROCEDURE — 3078F PR MOST RECENT DIASTOLIC BLOOD PRESSURE < 80 MM HG: ICD-10-PCS | Mod: CPTII,S$GLB,, | Performed by: INTERNAL MEDICINE

## 2022-08-23 PROCEDURE — 3044F PR MOST RECENT HEMOGLOBIN A1C LEVEL <7.0%: ICD-10-PCS | Mod: CPTII,S$GLB,, | Performed by: INTERNAL MEDICINE

## 2022-08-23 PROCEDURE — 36415 COLL VENOUS BLD VENIPUNCTURE: CPT | Performed by: INTERNAL MEDICINE

## 2022-08-23 PROCEDURE — 3074F PR MOST RECENT SYSTOLIC BLOOD PRESSURE < 130 MM HG: ICD-10-PCS | Mod: CPTII,S$GLB,, | Performed by: INTERNAL MEDICINE

## 2022-08-23 RX ORDER — TRAMADOL HYDROCHLORIDE 50 MG/1
50 TABLET ORAL EVERY 12 HOURS PRN
Qty: 30 TABLET | Refills: 1 | Status: SHIPPED | OUTPATIENT
Start: 2022-08-23 | End: 2022-09-15 | Stop reason: SDUPTHER

## 2022-08-23 NOTE — PROGRESS NOTES
Subjective:       Patient ID: Isma Martin is a 72 y.o. male.    Chief Complaint: Follow-up    Patient is here for followup for chronic conditions.    Has lost wt due to running around with his wife and mother. Much stress. Now back in his home after Laura.    Has painful jts in feet, specifically R big toe and L 5th toe (which is swollen). Does have some am stiffness, will take abt 30 minutes to get started. But most of his pains are with walking/wt bearing. Naproxen does not help with his pains. He denies hx of inflammatory arthritis or psoriasis.    Review of Systems   Constitutional: Negative for activity change and unexpected weight change.   HENT: Negative for hearing loss, rhinorrhea and trouble swallowing.    Eyes: Negative for discharge and visual disturbance.   Respiratory: Negative for chest tightness and wheezing.    Cardiovascular: Negative for chest pain and palpitations.   Gastrointestinal: Negative for blood in stool, constipation, diarrhea and vomiting.   Endocrine: Negative for polydipsia and polyuria.   Genitourinary: Positive for urgency. Negative for difficulty urinating and hematuria.   Musculoskeletal: Positive for arthralgias (R big toe and L 5th toe (which is swollen)) and back pain (improved). Negative for joint swelling and neck pain.   Neurological: Negative for weakness and headaches.   Psychiatric/Behavioral: Negative for confusion and dysphoric mood.           Objective:      Physical Exam  Vitals reviewed.   Constitutional:       General: He is not in acute distress.     Appearance: Normal appearance. He is well-developed. He is not ill-appearing, toxic-appearing or diaphoretic.   HENT:      Head: Normocephalic and atraumatic.   Eyes:      General: No scleral icterus.  Neck:      Thyroid: No thyromegaly.   Cardiovascular:      Rate and Rhythm: Normal rate and regular rhythm.      Heart sounds: Normal heart sounds. No murmur heard.    No friction rub. No gallop.   Pulmonary:       Effort: Pulmonary effort is normal. No respiratory distress.      Breath sounds: Normal breath sounds. No wheezing or rales.   Abdominal:      General: Bowel sounds are normal. There is no distension.      Palpations: Abdomen is soft. There is no mass.      Tenderness: There is no abdominal tenderness. There is no guarding or rebound.   Musculoskeletal:         General: Normal range of motion.      Cervical back: Normal range of motion.      Comments: Focal R 1st MTP jt tenderness to deep palpation.  L 5th toe is swollen almost appearing like a sausage digit  No rash on the skin  Fingers no swollen jts  No other swollen digits    Protective Sensation (w/ 10 gram monofilament):  Right: Intact  Left: Intact    Visual Inspection:  Normal -  Bilateral    Pedal Pulses:   Right: Diminished  Left: Diminished    Posterior tibialis:   Right:Diminished  Left: Diminished     Lymphadenopathy:      Cervical: No cervical adenopathy.   Skin:     Findings: No lesion.   Neurological:      Mental Status: He is alert and oriented to person, place, and time.   Psychiatric:         Mood and Affect: Mood normal.         Behavior: Behavior normal.         Thought Content: Thought content normal.         Assessment:       1. Polyarthralgia    2. Type 2 diabetes mellitus without complication, without long-term current use of insulin        Plan:       Isma was seen today for follow-up.    Diagnoses and all orders for this visit:    Polyarthralgia  -     Sedimentation rate; Future  -     C-REACTIVE PROTEIN; Future  -     CYCLIC CITRUL PEPTIDE ANTIBODY, IGG; Future  -     RHEUMATOID FACTOR; Future  -     traMADoL (ULTRAM) 50 mg tablet; Take 1 tablet (50 mg total) by mouth every 12 (twelve) hours as needed for Pain.  Starting tramadol since nsaids no effective  I do not think he has inflammatory arthritis, but important to make sure    Type 2 diabetes mellitus without complication, without long-term current use of insulin  -     Ambulatory  referral/consult to Optometry; Future  -     Hemoglobin A1C; Future        Health Maintenance       Date Due Completion Date    Diabetes Urine Screening 08/12/2016 8/12/2015    COVID-19 Vaccine (4 - Booster for Pfizer series) 03/08/2022 11/8/2021    Eye Exam 05/24/2022 5/24/2021    Override on 8/10/2015: Done    Influenza Vaccine (1) 09/01/2022 1/19/2021    Lipid Panel 01/18/2023 1/18/2022    Hemoglobin A1c 02/23/2023 8/23/2022    Foot Exam 08/23/2023 8/23/2022 (Done)    Override on 8/23/2022: Done    Override on 7/13/2021: Done    Override on 7/28/2016: Done    Override on 8/10/2015: Done    TETANUS VACCINE 05/03/2026 5/3/2016    Colorectal Cancer Screening 07/08/2028 7/8/2021    Override on 5/1/2008: Done          Follow up in about 4 months (around 12/23/2022).    Future Appointments   Date Time Provider Department Center   12/7/2022 10:40 AM Delmis Banegas OD [facility] [place]   1/3/2023  8:40 AM Anthony Tay MD [facility]

## 2022-09-12 ENCOUNTER — PES CALL (OUTPATIENT)
Dept: ADMINISTRATIVE | Facility: OTHER | Age: 72
End: 2022-09-12
Payer: MEDICARE

## 2022-09-15 DIAGNOSIS — M25.50 POLYARTHRALGIA: ICD-10-CM

## 2022-09-15 RX ORDER — TRAMADOL HYDROCHLORIDE 50 MG/1
50 TABLET ORAL EVERY 12 HOURS PRN
Qty: 30 TABLET | Refills: 5 | Status: SHIPPED | OUTPATIENT
Start: 2022-09-15 | End: 2022-09-20 | Stop reason: SDUPTHER

## 2022-09-15 NOTE — TELEPHONE ENCOUNTER
No new care gaps identified.  [facility] Embedded Care Gaps. Reference number: [id number]. 9/15/2022   11:59:54 AM SOFIAT

## 2022-09-20 DIAGNOSIS — M25.50 POLYARTHRALGIA: ICD-10-CM

## 2022-09-20 RX ORDER — TRAMADOL HYDROCHLORIDE 50 MG/1
50 TABLET ORAL EVERY 12 HOURS PRN
Qty: 30 TABLET | Refills: 5 | Status: SHIPPED | OUTPATIENT
Start: 2022-09-20 | End: 2023-07-06

## 2022-09-20 NOTE — TELEPHONE ENCOUNTER
Spoke with patient regarding his tramadol refill. Patient states that pharmacy is stating it is not available and asks that we resend to updated pharmacy. I sent new request  to Dr.Dvorin- Gloria PEREZ

## 2022-09-20 NOTE — TELEPHONE ENCOUNTER
No new care gaps identified.  [facility] Embedded Care Gaps. Reference number: [id number]. 9/20/2022   1:17:17 PM CDT

## 2022-09-20 NOTE — TELEPHONE ENCOUNTER
----- Message from Ciarra Castro sent at 9/20/2022 12:55 PM CDT -----  Contact: [phone]  Requesting an RX refill or new RX.  Is this a refill or new RX: refill 1  RX name and strength (copy/paste from chart):  traMADoL (ULTRAM) 50 mg tablet  Is this a 30 day or 90 day RX:   Pharmacy name and phone # (copy/paste from chart):  [id number] [address] [address]   Phone:  [phone]  Fax:  [phone]        The doctors have asked that we provide their patients with the following 2 reminders -- prescription refills can take up to 72 hours, and a friendly reminder that in the future you can use your [facility] account to request refills: phone  Patient called, stated that pharmacy is waiting for prior authorization from PCP since last week. Please call and f/u.  Thank you

## 2022-09-27 ENCOUNTER — PES CALL (OUTPATIENT)
Dept: ADMINISTRATIVE | Facility: CLINIC | Age: 72
End: 2022-09-27
Payer: MEDICARE

## 2022-10-10 ENCOUNTER — PATIENT MESSAGE (OUTPATIENT)
Dept: ADMINISTRATIVE | Facility: CLINIC | Age: 72
End: 2022-10-10
Payer: MEDICARE

## 2022-10-10 ENCOUNTER — TELEPHONE (OUTPATIENT)
Dept: ADMINISTRATIVE | Facility: CLINIC | Age: 72
End: 2022-10-10
Payer: MEDICARE

## 2022-10-11 ENCOUNTER — PES CALL (OUTPATIENT)
Dept: ADMINISTRATIVE | Facility: CLINIC | Age: 72
End: 2022-10-11
Payer: MEDICARE

## 2022-10-13 ENCOUNTER — PES CALL (OUTPATIENT)
Dept: ADMINISTRATIVE | Facility: CLINIC | Age: 72
End: 2022-10-13
Payer: MEDICARE

## 2022-12-08 ENCOUNTER — OFFICE VISIT (OUTPATIENT)
Dept: OPTOMETRY | Facility: CLINIC | Age: 72
End: 2022-12-08
Payer: MEDICARE

## 2022-12-08 DIAGNOSIS — H52.4 HYPEROPIA WITH ASTIGMATISM AND PRESBYOPIA, BILATERAL: ICD-10-CM

## 2022-12-08 DIAGNOSIS — H52.203 HYPEROPIA WITH ASTIGMATISM AND PRESBYOPIA, BILATERAL: ICD-10-CM

## 2022-12-08 DIAGNOSIS — H40.013 OAG (OPEN ANGLE GLAUCOMA) SUSPECT, LOW RISK, BILATERAL: ICD-10-CM

## 2022-12-08 DIAGNOSIS — Z86.018 HISTORY OF PITUITARY ADENOMA: ICD-10-CM

## 2022-12-08 DIAGNOSIS — H04.123 DRY EYE SYNDROME OF BOTH EYES: ICD-10-CM

## 2022-12-08 DIAGNOSIS — H25.13 NUCLEAR SCLEROSIS OF BOTH EYES: ICD-10-CM

## 2022-12-08 DIAGNOSIS — E11.9 TYPE 2 DIABETES MELLITUS WITHOUT OPHTHALMIC MANIFESTATIONS: Primary | ICD-10-CM

## 2022-12-08 DIAGNOSIS — H52.03 HYPEROPIA WITH ASTIGMATISM AND PRESBYOPIA, BILATERAL: ICD-10-CM

## 2022-12-08 DIAGNOSIS — E11.9 TYPE 2 DIABETES MELLITUS WITHOUT COMPLICATION, WITHOUT LONG-TERM CURRENT USE OF INSULIN: ICD-10-CM

## 2022-12-08 PROCEDURE — 1157F ADVNC CARE PLAN IN RCRD: CPT | Mod: CPTII,S$GLB,, | Performed by: OPTOMETRIST

## 2022-12-08 PROCEDURE — 3044F HG A1C LEVEL LT 7.0%: CPT | Mod: CPTII,S$GLB,, | Performed by: OPTOMETRIST

## 2022-12-08 PROCEDURE — 92015 DETERMINE REFRACTIVE STATE: CPT | Mod: S$GLB,,, | Performed by: OPTOMETRIST

## 2022-12-08 PROCEDURE — 92015 PR REFRACTION: ICD-10-PCS | Mod: S$GLB,,, | Performed by: OPTOMETRIST

## 2022-12-08 PROCEDURE — 3044F PR MOST RECENT HEMOGLOBIN A1C LEVEL <7.0%: ICD-10-PCS | Mod: CPTII,S$GLB,, | Performed by: OPTOMETRIST

## 2022-12-08 PROCEDURE — 1101F PT FALLS ASSESS-DOCD LE1/YR: CPT | Mod: CPTII,S$GLB,, | Performed by: OPTOMETRIST

## 2022-12-08 PROCEDURE — 99999 PR PBB SHADOW E&M-EST. PATIENT-LVL III: CPT | Mod: PBBFAC,,, | Performed by: OPTOMETRIST

## 2022-12-08 PROCEDURE — 1159F PR MEDICATION LIST DOCUMENTED IN MEDICAL RECORD: ICD-10-PCS | Mod: CPTII,S$GLB,, | Performed by: OPTOMETRIST

## 2022-12-08 PROCEDURE — 2023F PR DILATED RETINAL EXAM W/O EVID OF RETINOPATHY: ICD-10-PCS | Mod: CPTII,S$GLB,, | Performed by: OPTOMETRIST

## 2022-12-08 PROCEDURE — 1157F PR ADVANCE CARE PLAN OR EQUIV PRESENT IN MEDICAL RECORD: ICD-10-PCS | Mod: CPTII,S$GLB,, | Performed by: OPTOMETRIST

## 2022-12-08 PROCEDURE — 1126F PR PAIN SEVERITY QUANTIFIED, NO PAIN PRESENT: ICD-10-PCS | Mod: CPTII,S$GLB,, | Performed by: OPTOMETRIST

## 2022-12-08 PROCEDURE — 1160F RVW MEDS BY RX/DR IN RCRD: CPT | Mod: CPTII,S$GLB,, | Performed by: OPTOMETRIST

## 2022-12-08 PROCEDURE — 92014 COMPRE OPH EXAM EST PT 1/>: CPT | Mod: S$GLB,,, | Performed by: OPTOMETRIST

## 2022-12-08 PROCEDURE — 99499 UNLISTED E&M SERVICE: CPT | Mod: S$PBB,,, | Performed by: OPTOMETRIST

## 2022-12-08 PROCEDURE — 1126F AMNT PAIN NOTED NONE PRSNT: CPT | Mod: CPTII,S$GLB,, | Performed by: OPTOMETRIST

## 2022-12-08 PROCEDURE — 1160F PR REVIEW ALL MEDS BY PRESCRIBER/CLIN PHARMACIST DOCUMENTED: ICD-10-PCS | Mod: CPTII,S$GLB,, | Performed by: OPTOMETRIST

## 2022-12-08 PROCEDURE — 2023F DILAT RTA XM W/O RTNOPTHY: CPT | Mod: CPTII,S$GLB,, | Performed by: OPTOMETRIST

## 2022-12-08 PROCEDURE — 3288F PR FALLS RISK ASSESSMENT DOCUMENTED: ICD-10-PCS | Mod: CPTII,S$GLB,, | Performed by: OPTOMETRIST

## 2022-12-08 PROCEDURE — 99999 PR PBB SHADOW E&M-EST. PATIENT-LVL III: ICD-10-PCS | Mod: PBBFAC,,, | Performed by: OPTOMETRIST

## 2022-12-08 PROCEDURE — 99499 RISK ADDL DX/OHS AUDIT: ICD-10-PCS | Mod: S$PBB,,, | Performed by: OPTOMETRIST

## 2022-12-08 PROCEDURE — 1159F MED LIST DOCD IN RCRD: CPT | Mod: CPTII,S$GLB,, | Performed by: OPTOMETRIST

## 2022-12-08 PROCEDURE — 92014 PR EYE EXAM, EST PATIENT,COMPREHESV: ICD-10-PCS | Mod: S$GLB,,, | Performed by: OPTOMETRIST

## 2022-12-08 PROCEDURE — 1101F PR PT FALLS ASSESS DOC 0-1 FALLS W/OUT INJ PAST YR: ICD-10-PCS | Mod: CPTII,S$GLB,, | Performed by: OPTOMETRIST

## 2022-12-08 PROCEDURE — 3288F FALL RISK ASSESSMENT DOCD: CPT | Mod: CPTII,S$GLB,, | Performed by: OPTOMETRIST

## 2022-12-08 NOTE — PROGRESS NOTES
ADRIENNE    DIDIER: [birth year]  Chief complaint (CC): Patient is here for annual eye exam today. Vision is   blurred off and on especially when reading that clears up when he rubs his   eyes. Distance vision seems fine. Patient had a pituitary tumor removed   that had effected his peripheral vision in 1996.  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  Glasses? + yrs. old  Contacts? -  H/o eye surgery, injections or laser: -  H/o eye injury: -  Known eye conditions? See above  Family h/o eye conditions? -  Eye gtts? Clear eyes      (-) Flashes (-)  Floaters (-) Mucous   (-)  Tearing (-) Itching (-) Burning   (-) Headaches (-) Eye Pain/discomfort (-) Irritation   (-)  Redness (-) Double vision (+) Blurry vision    Diabetic? +BS usually about 130  A1c? Hemoglobin A1C       Date                     Value               Ref Range             Status                08/23/2022               5.8 (H)             4.0 - 5.6 %           Final                  01/18/2022               6.1 (H)             4.0 - 5.6 %           Final                  07/13/2021               6.0 (H)             4.0 - 5.6 %           Final                    Last edited by Alyssia Thorpe on 12/8/2022  2:58 PM.            Assessment /Plan     For exam results, see Encounter Report.      Type 2 diabetes mellitus without ophthalmic manifestations  Type 2 diabetes mellitus without complication, without long-term current use of insulin  -     Ambulatory referral/consult to Optometry  BS control. No signs of diabetic retinopathy. Monitor with annual exam.    Nuclear sclerosis of both eyes  Nuclear sclerotic cataract - not visually significant. Observe.    Dry eye syndrome of both eyes  Recommend Systane Ultra or Refresh Optive BID-TID OU to aid with symptoms of dry eyes.    Hyperopia with astigmatism and presbyopia, bilateral  SRx released to patient. Patient educated on lens options. Normal ocular  health. RTC 1 year for routine exam.     History of pituitary adenoma  Occurrence in 1996. Pt reports no visual changes since then.     OAG (open angle glaucoma) suspect, low risk, bilateral  -     Posterior Segment OCT Optic Nerve- Both eyes; Future  -     He Visual Field - OU - Extended - Both Eyes; Future  (-) fHx. IOP 19 OD, OS. C/d0. 65 OD, OS. Previous seen by Dr Becerra and documented as 0.5. Pt reports testing w/Dr prior to coming to [facility]. Educated pt on findings w/understanding. RTC 1 mo [facility]

## 2023-01-03 ENCOUNTER — OFFICE VISIT (OUTPATIENT)
Dept: INTERNAL MEDICINE | Facility: CLINIC | Age: 73
End: 2023-01-03
Payer: MEDICARE

## 2023-01-03 ENCOUNTER — IMMUNIZATION (OUTPATIENT)
Dept: INTERNAL MEDICINE | Facility: CLINIC | Age: 73
End: 2023-01-03
Payer: MEDICARE

## 2023-01-03 VITALS
SYSTOLIC BLOOD PRESSURE: 118 MMHG | BODY MASS INDEX: 25.97 KG/M2 | HEIGHT: 65 IN | WEIGHT: 155.88 LBS | DIASTOLIC BLOOD PRESSURE: 74 MMHG | OXYGEN SATURATION: 98 % | HEART RATE: 59 BPM

## 2023-01-03 DIAGNOSIS — Z23 NEED FOR VACCINATION: Primary | ICD-10-CM

## 2023-01-03 DIAGNOSIS — N40.0 BPH WITHOUT URINARY OBSTRUCTION: ICD-10-CM

## 2023-01-03 DIAGNOSIS — E11.9 TYPE 2 DIABETES MELLITUS WITHOUT OPHTHALMIC MANIFESTATIONS: ICD-10-CM

## 2023-01-03 DIAGNOSIS — M19.079 ARTHRITIS OF BIG TOE: ICD-10-CM

## 2023-01-03 DIAGNOSIS — D35.2 PROLACTINOMA: ICD-10-CM

## 2023-01-03 DIAGNOSIS — I70.0 AORTIC ATHEROSCLEROSIS: Primary | ICD-10-CM

## 2023-01-03 PROCEDURE — 99999 PR PBB SHADOW E&M-EST. PATIENT-LVL V: ICD-10-PCS | Mod: PBBFAC,HCNC,, | Performed by: INTERNAL MEDICINE

## 2023-01-03 PROCEDURE — 1157F PR ADVANCE CARE PLAN OR EQUIV PRESENT IN MEDICAL RECORD: ICD-10-PCS | Mod: HCNC,CPTII,S$GLB, | Performed by: INTERNAL MEDICINE

## 2023-01-03 PROCEDURE — 1159F PR MEDICATION LIST DOCUMENTED IN MEDICAL RECORD: ICD-10-PCS | Mod: HCNC,CPTII,S$GLB, | Performed by: INTERNAL MEDICINE

## 2023-01-03 PROCEDURE — 3072F LOW RISK FOR RETINOPATHY: CPT | Mod: HCNC,CPTII,S$GLB, | Performed by: INTERNAL MEDICINE

## 2023-01-03 PROCEDURE — 1125F AMNT PAIN NOTED PAIN PRSNT: CPT | Mod: HCNC,CPTII,S$GLB, | Performed by: INTERNAL MEDICINE

## 2023-01-03 PROCEDURE — 99214 PR OFFICE/OUTPT VISIT, EST, LEVL IV, 30-39 MIN: ICD-10-PCS | Mod: HCNC,S$GLB,, | Performed by: INTERNAL MEDICINE

## 2023-01-03 PROCEDURE — 3078F DIAST BP <80 MM HG: CPT | Mod: HCNC,CPTII,S$GLB, | Performed by: INTERNAL MEDICINE

## 2023-01-03 PROCEDURE — 91312 COVID-19, MRNA, LNP-S, BIVALENT BOOSTER, PF, 30 MCG/0.3 ML DOSE: CPT | Mod: HCNC,S$GLB,, | Performed by: INTERNAL MEDICINE

## 2023-01-03 PROCEDURE — 0124A COVID-19, MRNA, LNP-S, BIVALENT BOOSTER, PF, 30 MCG/0.3 ML DOSE: CPT | Mod: HCNC,CV19,PBBFAC | Performed by: INTERNAL MEDICINE

## 2023-01-03 PROCEDURE — 1125F PR PAIN SEVERITY QUANTIFIED, PAIN PRESENT: ICD-10-PCS | Mod: HCNC,CPTII,S$GLB, | Performed by: INTERNAL MEDICINE

## 2023-01-03 PROCEDURE — 1157F ADVNC CARE PLAN IN RCRD: CPT | Mod: HCNC,CPTII,S$GLB, | Performed by: INTERNAL MEDICINE

## 2023-01-03 PROCEDURE — 1101F PR PT FALLS ASSESS DOC 0-1 FALLS W/OUT INJ PAST YR: ICD-10-PCS | Mod: HCNC,CPTII,S$GLB, | Performed by: INTERNAL MEDICINE

## 2023-01-03 PROCEDURE — 3074F SYST BP LT 130 MM HG: CPT | Mod: HCNC,CPTII,S$GLB, | Performed by: INTERNAL MEDICINE

## 2023-01-03 PROCEDURE — 99214 OFFICE O/P EST MOD 30 MIN: CPT | Mod: HCNC,S$GLB,, | Performed by: INTERNAL MEDICINE

## 2023-01-03 PROCEDURE — 3288F PR FALLS RISK ASSESSMENT DOCUMENTED: ICD-10-PCS | Mod: HCNC,CPTII,S$GLB, | Performed by: INTERNAL MEDICINE

## 2023-01-03 PROCEDURE — 3008F BODY MASS INDEX DOCD: CPT | Mod: HCNC,CPTII,S$GLB, | Performed by: INTERNAL MEDICINE

## 2023-01-03 PROCEDURE — 3074F PR MOST RECENT SYSTOLIC BLOOD PRESSURE < 130 MM HG: ICD-10-PCS | Mod: HCNC,CPTII,S$GLB, | Performed by: INTERNAL MEDICINE

## 2023-01-03 PROCEDURE — 1160F PR REVIEW ALL MEDS BY PRESCRIBER/CLIN PHARMACIST DOCUMENTED: ICD-10-PCS | Mod: HCNC,CPTII,S$GLB, | Performed by: INTERNAL MEDICINE

## 2023-01-03 PROCEDURE — 1101F PT FALLS ASSESS-DOCD LE1/YR: CPT | Mod: HCNC,CPTII,S$GLB, | Performed by: INTERNAL MEDICINE

## 2023-01-03 PROCEDURE — 99999 PR PBB SHADOW E&M-EST. PATIENT-LVL V: CPT | Mod: PBBFAC,HCNC,, | Performed by: INTERNAL MEDICINE

## 2023-01-03 PROCEDURE — 3008F PR BODY MASS INDEX (BMI) DOCUMENTED: ICD-10-PCS | Mod: HCNC,CPTII,S$GLB, | Performed by: INTERNAL MEDICINE

## 2023-01-03 PROCEDURE — 91312 COVID-19, MRNA, LNP-S, BIVALENT BOOSTER, PF, 30 MCG/0.3 ML DOSE: ICD-10-PCS | Mod: HCNC,S$GLB,, | Performed by: INTERNAL MEDICINE

## 2023-01-03 PROCEDURE — 3072F PR LOW RISK FOR RETINOPATHY: ICD-10-PCS | Mod: HCNC,CPTII,S$GLB, | Performed by: INTERNAL MEDICINE

## 2023-01-03 PROCEDURE — 3288F FALL RISK ASSESSMENT DOCD: CPT | Mod: HCNC,CPTII,S$GLB, | Performed by: INTERNAL MEDICINE

## 2023-01-03 PROCEDURE — 99499 RISK ADDL DX/OHS AUDIT: ICD-10-PCS | Mod: HCNC,S$GLB,, | Performed by: INTERNAL MEDICINE

## 2023-01-03 PROCEDURE — 1159F MED LIST DOCD IN RCRD: CPT | Mod: HCNC,CPTII,S$GLB, | Performed by: INTERNAL MEDICINE

## 2023-01-03 PROCEDURE — 3078F PR MOST RECENT DIASTOLIC BLOOD PRESSURE < 80 MM HG: ICD-10-PCS | Mod: HCNC,CPTII,S$GLB, | Performed by: INTERNAL MEDICINE

## 2023-01-03 PROCEDURE — 99499 UNLISTED E&M SERVICE: CPT | Mod: HCNC,S$GLB,, | Performed by: INTERNAL MEDICINE

## 2023-01-03 PROCEDURE — 1160F RVW MEDS BY RX/DR IN RCRD: CPT | Mod: HCNC,CPTII,S$GLB, | Performed by: INTERNAL MEDICINE

## 2023-01-03 NOTE — PROGRESS NOTES
Subjective:       Patient ID: Isma Martin is a 72 y.o. male.    Chief Complaint: Follow-up    Patient is here for followup for chronic conditions.      Chronic L toe pain, has not seen podiatry in long time.    No other new significant health issues.    Review of Systems   Constitutional:  Negative for activity change and unexpected weight change.   HENT:  Negative for hearing loss, rhinorrhea and trouble swallowing.    Eyes:  Negative for discharge and visual disturbance.   Respiratory:  Negative for chest tightness and wheezing.    Cardiovascular:  Negative for chest pain and palpitations.   Gastrointestinal:  Negative for blood in stool, constipation, diarrhea and vomiting.   Endocrine: Negative for polydipsia and polyuria.   Genitourinary:  Positive for urgency. Negative for difficulty urinating and hematuria.   Musculoskeletal:  Positive for arthralgias (R big toe and L 5th toe (which is swollen)) and back pain (improved). Negative for joint swelling and neck pain.   Neurological:  Negative for weakness and headaches.   Psychiatric/Behavioral:  Negative for confusion and dysphoric mood.          Objective:      Physical Exam  Vitals reviewed.   Constitutional:       General: He is not in acute distress.     Appearance: Normal appearance. He is well-developed. He is not ill-appearing, toxic-appearing or diaphoretic.   HENT:      Head: Normocephalic and atraumatic.   Eyes:      General: No scleral icterus.  Neck:      Thyroid: No thyromegaly.   Cardiovascular:      Rate and Rhythm: Normal rate and regular rhythm.      Heart sounds: Normal heart sounds. No murmur heard.    No friction rub. No gallop.   Pulmonary:      Effort: Pulmonary effort is normal. No respiratory distress.      Breath sounds: Normal breath sounds. No wheezing or rales.   Abdominal:      General: Bowel sounds are normal. There is no distension.      Palpations: Abdomen is soft. There is no mass.      Tenderness: There is no abdominal  tenderness. There is no guarding or rebound.   Musculoskeletal:         General: Normal range of motion.      Cervical back: Normal range of motion.      Comments: Focal L 1st MTP jt tenderness to deep palpation and decreased rom at that jt   Lymphadenopathy:      Cervical: No cervical adenopathy.   Skin:     Findings: No lesion.   Neurological:      Mental Status: He is alert and oriented to person, place, and time.   Psychiatric:         Mood and Affect: Mood normal.         Behavior: Behavior normal.         Thought Content: Thought content normal.       Assessment:       1. Aortic atherosclerosis    2. Prolactinoma    3. Type 2 diabetes mellitus without ophthalmic manifestations    4. Arthritis of big toe    5. BPH without urinary obstruction        Plan:       Isma was seen today for follow-up.    Diagnoses and all orders for this visit:    Aortic atherosclerosis  On statin    Prolactinoma  Sees endocrin    Type 2 diabetes mellitus without ophthalmic manifestations  Lab Results   Component Value Date    HGBA1C 5.8 (H) 08/23/2022   Recheck next time    Arthritis of big toe  -     Ambulatory referral/consult to Podiatry; Future    BPH without urinary obstruction  -     Ambulatory referral/consult to Urology; Future        Health Maintenance         Date Due Completion Date    Diabetes Urine Screening 08/12/2016 8/12/2015    Influenza Vaccine (1) 09/01/2022 1/19/2021    Lipid Panel 01/18/2023 1/18/2022    Hemoglobin A1c 02/23/2023 8/23/2022    Foot Exam 08/23/2023 8/23/2022    Override on 8/23/2022: Done    Override on 7/13/2021: Done    Override on 7/28/2016: Done    Override on 8/10/2015: Done    Eye Exam 12/08/2023 12/8/2022    Override on 8/10/2015: Done    High Dose Statin 01/03/2024 1/3/2023    TETANUS VACCINE 05/03/2026 5/3/2016    Colorectal Cancer Screening 07/08/2028 7/8/2021    Override on 5/1/2008: Done   Flu and covid vaccines recommended         Follow up in about 6 months (around  7/3/2023).    Future Appointments   Date Time Provider Department Center   1/17/2023  9:00 AM Opal Powell NP [facility] UROLOGY [place]   1/19/2023 10:30 AM Mega Gramajo Jr., DPM [facility] [place]   2/8/2023  2:00 PM STEVENE, VISUAL FIELDS [facility] OPHTHAL [place]   2/8/2023  2:40 PM Corey Westbrook, ISSA [facility] OPTOMTY [place]   7/6/2023 11:00 AM Anthony Tay MD [facility] IM [place] PCW

## 2023-01-13 DIAGNOSIS — M79.671 FOOT PAIN, BILATERAL: Primary | ICD-10-CM

## 2023-01-13 DIAGNOSIS — M79.672 FOOT PAIN, BILATERAL: Primary | ICD-10-CM

## 2023-01-17 ENCOUNTER — OFFICE VISIT (OUTPATIENT)
Dept: UROLOGY | Facility: CLINIC | Age: 73
End: 2023-01-17
Payer: MEDICARE

## 2023-01-17 VITALS
WEIGHT: 153.44 LBS | BODY MASS INDEX: 25.53 KG/M2 | SYSTOLIC BLOOD PRESSURE: 154 MMHG | DIASTOLIC BLOOD PRESSURE: 88 MMHG | HEART RATE: 76 BPM

## 2023-01-17 DIAGNOSIS — R35.0 URINARY FREQUENCY: ICD-10-CM

## 2023-01-17 DIAGNOSIS — N40.0 BPH WITHOUT URINARY OBSTRUCTION: ICD-10-CM

## 2023-01-17 DIAGNOSIS — N52.1 ERECTILE DYSFUNCTION DUE TO DISEASES CLASSIFIED ELSEWHERE: Primary | ICD-10-CM

## 2023-01-17 PROCEDURE — 3008F BODY MASS INDEX DOCD: CPT | Mod: HCNC,CPTII,S$GLB, | Performed by: NURSE PRACTITIONER

## 2023-01-17 PROCEDURE — 3288F FALL RISK ASSESSMENT DOCD: CPT | Mod: HCNC,CPTII,S$GLB, | Performed by: NURSE PRACTITIONER

## 2023-01-17 PROCEDURE — 1157F PR ADVANCE CARE PLAN OR EQUIV PRESENT IN MEDICAL RECORD: ICD-10-PCS | Mod: HCNC,CPTII,S$GLB, | Performed by: NURSE PRACTITIONER

## 2023-01-17 PROCEDURE — 3079F DIAST BP 80-89 MM HG: CPT | Mod: HCNC,CPTII,S$GLB, | Performed by: NURSE PRACTITIONER

## 2023-01-17 PROCEDURE — 3288F PR FALLS RISK ASSESSMENT DOCUMENTED: ICD-10-PCS | Mod: HCNC,CPTII,S$GLB, | Performed by: NURSE PRACTITIONER

## 2023-01-17 PROCEDURE — 3077F PR MOST RECENT SYSTOLIC BLOOD PRESSURE >= 140 MM HG: ICD-10-PCS | Mod: HCNC,CPTII,S$GLB, | Performed by: NURSE PRACTITIONER

## 2023-01-17 PROCEDURE — 1160F PR REVIEW ALL MEDS BY PRESCRIBER/CLIN PHARMACIST DOCUMENTED: ICD-10-PCS | Mod: HCNC,CPTII,S$GLB, | Performed by: NURSE PRACTITIONER

## 2023-01-17 PROCEDURE — 3008F PR BODY MASS INDEX (BMI) DOCUMENTED: ICD-10-PCS | Mod: HCNC,CPTII,S$GLB, | Performed by: NURSE PRACTITIONER

## 2023-01-17 PROCEDURE — 99999 PR PBB SHADOW E&M-EST. PATIENT-LVL IV: ICD-10-PCS | Mod: PBBFAC,HCNC,, | Performed by: NURSE PRACTITIONER

## 2023-01-17 PROCEDURE — 99204 OFFICE O/P NEW MOD 45 MIN: CPT | Mod: HCNC,S$GLB,, | Performed by: NURSE PRACTITIONER

## 2023-01-17 PROCEDURE — 99999 PR PBB SHADOW E&M-EST. PATIENT-LVL IV: CPT | Mod: PBBFAC,HCNC,, | Performed by: NURSE PRACTITIONER

## 2023-01-17 PROCEDURE — 1101F PR PT FALLS ASSESS DOC 0-1 FALLS W/OUT INJ PAST YR: ICD-10-PCS | Mod: HCNC,CPTII,S$GLB, | Performed by: NURSE PRACTITIONER

## 2023-01-17 PROCEDURE — 1159F PR MEDICATION LIST DOCUMENTED IN MEDICAL RECORD: ICD-10-PCS | Mod: HCNC,CPTII,S$GLB, | Performed by: NURSE PRACTITIONER

## 2023-01-17 PROCEDURE — 1159F MED LIST DOCD IN RCRD: CPT | Mod: HCNC,CPTII,S$GLB, | Performed by: NURSE PRACTITIONER

## 2023-01-17 PROCEDURE — 3077F SYST BP >= 140 MM HG: CPT | Mod: HCNC,CPTII,S$GLB, | Performed by: NURSE PRACTITIONER

## 2023-01-17 PROCEDURE — 1157F ADVNC CARE PLAN IN RCRD: CPT | Mod: HCNC,CPTII,S$GLB, | Performed by: NURSE PRACTITIONER

## 2023-01-17 PROCEDURE — 3079F PR MOST RECENT DIASTOLIC BLOOD PRESSURE 80-89 MM HG: ICD-10-PCS | Mod: HCNC,CPTII,S$GLB, | Performed by: NURSE PRACTITIONER

## 2023-01-17 PROCEDURE — 99204 PR OFFICE/OUTPT VISIT, NEW, LEVL IV, 45-59 MIN: ICD-10-PCS | Mod: HCNC,S$GLB,, | Performed by: NURSE PRACTITIONER

## 2023-01-17 PROCEDURE — 3072F PR LOW RISK FOR RETINOPATHY: ICD-10-PCS | Mod: HCNC,CPTII,S$GLB, | Performed by: NURSE PRACTITIONER

## 2023-01-17 PROCEDURE — 1160F RVW MEDS BY RX/DR IN RCRD: CPT | Mod: HCNC,CPTII,S$GLB, | Performed by: NURSE PRACTITIONER

## 2023-01-17 PROCEDURE — 3072F LOW RISK FOR RETINOPATHY: CPT | Mod: HCNC,CPTII,S$GLB, | Performed by: NURSE PRACTITIONER

## 2023-01-17 PROCEDURE — 1101F PT FALLS ASSESS-DOCD LE1/YR: CPT | Mod: HCNC,CPTII,S$GLB, | Performed by: NURSE PRACTITIONER

## 2023-01-17 RX ORDER — SILDENAFIL 100 MG/1
50 TABLET, FILM COATED ORAL DAILY PRN
Qty: 20 TABLET | Refills: 11 | Status: SHIPPED | OUTPATIENT
Start: 2023-01-17 | End: 2023-02-16

## 2023-01-17 RX ORDER — SOLIFENACIN SUCCINATE 10 MG/1
5 TABLET, FILM COATED ORAL DAILY
Qty: 15 TABLET | Refills: 11 | Status: SHIPPED | OUTPATIENT
Start: 2023-01-17 | End: 2023-03-02

## 2023-01-17 NOTE — PROGRESS NOTES
CHIEF COMPLAINT:    Mr. Martin is a 72 y.o. male presenting for nocturia and urinary frequency.      PRESENTING ILLNESS:    Isma Martin is a 72 y.o. male with a PMH of htn, ed, dm type 2, bph who presents for nocturia and urinary frequency    New patient to urology department. Presents today due to nocturia.  Nocturia about 6-7 times per night.  He reports a good urinary stream and complete bladder emptying.  Does report also having frequency and urgency during the day.  Has been prescribed tamsulosin in the past with little improvement.    No family history of prostate cancer. Last PSA 0.79 wnl.    Has history of erectile dysfunction.  Prescribed sildenafil in the past, which worked for him.  Will send new script with higher dose.    REVIEW OF SYSTEMS:    Review of Systems   Constitutional:  Negative for chills and fever.   Respiratory:  Negative for shortness of breath.    Cardiovascular:  Negative for chest pain.   Gastrointestinal:  Negative for constipation and diarrhea.   Genitourinary:  Positive for frequency and urgency. Negative for dysuria, flank pain and hematuria.   Neurological:  Negative for dizziness and weakness.     PATIENT HISTORY:    Past Medical History:   Diagnosis Date    Arthritis     Bilateral dry eyes     Cataract     Colon polyp     Diabetes mellitus     GERD (gastroesophageal reflux disease)     Hyperlipidemia     Hypertension     Hypogonadism male     Obesity     Prolactinoma     Urinary tract infection        Family History   Problem Relation Age of Onset    Glaucoma Mother     Heart disease Brother     Heart attack Sister     Heart disease Sister     Cancer Neg Hx     Diabetes Neg Hx     Colon polyps Neg Hx     Blindness Neg Hx     Amblyopia Neg Hx     Cataracts Neg Hx     Hypertension Neg Hx     Macular degeneration Neg Hx     Retinal detachment Neg Hx     Strabismus Neg Hx     Stroke Neg Hx     Thyroid disease Neg Hx     Prostate cancer Neg Hx     Kidney disease Neg Hx         Allergies:  Patient has no known allergies.    Medications:    Current Outpatient Medications:     atorvastatin (LIPITOR) 40 MG tablet, TAKE 1 TABLET(40 MG) BY MOUTH EVERY DAY, Disp: 90 tablet, Rfl: 0    acetaminophen (TYLENOL) 650 MG TbSR, Take 1 tablet (650 mg total) by mouth every 6 (six) hours as needed (pain)., Disp: 56 tablet, Rfl: 0    ascorbic acid (VITAMIN C) 500 MG tablet, Take 500 mg by mouth Every PM., Disp: , Rfl:     cabergoline (DOSTINEX) 0.5 mg tablet, TAKE 1/2 TABLET(0.25 MG) BY MOUTH 2 TIMES A WEEK, Disp: 8 tablet, Rfl: 1    cholecalciferol, vitamin D3, 2,000 unit Tab, Take 1 tablet by mouth Every PM., Disp: , Rfl:     cyanocobalamin (VITAMIN B-12) 1000 MCG tablet, , Disp: , Rfl:     gabapentin (NEURONTIN) 100 MG capsule, Take 1 capsule (100 mg total) by mouth 3 (three) times daily., Disp: 90 capsule, Rfl: 0    losartan-hydrochlorothiazide 50-12.5 mg (HYZAAR) 50-12.5 mg per tablet, Take 1 tablet by mouth once daily., Disp: 90 tablet, Rfl: 11    metoprolol succinate (TOPROL-XL) 25 MG 24 hr tablet, Take 1 tablet (25 mg total) by mouth once daily., Disp: 90 tablet, Rfl: 11    naproxen (NAPROSYN) 500 MG tablet, Take 1 tablet (500 mg total) by mouth 2 (two) times daily with meals., Disp: 60 tablet, Rfl: 1    omega-3 fatty acids-vitamin E 1,000 mg Cap, , Disp: , Rfl:     omeprazole (PRILOSEC) 20 MG capsule, Take 1 capsule (20 mg total) by mouth once daily., Disp: 90 capsule, Rfl: 11    sildenafiL (VIAGRA) 100 MG tablet, Take 0.5 tablets (50 mg total) by mouth daily as needed for Erectile Dysfunction., Disp: 20 tablet, Rfl: 11    solifenacin (VESICARE) 10 MG tablet, Take 0.5 tablets (5 mg total) by mouth once daily., Disp: 15 tablet, Rfl: 11    traMADoL (ULTRAM) 50 mg tablet, Take 1 tablet (50 mg total) by mouth every 12 (twelve) hours as needed for Pain., Disp: 30 tablet, Rfl: 5    VITAMIN E ACETATE (VITAMIN E ORAL), Take by mouth., Disp: , Rfl:     PHYSICAL EXAMINATION:    Physical Exam  Vitals  and nursing note reviewed.   Constitutional:       Appearance: Normal appearance. He is well-developed.   HENT:      Head: Normocephalic and atraumatic.   Eyes:      Pupils: Pupils are equal, round, and reactive to light.   Pulmonary:      Effort: Pulmonary effort is normal.   Musculoskeletal:         General: Normal range of motion.      Cervical back: Normal range of motion.   Skin:     General: Skin is warm and dry.   Neurological:      Mental Status: He is alert and oriented to person, place, and time.   Psychiatric:         Behavior: Behavior normal.         LABS:    U/a performed in office today: yellow, ph 7, 1.005, otherwise unremarkable  Bladder scan performed by Nurse Anyi.  PVR 42 ml    Lab Results   Component Value Date    PSA 0.79 07/13/2021    PSA 1.0 10/25/2019    PSA 1.0 10/25/2019    PSA 0.84 05/03/2016    PSA 0.47 06/07/2013    PSA 0.39 03/07/2012    PSADIAG 0.93 10/25/2017    PSADIAG 1.2 01/24/2017    PSADIAG 0.25 10/13/2014    PSADIAG 0.53 05/05/2014       IMPRESSION:  Encounter Diagnoses   Name Primary?    Erectile dysfunction due to diseases classified elsewhere Yes    BPH without urinary obstruction     Urinary frequency          PLAN:  Problem List Items Addressed This Visit       Erectile dysfunction - Primary     Other Visit Diagnoses       BPH without urinary obstruction        Relevant Orders    POCT URINE DIPSTICK WITHOUT MICROSCOPE    POCT Bladder Scan    Urinary frequency                1. Bph with LUTs   - flomax with no relief in symptoms previously   - Trial of vesicare. Side effects discussed including dry mouth, dry eyes, and constipation.      - reports MARCOS & PSA completed by PCP  2.  Urinary frequency   - trial of vesicare   - Behavior modifications   -Empty bladder before bed  - limit fluids 3-4 hours before bed.  Drink just enough to take p.m. meds if taken within 3-4 hours prior to bedtime  -discussed bladder irritants and their avoidance.      3. Erectile dysfunction   -  continue viagra,  new script sent    4. Rtc in 6 weeks        Opal Powell, NP

## 2023-01-19 ENCOUNTER — OFFICE VISIT (OUTPATIENT)
Dept: PODIATRY | Facility: CLINIC | Age: 73
End: 2023-01-19
Payer: MEDICARE

## 2023-01-19 VITALS
HEIGHT: 65 IN | BODY MASS INDEX: 25.33 KG/M2 | SYSTOLIC BLOOD PRESSURE: 162 MMHG | RESPIRATION RATE: 18 BRPM | WEIGHT: 152 LBS | DIASTOLIC BLOOD PRESSURE: 71 MMHG | HEART RATE: 56 BPM

## 2023-01-19 DIAGNOSIS — M19.079 ARTHRITIS OF BIG TOE: ICD-10-CM

## 2023-01-19 DIAGNOSIS — M24.573 EQUINUS CONTRACTURE OF ANKLE: ICD-10-CM

## 2023-01-19 DIAGNOSIS — M20.21 HALLUX RIGIDUS, BILATERAL: Primary | ICD-10-CM

## 2023-01-19 DIAGNOSIS — M20.22 HALLUX RIGIDUS, BILATERAL: Primary | ICD-10-CM

## 2023-01-19 PROCEDURE — 99999 PR PBB SHADOW E&M-EST. PATIENT-LVL V: CPT | Mod: PBBFAC,HCNC,, | Performed by: PODIATRIST

## 2023-01-19 PROCEDURE — 3078F PR MOST RECENT DIASTOLIC BLOOD PRESSURE < 80 MM HG: ICD-10-PCS | Mod: HCNC,CPTII,S$GLB, | Performed by: PODIATRIST

## 2023-01-19 PROCEDURE — 1157F PR ADVANCE CARE PLAN OR EQUIV PRESENT IN MEDICAL RECORD: ICD-10-PCS | Mod: HCNC,CPTII,S$GLB, | Performed by: PODIATRIST

## 2023-01-19 PROCEDURE — 1160F PR REVIEW ALL MEDS BY PRESCRIBER/CLIN PHARMACIST DOCUMENTED: ICD-10-PCS | Mod: HCNC,CPTII,S$GLB, | Performed by: PODIATRIST

## 2023-01-19 PROCEDURE — 3008F BODY MASS INDEX DOCD: CPT | Mod: HCNC,CPTII,S$GLB, | Performed by: PODIATRIST

## 2023-01-19 PROCEDURE — 3288F FALL RISK ASSESSMENT DOCD: CPT | Mod: HCNC,CPTII,S$GLB, | Performed by: PODIATRIST

## 2023-01-19 PROCEDURE — 3077F PR MOST RECENT SYSTOLIC BLOOD PRESSURE >= 140 MM HG: ICD-10-PCS | Mod: HCNC,CPTII,S$GLB, | Performed by: PODIATRIST

## 2023-01-19 PROCEDURE — 3008F PR BODY MASS INDEX (BMI) DOCUMENTED: ICD-10-PCS | Mod: HCNC,CPTII,S$GLB, | Performed by: PODIATRIST

## 2023-01-19 PROCEDURE — 3288F PR FALLS RISK ASSESSMENT DOCUMENTED: ICD-10-PCS | Mod: HCNC,CPTII,S$GLB, | Performed by: PODIATRIST

## 2023-01-19 PROCEDURE — 1101F PR PT FALLS ASSESS DOC 0-1 FALLS W/OUT INJ PAST YR: ICD-10-PCS | Mod: HCNC,CPTII,S$GLB, | Performed by: PODIATRIST

## 2023-01-19 PROCEDURE — 20600 SMALL JOINT ASPIRATION/INJECTION: R GREAT MTP: ICD-10-PCS | Mod: 50,HCNC,S$GLB, | Performed by: PODIATRIST

## 2023-01-19 PROCEDURE — 1125F PR PAIN SEVERITY QUANTIFIED, PAIN PRESENT: ICD-10-PCS | Mod: HCNC,CPTII,S$GLB, | Performed by: PODIATRIST

## 2023-01-19 PROCEDURE — 99999 PR PBB SHADOW E&M-EST. PATIENT-LVL V: ICD-10-PCS | Mod: PBBFAC,HCNC,, | Performed by: PODIATRIST

## 2023-01-19 PROCEDURE — 3078F DIAST BP <80 MM HG: CPT | Mod: HCNC,CPTII,S$GLB, | Performed by: PODIATRIST

## 2023-01-19 PROCEDURE — 3077F SYST BP >= 140 MM HG: CPT | Mod: HCNC,CPTII,S$GLB, | Performed by: PODIATRIST

## 2023-01-19 PROCEDURE — 3072F LOW RISK FOR RETINOPATHY: CPT | Mod: HCNC,CPTII,S$GLB, | Performed by: PODIATRIST

## 2023-01-19 PROCEDURE — 99213 OFFICE O/P EST LOW 20 MIN: CPT | Mod: 25,HCNC,S$GLB, | Performed by: PODIATRIST

## 2023-01-19 PROCEDURE — 1101F PT FALLS ASSESS-DOCD LE1/YR: CPT | Mod: HCNC,CPTII,S$GLB, | Performed by: PODIATRIST

## 2023-01-19 PROCEDURE — 1157F ADVNC CARE PLAN IN RCRD: CPT | Mod: HCNC,CPTII,S$GLB, | Performed by: PODIATRIST

## 2023-01-19 PROCEDURE — 20600 DRAIN/INJ JOINT/BURSA W/O US: CPT | Mod: 50,HCNC,S$GLB, | Performed by: PODIATRIST

## 2023-01-19 PROCEDURE — 1160F RVW MEDS BY RX/DR IN RCRD: CPT | Mod: HCNC,CPTII,S$GLB, | Performed by: PODIATRIST

## 2023-01-19 PROCEDURE — 1125F AMNT PAIN NOTED PAIN PRSNT: CPT | Mod: HCNC,CPTII,S$GLB, | Performed by: PODIATRIST

## 2023-01-19 PROCEDURE — 1159F PR MEDICATION LIST DOCUMENTED IN MEDICAL RECORD: ICD-10-PCS | Mod: HCNC,CPTII,S$GLB, | Performed by: PODIATRIST

## 2023-01-19 PROCEDURE — 99213 PR OFFICE/OUTPT VISIT, EST, LEVL III, 20-29 MIN: ICD-10-PCS | Mod: 25,HCNC,S$GLB, | Performed by: PODIATRIST

## 2023-01-19 PROCEDURE — 1159F MED LIST DOCD IN RCRD: CPT | Mod: HCNC,CPTII,S$GLB, | Performed by: PODIATRIST

## 2023-01-19 PROCEDURE — 3072F PR LOW RISK FOR RETINOPATHY: ICD-10-PCS | Mod: HCNC,CPTII,S$GLB, | Performed by: PODIATRIST

## 2023-01-19 RX ORDER — DEXAMETHASONE SODIUM PHOSPHATE 4 MG/ML
4 INJECTION, SOLUTION INTRA-ARTICULAR; INTRALESIONAL; INTRAMUSCULAR; INTRAVENOUS; SOFT TISSUE
Status: DISCONTINUED | OUTPATIENT
Start: 2023-01-19 | End: 2023-01-19 | Stop reason: HOSPADM

## 2023-01-19 RX ADMIN — DEXAMETHASONE SODIUM PHOSPHATE 4 MG: 4 INJECTION, SOLUTION INTRA-ARTICULAR; INTRALESIONAL; INTRAMUSCULAR; INTRAVENOUS; SOFT TISSUE at 11:01

## 2023-01-19 NOTE — PROGRESS NOTES
[facility] - PODIATRY  [address] [place] [address], [address]  [facility] [address]  Dept: [phone]  Dept Fax: [phone]    Mega Gramajo Jr., DPM     Assessment:   MDM    Coding  1. Hallux rigidus, bilateral  Small Joint Aspiration/Injection    Small Joint Aspiration/Injection      2. Arthritis of big toe  Ambulatory referral/consult to Podiatry      3. Equinus contracture of ankle            Plan:     Small Joint Aspiration/Injection: R great MTP    Date/Time: 1/19/2023 11:23 AM  Performed by: Mega Gramajo Jr., DPM  Authorized by: Mega Gramajo Jr., DPM     Consent Done?:  Yes (Verbal)  Indications:  Arthritis, joint swelling and pain  Site marked: the procedure site was marked    Timeout: prior to procedure the correct patient, procedure, and site was verified    Prep: patient was prepped and draped in usual sterile fashion      Local anesthesia used?: Yes    Anesthesia:  Local infiltration  Local anesthetic:  Bupivacaine 0.25% without epinephrine  Anesthetic total (ml):  2    Location:  Great toe  Site:  R great MTP  Ultrasonic guidance for needle placement?: No    Needle size:  25 G  Approach:  Dorsal  Medications:  4 mg dexAMETHasone 4 mg/mL  Patient tolerance:  Patient tolerated the procedure well with no immediate complications  Small Joint Aspiration/Injection: L great MTP    Date/Time: 1/19/2023 11:23 AM  Performed by: Mega Gramajo Jr., DPM  Authorized by: Mega Gramajo Jr., DPM     Consent Done?:  Yes (Verbal)  Indications:  Arthritis, pain and joint swelling  Site marked: the procedure site was marked    Timeout: prior to procedure the correct patient, procedure, and site was verified    Prep: patient was prepped and draped in usual sterile fashion      Local anesthesia used?: Yes    Anesthesia:  Local infiltration  Local anesthetic:  Bupivacaine 0.25% without epinephrine  Anesthetic total (ml):  2    Location:  Great toe  Site:  L great MTP  Ultrasonic guidance for  needle placement?: No    Needle size:  25 G  Medications:  4 mg dexAMETHasone 4 mg/mL  Patient tolerance:  Patient tolerated the procedure well with no immediate complications  1. Hallux rigidus, bilateral  -     Small Joint Aspiration/Injection  -     Small Joint Aspiration/Injection    2. Arthritis of big toe  -     Ambulatory referral/consult to Podiatry    3. Equinus contracture of ankle      Isma was seen today for toe pain.    Diagnoses and all orders for this visit:    Hallux rigidus, bilateral  -     Small Joint Aspiration/Injection  -     Small Joint Aspiration/Injection    Arthritis of big toe  -     Ambulatory referral/consult to Podiatry    Equinus contracture of ankle        -pt seen, evaluated, and managed  -dx discussed in detail. All questions/concerns addressed  -all tx options discussed. All alternatives, risks, benefits of all txs discussed  -We discussed conservative care options possible including but not limited to shoe wear and/or padding, bracing/strapping, at home ROM, formal PT, medical therapy, injection therapy  - The utilization of NSAIDs can be considered but their benefit has to be tempered against the risk of GI/ concerns  - A steroid injection can be undertaken.  We did discuss the potential mechanism of action of this shot.  Understanding that multiple injections at the same anatomic site do have deleterious effects on the soft tissue.  Generic risks include: steroid flare (advised to ice if necessary), skin hypo-pgimentation (which can be permanent and unsightly), elevation of blood sugar, subcutaneous atrophy (can be permanent) and infection.   -XR/imaging reviewed by me: agree with read  -labs reviewed by me: ok for vgel  -implemented icing/stretching regimen  -offloading pads dispensed    -rxs dispensed: none  -referrals: none  -WB: wbat      Follow up in about 4 weeks (around 2/16/2023).    Subjective:      Patient ID: Isma Martin is a 72 y.o. male.    Chief  Complaint:   Chief Complaint   Patient presents with    Toe Pain     Bilateral great toe pain       CC - foot pain: patient presents to the podiatry clinic  with complaint of  bilateral foot pain. Onset of the symptoms was several years ago. Precipitating event: unk. Current symptoms include: ability to bear weight, but with some pain, stiffness, swelling and worsening symptoms after a period of activity. Aggravating factors: walking and certain shoegear. Symptoms have gradually worsened. Patient has had prior foot problems. Evaluation to date: none. Treatment to date: avoidance of offending activity, OTC analgesics which are not very effective, and surgery many years ago on the R foot (chielectomy) . Patients rates pain 8/10 on pain scale.        HPI    Last Podiatry Enc: Visit date not found  Last Enc w/ Me: Visit date not found    Outside reports reviewed: historical medical records.  Family hx: as below  Past Medical History:   Diagnosis Date    Arthritis     Bilateral dry eyes     Cataract     Colon polyp     Diabetes mellitus     GERD (gastroesophageal reflux disease)     Hyperlipidemia     Hypertension     Hypogonadism male     Obesity     Prolactinoma     Urinary tract infection      Past Surgical History:   Procedure Laterality Date    CAROTID ENDARTERECTOMY Left     CERVICAL LAMINECTOMY WITH SPINAL FUSION N/A 10/29/2019    Procedure: LAMINECTOMY, SPINE, CERVICAL, WITH FUSION C3-6 Depuy SNS: [employer]/SSEP [place] + Pads;  Surgeon: Ariel Lamar MD;  Location: [facility] OR [address] [place] [address];  Service: Neurosurgery;  Laterality: N/A;    COLONOSCOPY N/A 7/8/2021    Procedure: COLONOSCOPY;  Surgeon: Veena Power MD;  Location: [facility] (4TH FLR);  Service: Endoscopy;  Laterality: N/A;  fully vaccinated 4/11/21, instructions sent to [id number]    FOOT SURGERY  4-23-14    right    TRANSPHENOIDAL PITUITARY RESECTION      pituitary tumor     Family History   Problem Relation Age of Onset    Glaucoma Mother      Heart disease Brother     Heart attack Sister     Heart disease Sister     Cancer Neg Hx     Diabetes Neg Hx     Colon polyps Neg Hx     Blindness Neg Hx     Amblyopia Neg Hx     Cataracts Neg Hx     Hypertension Neg Hx     Macular degeneration Neg Hx     Retinal detachment Neg Hx     Strabismus Neg Hx     Stroke Neg Hx     Thyroid disease Neg Hx     Prostate cancer Neg Hx     Kidney disease Neg Hx      Current Outpatient Medications   Medication Sig Dispense Refill    acetaminophen (TYLENOL) 650 MG TbSR Take 1 tablet (650 mg total) by mouth every 6 (six) hours as needed (pain). 56 tablet 0    ascorbic acid (VITAMIN C) 500 MG tablet Take 500 mg by mouth Every PM.      atorvastatin (LIPITOR) 40 MG tablet TAKE 1 TABLET(40 MG) BY MOUTH EVERY DAY 90 tablet 0    cabergoline (DOSTINEX) 0.5 mg tablet TAKE 1/2 TABLET(0.25 MG) BY MOUTH 2 TIMES A WEEK 8 tablet 1    cholecalciferol, vitamin D3, 2,000 unit Tab Take 1 tablet by mouth Every PM.      cyanocobalamin (VITAMIN B-12) 1000 MCG tablet       gabapentin (NEURONTIN) 100 MG capsule Take 1 capsule (100 mg total) by mouth 3 (three) times daily. 90 capsule 0    losartan-hydrochlorothiazide 50-12.5 mg (HYZAAR) 50-12.5 mg per tablet Take 1 tablet by mouth once daily. 90 tablet 11    metoprolol succinate (TOPROL-XL) 25 MG 24 hr tablet Take 1 tablet (25 mg total) by mouth once daily. 90 tablet 11    naproxen (NAPROSYN) 500 MG tablet Take 1 tablet (500 mg total) by mouth 2 (two) times daily with meals. 60 tablet 1    omega-3 fatty acids-vitamin E 1,000 mg Cap       omeprazole (PRILOSEC) 20 MG capsule Take 1 capsule (20 mg total) by mouth once daily. 90 capsule 11    sildenafiL (VIAGRA) 100 MG tablet Take 0.5 tablets (50 mg total) by mouth daily as needed for Erectile Dysfunction. 20 tablet 11    solifenacin (VESICARE) 10 MG tablet Take 0.5 tablets (5 mg total) by mouth once daily. 15 tablet 11    traMADoL (ULTRAM) 50 mg tablet Take 1 tablet (50 mg total) by mouth every 12 (twelve)  hours as needed for Pain. 30 tablet 5    VITAMIN E ACETATE (VITAMIN E ORAL) Take by mouth.       No current facility-administered medications for this visit.     Review of patient's allergies indicates:  No Known Allergies  Social History     Socioeconomic History    Marital status:      Spouse name: Brenda   Occupational History    Occupation: [employer]      Employer: [employer]     Employer: [employer]   Tobacco Use    Smoking status: Former     Packs/day: 1.50     Years: 35.00     Pack years: 52.50     Types: Cigarettes     Quit date: 1995     Years since quittin.0    Smokeless tobacco: Never   Substance and Sexual Activity    Alcohol use: Yes     Alcohol/week: 0.8 standard drinks     Types: 1 Standard drinks or equivalent per week     Comment: rare    Drug use: No    Sexual activity: Not Currently     Partners: Female   Social History Narrative    , on ssdi for his toe.    . 1 kid still with them. Wife dm and in WC.    3 kids total.     Social Determinants of Health     Financial Resource Strain: High Risk    Difficulty of Paying Living Expenses: Very hard   Food Insecurity: No Food Insecurity    Worried About Running Out of Food in the Last Year: Never true    Ran Out of Food in the Last Year: Never true   Transportation Needs: No Transportation Needs    Lack of Transportation (Medical): No    Lack of Transportation (Non-Medical): No   Physical Activity: Sufficiently Active    Days of Exercise per Week: 3 days    Minutes of Exercise per Session: 60 min   Stress: Stress Concern Present    Feeling of Stress : Rather much   Social Connections: Unknown    Frequency of Communication with Friends and Family: Once a week    Frequency of Social Gatherings with Friends and Family: Patient refused    Active Member of Clubs or Organizations: No    Attends Club or Organization Meetings: Patient refused    Marital Status:    Housing Stability: Low Risk     Unable to  "Pay for Housing in the Last Year: No    Number of Places Lived in the Last Year: 2    Unstable Housing in the Last Year: No       ROS    REVIEW OF SYSTEMS: Negative as documented below as well as positive findings in bold.       Constitutional  Respiratory  Gastrointestinal  Skin   - Fever - Cough - Heartburn - Rash   - Chills - Spit blood - Nausea - Itching   - Weight Loss - Shortness of breath - Vomiting - Nail pain   - Malaise/Fatigue - Wheezing - Abdominal Pain  Wound/Ulcer   - Weight Gain   - Blood in Stool  Poor wound healing       - Diarrhea          Cardiovascular  Genitourinary  Neurological  HEENT   - Chest Pain - Dysuria - Burning Sensation of feet - Headache   - Palpitations - Hematuria - Tingling / Paresthesia - Congestion   - Pain at night in legs - Flank Pain - Dizziness - Sore Throat   - Cramping   - Tremor - Blurred Vision   - Leg Swelling   - Sensory Change - Double Vision   - Dizzy when standing   - Speech Change - Eye Redness       - Focal Weakness - Dry Eyes       - Loss of Consciousness          Endocrine  Musculoskeletal  Psychiatric   - Cold intolerance - Muscle Pain - Depression   - Heat intolerance - Neck Pain - Insomnia   - Anemia - Joint Pain - Memory Loss   -  Easy bruising, bleeding - Heel pain - Anxiety      Toe Pain        Leg/Ankle/Foot Pain         Objective:     BP (!) 162/71 (BP Location: Left arm, Patient Position: Sitting, BP Method: X-Large (Automatic))   Pulse (!) 56   Resp 18   Ht 5' 5" (1.651 m)   Wt 68.9 kg (152 lb 0.1 oz)   BMI 25.30 kg/m²   Vitals:    01/19/23 1047   BP: (!) 162/71   Pulse: (!) 56   Resp: 18   Weight: 68.9 kg (152 lb 0.1 oz)   Height: 5' 5" (1.651 m)   PainSc: 10-Worst pain ever   PainLoc: Toe       Physical Exam    General Appearance:   Patient appears well developed, well nourished  Patient appears stated age    Psychiatric:   Patient is oriented to time, place, and person.  Patient has appropriate mood and affect    Neck:  Trachea Midline  No " visible masses    Respiratory/Ears:  No distress or labored breathing.  Able to differentiate between normal talking voice and whisper.  Able to follow commands    Eyes:  Visual Acuity intact  Lids and conjunctivae normal. No discoloration noted.    Foot Exam  Physical Exam  Ortho Exam  Ortho/SPM Exam  Physical Exam  Foot/Ankle Musculoskeletal Exam    B/l LE exam con't:  V:  DP 2/4, PT 2/4   CRT< 3s to all digits tested   Tibial and popliteal lymph nodes are w/o abnormality   Edema: absent, varicosities: absent    N:  Patient displays normal ankle reflexes   SILT in SP/DP/T/José Miguel/Saph distributions    Ortho: +Motor EHL/FHL/TA/GA   Observed enlarged bony prominence at the dorsal 1st MTPJ   equinus deformity present   There is severe decreased ROM at the b/l (L>R) 1st MTPJ joints: pain is present, crepitus is present  There is severe pain with palpation of b/l 1st MTPJ  Compartments soft/compressible. No pain on passive stretch of big toe. No calf  Pain.    Derm:  skin intact, skin warm and dry, skin without ulcers or lesions, skin without induration, nails normal, no ecchymosis    Imaging / Labs:      X-Ray Foot Complete 3 view Bilateral    Result Date: 1/19/2023  EXAMINATION: XR FOOT COMPLETE 3 VIEW BILATERAL CLINICAL HISTORY: Pain in right foot TECHNIQUE: AP, lateral, and oblique views of both feet were performed. COMPARISON: 09/11/2017 FINDINGS: Right: There is no radiographic evidence of acute osseous, articular, or soft tissue abnormality.  There is severe osteoarthritis present at the 1st MTP joint.No osseous erosions demonstrated. Left: There is no radiographic evidence of acute osseous, articular, or soft tissue abnormality.  There is moderate osteoarthritis present at the 1st MTP joint.  No osseous erosions demonstrated.     Degenerative findings as above Electronically signed by: Dilan Muniz MD Date:    01/19/2023 Time:    10:26        Note: This was dictated using a computer transcription program.  Although proofread, it may contain computer transcription errors and phonetic errors. Other human proofreading errors may also exist. Corrections may be performed at a later time. Please contact us for any clarification if needed.    Mega Gramajo DPM  [facility]

## 2023-01-19 NOTE — PATIENT INSTRUCTIONS
Hallux Rigidus        What Is Hallux Rigidus?    Normal function of the joint at the base of the big toeHallux rigidus is a disorder of the joint located at the base of the big toe. It causes pain and stiffness in the joint, and with time, it gets increasingly harder to bend the toe. Hallux refers to the big toe, while rigidus indicates that the toe is rigid and cannot move. Hallux rigidus is actually a form of degenerative arthritis.        This disorder can be very troubling and even disabling since we use the big toe whenever we walk, stoop down, climb up or even stand. Many patients confuse hallux rigidus with a bunion, which affects the same joint, but they are very different conditions requiring different treatment.        Because hallux rigidus is a progressive condition, the toes motion decreases as time goes on. In its earlier stage, when motion of the big toe is only somewhat limited, the condition is called hallux limitus. But as the problem advances, the toes range of motion gradually decreases until it potentially reaches the end stage of rigidus, in which the big toe becomes stiff or what is sometimes called a frozen joint.    Causes  Limited motion of the big toe caused by hallux rigidusCommon causes of hallux rigidus are faulty function (biomechanics) and structural abnormalities of the foot that can lead to osteoarthritis in the big toe joint. This type of arthritis--the kind that results from wear and tear--often develops in people who have defects that change the way their foot and big toe functions. For example, those with fallen arches or excessive pronation (rolling in) of the ankles are susceptible to developing hallux rigidus. In some people, hallux rigidus runs in the family and is a result of inheriting a foot type that is prone to developing this condition. In other cases, it is associated with overuse, especially among people engaged in activities or jobs that increase the stress on the  big toe, such as workers who often must stoop or squat. Hallux rigidus can also result from an injury, such as stubbing your toe. Or it may be caused by inflammatory diseases, such as rheumatoid arthritis or gout. Your foot and ankle surgeon can determine the cause of your hallux rigidus and recommend the best treatment.    Symptoms  Early signs and symptoms include:    Pain and stiffness in the big toe during use (walking, standing, bending, etc.)  Pain and stiffness aggravated by cold, damp weather  Difficulty with certain activities (running, squatting)  Swelling and inflammation around the joint    Rigid deformity of the big toe caused by Hallux Rigidus  As the disorder gets more serious, additional symptoms may develop, including:    Pain, even during rest  Difficulty wearing shoes because bone spurs (overgrowths) develop  Dull pain in the hip, knee or lower back due to changes in the way you walk  Limping (in severe cases)     Diagnosis  The sooner this condition is diagnosed, the easier it is to treat. Therefore, the best time to see a foot and ankle surgeon is when you first notice symptoms. If you wait until bone spurs develop, your condition is likely to be more difficult to manage.    In diagnosing hallux rigidus, the surgeon will examine your feet and move the toe to determine its range of motion. X-rays help determine how much arthritis is present as well as to evaluate any bone spurs or other abnormalities that may have formed.    Nonsurgical Treatment  In many cases, early treatment may prevent or postpone the need for surgery in the future. Treatment for mild or moderate cases of hallux rigidus may include:    Shoe modifications. Shoes with a large toe box put less pressure on your toe. Stiff or rocker-bottom soles may also be recommended.  Orthotic devices. Custom orthotic devices may improve foot function.  Medications. Oral nonsteroidal anti-inflammatory drugs (NSAIDs), such as ibuprofen, may be  recommended to reduce pain and inflammation.  Injection therapy. Injections of corticosteroids may reduce inflammation and pain.  Physical therapy. Ultrasound therapy or other physical therapy modalities may be undertaken to provide temporary relief.       When Is Surgery Needed?  In some cases, surgery is the only way to eliminate or reduce pain. Several types of surgery are available for treatment of hallux rigidus. In selecting the procedure or combination of procedures for your particular case, the foot and ankle surgeon will take into consideration the extent of your deformity based on the x-ray findings, your age, your activity level and other factors. The length of the recovery period will vary depending on the procedure or procedures performed.      What Is Arthritis in the Foot?  Degenerative arthritis is a condition that slowly wears away joints, the area where bones meet and move. In the beginning, you may notice that the affected joint seems stiff. It may even ache. As the joint lining (cartilage) breaks down, the bones rub against each other, causing pain and swelling. Over time, small pieces of rough or splintered bone (bone spurs) develop, and the joints range of motion becomes limited. But movement doesnt have to cause pain. The effects of arthritis can be reduced.    The big-toe joint  When arthritis affects your big toe, your foot hurts when it pushes off the ground. Arthritis often appears in the big-toe joint along with a bunion (a bony bump at the side of the joint) or a bone spur on top of the joint.    Other joints  When arthritis affects the rear or midfoot joints, you feel pain when you put weight on your foot. Arthritis may affect the joint where the ankle and foot meet. It may also affect other joints nearby.  Date Last Reviewed: 7/1/2016 © 2000-2016 [employer] [address] [place] [address], [place] [address]. All rights reserved. This information is not intended as a  substitute for professional medical care. Always follow your healthcare professional's instructions.        Treating Arthritis in the Foot  If your symptoms are mild, medications may be enough to reduce pain and swelling. For more severe arthritis, surgery may be needed to improve the condition of the joint.    Medicine  Your doctor may prescribe medicine--pills or injections--to limit pain and swelling. Ice, aspirin, acetaminophen, or ibuprofen may help relieve mild symptoms that occur after activity.  Surgery and bone trimming  To ease movement and reduce pain, your doctor may trim damaged bone. If arthritis is severe, the joint may be fused or removed. If the bone is not damaged too badly, your doctor may simply shave away bone spurs. Any excess bone growth related to a bunion may also be trimmed.  Fusing joints  If damage is more severe, your doctor may fuse the joint to prevent the bones from rubbing. Afterward, staples, plates, or screws may hold the bones in place so they heal properly. In some cases, the joint may be removed and replaced with an implant.  After surgery  During the early stages of recovery, your foot is likely to be bandaged and immobilized for a while. For best results, follow up with your doctor as scheduled. These visits help ensure that your foot heals properly.  As you heal  After surgery, youll be told how to care for your incision and how soon to begin walking on the foot. Until the foot can bear weight, you may need to walk with crutches or a cane.  For surgery on the big toe, your foot may be splinted to limit movement for several weeks. Despite this, you should be able to walk soon after surgery.  For surgery on rear or midfoot joints, you may need to wear a cast or surgical shoe. These joints are fairly large, so full recovery may take a few months. Once the bone has healed, any staples, plates, or screws may be removed.  Date Last Reviewed: 7/1/2016  © 6755-0321 [employer]. [address] [place] [address]. All rights reserved. This information is not intended as a substitute for professional medical care. Always follow your healthcare professional's instructions.      Foot Surgery: Degenerative Joint Disease    Degenerative joint disease (arthritis) often happens in the joint of a big toe. This bone growth may cause pain and stiffness in the joint. Left untreated, arthritis can break down the cartilage and destroy the joint. Your treatment choices depend on how damaged your joint is. There are many nonsurgical treatments, but if these are not helpful, surgery may be considered.    Cheilectomy  This is done when the arthritic joint and cartilage can be saved. A bone spur caused by arthritis may be symptomatic on the top of the big toe joint. The procedure involves removing this bone spur, usually with a small part of the top of the joint itself.  You will need to wear a surgical shoe for several weeks. Once the foot heals, joint movement is restored.    Fusion  In fusion, the cartilage and some bone on both sides of the joint are removed. Then, the big toe and metatarsal bones are held together with staples, screws, or a plate and screws. Your foot may be placed in a cast. While you heal, you will be asked not to bear weight on this foot. You may also need crutches for several weeks. Because the joint has been removed, your toe will be less flexible.    Arthroplasty  During surgery, bone growth caused by the arthritis is trimmed, and part of the joint is removed. A pin can be used to align the bones and to keep them from touching. The pin is removed after several weeks. In some cases, the entire joint may be replaced with an implant. You may have to wear a splint or a surgical shoe for several weeks. When healed, the bones become connected with scar tissue.  Date Last Reviewed: 10/15/2015  © 3283-6450 [employer]. [address] [place] [address]  [address]. All rights reserved. This information is not intended as a substitute for professional medical care. Always follow your healthcare professional's instructions.

## 2023-01-25 DIAGNOSIS — E11.9 TYPE 2 DIABETES MELLITUS WITHOUT COMPLICATION: ICD-10-CM

## 2023-01-30 ENCOUNTER — PATIENT MESSAGE (OUTPATIENT)
Dept: ADMINISTRATIVE | Facility: HOSPITAL | Age: 73
End: 2023-01-30
Payer: MEDICARE

## 2023-01-31 ENCOUNTER — TELEPHONE (OUTPATIENT)
Dept: INTERNAL MEDICINE | Facility: CLINIC | Age: 73
End: 2023-01-31
Payer: MEDICARE

## 2023-01-31 NOTE — TELEPHONE ENCOUNTER
"Called and spoke to patient.    Positive SOB (10-15 min after activity starts), also when talking for extended periods.(Presently feels SOB speaking on phone); sneezing, fatigue, shallow breathing, "jittery"  20 lbs weight loss in 1 year    Denies edema to LE, CP, cough, dizziness, palpitations, weight gain, fever  Presently 134/82, P 100  Onset Sunday with activity (changing sheets on bed)  New med- solifenacin (VESICARE) on 1/17/23    Evaluation in ED recommended due to new onset SOB, age, and hx of HTN.  Pt will make arrangements to go.  "

## 2023-01-31 NOTE — TELEPHONE ENCOUNTER
Called patient regarding his message and asking to be seen sooner that March. Upon speaking with the patient and triaging the call patient stated he has been SOB and pain located in the clavicle area. I reached out to Nurse Lisbeth SMITH RN to call patient to access.- Gloria PEREZ MA

## 2023-01-31 NOTE — TELEPHONE ENCOUNTER
----- Message from Harlan Jean Baptiste sent at 1/31/2023  1:21 PM CST -----  Contact: [phone]  The patient wants to know if you can see him before 3/6/23 he feels nervous and nauseated sometimes.    Thank you

## 2023-02-01 ENCOUNTER — TELEPHONE (OUTPATIENT)
Dept: INTERNAL MEDICINE | Facility: CLINIC | Age: 73
End: 2023-02-01
Payer: MEDICARE

## 2023-02-01 NOTE — TELEPHONE ENCOUNTER
Hi, please call him -- I see he called yesterday about shortness of breath. Please ask how he is doing today. Please ask if was seen by a healthcare provider yet for his symptoms.  Let me know if patient has any questions.  Thank you, Anthony Tay
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
## 2023-02-01 NOTE — TELEPHONE ENCOUNTER
Hi, I can add him on my schedule for tomorrow at 8am, please offer him that appointment  Thank you, Anthony Tay

## 2023-02-02 ENCOUNTER — OFFICE VISIT (OUTPATIENT)
Dept: INTERNAL MEDICINE | Facility: CLINIC | Age: 73
End: 2023-02-02
Payer: MEDICARE

## 2023-02-02 ENCOUNTER — HOSPITAL ENCOUNTER (OUTPATIENT)
Dept: RADIOLOGY | Facility: HOSPITAL | Age: 73
Discharge: HOME | End: 2023-02-02
Attending: INTERNAL MEDICINE
Payer: MEDICARE

## 2023-02-02 VITALS
BODY MASS INDEX: 26.19 KG/M2 | OXYGEN SATURATION: 99 % | DIASTOLIC BLOOD PRESSURE: 64 MMHG | HEIGHT: 65 IN | RESPIRATION RATE: 18 BRPM | HEART RATE: 62 BPM | SYSTOLIC BLOOD PRESSURE: 122 MMHG | WEIGHT: 157.19 LBS

## 2023-02-02 DIAGNOSIS — F43.9 STRESS: ICD-10-CM

## 2023-02-02 DIAGNOSIS — M19.079 ARTHRITIS OF BIG TOE: ICD-10-CM

## 2023-02-02 DIAGNOSIS — R06.09 DOE (DYSPNEA ON EXERTION): Primary | ICD-10-CM

## 2023-02-02 DIAGNOSIS — R06.09 DOE (DYSPNEA ON EXERTION): ICD-10-CM

## 2023-02-02 DIAGNOSIS — E11.9 TYPE 2 DIABETES MELLITUS WITHOUT OPHTHALMIC MANIFESTATIONS: ICD-10-CM

## 2023-02-02 DIAGNOSIS — Z63.6 CAREGIVER BURDEN: ICD-10-CM

## 2023-02-02 PROCEDURE — 1160F PR REVIEW ALL MEDS BY PRESCRIBER/CLIN PHARMACIST DOCUMENTED: ICD-10-PCS | Mod: HCNC,CPTII,S$GLB, | Performed by: INTERNAL MEDICINE

## 2023-02-02 PROCEDURE — 71046 XR CHEST PA AND LATERAL: ICD-10-PCS | Mod: 26,HCNC,, | Performed by: RADIOLOGY

## 2023-02-02 PROCEDURE — 1157F PR ADVANCE CARE PLAN OR EQUIV PRESENT IN MEDICAL RECORD: ICD-10-PCS | Mod: HCNC,CPTII,S$GLB, | Performed by: INTERNAL MEDICINE

## 2023-02-02 PROCEDURE — 1160F RVW MEDS BY RX/DR IN RCRD: CPT | Mod: HCNC,CPTII,S$GLB, | Performed by: INTERNAL MEDICINE

## 2023-02-02 PROCEDURE — 71046 X-RAY EXAM CHEST 2 VIEWS: CPT | Mod: TC,HCNC

## 2023-02-02 PROCEDURE — 1159F MED LIST DOCD IN RCRD: CPT | Mod: HCNC,CPTII,S$GLB, | Performed by: INTERNAL MEDICINE

## 2023-02-02 PROCEDURE — 1101F PT FALLS ASSESS-DOCD LE1/YR: CPT | Mod: HCNC,CPTII,S$GLB, | Performed by: INTERNAL MEDICINE

## 2023-02-02 PROCEDURE — 93010 EKG 12-LEAD: ICD-10-PCS | Mod: HCNC,S$GLB,, | Performed by: INTERNAL MEDICINE

## 2023-02-02 PROCEDURE — 3072F LOW RISK FOR RETINOPATHY: CPT | Mod: HCNC,CPTII,S$GLB, | Performed by: INTERNAL MEDICINE

## 2023-02-02 PROCEDURE — 3008F BODY MASS INDEX DOCD: CPT | Mod: HCNC,CPTII,S$GLB, | Performed by: INTERNAL MEDICINE

## 2023-02-02 PROCEDURE — 1126F PR PAIN SEVERITY QUANTIFIED, NO PAIN PRESENT: ICD-10-PCS | Mod: HCNC,CPTII,S$GLB, | Performed by: INTERNAL MEDICINE

## 2023-02-02 PROCEDURE — 3072F PR LOW RISK FOR RETINOPATHY: ICD-10-PCS | Mod: HCNC,CPTII,S$GLB, | Performed by: INTERNAL MEDICINE

## 2023-02-02 PROCEDURE — 3078F PR MOST RECENT DIASTOLIC BLOOD PRESSURE < 80 MM HG: ICD-10-PCS | Mod: HCNC,CPTII,S$GLB, | Performed by: INTERNAL MEDICINE

## 2023-02-02 PROCEDURE — 93005 ELECTROCARDIOGRAM TRACING: CPT | Mod: HCNC,S$GLB,, | Performed by: INTERNAL MEDICINE

## 2023-02-02 PROCEDURE — 3074F SYST BP LT 130 MM HG: CPT | Mod: HCNC,CPTII,S$GLB, | Performed by: INTERNAL MEDICINE

## 2023-02-02 PROCEDURE — 1101F PR PT FALLS ASSESS DOC 0-1 FALLS W/OUT INJ PAST YR: ICD-10-PCS | Mod: HCNC,CPTII,S$GLB, | Performed by: INTERNAL MEDICINE

## 2023-02-02 PROCEDURE — 93005 EKG 12-LEAD: ICD-10-PCS | Mod: HCNC,S$GLB,, | Performed by: INTERNAL MEDICINE

## 2023-02-02 PROCEDURE — 3288F PR FALLS RISK ASSESSMENT DOCUMENTED: ICD-10-PCS | Mod: HCNC,CPTII,S$GLB, | Performed by: INTERNAL MEDICINE

## 2023-02-02 PROCEDURE — 3074F PR MOST RECENT SYSTOLIC BLOOD PRESSURE < 130 MM HG: ICD-10-PCS | Mod: HCNC,CPTII,S$GLB, | Performed by: INTERNAL MEDICINE

## 2023-02-02 PROCEDURE — 93010 ELECTROCARDIOGRAM REPORT: CPT | Mod: HCNC,S$GLB,, | Performed by: INTERNAL MEDICINE

## 2023-02-02 PROCEDURE — 3078F DIAST BP <80 MM HG: CPT | Mod: HCNC,CPTII,S$GLB, | Performed by: INTERNAL MEDICINE

## 2023-02-02 PROCEDURE — 1157F ADVNC CARE PLAN IN RCRD: CPT | Mod: HCNC,CPTII,S$GLB, | Performed by: INTERNAL MEDICINE

## 2023-02-02 PROCEDURE — 99999 PR PBB SHADOW E&M-EST. PATIENT-LVL V: CPT | Mod: PBBFAC,HCNC,, | Performed by: INTERNAL MEDICINE

## 2023-02-02 PROCEDURE — 71046 X-RAY EXAM CHEST 2 VIEWS: CPT | Mod: 26,HCNC,, | Performed by: RADIOLOGY

## 2023-02-02 PROCEDURE — 1126F AMNT PAIN NOTED NONE PRSNT: CPT | Mod: HCNC,CPTII,S$GLB, | Performed by: INTERNAL MEDICINE

## 2023-02-02 PROCEDURE — 3288F FALL RISK ASSESSMENT DOCD: CPT | Mod: HCNC,CPTII,S$GLB, | Performed by: INTERNAL MEDICINE

## 2023-02-02 PROCEDURE — 99214 OFFICE O/P EST MOD 30 MIN: CPT | Mod: HCNC,S$GLB,, | Performed by: INTERNAL MEDICINE

## 2023-02-02 PROCEDURE — 1159F PR MEDICATION LIST DOCUMENTED IN MEDICAL RECORD: ICD-10-PCS | Mod: HCNC,CPTII,S$GLB, | Performed by: INTERNAL MEDICINE

## 2023-02-02 PROCEDURE — 99999 PR PBB SHADOW E&M-EST. PATIENT-LVL V: ICD-10-PCS | Mod: PBBFAC,HCNC,, | Performed by: INTERNAL MEDICINE

## 2023-02-02 PROCEDURE — 99214 PR OFFICE/OUTPT VISIT, EST, LEVL IV, 30-39 MIN: ICD-10-PCS | Mod: HCNC,S$GLB,, | Performed by: INTERNAL MEDICINE

## 2023-02-02 PROCEDURE — 3008F PR BODY MASS INDEX (BMI) DOCUMENTED: ICD-10-PCS | Mod: HCNC,CPTII,S$GLB, | Performed by: INTERNAL MEDICINE

## 2023-02-02 SDOH — SOCIAL DETERMINANTS OF HEALTH (SDOH): DEPENDENT RELATIVE NEEDING CARE AT HOME: Z63.6
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
## 2023-02-07 DIAGNOSIS — Z00.00 ENCOUNTER FOR MEDICARE ANNUAL WELLNESS EXAM: ICD-10-CM

## 2023-02-09 DIAGNOSIS — Z00.00 ENCOUNTER FOR MEDICARE ANNUAL WELLNESS EXAM: ICD-10-CM

## 2023-02-14 DIAGNOSIS — D35.2 PROLACTINOMA: ICD-10-CM

## 2023-02-15 ENCOUNTER — OUTPATIENT CASE MANAGEMENT (OUTPATIENT)
Dept: ADMINISTRATIVE | Facility: OTHER | Age: 73
End: 2023-02-15
Payer: MEDICARE

## 2023-02-15 NOTE — LETTER
February 17, 2023    [place]  [address] [place] [id number]             [facility] [address] [place] [address] Dear Mr. Martin:    I am writing from the Outpatient Complex Care Management Department at [facility].  I received a referral from Dr. Tay to contact you or your caregiver regarding any needs you may have. I have attempted to contact you or your cargeiver by phone two times unsuccessfully.  Please contact the Outpatient Complex Care Management Department at [phone] if you would like to discuss your needs.      Sincerely,       Laura Major, BETITOW

## 2023-02-15 NOTE — PROGRESS NOTES
Attempt #:  1  This LCSW attempted to reach patient/caregiver to provide resource. Patient reported he was not available to complete assessment. Pt requested call back tomorrow.

## 2023-02-16 RX ORDER — CABERGOLINE 0.5 MG/1
0.25 TABLET ORAL
Qty: 8 TABLET | Refills: 5 | Status: SHIPPED | OUTPATIENT
Start: 2023-02-16 | End: 2023-12-08

## 2023-02-17 NOTE — PROGRESS NOTES
Attempt #: 2  This LCSW attempted to reach patient/caregiver to provide resource and left msg requesting a return call.  Letter with contact information was sent via portal to patient/caregiver.  Referral source notified.

## 2023-02-23 ENCOUNTER — TELEPHONE (OUTPATIENT)
Dept: PODIATRY | Facility: CLINIC | Age: 73
End: 2023-02-23
Payer: MEDICARE

## 2023-03-02 ENCOUNTER — OFFICE VISIT (OUTPATIENT)
Dept: UROLOGY | Facility: CLINIC | Age: 73
End: 2023-03-02
Payer: MEDICARE

## 2023-03-02 VITALS
HEART RATE: 66 BPM | WEIGHT: 151 LBS | SYSTOLIC BLOOD PRESSURE: 124 MMHG | BODY MASS INDEX: 25.16 KG/M2 | DIASTOLIC BLOOD PRESSURE: 85 MMHG | HEIGHT: 65 IN

## 2023-03-02 DIAGNOSIS — N40.0 BPH WITHOUT URINARY OBSTRUCTION: Primary | ICD-10-CM

## 2023-03-02 LAB
BILIRUB SERPL-MCNC: NORMAL MG/DL
BLOOD URINE, POC: NORMAL
CLARITY, POC UA: CLEAR
COLOR, POC UA: YELLOW
GLUCOSE UR QL STRIP: NORMAL
KETONES UR QL STRIP: NORMAL
LEUKOCYTE ESTERASE URINE, POC: NORMAL
NITRITE, POC UA: NORMAL
PH, POC UA: 5
POC RESIDUAL URINE VOLUME: 16 ML (ref 0–100)
PROTEIN, POC: NORMAL
SPECIFIC GRAVITY, POC UA: 1.01
UROBILINOGEN, POC UA: NORMAL

## 2023-03-02 PROCEDURE — 3044F HG A1C LEVEL LT 7.0%: CPT | Mod: HCNC,CPTII,S$GLB, | Performed by: NURSE PRACTITIONER

## 2023-03-02 PROCEDURE — 81002 URINALYSIS NONAUTO W/O SCOPE: CPT | Mod: HCNC,S$GLB,, | Performed by: NURSE PRACTITIONER

## 2023-03-02 PROCEDURE — 1157F ADVNC CARE PLAN IN RCRD: CPT | Mod: HCNC,CPTII,S$GLB, | Performed by: NURSE PRACTITIONER

## 2023-03-02 PROCEDURE — 3072F LOW RISK FOR RETINOPATHY: CPT | Mod: HCNC,CPTII,S$GLB, | Performed by: NURSE PRACTITIONER

## 2023-03-02 PROCEDURE — 51798 POCT BLADDER SCAN: ICD-10-PCS | Mod: HCNC,S$GLB,, | Performed by: NURSE PRACTITIONER

## 2023-03-02 PROCEDURE — 1160F RVW MEDS BY RX/DR IN RCRD: CPT | Mod: HCNC,CPTII,S$GLB, | Performed by: NURSE PRACTITIONER

## 2023-03-02 PROCEDURE — 3008F BODY MASS INDEX DOCD: CPT | Mod: HCNC,CPTII,S$GLB, | Performed by: NURSE PRACTITIONER

## 2023-03-02 PROCEDURE — 99214 OFFICE O/P EST MOD 30 MIN: CPT | Mod: HCNC,S$GLB,, | Performed by: NURSE PRACTITIONER

## 2023-03-02 PROCEDURE — 1157F PR ADVANCE CARE PLAN OR EQUIV PRESENT IN MEDICAL RECORD: ICD-10-PCS | Mod: HCNC,CPTII,S$GLB, | Performed by: NURSE PRACTITIONER

## 2023-03-02 PROCEDURE — 1159F PR MEDICATION LIST DOCUMENTED IN MEDICAL RECORD: ICD-10-PCS | Mod: HCNC,CPTII,S$GLB, | Performed by: NURSE PRACTITIONER

## 2023-03-02 PROCEDURE — 3008F PR BODY MASS INDEX (BMI) DOCUMENTED: ICD-10-PCS | Mod: HCNC,CPTII,S$GLB, | Performed by: NURSE PRACTITIONER

## 2023-03-02 PROCEDURE — 3079F DIAST BP 80-89 MM HG: CPT | Mod: HCNC,CPTII,S$GLB, | Performed by: NURSE PRACTITIONER

## 2023-03-02 PROCEDURE — 3288F FALL RISK ASSESSMENT DOCD: CPT | Mod: HCNC,CPTII,S$GLB, | Performed by: NURSE PRACTITIONER

## 2023-03-02 PROCEDURE — 1126F PR PAIN SEVERITY QUANTIFIED, NO PAIN PRESENT: ICD-10-PCS | Mod: HCNC,CPTII,S$GLB, | Performed by: NURSE PRACTITIONER

## 2023-03-02 PROCEDURE — 3074F PR MOST RECENT SYSTOLIC BLOOD PRESSURE < 130 MM HG: ICD-10-PCS | Mod: HCNC,CPTII,S$GLB, | Performed by: NURSE PRACTITIONER

## 2023-03-02 PROCEDURE — 3288F PR FALLS RISK ASSESSMENT DOCUMENTED: ICD-10-PCS | Mod: HCNC,CPTII,S$GLB, | Performed by: NURSE PRACTITIONER

## 2023-03-02 PROCEDURE — 51798 US URINE CAPACITY MEASURE: CPT | Mod: HCNC,S$GLB,, | Performed by: NURSE PRACTITIONER

## 2023-03-02 PROCEDURE — 3044F PR MOST RECENT HEMOGLOBIN A1C LEVEL <7.0%: ICD-10-PCS | Mod: HCNC,CPTII,S$GLB, | Performed by: NURSE PRACTITIONER

## 2023-03-02 PROCEDURE — 99999 PR PBB SHADOW E&M-EST. PATIENT-LVL III: CPT | Mod: PBBFAC,HCNC,, | Performed by: NURSE PRACTITIONER

## 2023-03-02 PROCEDURE — 1159F MED LIST DOCD IN RCRD: CPT | Mod: HCNC,CPTII,S$GLB, | Performed by: NURSE PRACTITIONER

## 2023-03-02 PROCEDURE — 3074F SYST BP LT 130 MM HG: CPT | Mod: HCNC,CPTII,S$GLB, | Performed by: NURSE PRACTITIONER

## 2023-03-02 PROCEDURE — 99999 PR PBB SHADOW E&M-EST. PATIENT-LVL III: ICD-10-PCS | Mod: PBBFAC,HCNC,, | Performed by: NURSE PRACTITIONER

## 2023-03-02 PROCEDURE — 1126F AMNT PAIN NOTED NONE PRSNT: CPT | Mod: HCNC,CPTII,S$GLB, | Performed by: NURSE PRACTITIONER

## 2023-03-02 PROCEDURE — 1101F PR PT FALLS ASSESS DOC 0-1 FALLS W/OUT INJ PAST YR: ICD-10-PCS | Mod: HCNC,CPTII,S$GLB, | Performed by: NURSE PRACTITIONER

## 2023-03-02 PROCEDURE — 81002 POCT URINE DIPSTICK WITHOUT MICROSCOPE: ICD-10-PCS | Mod: HCNC,S$GLB,, | Performed by: NURSE PRACTITIONER

## 2023-03-02 PROCEDURE — 1101F PT FALLS ASSESS-DOCD LE1/YR: CPT | Mod: HCNC,CPTII,S$GLB, | Performed by: NURSE PRACTITIONER

## 2023-03-02 PROCEDURE — 1160F PR REVIEW ALL MEDS BY PRESCRIBER/CLIN PHARMACIST DOCUMENTED: ICD-10-PCS | Mod: HCNC,CPTII,S$GLB, | Performed by: NURSE PRACTITIONER

## 2023-03-02 PROCEDURE — 3079F PR MOST RECENT DIASTOLIC BLOOD PRESSURE 80-89 MM HG: ICD-10-PCS | Mod: HCNC,CPTII,S$GLB, | Performed by: NURSE PRACTITIONER

## 2023-03-02 PROCEDURE — 3072F PR LOW RISK FOR RETINOPATHY: ICD-10-PCS | Mod: HCNC,CPTII,S$GLB, | Performed by: NURSE PRACTITIONER

## 2023-03-02 PROCEDURE — 99214 PR OFFICE/OUTPT VISIT, EST, LEVL IV, 30-39 MIN: ICD-10-PCS | Mod: HCNC,S$GLB,, | Performed by: NURSE PRACTITIONER

## 2023-03-02 RX ORDER — SOLIFENACIN SUCCINATE 10 MG/1
10 TABLET, FILM COATED ORAL DAILY
Qty: 30 TABLET | Refills: 11 | Status: SHIPPED | OUTPATIENT
Start: 2023-03-02 | End: 2023-04-27 | Stop reason: SDUPTHER
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
## 2023-04-06 ENCOUNTER — PES CALL (OUTPATIENT)
Dept: ADMINISTRATIVE | Facility: CLINIC | Age: 73
End: 2023-04-06
Payer: MEDICARE

## 2023-04-27 DIAGNOSIS — R35.0 URINARY FREQUENCY: Primary | ICD-10-CM

## 2023-04-27 RX ORDER — SOLIFENACIN SUCCINATE 10 MG/1
10 TABLET, FILM COATED ORAL DAILY
Qty: 30 TABLET | Refills: 11 | Status: SHIPPED | OUTPATIENT
Start: 2023-04-27 | End: 2024-04-26

## 2023-07-06 ENCOUNTER — TELEPHONE (OUTPATIENT)
Dept: INTERNAL MEDICINE | Facility: CLINIC | Age: 73
End: 2023-07-06
Payer: MEDICARE

## 2023-07-06 ENCOUNTER — LAB VISIT (OUTPATIENT)
Dept: LAB | Facility: HOSPITAL | Age: 73
End: 2023-07-06
Payer: MEDICARE

## 2023-07-06 ENCOUNTER — OFFICE VISIT (OUTPATIENT)
Dept: INTERNAL MEDICINE | Facility: CLINIC | Age: 73
End: 2023-07-06
Payer: MEDICARE

## 2023-07-06 VITALS
OXYGEN SATURATION: 98 % | HEIGHT: 65 IN | BODY MASS INDEX: 27.4 KG/M2 | HEART RATE: 67 BPM | SYSTOLIC BLOOD PRESSURE: 104 MMHG | WEIGHT: 164.44 LBS | DIASTOLIC BLOOD PRESSURE: 60 MMHG

## 2023-07-06 DIAGNOSIS — F43.9 STRESS AT HOME: Primary | ICD-10-CM

## 2023-07-06 DIAGNOSIS — Z63.6 CAREGIVER BURDEN: ICD-10-CM

## 2023-07-06 DIAGNOSIS — G89.29 CHRONIC FOOT PAIN, LEFT: ICD-10-CM

## 2023-07-06 DIAGNOSIS — E11.9 TYPE 2 DIABETES MELLITUS WITHOUT COMPLICATION: ICD-10-CM

## 2023-07-06 DIAGNOSIS — M79.672 CHRONIC FOOT PAIN, LEFT: ICD-10-CM

## 2023-07-06 DIAGNOSIS — E11.9 TYPE 2 DIABETES MELLITUS WITHOUT OPHTHALMIC MANIFESTATIONS: ICD-10-CM

## 2023-07-06 LAB
ALBUMIN SERPL BCP-MCNC: 3.6 G/DL (ref 3.5–5.2)
ALP SERPL-CCNC: 51 U/L (ref 55–135)
ALT SERPL W/O P-5'-P-CCNC: 29 U/L (ref 10–44)
ANION GAP SERPL CALC-SCNC: 7 MMOL/L (ref 8–16)
AST SERPL-CCNC: 24 U/L (ref 10–40)
BILIRUB SERPL-MCNC: 0.8 MG/DL (ref 0.1–1)
BUN SERPL-MCNC: 13 MG/DL (ref 8–23)
CALCIUM SERPL-MCNC: 9.3 MG/DL (ref 8.7–10.5)
CHLORIDE SERPL-SCNC: 102 MMOL/L (ref 95–110)
CHOLEST SERPL-MCNC: 147 MG/DL (ref 120–199)
CHOLEST/HDLC SERPL: 3.6 {RATIO} (ref 2–5)
CO2 SERPL-SCNC: 27 MMOL/L (ref 23–29)
CREAT SERPL-MCNC: 1 MG/DL (ref 0.5–1.4)
EST. GFR  (NO RACE VARIABLE): >60 ML/MIN/1.73 M^2
ESTIMATED AVG GLUCOSE: 117 MG/DL (ref 68–131)
GLUCOSE SERPL-MCNC: 95 MG/DL (ref 70–110)
HBA1C MFR BLD: 5.7 % (ref 4–5.6)
HDLC SERPL-MCNC: 41 MG/DL (ref 40–75)
HDLC SERPL: 27.9 % (ref 20–50)
LDLC SERPL CALC-MCNC: 92.2 MG/DL (ref 63–159)
NONHDLC SERPL-MCNC: 106 MG/DL
POTASSIUM SERPL-SCNC: 3.6 MMOL/L (ref 3.5–5.1)
PROT SERPL-MCNC: 7.2 G/DL (ref 6–8.4)
SODIUM SERPL-SCNC: 136 MMOL/L (ref 136–145)
TRIGL SERPL-MCNC: 69 MG/DL (ref 30–150)

## 2023-07-06 PROCEDURE — 1159F PR MEDICATION LIST DOCUMENTED IN MEDICAL RECORD: ICD-10-PCS | Mod: HCNC,CPTII,S$GLB, | Performed by: INTERNAL MEDICINE

## 2023-07-06 PROCEDURE — 3072F PR LOW RISK FOR RETINOPATHY: ICD-10-PCS | Mod: HCNC,CPTII,S$GLB, | Performed by: INTERNAL MEDICINE

## 2023-07-06 PROCEDURE — 3008F PR BODY MASS INDEX (BMI) DOCUMENTED: ICD-10-PCS | Mod: HCNC,CPTII,S$GLB, | Performed by: INTERNAL MEDICINE

## 2023-07-06 PROCEDURE — 80053 COMPREHEN METABOLIC PANEL: CPT | Mod: HCNC | Performed by: INTERNAL MEDICINE

## 2023-07-06 PROCEDURE — 80061 LIPID PANEL: CPT | Mod: HCNC | Performed by: INTERNAL MEDICINE

## 2023-07-06 PROCEDURE — 1125F PR PAIN SEVERITY QUANTIFIED, PAIN PRESENT: ICD-10-PCS | Mod: HCNC,CPTII,S$GLB, | Performed by: INTERNAL MEDICINE

## 2023-07-06 PROCEDURE — 3044F PR MOST RECENT HEMOGLOBIN A1C LEVEL <7.0%: ICD-10-PCS | Mod: HCNC,CPTII,S$GLB, | Performed by: INTERNAL MEDICINE

## 2023-07-06 PROCEDURE — 1157F PR ADVANCE CARE PLAN OR EQUIV PRESENT IN MEDICAL RECORD: ICD-10-PCS | Mod: HCNC,CPTII,S$GLB, | Performed by: INTERNAL MEDICINE

## 2023-07-06 PROCEDURE — 83036 HEMOGLOBIN GLYCOSYLATED A1C: CPT | Mod: HCNC | Performed by: INTERNAL MEDICINE

## 2023-07-06 PROCEDURE — 3078F PR MOST RECENT DIASTOLIC BLOOD PRESSURE < 80 MM HG: ICD-10-PCS | Mod: HCNC,CPTII,S$GLB, | Performed by: INTERNAL MEDICINE

## 2023-07-06 PROCEDURE — 3008F BODY MASS INDEX DOCD: CPT | Mod: HCNC,CPTII,S$GLB, | Performed by: INTERNAL MEDICINE

## 2023-07-06 PROCEDURE — 1157F ADVNC CARE PLAN IN RCRD: CPT | Mod: HCNC,CPTII,S$GLB, | Performed by: INTERNAL MEDICINE

## 2023-07-06 PROCEDURE — 36415 COLL VENOUS BLD VENIPUNCTURE: CPT | Mod: HCNC | Performed by: INTERNAL MEDICINE

## 2023-07-06 PROCEDURE — 99214 OFFICE O/P EST MOD 30 MIN: CPT | Mod: HCNC,S$GLB,, | Performed by: INTERNAL MEDICINE

## 2023-07-06 PROCEDURE — 3074F SYST BP LT 130 MM HG: CPT | Mod: HCNC,CPTII,S$GLB, | Performed by: INTERNAL MEDICINE

## 2023-07-06 PROCEDURE — 1159F MED LIST DOCD IN RCRD: CPT | Mod: HCNC,CPTII,S$GLB, | Performed by: INTERNAL MEDICINE

## 2023-07-06 PROCEDURE — 3072F LOW RISK FOR RETINOPATHY: CPT | Mod: HCNC,CPTII,S$GLB, | Performed by: INTERNAL MEDICINE

## 2023-07-06 PROCEDURE — 99999 PR PBB SHADOW E&M-EST. PATIENT-LVL IV: CPT | Mod: PBBFAC,HCNC,, | Performed by: INTERNAL MEDICINE

## 2023-07-06 PROCEDURE — 1125F AMNT PAIN NOTED PAIN PRSNT: CPT | Mod: HCNC,CPTII,S$GLB, | Performed by: INTERNAL MEDICINE

## 2023-07-06 PROCEDURE — 99214 PR OFFICE/OUTPT VISIT, EST, LEVL IV, 30-39 MIN: ICD-10-PCS | Mod: HCNC,S$GLB,, | Performed by: INTERNAL MEDICINE

## 2023-07-06 PROCEDURE — 3044F HG A1C LEVEL LT 7.0%: CPT | Mod: HCNC,CPTII,S$GLB, | Performed by: INTERNAL MEDICINE

## 2023-07-06 PROCEDURE — 99999 PR PBB SHADOW E&M-EST. PATIENT-LVL IV: ICD-10-PCS | Mod: PBBFAC,HCNC,, | Performed by: INTERNAL MEDICINE

## 2023-07-06 PROCEDURE — 1160F RVW MEDS BY RX/DR IN RCRD: CPT | Mod: HCNC,CPTII,S$GLB, | Performed by: INTERNAL MEDICINE

## 2023-07-06 PROCEDURE — 3078F DIAST BP <80 MM HG: CPT | Mod: HCNC,CPTII,S$GLB, | Performed by: INTERNAL MEDICINE

## 2023-07-06 PROCEDURE — 3074F PR MOST RECENT SYSTOLIC BLOOD PRESSURE < 130 MM HG: ICD-10-PCS | Mod: HCNC,CPTII,S$GLB, | Performed by: INTERNAL MEDICINE

## 2023-07-06 PROCEDURE — 1160F PR REVIEW ALL MEDS BY PRESCRIBER/CLIN PHARMACIST DOCUMENTED: ICD-10-PCS | Mod: HCNC,CPTII,S$GLB, | Performed by: INTERNAL MEDICINE

## 2023-07-06 RX ORDER — OXYCODONE AND ACETAMINOPHEN 5; 325 MG/1; MG/1
1 TABLET ORAL EVERY 12 HOURS PRN
Qty: 40 TABLET | Refills: 0 | Status: SHIPPED | OUTPATIENT
Start: 2023-07-06 | End: 2023-07-06 | Stop reason: SDUPTHER

## 2023-07-06 RX ORDER — OXYCODONE AND ACETAMINOPHEN 5; 325 MG/1; MG/1
1 TABLET ORAL EVERY 12 HOURS PRN
Qty: 40 TABLET | Refills: 0 | Status: SHIPPED | OUTPATIENT
Start: 2023-07-06 | End: 2023-09-22

## 2023-07-06 SDOH — SOCIAL DETERMINANTS OF HEALTH (SDOH): DEPENDENT RELATIVE NEEDING CARE AT HOME: Z63.6

## 2023-07-06 NOTE — TELEPHONE ENCOUNTER
----- Message from Fatmata Russell sent at 7/6/2023 11:42 AM CDT -----  Contact: [phone]  Pharmacy is out of oxyCODONE-acetaminophen (PERCOCET) 5-325 mg       Please call pt and advise on which pharmacy to send to @ # [phone]

## 2023-07-06 NOTE — PROGRESS NOTES
Subjective:       Patient ID: Isma Martin is a 73 y.o. male.    Chief Complaint: Follow-up    Patient is here for followup for chronic conditions.    Much recent stress with his mother staying with him, she has dementia    Still chronic foot pain, feels need for pain med.      Review of Systems   Constitutional:  Negative for activity change and unexpected weight change.   HENT:  Negative for hearing loss, rhinorrhea and trouble swallowing.    Eyes:  Negative for discharge and visual disturbance.   Respiratory:  Negative for chest tightness and wheezing.    Cardiovascular:  Negative for chest pain and palpitations.   Gastrointestinal:  Negative for blood in stool, constipation, diarrhea and vomiting.   Endocrine: Negative for polydipsia and polyuria.   Genitourinary:  Positive for urgency. Negative for difficulty urinating and hematuria.   Musculoskeletal:  Positive for arthralgias (R big toe and L 5th toe (which is swollen)) and back pain. Negative for joint swelling and neck pain.   Neurological:  Negative for weakness and headaches.   Psychiatric/Behavioral:  Negative for confusion and dysphoric mood.          Objective:      Physical Exam  Vitals reviewed.   Constitutional:       General: He is not in acute distress.     Appearance: Normal appearance. He is well-developed. He is not ill-appearing, toxic-appearing or diaphoretic.   HENT:      Head: Normocephalic and atraumatic.   Eyes:      General: No scleral icterus.  Neck:      Thyroid: No thyromegaly.   Cardiovascular:      Rate and Rhythm: Normal rate and regular rhythm.      Heart sounds: Normal heart sounds. No murmur heard.    No friction rub. No gallop.   Pulmonary:      Effort: Pulmonary effort is normal. No respiratory distress.      Breath sounds: Normal breath sounds. No wheezing or rales.   Abdominal:      General: Bowel sounds are normal. There is no distension.      Palpations: Abdomen is soft. There is no mass.      Tenderness: There is no  abdominal tenderness. There is no guarding or rebound.   Musculoskeletal:         General: Normal range of motion.      Cervical back: Normal range of motion.      Comments: Focal L 1st MTP jt tenderness to deep palpation and decreased rom at that jt   Lymphadenopathy:      Cervical: No cervical adenopathy.   Skin:     Findings: No lesion.   Neurological:      Mental Status: He is alert and oriented to person, place, and time.   Psychiatric:         Mood and Affect: Mood normal.         Behavior: Behavior normal.         Thought Content: Thought content normal.       Assessment:       1. Stress at home    2. Caregiver burden    3. Chronic foot pain, left    4. Type 2 diabetes mellitus without ophthalmic manifestations        Plan:       Isma was seen today for follow-up.    Diagnoses and all orders for this visit:    Stress at home  -     Ambulatory referral/consult to Outpatient Case Management  Caregiver burden    Chronic foot pain, left  -     Discontinue: oxyCODONE-acetaminophen (PERCOCET) 5-325 mg per tablet; Take 1 tablet by mouth every 12 (twelve) hours as needed for Pain.  Try to limit use, felt like tramadol was not effective    Type 2 diabetes mellitus without ophthalmic manifestations  -     Hemoglobin A1C; Future        Health Maintenance         Date Due Completion Date    Diabetes Urine Screening 08/12/2016 8/12/2015    COVID-19 Vaccine (5 - Pfizer series) 05/03/2023 1/3/2023    Foot Exam 08/23/2023 8/23/2022    Override on 8/23/2022: Done    Override on 7/13/2021: Done    Override on 7/28/2016: Done    Override on 8/10/2015: Done    Influenza Vaccine (1) 09/01/2023 1/19/2021    Eye Exam 12/08/2023 12/8/2022    Override on 8/10/2015: Done    Hemoglobin A1c 01/06/2024 7/6/2023    High Dose Statin 07/06/2024 7/6/2023    Lipid Panel 07/06/2024 7/6/2023    TETANUS VACCINE 05/03/2026 5/3/2016    Colorectal Cancer Screening 07/08/2028 7/8/2021    Override on 5/1/2008: Done            Follow up in about 6  months (around 1/6/2024).    Future Appointments   Date Time Provider Department Center   8/31/2023  9:00 AM LAB, APPOINTMENT [facility]

## 2023-07-06 NOTE — TELEPHONE ENCOUNTER
----- Message from [place] sent at 7/6/2023  2:12 PM CDT -----  Name Of Caller: Isma        Provider Name: Anthony Tay        Does patient feel the need to be seen today? no        Relationship to the Pt?: patient        Contact Preference?: [phone]        What is the nature of the call?:  Patient states that [facility] had informed him that his medication (oxyCODONE-acetaminophen (PERCOCET) 5-325 mg per tablet) is out of stock, he would like to know if the prescription can be transferred to [facility] in [place].

## 2023-07-07 ENCOUNTER — TELEPHONE (OUTPATIENT)
Dept: INTERNAL MEDICINE | Facility: CLINIC | Age: 73
End: 2023-07-07
Payer: MEDICARE

## 2023-07-07 NOTE — TELEPHONE ENCOUNTER
Hi, please offer him and his wife a 6 month followup appt with me, they did not get the followup yesterday  Thank you, Anthony Tay

## 2023-07-19 ENCOUNTER — OUTPATIENT CASE MANAGEMENT (OUTPATIENT)
Dept: ADMINISTRATIVE | Facility: OTHER | Age: 73
End: 2023-07-19
Payer: MEDICARE

## 2023-07-19 NOTE — PROGRESS NOTES
Attempt #:  1  This LMSW attempted to reach patient/caregiver to provide resource. Patient reports not being avilable. Patient ask that SW contact him on tomorrow morning 07/20/2023

## 2023-07-20 NOTE — PROGRESS NOTES
2nd attempt    This LMSW attempted to reach patient/caregiver to provide resource , however patient reports not being avilable. SW provided her contact information for a return call.     SW received a return call from patient. SW explained to patient reason for referral to assist with community resources. Patient reports current need is assistance with more in hoe care for mother. Patient reports mother requires more assistance than he and or spouse can provide. Patient denied he has any family and or friends that can assist. Patient currently working with I/P team for possible placement for his mother. Patient reports speaking with SW regarding placement. SW encouraged patient to speak with SW regarding status of possible placement. Patient voiced understanding. SW provided patient with her contact information if any additional needs arise.

## 2023-08-31 ENCOUNTER — LAB VISIT (OUTPATIENT)
Dept: LAB | Facility: HOSPITAL | Age: 73
End: 2023-08-31
Attending: NURSE PRACTITIONER
Payer: MEDICARE

## 2023-08-31 DIAGNOSIS — E11.9 TYPE 2 DIABETES MELLITUS WITHOUT OPHTHALMIC MANIFESTATIONS: ICD-10-CM

## 2023-08-31 DIAGNOSIS — N40.0 BPH WITHOUT URINARY OBSTRUCTION: ICD-10-CM

## 2023-08-31 LAB
COMPLEXED PSA SERPL-MCNC: 0.9 NG/ML (ref 0–4)
ESTIMATED AVG GLUCOSE: 123 MG/DL (ref 68–131)
HBA1C MFR BLD: 5.9 % (ref 4–5.6)

## 2023-08-31 PROCEDURE — 36415 COLL VENOUS BLD VENIPUNCTURE: CPT | Mod: HCNC | Performed by: NURSE PRACTITIONER

## 2023-08-31 PROCEDURE — 83036 HEMOGLOBIN GLYCOSYLATED A1C: CPT | Mod: HCNC | Performed by: INTERNAL MEDICINE

## 2023-08-31 PROCEDURE — 84153 ASSAY OF PSA TOTAL: CPT | Mod: HCNC | Performed by: NURSE PRACTITIONER

## 2023-09-13 DIAGNOSIS — E11.9 TYPE 2 DIABETES MELLITUS WITHOUT COMPLICATION: ICD-10-CM

## 2023-09-18 ENCOUNTER — PATIENT MESSAGE (OUTPATIENT)
Dept: ADMINISTRATIVE | Facility: HOSPITAL | Age: 73
End: 2023-09-18
Payer: MEDICARE

## 2023-09-19 ENCOUNTER — TELEPHONE (OUTPATIENT)
Dept: INTERNAL MEDICINE | Facility: CLINIC | Age: 73
End: 2023-09-19
Payer: MEDICARE

## 2023-09-19 NOTE — TELEPHONE ENCOUNTER
Hi, I can see him on Friday one of the open slots (in person) or please offer an appt with Ms Thelma Strong     Thank you, Anthony Tay

## 2023-09-19 NOTE — TELEPHONE ENCOUNTER
----- Message from Narcisa Lopes sent at 9/19/2023  2:19 PM CDT -----  Type:  Sooner Appointment Request    Caller is requesting a sooner appointment.  Caller declined first available appointment listed below.  Caller will not accept being placed on the waitlist and is requesting a message be sent to doctor.  Name of Caller: pt's wife  When is the first available appointment? Oct 17th  Symptoms: pain in legs  Would the patient rather a call back or a response via [employer]?  call  Best Call Back Number: [phone]  Additional Information:

## 2023-09-20 ENCOUNTER — TELEPHONE (OUTPATIENT)
Dept: INTERNAL MEDICINE | Facility: CLINIC | Age: 73
End: 2023-09-20
Payer: MEDICARE

## 2023-09-20 NOTE — TELEPHONE ENCOUNTER
----- Message from Lilly Cardona sent at 9/20/2023  8:37 AM CDT -----  Contact: Self/[phone]  Patient is returning a phone call.  Who left a message for the patient: Vivian  Does patient know what this is regarding:  Yes  Would you like a call back, or a response through your [facility] portal?:   call back   Comments: pt stated that he can be seen on Friday but he needs something after 11am bc his wife goes to Dialysis

## 2023-09-22 ENCOUNTER — OFFICE VISIT (OUTPATIENT)
Dept: INTERNAL MEDICINE | Facility: CLINIC | Age: 73
End: 2023-09-22
Payer: MEDICARE

## 2023-09-22 VITALS
OXYGEN SATURATION: 98 % | DIASTOLIC BLOOD PRESSURE: 58 MMHG | BODY MASS INDEX: 28.28 KG/M2 | SYSTOLIC BLOOD PRESSURE: 118 MMHG | HEART RATE: 64 BPM | RESPIRATION RATE: 18 BRPM | HEIGHT: 65 IN | WEIGHT: 169.75 LBS

## 2023-09-22 DIAGNOSIS — G89.29 CHRONIC FOOT PAIN, LEFT: ICD-10-CM

## 2023-09-22 DIAGNOSIS — M54.31 SCIATICA, RIGHT SIDE: Primary | ICD-10-CM

## 2023-09-22 DIAGNOSIS — M79.672 CHRONIC FOOT PAIN, LEFT: ICD-10-CM

## 2023-09-22 PROCEDURE — 3288F FALL RISK ASSESSMENT DOCD: CPT | Mod: HCNC,CPTII,S$GLB, | Performed by: INTERNAL MEDICINE

## 2023-09-22 PROCEDURE — 1160F RVW MEDS BY RX/DR IN RCRD: CPT | Mod: HCNC,CPTII,S$GLB, | Performed by: INTERNAL MEDICINE

## 2023-09-22 PROCEDURE — 3074F SYST BP LT 130 MM HG: CPT | Mod: HCNC,CPTII,S$GLB, | Performed by: INTERNAL MEDICINE

## 2023-09-22 PROCEDURE — 3008F PR BODY MASS INDEX (BMI) DOCUMENTED: ICD-10-PCS | Mod: HCNC,CPTII,S$GLB, | Performed by: INTERNAL MEDICINE

## 2023-09-22 PROCEDURE — 3078F DIAST BP <80 MM HG: CPT | Mod: HCNC,CPTII,S$GLB, | Performed by: INTERNAL MEDICINE

## 2023-09-22 PROCEDURE — 1157F ADVNC CARE PLAN IN RCRD: CPT | Mod: HCNC,CPTII,S$GLB, | Performed by: INTERNAL MEDICINE

## 2023-09-22 PROCEDURE — 3078F PR MOST RECENT DIASTOLIC BLOOD PRESSURE < 80 MM HG: ICD-10-PCS | Mod: HCNC,CPTII,S$GLB, | Performed by: INTERNAL MEDICINE

## 2023-09-22 PROCEDURE — 99999 PR PBB SHADOW E&M-EST. PATIENT-LVL IV: ICD-10-PCS | Mod: PBBFAC,HCNC,, | Performed by: INTERNAL MEDICINE

## 2023-09-22 PROCEDURE — 99213 PR OFFICE/OUTPT VISIT, EST, LEVL III, 20-29 MIN: ICD-10-PCS | Mod: HCNC,S$GLB,, | Performed by: INTERNAL MEDICINE

## 2023-09-22 PROCEDURE — 1125F AMNT PAIN NOTED PAIN PRSNT: CPT | Mod: HCNC,CPTII,S$GLB, | Performed by: INTERNAL MEDICINE

## 2023-09-22 PROCEDURE — 3074F PR MOST RECENT SYSTOLIC BLOOD PRESSURE < 130 MM HG: ICD-10-PCS | Mod: HCNC,CPTII,S$GLB, | Performed by: INTERNAL MEDICINE

## 2023-09-22 PROCEDURE — 1157F PR ADVANCE CARE PLAN OR EQUIV PRESENT IN MEDICAL RECORD: ICD-10-PCS | Mod: HCNC,CPTII,S$GLB, | Performed by: INTERNAL MEDICINE

## 2023-09-22 PROCEDURE — 3044F PR MOST RECENT HEMOGLOBIN A1C LEVEL <7.0%: ICD-10-PCS | Mod: HCNC,CPTII,S$GLB, | Performed by: INTERNAL MEDICINE

## 2023-09-22 PROCEDURE — 99213 OFFICE O/P EST LOW 20 MIN: CPT | Mod: HCNC,S$GLB,, | Performed by: INTERNAL MEDICINE

## 2023-09-22 PROCEDURE — 1101F PR PT FALLS ASSESS DOC 0-1 FALLS W/OUT INJ PAST YR: ICD-10-PCS | Mod: HCNC,CPTII,S$GLB, | Performed by: INTERNAL MEDICINE

## 2023-09-22 PROCEDURE — 99999 PR PBB SHADOW E&M-EST. PATIENT-LVL IV: CPT | Mod: PBBFAC,HCNC,, | Performed by: INTERNAL MEDICINE

## 2023-09-22 PROCEDURE — 1159F PR MEDICATION LIST DOCUMENTED IN MEDICAL RECORD: ICD-10-PCS | Mod: HCNC,CPTII,S$GLB, | Performed by: INTERNAL MEDICINE

## 2023-09-22 PROCEDURE — 1160F PR REVIEW ALL MEDS BY PRESCRIBER/CLIN PHARMACIST DOCUMENTED: ICD-10-PCS | Mod: HCNC,CPTII,S$GLB, | Performed by: INTERNAL MEDICINE

## 2023-09-22 PROCEDURE — 3072F LOW RISK FOR RETINOPATHY: CPT | Mod: HCNC,CPTII,S$GLB, | Performed by: INTERNAL MEDICINE

## 2023-09-22 PROCEDURE — 1101F PT FALLS ASSESS-DOCD LE1/YR: CPT | Mod: HCNC,CPTII,S$GLB, | Performed by: INTERNAL MEDICINE

## 2023-09-22 PROCEDURE — 1125F PR PAIN SEVERITY QUANTIFIED, PAIN PRESENT: ICD-10-PCS | Mod: HCNC,CPTII,S$GLB, | Performed by: INTERNAL MEDICINE

## 2023-09-22 PROCEDURE — 3044F HG A1C LEVEL LT 7.0%: CPT | Mod: HCNC,CPTII,S$GLB, | Performed by: INTERNAL MEDICINE

## 2023-09-22 PROCEDURE — 3288F PR FALLS RISK ASSESSMENT DOCUMENTED: ICD-10-PCS | Mod: HCNC,CPTII,S$GLB, | Performed by: INTERNAL MEDICINE

## 2023-09-22 PROCEDURE — 3072F PR LOW RISK FOR RETINOPATHY: ICD-10-PCS | Mod: HCNC,CPTII,S$GLB, | Performed by: INTERNAL MEDICINE

## 2023-09-22 PROCEDURE — 1159F MED LIST DOCD IN RCRD: CPT | Mod: HCNC,CPTII,S$GLB, | Performed by: INTERNAL MEDICINE

## 2023-09-22 PROCEDURE — 3008F BODY MASS INDEX DOCD: CPT | Mod: HCNC,CPTII,S$GLB, | Performed by: INTERNAL MEDICINE

## 2023-09-22 RX ORDER — OXYCODONE AND ACETAMINOPHEN 5; 325 MG/1; MG/1
1 TABLET ORAL EVERY 12 HOURS PRN
Qty: 60 TABLET | Refills: 0 | Status: SHIPPED | OUTPATIENT
Start: 2023-09-22 | End: 2023-10-30 | Stop reason: SDUPTHER

## 2023-09-22 NOTE — PROGRESS NOTES
Subjective:       Patient ID: Isma Martin is a 73 y.o. male.    Chief Complaint: Follow-up    Here for urgent care --  Has new onset R leg pains, diff to walk on the RLE. Symptoms acute came on 10 days ago. Never pain like this in past.    1.5 yrs ago had L leg radicular pains and found to have a pinched nerve on the R.    Pt denies f/c/ns/wt loss, no fecal incontinence or difficulty with retained urine, no hematuria, no dysuria, no perianal anesthesia, no focal weakness or loss of sensation in feet or legs.    No inj to back.    Review of Systems        Objective:      Physical Exam  Constitutional:       General: He is not in acute distress.     Appearance: He is well-developed. He is not diaphoretic.      Comments: Well appearing, breathing comfortably, speaking full sentences, no coughing during encounter   HENT:      Head: Normocephalic and atraumatic.   Pulmonary:      Effort: Pulmonary effort is normal. No respiratory distress.   Musculoskeletal:      Comments: Pain with SLR on the R, but pain does not shoot down entire leg (but did this morning).  Hip rom does not cause pain.  No midline spine tenderness to deep palpation    nl lower extrem strength/sense/DTRs  able to heel and tippie toe walk w/o any difficulty.     Neurological:      Mental Status: He is alert and oriented to person, place, and time.   Psychiatric:         Behavior: Behavior normal.         Thought Content: Thought content normal.         Judgment: Judgment normal.         Assessment:       1. Sciatica, right side    2. Chronic foot pain, left        Plan:       Isma was seen today for follow-up.    Diagnoses and all orders for this visit:    Sciatica, right side  -     oxyCODONE-acetaminophen (PERCOCET) 5-325 mg per tablet; Take 1 tablet by mouth every 12 (twelve) hours as needed for Pain.  Percocet has been helpful and has run out  Lengthy discussion re importance of taking at last resort  Also try heating pad and leg  stretching  Explained that if not better in 2-4 weeks, pt should rtc/call PCP then see spine clinic          No follow-ups on file.    Future Appointments   Date Time Provider Department Center   1/8/2024 10:40 AM Anthony Tay MD [facility]

## 2023-10-13 ENCOUNTER — IMMUNIZATION (OUTPATIENT)
Dept: INTERNAL MEDICINE | Facility: CLINIC | Age: 73
End: 2023-10-13
Payer: MEDICARE

## 2023-10-13 PROCEDURE — G0008 FLU VACCINE - QUADRIVALENT - ADJUVANTED: ICD-10-PCS | Mod: HCNC,S$GLB,, | Performed by: INTERNAL MEDICINE

## 2023-10-13 PROCEDURE — G0008 ADMIN INFLUENZA VIRUS VAC: HCPCS | Mod: HCNC,S$GLB,, | Performed by: INTERNAL MEDICINE

## 2023-10-13 PROCEDURE — 90694 VACC AIIV4 NO PRSRV 0.5ML IM: CPT | Mod: HCNC,S$GLB,, | Performed by: INTERNAL MEDICINE

## 2023-10-13 PROCEDURE — 90694 FLU VACCINE - QUADRIVALENT - ADJUVANTED: ICD-10-PCS | Mod: HCNC,S$GLB,, | Performed by: INTERNAL MEDICINE

## 2023-10-13 NOTE — TELEPHONE ENCOUNTER
----- Message from Magaly Rendon RN sent at 10/7/2019 10:44 AM CDT -----  10/29/19 PLEASE SCHEDULE POC/LAB                          THANKS  
Appt. 10/24 spoke with patient. Mailing appt. sheet  
4 = No assist / stand by assistance

## 2023-10-30 DIAGNOSIS — M54.31 SCIATICA, RIGHT SIDE: ICD-10-CM

## 2023-10-30 NOTE — TELEPHONE ENCOUNTER
No care due was identified.  [facility] Embedded Care Due Messages. Reference number: [id number].   10/30/2023 8:40:23 AM CDT

## 2023-10-30 NOTE — TELEPHONE ENCOUNTER
----- Message from Catherine Alves sent at 10/30/2023  8:12 AM CDT -----  Contact: Mobile [phone]  Patient just put in an order for some medications and he said that he would like for the order to go to..       [id number] - [place] - [address] [facility] [address]  [address]  [place] [address]  Phone: [phone] Fax: [phone]       Per Dr. Barnes - \"Liver enzyme elevation is stable compared to 2 months ago. Please check Hep B Antibody, LKM Antibody, SLA. Need immunization against Hep A (PCP office). Follow up other work up as planned.\" Attempted to contact patient with above results/plan. Left message for patient to contact clinic. Additional lab orders placed.

## 2023-10-30 NOTE — TELEPHONE ENCOUNTER
----- Message from Phill Lopes sent at 10/30/2023  8:05 AM CDT -----  Contact: [phone]  Requesting an RX refill or new RX.  Is this a refill or new RX: refill   RX name and strength (copy/paste from chart):  oxyCODONE-acetaminophen (PERCOCET) 5-325 mg per tablet  Is this a 30 day or 90 day RX: 30  Pharmacy name and phone # (copy/paste from chart):    [id number] - [place], [place] [facility] [address] [facility] [facility] [address] [facility] [address] [facility] [address]  [address]  [facility] [address]  Phone: [phone] Fax: [phone]

## 2023-10-31 ENCOUNTER — TELEPHONE (OUTPATIENT)
Dept: INTERNAL MEDICINE | Facility: CLINIC | Age: 73
End: 2023-10-31
Payer: MEDICARE

## 2023-10-31 NOTE — TELEPHONE ENCOUNTER
Hi, please call him -- I see that he requested a pain med refill, the percocet.  Please ask which pain is bother him at this time, where in his body.    Last time I saw him we discussed seeing a spine specialist, let me know if he wants to see a specialist.    Let me know if patient has any more questions.  Thank you, Anthony Tay

## 2023-10-31 NOTE — TELEPHONE ENCOUNTER
Reached out to patient,patient was unavailable.Voicemail was left informing patient to give a call back about his medication refill.

## 2023-10-31 NOTE — TELEPHONE ENCOUNTER
Called and spoke with pt. I asked where is his pain and he states it's still in his legs. Starting from the thigh and will go down to the calf. Pt states he is interested in seeing the spine specialist

## 2023-10-31 NOTE — TELEPHONE ENCOUNTER
----- Message from Jluis Murphy sent at 10/31/2023  2:33 PM CDT -----  Contact: [phone]  Requesting an RX refill or new RX.  Is this a refill or new RX: refill  RX name and strength (copy/paste from chart):  HYDROcodone-acetaminophen (NORCO) 7.5-325 mg per tablet  Is this a 30 day or 90 day RX:   Pharmacy name and phone # (copy/paste from chart):    [facility] #[id number] - [place], [place] - [address] [facility] [address]  [address]  [facility] [address]  Phone: [phone] Fax: [phone]      The doctors have asked that we provide their patients with the following 2 reminders -- prescription refills can take up to 72 hours, and a friendly reminder that in the future you can use your [facility] account to request refills: call back

## 2023-11-01 RX ORDER — OXYCODONE AND ACETAMINOPHEN 5; 325 MG/1; MG/1
1 TABLET ORAL EVERY 12 HOURS PRN
Qty: 60 TABLET | Refills: 0 | Status: SHIPPED | OUTPATIENT
Start: 2023-11-01 | End: 2024-01-08 | Stop reason: SDUPTHER

## 2023-11-01 NOTE — TELEPHONE ENCOUNTER
Hi, please contact the patient to assist in scheduling    Orders Placed This Encounter    Ambulatory referral/consult to Back & Spine Clinic    oxyCODONE-acetaminophen (PERCOCET) 5-325 mg per tablet       Thank you, Anthony Tay

## 2023-11-02 ENCOUNTER — TELEPHONE (OUTPATIENT)
Dept: INTERNAL MEDICINE | Facility: CLINIC | Age: 73
End: 2023-11-02
Payer: MEDICARE

## 2023-11-02 NOTE — TELEPHONE ENCOUNTER
Called and spoke with pt, informed him medication was sent. We also scheduled back and spine clinic appt

## 2023-11-09 ENCOUNTER — TELEPHONE (OUTPATIENT)
Dept: ORTHOPEDICS | Facility: CLINIC | Age: 73
End: 2023-11-09
Payer: MEDICARE

## 2023-11-09 DIAGNOSIS — M51.36 DDD (DEGENERATIVE DISC DISEASE), LUMBAR: Primary | ICD-10-CM

## 2023-11-09 NOTE — TELEPHONE ENCOUNTER
I left a voicemail for patient about his up coming appointment. I also let him know he has to get x-ray before his visit.

## 2023-11-14 ENCOUNTER — OFFICE VISIT (OUTPATIENT)
Dept: ORTHOPEDICS | Facility: CLINIC | Age: 73
End: 2023-11-14
Payer: MEDICARE

## 2023-11-14 ENCOUNTER — TELEPHONE (OUTPATIENT)
Dept: PAIN MEDICINE | Facility: CLINIC | Age: 73
End: 2023-11-14
Payer: MEDICARE

## 2023-11-14 ENCOUNTER — HOSPITAL ENCOUNTER (OUTPATIENT)
Dept: RADIOLOGY | Facility: HOSPITAL | Age: 73
Discharge: HOME | End: 2023-11-14
Attending: PHYSICIAN ASSISTANT
Payer: MEDICARE

## 2023-11-14 VITALS — BODY MASS INDEX: 28.16 KG/M2 | HEIGHT: 65 IN | WEIGHT: 169 LBS

## 2023-11-14 DIAGNOSIS — M48.062 SPINAL STENOSIS OF LUMBAR REGION WITH NEUROGENIC CLAUDICATION: Primary | ICD-10-CM

## 2023-11-14 DIAGNOSIS — M51.36 DDD (DEGENERATIVE DISC DISEASE), LUMBAR: ICD-10-CM

## 2023-11-14 DIAGNOSIS — M54.31 SCIATICA, RIGHT SIDE: ICD-10-CM

## 2023-11-14 PROCEDURE — 99999 PR PBB SHADOW E&M-EST. PATIENT-LVL IV: ICD-10-PCS | Mod: PBBFAC,HCNC,, | Performed by: PHYSICIAN ASSISTANT

## 2023-11-14 PROCEDURE — 72110 XR LUMBAR SPINE AP AND LAT WITH FLEX/EXT: ICD-10-PCS | Mod: 26,HCNC,, | Performed by: RADIOLOGY

## 2023-11-14 PROCEDURE — 3044F PR MOST RECENT HEMOGLOBIN A1C LEVEL <7.0%: ICD-10-PCS | Mod: HCNC,CPTII,S$GLB, | Performed by: PHYSICIAN ASSISTANT

## 2023-11-14 PROCEDURE — 99214 OFFICE O/P EST MOD 30 MIN: CPT | Mod: HCNC,S$GLB,, | Performed by: PHYSICIAN ASSISTANT

## 2023-11-14 PROCEDURE — 3288F FALL RISK ASSESSMENT DOCD: CPT | Mod: HCNC,CPTII,S$GLB, | Performed by: PHYSICIAN ASSISTANT

## 2023-11-14 PROCEDURE — 72110 X-RAY EXAM L-2 SPINE 4/>VWS: CPT | Mod: TC,HCNC

## 2023-11-14 PROCEDURE — 1125F PR PAIN SEVERITY QUANTIFIED, PAIN PRESENT: ICD-10-PCS | Mod: HCNC,CPTII,S$GLB, | Performed by: PHYSICIAN ASSISTANT

## 2023-11-14 PROCEDURE — 1125F AMNT PAIN NOTED PAIN PRSNT: CPT | Mod: HCNC,CPTII,S$GLB, | Performed by: PHYSICIAN ASSISTANT

## 2023-11-14 PROCEDURE — 1157F ADVNC CARE PLAN IN RCRD: CPT | Mod: HCNC,CPTII,S$GLB, | Performed by: PHYSICIAN ASSISTANT

## 2023-11-14 PROCEDURE — 1157F PR ADVANCE CARE PLAN OR EQUIV PRESENT IN MEDICAL RECORD: ICD-10-PCS | Mod: HCNC,CPTII,S$GLB, | Performed by: PHYSICIAN ASSISTANT

## 2023-11-14 PROCEDURE — 3008F BODY MASS INDEX DOCD: CPT | Mod: HCNC,CPTII,S$GLB, | Performed by: PHYSICIAN ASSISTANT

## 2023-11-14 PROCEDURE — 1159F MED LIST DOCD IN RCRD: CPT | Mod: HCNC,CPTII,S$GLB, | Performed by: PHYSICIAN ASSISTANT

## 2023-11-14 PROCEDURE — 99999 PR PBB SHADOW E&M-EST. PATIENT-LVL IV: CPT | Mod: PBBFAC,HCNC,, | Performed by: PHYSICIAN ASSISTANT

## 2023-11-14 PROCEDURE — 1101F PT FALLS ASSESS-DOCD LE1/YR: CPT | Mod: HCNC,CPTII,S$GLB, | Performed by: PHYSICIAN ASSISTANT

## 2023-11-14 PROCEDURE — 3288F PR FALLS RISK ASSESSMENT DOCUMENTED: ICD-10-PCS | Mod: HCNC,CPTII,S$GLB, | Performed by: PHYSICIAN ASSISTANT

## 2023-11-14 PROCEDURE — 3008F PR BODY MASS INDEX (BMI) DOCUMENTED: ICD-10-PCS | Mod: HCNC,CPTII,S$GLB, | Performed by: PHYSICIAN ASSISTANT

## 2023-11-14 PROCEDURE — 3044F HG A1C LEVEL LT 7.0%: CPT | Mod: HCNC,CPTII,S$GLB, | Performed by: PHYSICIAN ASSISTANT

## 2023-11-14 PROCEDURE — 1159F PR MEDICATION LIST DOCUMENTED IN MEDICAL RECORD: ICD-10-PCS | Mod: HCNC,CPTII,S$GLB, | Performed by: PHYSICIAN ASSISTANT

## 2023-11-14 PROCEDURE — 99214 PR OFFICE/OUTPT VISIT, EST, LEVL IV, 30-39 MIN: ICD-10-PCS | Mod: HCNC,S$GLB,, | Performed by: PHYSICIAN ASSISTANT

## 2023-11-14 PROCEDURE — 72110 X-RAY EXAM L-2 SPINE 4/>VWS: CPT | Mod: 26,HCNC,, | Performed by: RADIOLOGY

## 2023-11-14 PROCEDURE — 1101F PR PT FALLS ASSESS DOC 0-1 FALLS W/OUT INJ PAST YR: ICD-10-PCS | Mod: HCNC,CPTII,S$GLB, | Performed by: PHYSICIAN ASSISTANT

## 2023-11-14 PROCEDURE — 3072F PR LOW RISK FOR RETINOPATHY: ICD-10-PCS | Mod: HCNC,CPTII,S$GLB, | Performed by: PHYSICIAN ASSISTANT

## 2023-11-14 PROCEDURE — 3072F LOW RISK FOR RETINOPATHY: CPT | Mod: HCNC,CPTII,S$GLB, | Performed by: PHYSICIAN ASSISTANT

## 2023-11-15 NOTE — PROGRESS NOTES
"DATE: 11/15/2023  PATIENT: Isma Martin    Attending Physician: Ariel Lamar M.D.    HISTORY:  Isma Martin is a 73 y.o. male who returns to me today for evaluation of low back and right leg pain.  He was last seen by me 2/1/2022.  Today he is doing well but notes he has had increasing pain for the last 2 months.  There is no numbness or tingling. There is subjective weakness.  The pain is worse with standing and in the morning and improved by sitting.  He has tried oxycodone and tylenol with little relief.    The Patient denies myelopathic symptoms such as handwriting changes or difficulty with buttons/coins/keys. Denies perineal paresthesias, bowel/bladder dysfunction.    PMH/PSH/FamHx/SocHx:  Unchanged from prior visit      EXAM:  Ht 5' 5" (1.651 m)   Wt 76.7 kg (169 lb)   BMI 28.12 kg/m²     My physical examination was notable for the following findings:     Antalgic station and gait.   Dorsal lumbar skin negative for rashes, lesions, hairy patches and surgical scars. There is mild lumbar tenderness to palpation.  Lumbar range of motion is acceptable.  Global saggital and coronal spinal balance acceptable, not significant for scoliosis and kyphosis.  No pain with the range of motion of the bilateral hips. No trochanteric tenderness to palpation.  Bilateral lower extremities warm and well perfused, dorsalis pedis pulses 2+ bilaterally.  Normal strength and tone in all major motor groups in the bilateral lower extremities. Normal sensation to light touch in the L2-S1 dermatomes bilaterally.  Deep tendon reflexes symmetric 2+ in the bilateral lower extremities.  Negative Babinski bilaterally. Straight leg raise positive on the right, negative on the left.      IMAGING:    Today I personally re- reviewed AP, Lat and Flex/Ex  upright L-spine that demonstrate mild to moderate degenerative changes.    MRI lumbar spine demonstrates moderate stenosis at L2/3 and L3/4.       Body mass index is 28.12 " kg/m².    Hemoglobin A1C   Date Value Ref Range Status   08/31/2023 5.9 (H) 4.0 - 5.6 % Final     Comment:     ADA Screening Guidelines:  5.7-6.4%  Consistent with prediabetes  >or=6.5%  Consistent with diabetes    High levels of fetal hemoglobin interfere with the HbA1C  assay. Heterozygous hemoglobin variants (HbS, HgC, etc)do  not significantly interfere with this assay.   However, presence of multiple variants may affect accuracy.     07/06/2023 5.7 (H) 4.0 - 5.6 % Final     Comment:     ADA Screening Guidelines:  5.7-6.4%  Consistent with prediabetes  >or=6.5%  Consistent with diabetes    High levels of fetal hemoglobin interfere with the HbA1C  assay. Heterozygous hemoglobin variants (HbS, HgC, etc)do  not significantly interfere with this assay.   However, presence of multiple variants may affect accuracy.     02/02/2023 6.0 (H) 4.0 - 5.6 % Final     Comment:     ADA Screening Guidelines:  5.7-6.4%  Consistent with prediabetes  >or=6.5%  Consistent with diabetes    High levels of fetal hemoglobin interfere with the HbA1C  assay. Heterozygous hemoglobin variants (HbS, HgC, etc)do  not significantly interfere with this assay.   However, presence of multiple variants may affect accuracy.           ASSESSMENT/PLAN:    Isma was seen today for leg pain and low-back pain.    Diagnoses and all orders for this visit:    Spinal stenosis of lumbar region with neurogenic claudication  -     Procedure Order to Pain Management; Future    Sciatica, right side  -     Ambulatory referral/consult to Back & Spine Clinic      Today we discussed at length all of the different treatment options including anti-inflammatories, acetaminophen, rest, ice, heat, physical therapy including strengthening and stretching exercises, home exercises, ROM, aerobic conditioning, aqua therapy, other modalities including ultrasound, massage, and dry needling, epidural steroid injections and finally surgical intervention.      Orders placed for  TFESI.  Follow up 2 weeks after injection if symptoms persist.

## 2023-11-16 ENCOUNTER — TELEPHONE (OUTPATIENT)
Dept: PAIN MEDICINE | Facility: CLINIC | Age: 73
End: 2023-11-16
Payer: MEDICARE

## 2023-12-07 ENCOUNTER — OFFICE VISIT (OUTPATIENT)
Dept: INTERNAL MEDICINE | Facility: CLINIC | Age: 73
End: 2023-12-07
Payer: MEDICARE

## 2023-12-07 VITALS
BODY MASS INDEX: 29.02 KG/M2 | OXYGEN SATURATION: 98 % | WEIGHT: 174.19 LBS | HEIGHT: 65 IN | SYSTOLIC BLOOD PRESSURE: 144 MMHG | RESPIRATION RATE: 16 BRPM | DIASTOLIC BLOOD PRESSURE: 72 MMHG | HEART RATE: 60 BPM

## 2023-12-07 DIAGNOSIS — M19.072 OSTEOARTHRITIS OF FIRST METATARSOPHALANGEAL (MTP) JOINT OF LEFT FOOT: Primary | ICD-10-CM

## 2023-12-07 DIAGNOSIS — M79.605 LEG PAIN, LEFT: ICD-10-CM

## 2023-12-07 PROCEDURE — 1125F PR PAIN SEVERITY QUANTIFIED, PAIN PRESENT: ICD-10-PCS | Mod: HCNC,CPTII,S$GLB, | Performed by: STUDENT IN AN ORGANIZED HEALTH CARE EDUCATION/TRAINING PROGRAM

## 2023-12-07 PROCEDURE — 3008F BODY MASS INDEX DOCD: CPT | Mod: HCNC,CPTII,S$GLB, | Performed by: STUDENT IN AN ORGANIZED HEALTH CARE EDUCATION/TRAINING PROGRAM

## 2023-12-07 PROCEDURE — 3288F FALL RISK ASSESSMENT DOCD: CPT | Mod: HCNC,CPTII,S$GLB, | Performed by: STUDENT IN AN ORGANIZED HEALTH CARE EDUCATION/TRAINING PROGRAM

## 2023-12-07 PROCEDURE — 99213 OFFICE O/P EST LOW 20 MIN: CPT | Mod: HCNC,S$GLB,, | Performed by: STUDENT IN AN ORGANIZED HEALTH CARE EDUCATION/TRAINING PROGRAM

## 2023-12-07 PROCEDURE — 1125F AMNT PAIN NOTED PAIN PRSNT: CPT | Mod: HCNC,CPTII,S$GLB, | Performed by: STUDENT IN AN ORGANIZED HEALTH CARE EDUCATION/TRAINING PROGRAM

## 2023-12-07 PROCEDURE — 99999 PR PBB SHADOW E&M-EST. PATIENT-LVL IV: ICD-10-PCS | Mod: PBBFAC,HCNC,, | Performed by: STUDENT IN AN ORGANIZED HEALTH CARE EDUCATION/TRAINING PROGRAM

## 2023-12-07 PROCEDURE — 3044F HG A1C LEVEL LT 7.0%: CPT | Mod: HCNC,CPTII,S$GLB, | Performed by: STUDENT IN AN ORGANIZED HEALTH CARE EDUCATION/TRAINING PROGRAM

## 2023-12-07 PROCEDURE — 1157F PR ADVANCE CARE PLAN OR EQUIV PRESENT IN MEDICAL RECORD: ICD-10-PCS | Mod: HCNC,CPTII,S$GLB, | Performed by: STUDENT IN AN ORGANIZED HEALTH CARE EDUCATION/TRAINING PROGRAM

## 2023-12-07 PROCEDURE — 1159F PR MEDICATION LIST DOCUMENTED IN MEDICAL RECORD: ICD-10-PCS | Mod: HCNC,CPTII,S$GLB, | Performed by: STUDENT IN AN ORGANIZED HEALTH CARE EDUCATION/TRAINING PROGRAM

## 2023-12-07 PROCEDURE — 3077F PR MOST RECENT SYSTOLIC BLOOD PRESSURE >= 140 MM HG: ICD-10-PCS | Mod: HCNC,CPTII,S$GLB, | Performed by: STUDENT IN AN ORGANIZED HEALTH CARE EDUCATION/TRAINING PROGRAM

## 2023-12-07 PROCEDURE — 1101F PR PT FALLS ASSESS DOC 0-1 FALLS W/OUT INJ PAST YR: ICD-10-PCS | Mod: HCNC,CPTII,S$GLB, | Performed by: STUDENT IN AN ORGANIZED HEALTH CARE EDUCATION/TRAINING PROGRAM

## 2023-12-07 PROCEDURE — 3078F DIAST BP <80 MM HG: CPT | Mod: HCNC,CPTII,S$GLB, | Performed by: STUDENT IN AN ORGANIZED HEALTH CARE EDUCATION/TRAINING PROGRAM

## 2023-12-07 PROCEDURE — 1101F PT FALLS ASSESS-DOCD LE1/YR: CPT | Mod: HCNC,CPTII,S$GLB, | Performed by: STUDENT IN AN ORGANIZED HEALTH CARE EDUCATION/TRAINING PROGRAM

## 2023-12-07 PROCEDURE — 99213 PR OFFICE/OUTPT VISIT, EST, LEVL III, 20-29 MIN: ICD-10-PCS | Mod: HCNC,S$GLB,, | Performed by: STUDENT IN AN ORGANIZED HEALTH CARE EDUCATION/TRAINING PROGRAM

## 2023-12-07 PROCEDURE — 3288F PR FALLS RISK ASSESSMENT DOCUMENTED: ICD-10-PCS | Mod: HCNC,CPTII,S$GLB, | Performed by: STUDENT IN AN ORGANIZED HEALTH CARE EDUCATION/TRAINING PROGRAM

## 2023-12-07 PROCEDURE — 3008F PR BODY MASS INDEX (BMI) DOCUMENTED: ICD-10-PCS | Mod: HCNC,CPTII,S$GLB, | Performed by: STUDENT IN AN ORGANIZED HEALTH CARE EDUCATION/TRAINING PROGRAM

## 2023-12-07 PROCEDURE — 3044F PR MOST RECENT HEMOGLOBIN A1C LEVEL <7.0%: ICD-10-PCS | Mod: HCNC,CPTII,S$GLB, | Performed by: STUDENT IN AN ORGANIZED HEALTH CARE EDUCATION/TRAINING PROGRAM

## 2023-12-07 PROCEDURE — 3078F PR MOST RECENT DIASTOLIC BLOOD PRESSURE < 80 MM HG: ICD-10-PCS | Mod: HCNC,CPTII,S$GLB, | Performed by: STUDENT IN AN ORGANIZED HEALTH CARE EDUCATION/TRAINING PROGRAM

## 2023-12-07 PROCEDURE — 3072F LOW RISK FOR RETINOPATHY: CPT | Mod: HCNC,CPTII,S$GLB, | Performed by: STUDENT IN AN ORGANIZED HEALTH CARE EDUCATION/TRAINING PROGRAM

## 2023-12-07 PROCEDURE — 3077F SYST BP >= 140 MM HG: CPT | Mod: HCNC,CPTII,S$GLB, | Performed by: STUDENT IN AN ORGANIZED HEALTH CARE EDUCATION/TRAINING PROGRAM

## 2023-12-07 PROCEDURE — 1159F MED LIST DOCD IN RCRD: CPT | Mod: HCNC,CPTII,S$GLB, | Performed by: STUDENT IN AN ORGANIZED HEALTH CARE EDUCATION/TRAINING PROGRAM

## 2023-12-07 PROCEDURE — 99999 PR PBB SHADOW E&M-EST. PATIENT-LVL IV: CPT | Mod: PBBFAC,HCNC,, | Performed by: STUDENT IN AN ORGANIZED HEALTH CARE EDUCATION/TRAINING PROGRAM

## 2023-12-07 PROCEDURE — 3072F PR LOW RISK FOR RETINOPATHY: ICD-10-PCS | Mod: HCNC,CPTII,S$GLB, | Performed by: STUDENT IN AN ORGANIZED HEALTH CARE EDUCATION/TRAINING PROGRAM

## 2023-12-07 PROCEDURE — 1157F ADVNC CARE PLAN IN RCRD: CPT | Mod: HCNC,CPTII,S$GLB, | Performed by: STUDENT IN AN ORGANIZED HEALTH CARE EDUCATION/TRAINING PROGRAM

## 2023-12-07 RX ORDER — NAPROXEN 500 MG/1
500 TABLET ORAL 2 TIMES DAILY WITH MEALS
Qty: 30 TABLET | Refills: 1 | Status: SHIPPED | OUTPATIENT
Start: 2023-12-07 | End: 2024-01-06

## 2023-12-07 NOTE — ASSESSMENT & PLAN NOTE
Has OA. Bothers him from time to time. He already takes oxycodone for the right leg pain. Do not suspect gout. He is not taking nsaids. Will prescribe naproxen. Asked him to only take with meals. Asked him to not take it more than 4-5 days consecutively.

## 2023-12-07 NOTE — PROGRESS NOTES
Subjective     Patient ID: Isma Martin is a 73 y.o. male.    Chief Complaint: Leg Pain    Leg Pain       Patient is a 72 yo here for left great toe pain and swelling which started a year ago. It comes and goes. They told him it was arthritis. They even gave him a steroid injection in the past for that. He takes oxycodone for pain. Not taking nsaids. He has naproxen on his med list but he does not have it. Denies hx of gout. His uric acid level in jan 2022 was 5.1. they did an xray which is evident for OA at the 1st MTP joint.     Review of Systems   Constitutional:  Negative for chills and fever.   HENT:  Negative for rhinorrhea and sore throat.    Respiratory:  Negative for cough, chest tightness and shortness of breath.    Cardiovascular:  Negative for chest pain.   Gastrointestinal:  Negative for abdominal pain and blood in stool.   Genitourinary:  Negative for dysuria and hematuria.   Musculoskeletal:  Positive for leg pain.   Neurological:  Negative for dizziness, light-headedness and headaches.          Objective     Physical Exam  Vitals and nursing note reviewed.   Constitutional:       Appearance: Normal appearance.   Eyes:      Conjunctiva/sclera: Conjunctivae normal.   Cardiovascular:      Rate and Rhythm: Normal rate and regular rhythm.   Pulmonary:      Effort: Pulmonary effort is normal. No respiratory distress.   Musculoskeletal:      Comments: Left foot, great toe has pain upon palpation. No erythema or swelling noted.    Skin:     General: Skin is warm.   Neurological:      Mental Status: He is alert and oriented to person, place, and time.   Psychiatric:         Mood and Affect: Mood normal.         Behavior: Behavior normal.            Assessment and Plan     1. 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 Bothers him from time to time. He already takes oxycodone for the right leg pain. Do not suspect gout. He is not taking nsaids. Will  prescribe naproxen. Asked him to only take with meals. Asked him to not take it more than 4-5 days consecutively.       2. Leg pain, left  -     naproxen (NAPROSYN) 500 MG tablet; Take 1 tablet (500 mg total) by mouth 2 (two) times daily with meals.  Dispense: 30 tablet; Refill: 1

## 2023-12-08 DIAGNOSIS — D35.2 PROLACTINOMA: ICD-10-CM

## 2023-12-08 RX ORDER — CABERGOLINE 0.5 MG/1
TABLET ORAL
Qty: 8 TABLET | Refills: 3 | Status: SHIPPED | OUTPATIENT
Start: 2023-12-08

## 2024-01-08 ENCOUNTER — OFFICE VISIT (OUTPATIENT)
Dept: INTERNAL MEDICINE | Facility: CLINIC | Age: 74
End: 2024-01-08
Payer: MEDICARE

## 2024-01-08 VITALS
DIASTOLIC BLOOD PRESSURE: 78 MMHG | WEIGHT: 168.63 LBS | HEIGHT: 65 IN | HEART RATE: 65 BPM | OXYGEN SATURATION: 99 % | BODY MASS INDEX: 28.1 KG/M2 | SYSTOLIC BLOOD PRESSURE: 106 MMHG

## 2024-01-08 DIAGNOSIS — F43.21 GRIEF: Primary | ICD-10-CM

## 2024-01-08 DIAGNOSIS — D35.2 PROLACTINOMA: ICD-10-CM

## 2024-01-08 DIAGNOSIS — M54.31 SCIATICA, RIGHT SIDE: ICD-10-CM

## 2024-01-08 DIAGNOSIS — E11.9 TYPE 2 DIABETES MELLITUS WITHOUT OPHTHALMIC MANIFESTATIONS: ICD-10-CM

## 2024-01-08 DIAGNOSIS — I70.0 AORTIC ATHEROSCLEROSIS: ICD-10-CM

## 2024-01-08 PROCEDURE — 1101F PT FALLS ASSESS-DOCD LE1/YR: CPT | Mod: HCNC,CPTII,S$GLB, | Performed by: INTERNAL MEDICINE

## 2024-01-08 PROCEDURE — 1157F ADVNC CARE PLAN IN RCRD: CPT | Mod: HCNC,CPTII,S$GLB, | Performed by: INTERNAL MEDICINE

## 2024-01-08 PROCEDURE — 1160F RVW MEDS BY RX/DR IN RCRD: CPT | Mod: HCNC,CPTII,S$GLB, | Performed by: INTERNAL MEDICINE

## 2024-01-08 PROCEDURE — 3008F BODY MASS INDEX DOCD: CPT | Mod: HCNC,CPTII,S$GLB, | Performed by: INTERNAL MEDICINE

## 2024-01-08 PROCEDURE — 3074F SYST BP LT 130 MM HG: CPT | Mod: HCNC,CPTII,S$GLB, | Performed by: INTERNAL MEDICINE

## 2024-01-08 PROCEDURE — 1125F AMNT PAIN NOTED PAIN PRSNT: CPT | Mod: HCNC,CPTII,S$GLB, | Performed by: INTERNAL MEDICINE

## 2024-01-08 PROCEDURE — 99214 OFFICE O/P EST MOD 30 MIN: CPT | Mod: HCNC,S$GLB,, | Performed by: INTERNAL MEDICINE

## 2024-01-08 PROCEDURE — 3078F DIAST BP <80 MM HG: CPT | Mod: HCNC,CPTII,S$GLB, | Performed by: INTERNAL MEDICINE

## 2024-01-08 PROCEDURE — 99999 PR PBB SHADOW E&M-EST. PATIENT-LVL IV: CPT | Mod: PBBFAC,HCNC,, | Performed by: INTERNAL MEDICINE

## 2024-01-08 PROCEDURE — 3288F FALL RISK ASSESSMENT DOCD: CPT | Mod: HCNC,CPTII,S$GLB, | Performed by: INTERNAL MEDICINE

## 2024-01-08 PROCEDURE — 1159F MED LIST DOCD IN RCRD: CPT | Mod: HCNC,CPTII,S$GLB, | Performed by: INTERNAL MEDICINE

## 2024-01-08 RX ORDER — OXYCODONE AND ACETAMINOPHEN 5; 325 MG/1; MG/1
1 TABLET ORAL EVERY 12 HOURS PRN
Qty: 60 TABLET | Refills: 0 | Status: SHIPPED | OUTPATIENT
Start: 2024-01-08 | End: 2024-02-07 | Stop reason: SDUPTHER

## 2024-01-09 NOTE — PROGRESS NOTES
Subjective:       Patient ID: Isma Martin is a 73 y.o. male.    Chief Complaint: Follow-up    Patient is here for followup for chronic conditions.    Since last visit his wife passed away, which has been a very diff transition. Has supportive family/kids and friends at his [employer].      Review of Systems   Constitutional:  Negative for activity change and unexpected weight change.   HENT:  Negative for hearing loss, rhinorrhea and trouble swallowing.    Eyes:  Negative for discharge and visual disturbance.   Respiratory:  Negative for chest tightness and wheezing.    Cardiovascular:  Negative for chest pain and palpitations.   Gastrointestinal:  Negative for blood in stool, constipation, diarrhea and vomiting.   Endocrine: Negative for polydipsia and polyuria.   Genitourinary:  Positive for urgency. Negative for difficulty urinating and hematuria.   Musculoskeletal:  Positive for arthralgias (R big toe and L 5th toe (which is swollen)) and back pain. Negative for joint swelling and neck pain.   Neurological:  Negative for weakness and headaches.   Psychiatric/Behavioral:  Negative for confusion and dysphoric mood.            Objective:      Physical Exam  Constitutional:       General: He is not in acute distress.     Appearance: He is well-developed. He is not diaphoretic.      Comments: Well appearing, breathing comfortably, speaking full sentences, no coughing during encounter   HENT:      Head: Normocephalic and atraumatic.   Cardiovascular:      Rate and Rhythm: Normal rate and regular rhythm.   Pulmonary:      Effort: Pulmonary effort is normal. No respiratory distress.      Breath sounds: Normal breath sounds.   Neurological:      Mental Status: He is alert and oriented to person, place, and time.   Psychiatric:         Behavior: Behavior normal.         Thought Content: Thought content normal.         Judgment: Judgment normal.         Assessment:       1. Grief    2. Sciatica, right side    3. Type 2  diabetes mellitus without ophthalmic manifestations    4. Prolactinoma    5. Aortic atherosclerosis        Plan:       Isma was seen today for follow-up.    Diagnoses and all orders for this visit:    Grief  Offered med or SW referral he declines both for now    Sciatica, right side  -     oxyCODONE-acetaminophen (PERCOCET) 5-325 mg per tablet; Take 1 tablet by mouth every 12 (twelve) hours as needed for Pain.    Type 2 diabetes mellitus without ophthalmic manifestations  Has been well controlled  Check hemoglobin A1c next time  Lab Results   Component Value Date    HGBA1C 5.9 (H) 08/31/2023         Prolactinoma  See back endocrin in future      Aortic atherosclerosis  On statin      Health Maintenance         Date Due Completion Date    Sign Pain Contract Never done ---    Complete Opioid Risk Tool Never done ---    Diabetes Urine Screening 08/12/2016 8/12/2015    Foot Exam 08/23/2023 8/23/2022    Override on 8/23/2022: Done    Override on 7/13/2021: Done    Override on 7/28/2016: Done    Override on 8/10/2015: Done    COVID-19 Vaccine (5 - 2023-24 season) 09/01/2023 1/3/2023    Eye Exam 12/08/2023 12/8/2022    Override on 8/10/2015: Done    Hemoglobin A1c 02/29/2024 8/31/2023    Lipid Panel 07/06/2024 7/6/2023    High Dose Statin 01/08/2025 1/8/2024    TETANUS VACCINE 05/03/2026 5/3/2016    Colorectal Cancer Screening 07/08/2028 7/8/2021    Override on 5/1/2008: Done   RSV vaccine today         Follow up in about 3 months (around 4/8/2024) for RSV vaccine please.    Future Appointments   Date Time Provider Department Center   4/8/2024 11:00 AM Anthony Tay MD [facility] [place]

## 2024-02-07 DIAGNOSIS — M54.31 SCIATICA, RIGHT SIDE: ICD-10-CM

## 2024-02-07 RX ORDER — OXYCODONE AND ACETAMINOPHEN 5; 325 MG/1; MG/1
1 TABLET ORAL EVERY 12 HOURS PRN
Qty: 60 TABLET | Refills: 0 | Status: SHIPPED | OUTPATIENT
Start: 2024-02-07 | End: 2024-03-06 | Stop reason: SDUPTHER

## 2024-02-07 NOTE — TELEPHONE ENCOUNTER
No care due was identified.  [facility] Embedded Care Due Messages. Reference number: [id number].   2/07/2024 9:48:18 AM CST

## 2024-02-23 ENCOUNTER — TELEPHONE (OUTPATIENT)
Dept: INTERNAL MEDICINE | Facility: CLINIC | Age: 74
End: 2024-02-23
Payer: MEDICARE

## 2024-02-23 NOTE — TELEPHONE ENCOUNTER
Pt wants to speak to  About his wife insurance. I told him the  is out office and wont be back until Monday. He states he sent over paper work 2/5.

## 2024-02-23 NOTE — TELEPHONE ENCOUNTER
----- Message from [place] sent at 2/23/2024  1:05 PM CST -----  Contact: Isma [phone]  Patient needs call back. It's regarding his Life Insurance needing additional info from Dr Tay. Please call to advise

## 2024-02-26 ENCOUNTER — TELEPHONE (OUTPATIENT)
Dept: INTERNAL MEDICINE | Facility: CLINIC | Age: 74
End: 2024-02-26
Payer: MEDICARE

## 2024-02-26 NOTE — TELEPHONE ENCOUNTER
Pt states he's going to call back the insurance to see what's going on. Then give us a call back with an update.

## 2024-02-26 NOTE — TELEPHONE ENCOUNTER
Hi, please call back -- I completed a form for his wife's life insurance -- in January. I do not see a fax in my paper work to additionally complete.  I am not sure what other form he needs completed  Thank you, Anthony Tay

## 2024-03-06 DIAGNOSIS — M54.31 SCIATICA, RIGHT SIDE: ICD-10-CM

## 2024-03-06 RX ORDER — OXYCODONE AND ACETAMINOPHEN 5; 325 MG/1; MG/1
1 TABLET ORAL EVERY 12 HOURS PRN
Qty: 60 TABLET | Refills: 0 | Status: SHIPPED | OUTPATIENT
Start: 2024-03-06 | End: 2024-04-05 | Stop reason: SDUPTHER

## 2024-03-06 NOTE — TELEPHONE ENCOUNTER
No care due was identified.  [facility] Embedded Care Due Messages. Reference number: [id number].   3/06/2024 10:24:37 AM CST

## 2024-03-13 DIAGNOSIS — E11.9 TYPE 2 DIABETES MELLITUS WITHOUT COMPLICATION: ICD-10-CM

## 2024-03-18 ENCOUNTER — PATIENT MESSAGE (OUTPATIENT)
Dept: ADMINISTRATIVE | Facility: HOSPITAL | Age: 74
End: 2024-03-18
Payer: MEDICARE

## 2024-03-29 ENCOUNTER — HOSPITAL ENCOUNTER (EMERGENCY)
Facility: HOSPITAL | Age: 74
Discharge: HOME | End: 2024-03-29
Attending: STUDENT IN AN ORGANIZED HEALTH CARE EDUCATION/TRAINING PROGRAM
Payer: MEDICARE

## 2024-03-29 VITALS
SYSTOLIC BLOOD PRESSURE: 102 MMHG | HEIGHT: 65 IN | BODY MASS INDEX: 28.32 KG/M2 | TEMPERATURE: 98 F | RESPIRATION RATE: 16 BRPM | DIASTOLIC BLOOD PRESSURE: 60 MMHG | HEART RATE: 60 BPM | OXYGEN SATURATION: 99 % | WEIGHT: 170 LBS

## 2024-03-29 DIAGNOSIS — M51.26 LUMBAR HERNIATED DISC: ICD-10-CM

## 2024-03-29 DIAGNOSIS — G89.29 CHRONIC RIGHT-SIDED LOW BACK PAIN WITH RIGHT-SIDED SCIATICA: Primary | ICD-10-CM

## 2024-03-29 DIAGNOSIS — M54.16 LUMBAR RADICULOPATHY: ICD-10-CM

## 2024-03-29 DIAGNOSIS — M54.41 CHRONIC RIGHT-SIDED LOW BACK PAIN WITH RIGHT-SIDED SCIATICA: Primary | ICD-10-CM

## 2024-03-29 PROCEDURE — 63600175 PHARM REV CODE 636 W HCPCS: Mod: HCNC | Performed by: PHYSICIAN ASSISTANT

## 2024-03-29 PROCEDURE — 96372 THER/PROPH/DIAG INJ SC/IM: CPT | Performed by: PHYSICIAN ASSISTANT

## 2024-03-29 PROCEDURE — 99284 EMERGENCY DEPT VISIT MOD MDM: CPT | Mod: 25,HCNC

## 2024-03-29 RX ORDER — KETOROLAC TROMETHAMINE 30 MG/ML
15 INJECTION, SOLUTION INTRAMUSCULAR; INTRAVENOUS
Status: DISCONTINUED | OUTPATIENT
Start: 2024-03-29 | End: 2024-03-29

## 2024-03-29 RX ORDER — KETOROLAC TROMETHAMINE 30 MG/ML
10 INJECTION, SOLUTION INTRAMUSCULAR; INTRAVENOUS
Status: COMPLETED | OUTPATIENT
Start: 2024-03-29 | End: 2024-03-29

## 2024-03-29 RX ADMIN — KETOROLAC TROMETHAMINE 10 MG: 30 INJECTION, SOLUTION INTRAMUSCULAR; INTRAVENOUS at 01:03

## 2024-03-29 NOTE — DISCHARGE INSTRUCTIONS
You have a reassuring physical exam.  I do not feel that you have a compressed spinal cord.  You do not have any bony tenderness, recent trauma or injury to suggest fractures or dislocation of your spine.  You do not have signs or symptoms of a back infection.    You were given an injection of Toradol.  Your last kidney function was normal.    Take ibuprofen 600-800 mg every 24 hours as needed with food for anti-inflammatory relief. Avoid long term use since your are older.  You can take acetaminophen/tylenol 650 mg every 6 hours or 1000 mg every 8 hours for added relief.  Apply ice to the area for 10-20 minutes every 4 hours. You can apply heat 2 days after for the same duration and frequency.  Follow up with Pain management and Ortho.  Return to the ER for new or worsening symptoms.  Future Appointments   Date Time Provider Department Center   4/8/2024 11:00 AM Anthony Tay MD [facility] [facility]   4/29/2024  3:00 PM Thelma Morataya NP [facility] [facility]

## 2024-03-29 NOTE — ED NOTES
LOC: The patient is awake and alert; oriented x 3 and speaking appropriately.  APPEARANCE: Patient resting comfortably, patient is clean and well groomed  SKIN: warm and dry, normal skin turgor & moist mucus membranes, skin intact, no breakdown noted.  MUSCULOSKELETAL: Patient moving all extremities well, no obvious swelling or deformities noted, pain in rt hip radiating down rt leg  RESPIRATORY: Airway is open and patent, respirations are spontaneous, normal effort and rate  CARDIAC: Patient has a normal rate, no peripheral edema noted, capillary refill < 3 seconds; No complaints of chest pain   ABDOMEN: Soft and non tender to palpation, no distention noted.

## 2024-03-29 NOTE — ED PROVIDER NOTES
Encounter Date: 3/29/2024       History     Chief Complaint   Patient presents with    Leg Pain     R leg pain down to foot, hx sciatic nerve problem     12:59 PM  Patient is a 73-year-old male with a history of HTN, HLD, GERD, DM, lumbar herniated disc, lumbar radiculopathy who presents to [facility] ED with right low back and right leg pain.    Patient reports intermittent symptoms for years.  He states a few weeks ago his pain started again.  He denies any new injury, trauma, heavy lifting, or falls.  He endorses right low back pain when he moves.  Has had right lower leg pain mainly to his anterior right thigh.  It is worse when he stands up and walks for a prolonged period of time.  Feels like the pain goes up and down his legs.  He states it is hard for him to get comfortable.  Yesterday his pain was worse which prompted him to seek emergent treatment today.  He denies any fever, chills, bowel/bladder incontinence, saddle anesthesias, lower extremity paresthesias.  States that this pain is similar to his pain he had when he saw Orthopedics and at the muscle relaxers helped him.    On chart review:  Lumbar x-ray on 11/14/2023 showed:  Vertebral bodies are intact. The L2-L3 disc space is slightly narrowed bony structures are intact. No instability in flexion extension. No collapse or destruction.    Lumbar radiculopathy, ie low back pain and RLE pain, saw Ortho on 2/1/2022.    MRI on 2/10/2022 showed:  Small left lateral disc protrusion at L4-5, possibly impinging upon the exiting left L4 nerve root. Additional lumbar spondylosis, resulting in moderate spinal canal stenosis at L2-L3 and L3-L4, and moderate neural foraminal narrowing from L2-3 through L4-5, above.    L low back pain ED visit on 1217/2021.        Review of patient's allergies indicates:  No Known Allergies  Past Medical History:   Diagnosis Date    Arthritis     Bilateral dry eyes     Cataract     Colon polyp     Diabetes mellitus     GERD  (gastroesophageal reflux disease)     Hyperlipidemia     Hypertension     Hypogonadism male     Obesity     Prolactinoma     Urinary tract infection      Past Surgical History:   Procedure Laterality Date    CAROTID ENDARTERECTOMY Left     CERVICAL LAMINECTOMY WITH SPINAL FUSION N/A 10/29/2019    Procedure: LAMINECTOMY, SPINE, CERVICAL, WITH FUSION C3-6 Depuy SNS: [employer]/SSEP [place] + Pads;  Surgeon: Ariel Lamar MD;  [facility];  Service: Neurosurgery;  Laterality: N/A;    COLONOSCOPY N/A 2021    Procedure: COLONOSCOPY;  Surgeon: Veena Power MD;  Location: [facility] ENDO (4TH FLR);  Service: Endoscopy;  Laterality: N/A;  fully vaccinated 21, instructions sent to [facility]    FOOT SURGERY  16    right    TRANSPHENOIDAL PITUITARY RESECTION      pituitary tumor     Family History   Problem Relation Age of Onset    Glaucoma Mother     Heart disease Brother     Heart attack Sister     Heart disease Sister     Cancer Neg Hx     Diabetes Neg Hx     Colon polyps Neg Hx     Blindness Neg Hx     Amblyopia Neg Hx     Cataracts Neg Hx     Hypertension Neg Hx     Macular degeneration Neg Hx     Retinal detachment Neg Hx     Strabismus Neg Hx     Stroke Neg Hx     Thyroid disease Neg Hx     Prostate cancer Neg Hx     Kidney disease Neg Hx      Social History     Tobacco Use    Smoking status: Former     Current packs/day: 0.00     Average packs/day: 1.5 packs/day for 35.0 years (52.5 ttl pk-yrs)     Types: Cigarettes     Start date: 1960     Quit date: 1995     Years since quittin.2    Smokeless tobacco: Never   Substance Use Topics    Alcohol use: Yes     Alcohol/week: 0.8 standard drinks of alcohol     Types: 1 Standard drinks or equivalent per week     Comment: rare    Drug use: No     Review of Systems   Constitutional:  Negative for fever.   HENT:  Negative for sore throat.    Respiratory:  Negative for shortness of breath.    Cardiovascular:  Negative for chest  pain.   Gastrointestinal:  Negative for abdominal pain, diarrhea, nausea and vomiting.   Genitourinary:  Negative for dysuria, frequency, hematuria and penile pain.   Musculoskeletal:  Positive for arthralgias, back pain and myalgias.   Skin:  Negative for rash.   Neurological:  Negative for weakness and numbness.   Hematological:  Does not bruise/bleed easily.       Physical Exam     Initial Vitals [03/29/24 1217]   BP Pulse Resp Temp SpO2   (!) 145/68 67 16 97.8 °F (36.6 °C) 99 %      MAP       --         Physical Exam    Vitals reviewed.  Constitutional: He appears well-developed and well-nourished. He is not diaphoretic. He is cooperative.  Non-toxic appearance. He does not have a sickly appearance. He does not appear ill. No distress.   HENT:   Head: Normocephalic and atraumatic.   Nose: Nose normal.   Mouth/Throat: No trismus in the jaw.   Eyes: Conjunctivae and EOM are normal.   Neck:   Normal range of motion.  Cardiovascular:  Normal rate.           Pulses:       Dorsalis pedis pulses are 2+ on the right side and 2+ on the left side.   Pulmonary/Chest: No accessory muscle usage. No tachypnea. No respiratory distress.   Abdominal: He exhibits no distension.   Musculoskeletal:      Cervical back: Normal range of motion. No bony tenderness. Normal range of motion.      Thoracic back: No bony tenderness. Normal range of motion.      Lumbar back: No tenderness or bony tenderness. Normal range of motion. Positive right straight leg raise test. Negative left straight leg raise test.      Comments: No significant tenderness to palpation to area of pain.  Skin without erythema, wounds, rashes, lesions, edema, induration, ecchymosis.  No difficulty bearing weight or ambulating although he does report worsening pain.  Positive straight leg raise on the right.  No unilateral leg swelling, peripheral edema, or calf tenderness.  Mild varicose veins appreciated.  Soft compartments.     Neurological: He is alert. He has  normal strength.   Reflex Scores:       Patellar reflexes are 2+ on the right side and 2+ on the left side.       Achilles reflexes are 2+ on the right side and 2+ on the left side.  Skin: Skin is dry. No pallor.         ED Course   Procedures  Labs Reviewed - No data to display       Imaging Results    None          Medications   ketorolac injection 9.999 mg (9.999 mg Intramuscular Given 3/29/24 1321)     Medical Decision Making  Patient is a 73-year-old male with a history of HTN, HLD, GERD, DM, lumbar herniated disc, lumbar radiculopathy who presents to [facility] [place] with right low back and right leg pain.    Differential diagnosis includes but is not limited to DDD, DJD, radiculopathy such as sciatica, neuropathy, strain, sprain, arthritis, other inflammatory arthritis.  Patient has known history of herniated disc in the lumbar region and spinal stenosis.  He has had these symptoms before.  Saw Orthopedics in 2022.  Had an MRI that showed disc disease in his lumbar region.  He states his pain started again a few weeks ago.  He has no red flag symptoms.  Do not think this is spinal cord compression or cauda equina syndrome.  Intact reflexes.  Intact DP pulse and symmetric so I do not think this is claudications or peripheral arterial disease.  Clinically he has no signs or symptoms of a DVT.  Given history and physical exam, I think he has lumbar radiculopathy again.  His last creatinine was within normal limits.  I will give him a dose of Toradol intramuscular injection.  He can use ibuprofen, but only once a day.  Recommend acetaminophen q6hr.  He was referred to pain management last year in November but his wife was sick and actually passed away in December so he did not get his epidural injection.  I think this will benefit him if he does not feel improved after today's visit.  I provided him with pain management number for him to call and reschedule procedure.  Follow up closely with Orthopedics.  All of his  questions were answered.  Return to ED precautions were given.  Patient comfortable with plan and stable for discharge.    Amount and/or Complexity of Data Reviewed  External Data Reviewed: radiology and notes.    Risk  Prescription drug management.               ED Course as of 03/29/24 1336   Fri Mar 29, 2024   1246 BP(!): 145/68 [CL]   1246 Temp: 97.8 °F (36.6 °C) [CL]   1246 Pulse: 67 [CL]   1246 Resp: 16 [CL]   1246 SpO2: 99 % [CL]      ED Course User Index  [CL] Georgia Fagan PA-C                           Clinical Impression:  Final diagnoses:  [M54.41, G89.29] Chronic right-sided low back pain with right-sided sciatica (Primary)  [M54.16] Lumbar radiculopathy  [M51.26] Lumbar herniated disc          ED Disposition Condition    Discharge Stable          ED Prescriptions    None       Follow-up Information       Follow up With Specialties Details Why Contact Info Additional Information    Kelsey Sauer PA-C Orthopedic Surgery Schedule an appointment as soon as possible for a visit   [address] [facility] [address]  [phone]       [facility] Pain Medicine Call   [address] [facility] [phone] Upon entry from [facility], please park in the surface lots. Handicap parking is only available in the South surface lot. Proceed to the registration desk to check in.    [facility] - Emergency Dept Emergency Medicine  If symptoms worsen [facility] [facility] [id number] [phone]           Future Appointments   Date Time Provider Department Center   4/8/2024 11:00 AM Anthony Tay MD [facility] IM [facility]   4/29/2024  3:00 PM Thelma Morataya NP [facility]        Georgia Fagan PA-C  03/29/24 1336

## 2024-03-29 NOTE — ED TRIAGE NOTES
C/o rt hip pain radiating  into rt leg- dx'd a month ago w/ sciatica. Denies trauma. Chronic pain over last two yrs. Denies numbness/ tingling in legs  Denies incontinence.

## 2024-04-02 ENCOUNTER — OFFICE VISIT (OUTPATIENT)
Dept: ORTHOPEDICS | Facility: CLINIC | Age: 74
End: 2024-04-02
Payer: MEDICARE

## 2024-04-02 ENCOUNTER — TELEPHONE (OUTPATIENT)
Dept: PAIN MEDICINE | Facility: CLINIC | Age: 74
End: 2024-04-02
Payer: MEDICARE

## 2024-04-02 DIAGNOSIS — M48.062 SPINAL STENOSIS, LUMBAR REGION, WITH NEUROGENIC CLAUDICATION: Primary | ICD-10-CM

## 2024-04-02 DIAGNOSIS — M48.062 SPINAL STENOSIS OF LUMBAR REGION WITH NEUROGENIC CLAUDICATION: Primary | ICD-10-CM

## 2024-04-02 PROCEDURE — 99213 OFFICE O/P EST LOW 20 MIN: CPT | Mod: S$GLB,,, | Performed by: PHYSICIAN ASSISTANT

## 2024-04-02 PROCEDURE — 1157F ADVNC CARE PLAN IN RCRD: CPT | Mod: CPTII,S$GLB,, | Performed by: PHYSICIAN ASSISTANT

## 2024-04-02 NOTE — PROGRESS NOTES
DATE: 4/2/2024  PATIENT: Isma Martin    Attending Physician: Ariel Lamar M.D.    HISTORY:  Isma Martin is a 73 y.o. male who returns to me today for follow up.  He was last seen by me 11/14/2023.  Today he is doing well but notes when I saw him last I ordered an YUMIKO but this was never done.  His wife got sick and he wasn't able to get it scheduled.  He continues to have right leg pain.    The Patient denies myelopathic symptoms such as handwriting changes or difficulty with buttons/coins/keys. Denies perineal paresthesias, bowel/bladder dysfunction.      EXAM:  There were no vitals taken for this visit.    Physical exam stable. Neuro exam stable.      IMAGING:    Today I personally re- reviewed AP, Lat and Flex/Ex  upright L-spine that demonstrate mild to moderate degenerative changes.     MRI lumbar spine demonstrates moderate stenosis at L2/3 and L3/4.     There is no height or weight on file to calculate BMI.    Hemoglobin A1C   Date Value Ref Range Status   08/31/2023 5.9 (H) 4.0 - 5.6 % Final     Comment:     ADA Screening Guidelines:  5.7-6.4%  Consistent with prediabetes  >or=6.5%  Consistent with diabetes    High levels of fetal hemoglobin interfere with the HbA1C  assay. Heterozygous hemoglobin variants (HbS, HgC, etc)do  not significantly interfere with this assay.   However, presence of multiple variants may affect accuracy.     07/06/2023 5.7 (H) 4.0 - 5.6 % Final     Comment:     ADA Screening Guidelines:  5.7-6.4%  Consistent with prediabetes  >or=6.5%  Consistent with diabetes    High levels of fetal hemoglobin interfere with the HbA1C  assay. Heterozygous hemoglobin variants (HbS, HgC, etc)do  not significantly interfere with this assay.   However, presence of multiple variants may affect accuracy.     02/02/2023 6.0 (H) 4.0 - 5.6 % Final     Comment:     ADA Screening Guidelines:  5.7-6.4%  Consistent with prediabetes  >or=6.5%  Consistent with diabetes    High levels of fetal  hemoglobin interfere with the HbA1C  assay. Heterozygous hemoglobin variants (HbS, HgC, etc)do  not significantly interfere with this assay.   However, presence of multiple variants may affect accuracy.           ASSESSMENT/PLAN:    Diagnoses and all orders for this visit:    Spinal stenosis of lumbar region with neurogenic claudication  -     Procedure Order to Pain Management; Future        Today we discussed at length all of the different treatment options including anti-inflammatories, acetaminophen, rest, ice, heat, physical therapy including strengthening and stretching exercises, home exercises, ROM, aerobic conditioning, aqua therapy, other modalities including ultrasound, massage, and dry needling, epidural steroid injections and finally surgical intervention.      Plan for TFESI.  Follow up 2 weeks after injection if symptoms persist.

## 2024-04-02 NOTE — TELEPHONE ENCOUNTER
----- Message from Kelsey Sauer PA-C sent at 2024  7:14 AM CDT -----  Regarding: Order for FLORIDALMA DIAZ    Patient Name: FLORIDALMA DIAZ([id number])  Sex: Male  : 1950      PCP: VELMA KAYE    Center: [facility]     Types of orders made on 2024: Procedure Request    Order Date:2024  Ordering User:KELSEY SAUER [[id number]]  Encounter Provider:Kelsey Sauer PA-C [[id number]]  Auth  orizing Provider: Kelsey Sauer PA-C [[id number]]  Supervising Provider:FÉLIX KERR [[id number]]  Type of Supervision:Supervision Required  Department:[facility][[id number]]    Common Order Information  Procedure -> Transforaminal Injection (Specify level and laterality) Cmt: right             L2/3 and L3/4    Order Specific Information  Order: Procedure Order to Pain Management [Custom: [id number]]  Order #:            [id number]: 1 FUTURE    Priority: Routine  Class: Clinic Performed    Future Order Information      Expires on:2025            Expected by:2024                   Associated Diagnoses      M48.062 Spinal stenosis of lumbar region with neurogenic claudication      Facility Name: -> [facility]           Priority: Routine  Class: Clinic Performed    Future Order Information  u      Expires on:2025            Expected by:2024                   Associated Diagnoses      M48.062 Spinal stenosis of lumbar region with neurogenic claudication      Procedure -> Transforaminal Injection (Specify level and laterality) Cmt:                 right L2/3 and L3/4        Facility Name: -> [facility]

## 2024-04-05 ENCOUNTER — TELEPHONE (OUTPATIENT)
Dept: PAIN MEDICINE | Facility: CLINIC | Age: 74
End: 2024-04-05
Payer: MEDICARE

## 2024-04-05 DIAGNOSIS — M54.31 SCIATICA, RIGHT SIDE: ICD-10-CM

## 2024-04-05 RX ORDER — OXYCODONE AND ACETAMINOPHEN 5; 325 MG/1; MG/1
1 TABLET ORAL EVERY 12 HOURS PRN
Qty: 60 TABLET | Refills: 0 | Status: SHIPPED | OUTPATIENT
Start: 2024-04-05 | End: 2024-05-06 | Stop reason: SDUPTHER

## 2024-04-05 NOTE — TELEPHONE ENCOUNTER
Care Due:                  Date            Visit Type   Department     Provider  --------------------------------------------------------------------------------                                EP -                              PRIMARY      [facility] INTERNAL  Last Visit: 01-      CARE ([facility])   MEDICINE       Anthony Tay                              EP -                              PRIMARY      [facility] INTERNAL  Next Visit: 04-      CARE ([facility])   MEDICINE       Anthony Tay                                                            Last  Test          Frequency    Reason                     Performed    Due Date  --------------------------------------------------------------------------------    CMP.........  12 months..  atorvastatin,              07- 06-                             losartan-hydrochlorothiaz                             cara......................    Lipid Panel.  12 months..  atorvastatin.............  07- 06-    [facility] Embedded Care Due Messages. Reference number: [id number].   4/05/2024 9:55:39 AM CDT

## 2024-04-08 ENCOUNTER — OFFICE VISIT (OUTPATIENT)
Dept: INTERNAL MEDICINE | Facility: CLINIC | Age: 74
End: 2024-04-08
Payer: MEDICARE

## 2024-04-08 ENCOUNTER — LAB VISIT (OUTPATIENT)
Dept: LAB | Facility: HOSPITAL | Age: 74
End: 2024-04-08
Attending: INTERNAL MEDICINE
Payer: MEDICARE

## 2024-04-08 VITALS
WEIGHT: 165.81 LBS | OXYGEN SATURATION: 98 % | DIASTOLIC BLOOD PRESSURE: 66 MMHG | HEIGHT: 65 IN | SYSTOLIC BLOOD PRESSURE: 106 MMHG | HEART RATE: 67 BPM | BODY MASS INDEX: 27.63 KG/M2

## 2024-04-08 DIAGNOSIS — M54.31 SCIATICA, RIGHT SIDE: ICD-10-CM

## 2024-04-08 DIAGNOSIS — M25.50 ARTHRALGIA, UNSPECIFIED JOINT: ICD-10-CM

## 2024-04-08 DIAGNOSIS — E11.9 TYPE 2 DIABETES MELLITUS WITHOUT OPHTHALMIC MANIFESTATIONS: Primary | ICD-10-CM

## 2024-04-08 DIAGNOSIS — E11.9 TYPE 2 DIABETES MELLITUS WITHOUT OPHTHALMIC MANIFESTATIONS: ICD-10-CM

## 2024-04-08 LAB
ALBUMIN/CREAT UR: 6.5 UG/MG (ref 0–30)
AMPHET+METHAMPHET UR QL: NEGATIVE
ANION GAP SERPL CALC-SCNC: 7 MMOL/L (ref 8–16)
BARBITURATES UR QL SCN>200 NG/ML: NEGATIVE
BENZODIAZ UR QL SCN>200 NG/ML: NEGATIVE
BUN SERPL-MCNC: 21 MG/DL (ref 8–23)
BZE UR QL SCN: NEGATIVE
CALCIUM SERPL-MCNC: 9.3 MG/DL (ref 8.7–10.5)
CANNABINOIDS UR QL SCN: NEGATIVE
CHLORIDE SERPL-SCNC: 106 MMOL/L (ref 95–110)
CO2 SERPL-SCNC: 25 MMOL/L (ref 23–29)
CREAT SERPL-MCNC: 1.1 MG/DL (ref 0.5–1.4)
CREAT UR-MCNC: 92 MG/DL (ref 23–375)
CREAT UR-MCNC: 92 MG/DL (ref 23–375)
EST. GFR  (NO RACE VARIABLE): >60 ML/MIN/1.73 M^2
ESTIMATED AVG GLUCOSE: 120 MG/DL (ref 68–131)
ETHANOL UR-MCNC: <10 MG/DL
GLUCOSE SERPL-MCNC: 127 MG/DL (ref 70–110)
HBA1C MFR BLD: 5.8 % (ref 4–5.6)
METHADONE UR QL SCN>300 NG/ML: NEGATIVE
MICROALBUMIN UR DL<=1MG/L-MCNC: 6 UG/ML
OPIATES UR QL SCN: NEGATIVE
PCP UR QL SCN>25 NG/ML: NEGATIVE
POTASSIUM SERPL-SCNC: 4.1 MMOL/L (ref 3.5–5.1)
SODIUM SERPL-SCNC: 138 MMOL/L (ref 136–145)
TOXICOLOGY INFORMATION: NORMAL

## 2024-04-08 PROCEDURE — 82043 UR ALBUMIN QUANTITATIVE: CPT | Performed by: INTERNAL MEDICINE

## 2024-04-08 PROCEDURE — 80326 AMPHETAMINES 5 OR MORE: CPT | Performed by: INTERNAL MEDICINE

## 2024-04-08 PROCEDURE — 99999 PR PBB SHADOW E&M-EST. PATIENT-LVL V: CPT | Mod: PBBFAC,,, | Performed by: INTERNAL MEDICINE

## 2024-04-08 PROCEDURE — 3288F FALL RISK ASSESSMENT DOCD: CPT | Mod: CPTII,S$GLB,, | Performed by: INTERNAL MEDICINE

## 2024-04-08 PROCEDURE — 80355 GABAPENTIN NON-BLOOD: CPT | Performed by: INTERNAL MEDICINE

## 2024-04-08 PROCEDURE — 80048 BASIC METABOLIC PNL TOTAL CA: CPT | Performed by: INTERNAL MEDICINE

## 2024-04-08 PROCEDURE — 83036 HEMOGLOBIN GLYCOSYLATED A1C: CPT | Performed by: INTERNAL MEDICINE

## 2024-04-08 PROCEDURE — 1157F ADVNC CARE PLAN IN RCRD: CPT | Mod: CPTII,S$GLB,, | Performed by: INTERNAL MEDICINE

## 2024-04-08 PROCEDURE — 80307 DRUG TEST PRSMV CHEM ANLYZR: CPT | Performed by: INTERNAL MEDICINE

## 2024-04-08 PROCEDURE — 3078F DIAST BP <80 MM HG: CPT | Mod: CPTII,S$GLB,, | Performed by: INTERNAL MEDICINE

## 2024-04-08 PROCEDURE — 3008F BODY MASS INDEX DOCD: CPT | Mod: CPTII,S$GLB,, | Performed by: INTERNAL MEDICINE

## 2024-04-08 PROCEDURE — 1159F MED LIST DOCD IN RCRD: CPT | Mod: CPTII,S$GLB,, | Performed by: INTERNAL MEDICINE

## 2024-04-08 PROCEDURE — 1160F RVW MEDS BY RX/DR IN RCRD: CPT | Mod: CPTII,S$GLB,, | Performed by: INTERNAL MEDICINE

## 2024-04-08 PROCEDURE — 1125F AMNT PAIN NOTED PAIN PRSNT: CPT | Mod: CPTII,S$GLB,, | Performed by: INTERNAL MEDICINE

## 2024-04-08 PROCEDURE — 1101F PT FALLS ASSESS-DOCD LE1/YR: CPT | Mod: CPTII,S$GLB,, | Performed by: INTERNAL MEDICINE

## 2024-04-08 PROCEDURE — 36415 COLL VENOUS BLD VENIPUNCTURE: CPT | Performed by: INTERNAL MEDICINE

## 2024-04-08 PROCEDURE — 99214 OFFICE O/P EST MOD 30 MIN: CPT | Mod: S$GLB,,, | Performed by: INTERNAL MEDICINE

## 2024-04-08 PROCEDURE — 3074F SYST BP LT 130 MM HG: CPT | Mod: CPTII,S$GLB,, | Performed by: INTERNAL MEDICINE

## 2024-04-08 NOTE — PROGRESS NOTES
Subjective:       Patient ID: Isma Martin is a 73 y.o. male.    Chief Complaint: Follow-up    Patient is here for followup for chronic conditions.    Sciatic nerve pain has been worsening. Recent ER visit and muscle relaxer did not help. Percocet has been helpful.    Has LESI scheduled for 4/12. This will be his first LESI.    No other new health issues.    Continue stress since wife passed away.        Review of Systems   Constitutional:  Negative for activity change and unexpected weight change.   HENT:  Negative for hearing loss, rhinorrhea and trouble swallowing.    Eyes:  Negative for discharge and visual disturbance.   Respiratory:  Negative for chest tightness and wheezing.    Cardiovascular:  Negative for chest pain and palpitations.   Gastrointestinal:  Negative for blood in stool, constipation, diarrhea and vomiting.   Endocrine: Negative for polydipsia and polyuria.   Genitourinary:  Positive for urgency. Negative for difficulty urinating and hematuria.   Musculoskeletal:  Positive for arthralgias (R big toe and L 5th toe (which is swollen)) and back pain (R side and R leg/thigh). Negative for joint swelling and neck pain.   Neurological:  Negative for weakness and headaches.   Psychiatric/Behavioral:  Negative for confusion and dysphoric mood.            Objective:      Physical Exam  Vitals reviewed.   Constitutional:       General: He is not in acute distress.     Appearance: Normal appearance. He is well-developed. He is not ill-appearing, toxic-appearing or diaphoretic.      Comments: Well appearing, breathing comfortably, speaking full sentences, no coughing during encounter   HENT:      Head: Normocephalic and atraumatic.   Eyes:      General: No scleral icterus.  Neck:      Thyroid: No thyromegaly.   Cardiovascular:      Rate and Rhythm: Normal rate and regular rhythm.      Heart sounds: Normal heart sounds. No murmur heard.     No friction rub. No gallop.   Pulmonary:      Effort:  Pulmonary effort is normal. No respiratory distress.      Breath sounds: Normal breath sounds. No wheezing or rales.   Abdominal:      General: Bowel sounds are normal. There is no distension.      Palpations: Abdomen is soft. There is no mass.      Tenderness: There is no abdominal tenderness. There is no guarding or rebound.   Musculoskeletal:         General: Normal range of motion.      Cervical back: Normal range of motion.      Comments: Pain with SLR on the R, but pain does not shoot down entire leg.  Hip rom does not cause pain.  No midline spine tenderness to deep palpation   Lymphadenopathy:      Cervical: No cervical adenopathy.   Skin:     Findings: No lesion.   Neurological:      Mental Status: He is alert and oriented to person, place, and time.   Psychiatric:         Mood and Affect: Mood normal.         Behavior: Behavior normal.         Thought Content: Thought content normal.         Judgment: Judgment normal.         Assessment:       1. Type 2 diabetes mellitus without ophthalmic manifestations    2. Sciatica, right side    3. Arthralgia, unspecified joint        Plan:       Isma was seen today for follow-up.    Diagnoses and all orders for this visit:    Type 2 diabetes mellitus without ophthalmic manifestations  -     Hemoglobin A1C; Future  -     BASIC METABOLIC PANEL; Future  -     Ambulatory referral/consult to Optometry; Future  -     Microalbumin/Creatinine Ratio, Urine    Sciatica, right side  -     Toxicology Screen, Urine  -     Pain Clinic Drug Screen  -     BASIC METABOLIC PANEL; Future  I recommended he have the LESI to see whether it is helpful both diagnostically and therapeutically    Arthralgia, unspecified joint  -     Toxicology Screen, Urine        Health Maintenance         Date Due Completion Date    Sign Pain Contract Never done ---    Complete Opioid Risk Tool Never done ---    Diabetes Urine Screening 08/12/2016 8/12/2015    Foot Exam 08/23/2023 8/23/2022    Override  on 8/23/2022: Done    Override on 7/13/2021: Done    Override on 7/28/2016: Done    Override on 8/10/2015: Done    COVID-19 Vaccine (5 - 2023-24 season) 09/01/2023 1/3/2023    Eye Exam 12/08/2023 12/8/2022    Override on 8/10/2015: Done    Hemoglobin A1c 02/29/2024 8/31/2023    Lipid Panel 07/06/2024 7/6/2023    High Dose Statin 04/08/2025 4/8/2024    TETANUS VACCINE 05/03/2026 5/3/2016    Colorectal Cancer Screening 07/08/2028 7/8/2021    Override on 5/1/2008: Done            Follow up in about 6 months (around 10/8/2024).    Future Appointments   Date Time Provider Department Center   4/29/2024  3:00 PM Thelma Morataya NP [facility]   8/8/2024  3:20 PM Yari Storey OD [facility]   10/8/2024 11:20 AM Anthony Tay MD [facility]

## 2024-04-10 NOTE — PRE-PROCEDURE INSTRUCTIONS
Patient reviewed on 4/10/2024.  Okay to proceed at [place]. The following pre-procedure instructions and arrival time have been reviewed with patient via phone and sent to patient portal for review.  Patient verbalized an understanding.  Pt to be accompanied by Dona day of procedure as responsible .    Dear Isma ,     You are scheduled for a pain procedure on 4/12/24 with Dr. Zhou, please report to [facility]   [address].     Please park in the lot in front of the building and enter at the main entrance. Proceed to the second floor for registration.     Arrival time: 07:00 am     Anesthesia fasting instructions:   IV sedation. You should not eat for 8 hours and can only drink clear liquids (water or black coffee without cream/sugar) up until 2 hours before your scheduled time.  You CANNOT drive yourself and must have a .     *Refrain from drinking any alcohol  *Please insure that you have pre-planned your ride home IF YOU ARE to have sedation of any kind You CANNOT drive yourself home.    *You may not leave alone in an uber, taxi, etc. IF YOU HAVE received sedating medications by mouth or by vein  *You must be discharged to a responsible adult.   *Please remain under adult supervision for 24 hours if you had any form of sedation.      If possible, please have your visitor &/or ride home stay during your visit.   The surgeon should speak with your visitors after your procedure.  All visitors must be 18 years of age or older. Please limit your visitors to max of 2 people.        You should take any medications that you routinely take for blood pressure, heart medications, thyroid, cholesterol, etc with small sips of water.   If you are a diabetic, do not take your medication if you will be fasting, but bring it with you.   Please plan on being here for roughly 2 hours.   HOLD vitamins and supplements the morning of your procedure.  HOLD any Anticoagulants (ex. Aspirin,  goody or BC powder, Coumadin, Eliquis, Heparin, Plavix, Lovenox, Xarelto, etc.) for a total of 5 days  HOLD any non-insulin injections until after procedure (Ozempic, Mounjaro, Trulicity, Victoza, Byetta, Wegovy, Adlyxin, etc) (you should have been instructed to hold for a 7 day period)     Shower the night before and the morning of your procedure with antibacterial soap (ex. Dial)   Please do not use antibacterial soap to wash your face. Use your regular face soap.  Do not apply any products to your skin nor your hair after you shower the morning of your procedure.         Products include: lotions, oils, ointments, creams, gels, powders, lotion, deodorant, make-up, perfume/cologne, after shave and sunscreen.        No contacts. Bring glasses if necessary.  If you have dentures, please bring a case.  Please leave all jewelry and valuables at home.  Wear loose comfortable clothes (preferably an button up shirt), All patients will need to be placed in a gown for procedure.     ---If you start to feel sick (fever, chills, coughing, sore throat, etc) or start on or have been taking any antibiotics, please contact your doctor's office at [phone] your procedure would have to be rescheduled.      Please reply to this message as receipt of delivery.     Thanks,  Catina, LPN [facility]  Pre-Admit Testing

## 2024-04-11 ENCOUNTER — TELEPHONE (OUTPATIENT)
Dept: SPINE | Facility: CLINIC | Age: 74
End: 2024-04-11
Payer: MEDICARE

## 2024-04-11 NOTE — TELEPHONE ENCOUNTER
----- Message from Te Cheatham sent at 4/11/2024  1:08 PM CDT -----  Name of Who is Calling:FLORIDALMA DIAZ [[id number]]                   What is the request in detail: PT wants to know what time to be there for his surgery and what he has to do to prepare  for it                    Can the clinic reply by [facility]: No                   What Number to Call Back if not in [facility]: [phone]

## 2024-04-12 ENCOUNTER — HOSPITAL ENCOUNTER (OUTPATIENT)
Facility: HOSPITAL | Age: 74
Discharge: HOME | End: 2024-04-12
Attending: STUDENT IN AN ORGANIZED HEALTH CARE EDUCATION/TRAINING PROGRAM | Admitting: STUDENT IN AN ORGANIZED HEALTH CARE EDUCATION/TRAINING PROGRAM
Payer: MEDICARE

## 2024-04-12 VITALS
DIASTOLIC BLOOD PRESSURE: 64 MMHG | WEIGHT: 165 LBS | HEIGHT: 65 IN | TEMPERATURE: 98 F | SYSTOLIC BLOOD PRESSURE: 120 MMHG | OXYGEN SATURATION: 98 % | HEART RATE: 55 BPM | BODY MASS INDEX: 27.49 KG/M2 | RESPIRATION RATE: 16 BRPM

## 2024-04-12 DIAGNOSIS — M54.16 LUMBAR RADICULOPATHY: Primary | ICD-10-CM

## 2024-04-12 DIAGNOSIS — G89.29 CHRONIC PAIN: ICD-10-CM

## 2024-04-12 PROCEDURE — 64484 NJX AA&/STRD TFRM EPI L/S EA: CPT | Mod: RT | Performed by: STUDENT IN AN ORGANIZED HEALTH CARE EDUCATION/TRAINING PROGRAM

## 2024-04-12 PROCEDURE — 64483 NJX AA&/STRD TFRM EPI L/S 1: CPT | Mod: RT,,, | Performed by: STUDENT IN AN ORGANIZED HEALTH CARE EDUCATION/TRAINING PROGRAM

## 2024-04-12 PROCEDURE — 63600175 PHARM REV CODE 636 W HCPCS: Performed by: STUDENT IN AN ORGANIZED HEALTH CARE EDUCATION/TRAINING PROGRAM

## 2024-04-12 PROCEDURE — 64483 NJX AA&/STRD TFRM EPI L/S 1: CPT | Mod: RT | Performed by: STUDENT IN AN ORGANIZED HEALTH CARE EDUCATION/TRAINING PROGRAM

## 2024-04-12 PROCEDURE — 64484 NJX AA&/STRD TFRM EPI L/S EA: CPT | Mod: RT,,, | Performed by: STUDENT IN AN ORGANIZED HEALTH CARE EDUCATION/TRAINING PROGRAM

## 2024-04-12 PROCEDURE — 25500020 PHARM REV CODE 255: Performed by: STUDENT IN AN ORGANIZED HEALTH CARE EDUCATION/TRAINING PROGRAM

## 2024-04-12 PROCEDURE — 25000003 PHARM REV CODE 250: Performed by: STUDENT IN AN ORGANIZED HEALTH CARE EDUCATION/TRAINING PROGRAM

## 2024-04-12 RX ORDER — DEXAMETHASONE SODIUM PHOSPHATE 10 MG/ML
INJECTION INTRAMUSCULAR; INTRAVENOUS
Status: DISCONTINUED | OUTPATIENT
Start: 2024-04-12 | End: 2024-04-12 | Stop reason: HOSPADM

## 2024-04-12 RX ORDER — SODIUM CHLORIDE 9 MG/ML
500 INJECTION, SOLUTION INTRAVENOUS CONTINUOUS
Status: DISCONTINUED | OUTPATIENT
Start: 2024-04-12 | End: 2024-04-12 | Stop reason: HOSPADM

## 2024-04-12 RX ORDER — LIDOCAINE HYDROCHLORIDE 10 MG/ML
INJECTION, SOLUTION EPIDURAL; INFILTRATION; INTRACAUDAL; PERINEURAL
Status: DISCONTINUED | OUTPATIENT
Start: 2024-04-12 | End: 2024-04-12 | Stop reason: HOSPADM

## 2024-04-12 RX ORDER — MIDAZOLAM HYDROCHLORIDE 1 MG/ML
INJECTION INTRAMUSCULAR; INTRAVENOUS
Status: DISCONTINUED | OUTPATIENT
Start: 2024-04-12 | End: 2024-04-12 | Stop reason: HOSPADM

## 2024-04-12 RX ORDER — LIDOCAINE HYDROCHLORIDE 20 MG/ML
INJECTION, SOLUTION EPIDURAL; INFILTRATION; INTRACAUDAL; PERINEURAL
Status: DISCONTINUED | OUTPATIENT
Start: 2024-04-12 | End: 2024-04-12 | Stop reason: HOSPADM

## 2024-04-12 RX ORDER — FENTANYL CITRATE 50 UG/ML
INJECTION, SOLUTION INTRAMUSCULAR; INTRAVENOUS
Status: DISCONTINUED | OUTPATIENT
Start: 2024-04-12 | End: 2024-04-12 | Stop reason: HOSPADM

## 2024-04-12 RX ORDER — LIDOCAINE HYDROCHLORIDE 10 MG/ML
1 INJECTION, SOLUTION EPIDURAL; INFILTRATION; INTRACAUDAL; PERINEURAL ONCE
Status: DISCONTINUED | OUTPATIENT
Start: 2024-04-12 | End: 2024-04-12 | Stop reason: HOSPADM

## 2024-04-12 NOTE — DISCHARGE INSTRUCTIONS
Home Care Instructions Pain Management:     1.  DIET:     You may resume your normal diet today.     2.  BATHING:     You may shower with luke warm water.     3.  DRESSING:     You may remove your bandage today.     4.  ACTIVITY LEVEL:       You may resume your normal activities 24 hours after your procedure.     5.  MEDICATIONS:     You may resume your normal medications today.     6.  SPECIAL INSTRUCTIONS:     No heat to the injection site for 24 hours including bath or shower, heating pad, moist heat or hot tubs.     Use an ice pack to the injection site for any pain or discomfort.  Apply ice packs for 20 minute intervals as needed.     If you have received any sedatives by mouth today, you can not drive for 12 hours.     If you have received sedation through an IV, you can not drive for 24 hours.     PLEASE CALL YOUR DOCTOR FOR THE FOLLOWIN.  Redness or swelling around the injection site.  2.  Fever of 101 degrees.  3.  Drainage (pus) from the injection site.  4.  For any continuous bleeding (some dried blood over the incision is normal.)     FOR EMERGENCIES:     If any unusual problems or difficulties occur during clinic hours, call [phone] or dial [phone].     Follow up with with your physician in 2-3 weeks.

## 2024-04-12 NOTE — PLAN OF CARE
Chart reviewed. Preop nursing care completed per orders. Safe surgery checklist complete. Pt denies any open wounds cuts or sores. Pt denies any metal in body. Belongings behind stretcher. Waiting for H&P; surgical consent prior to surgery. Pt AAOX4, VSS on room air. Pt toileted, Bed locked in lowest position, Call light within reach. Pt denies any needs at this time.

## 2024-04-12 NOTE — INTERVAL H&P NOTE
The patient was examined and no significant changes were noted from the updated H&P or last clinic note.    The risks and benefits of this procedure, including alternative therapies, were discussed with the patient.  The discussion of risks included infection, bleeding, need for additional procedures or surgery, nerve damage, paralysis, adverse medication reaction(s), stroke, and if appropriate for the procedure, death.  Questions regarding the procedure, risks, expected outcome, and possible side effects were solicited and answered to Isma's satisfaction.  Isma Martin wishes to proceed with the injection or procedure as confirmed by written consent.

## 2024-04-13 LAB
6MAM UR QL: NOT DETECTED
7AMINOCLONAZEPAM UR QL: NOT DETECTED
A-OH ALPRAZ UR QL: NOT DETECTED
ALPHA-OH-MIDAZOLAM: NOT DETECTED
ALPRAZ UR QL: NOT DETECTED
AMPHET UR QL SCN: NOT DETECTED
ANNOTATION COMMENT IMP: NORMAL
BARBITURATES UR QL: NEGATIVE
BUPRENORPHINE UR QL: NOT DETECTED
BZE UR QL: NEGATIVE
CARBOXYTHC UR QL: NEGATIVE
CARISOPRODOL UR QL: NEGATIVE
CLONAZEPAM UR QL: NOT DETECTED
CODEINE UR QL: NOT DETECTED
CREAT UR-MCNC: 86.7 MG/DL (ref 20–400)
DIAZEPAM UR QL: NOT DETECTED
ETHYL GLUCURONIDE UR QL: NEGATIVE
FENTANYL UR QL: NOT DETECTED
GABAPENTIN: NOT DETECTED
HYDROCODONE UR QL: NOT DETECTED
HYDROMORPHONE UR QL: NOT DETECTED
LORAZEPAM UR QL: NOT DETECTED
MDA UR QL: NOT DETECTED
MDEA UR QL: NOT DETECTED
MDMA UR QL: NOT DETECTED
ME-PHENIDATE UR QL: NOT DETECTED
METHADONE UR QL: NEGATIVE
METHAMPHET UR QL: NOT DETECTED
MIDAZOLAM UR QL SCN: NOT DETECTED
MORPHINE UR QL: NOT DETECTED
NALOXONE: NOT DETECTED
NORBUPRENORPHINE UR QL CFM: NOT DETECTED
NORDIAZEPAM UR QL: NOT DETECTED
NORFENTANYL UR QL: NOT DETECTED
NORHYDROCODONE UR QL CFM: NOT DETECTED
NORMEPERIDINE UR QL CFM: NOT DETECTED
NOROXYCODONE UR QL CFM: PRESENT
NOROXYMORPHONE UR QL SCN: PRESENT
OXAZEPAM UR QL: NOT DETECTED
OXYCODONE UR QL: PRESENT
OXYMORPHONE UR QL: PRESENT
PATHOLOGY STUDY: NORMAL
PCP UR QL: NEGATIVE
PHENTERMINE UR QL: NOT DETECTED
PREGABALIN: NOT DETECTED
SERVICE CMNT-IMP: NORMAL
TAPENTADOL UR QL SCN: NOT DETECTED
TAPENTADOL UR QL SCN: NOT DETECTED
TEMAZEPAM UR QL: NOT DETECTED
TRAMADOL UR QL: NEGATIVE
ZOLPIDEM METABOLITE: NOT DETECTED
ZOLPIDEM UR QL: NOT DETECTED

## 2024-04-24 NOTE — DISCHARGE SUMMARY
Discharge Note  Short Stay      SUMMARY     Admit Date: 4/12/2024    Attending Physician: Elvis Zhou MD PhD    Discharge Physician: Elvis Zhou      Discharge Date: 4/24/2024 4:34 PM    Procedure(s) (LRB):  TFESI RT L2/3, L3/4 (Right)    Final Diagnosis: Spinal stenosis, lumbar region, with neurogenic claudication [M48.062]    Disposition: Home or self care    Patient Instructions:   Discharge Medication List as of 4/12/2024  8:00 AM        CONTINUE these medications which have NOT CHANGED    Details   acetaminophen (TYLENOL) 650 MG TbSR Take 1 tablet (650 mg total) by mouth every 6 (six) hours as needed (pain)., Starting Wed 10/30/2019, Normal      ascorbic acid (VITAMIN C) 500 MG tablet Take 500 mg by mouth Every PM., Starting 3/7/2012, Until Discontinued, Historical Med      atorvastatin (LIPITOR) 40 MG tablet Take 1 tablet (40 mg total) by mouth once daily., Starting Thu 3/23/2023, Normal      cabergoline (DOSTINEX) 0.5 mg tablet TAKE 1/2 TABLET TWICE WEEKLY, Normal      cholecalciferol, vitamin D3, 2,000 unit Tab Take 1 tablet by mouth Every PM., Starting 3/7/2012, Until Discontinued, Historical Med      cyanocobalamin (VITAMIN B-12) 1000 MCG tablet Starting Wed 3/7/2012, Historical Med      gabapentin (NEURONTIN) 100 MG capsule Take 1 capsule (100 mg total) by mouth 3 (three) times daily., Starting Tue 2/1/2022, Normal      losartan-hydrochlorothiazide 50-12.5 mg (HYZAAR) 50-12.5 mg per tablet TAKE 1 TABLET BY MOUTH EVERY DAY, Normal      metoprolol succinate (TOPROL-XL) 25 MG 24 hr tablet TAKE 1 TABLET(25 MG) BY MOUTH EVERY DAY, Normal      omega-3 fatty acids-vitamin E 1,000 mg Cap Starting Wed 3/7/2012, Historical Med      omeprazole (PRILOSEC) 20 MG capsule TAKE 1 CAPSULE(20 MG) BY MOUTH EVERY DAY, Normal      oxyCODONE-acetaminophen (PERCOCET) 5-325 mg per tablet Take 1 tablet by mouth every 12 (twelve) hours as needed for Pain., Starting Fri 4/5/2024, Normal      sildenafiL (VIAGRA) 100 MG tablet Take  1 tablet (100 mg total) by mouth as needed for Erectile Dysfunction., Starting Thu 2/16/2023, Normal      solifenacin (VESICARE) 10 MG tablet Take 1 tablet (10 mg total) by mouth once daily., Starting Thu 4/27/2023, Until Fri 4/26/2024, Normal      VITAMIN E ACETATE (VITAMIN E ORAL) Take by mouth., Historical Med                 Discharge Diagnosis: Spinal stenosis, lumbar region, with neurogenic claudication [M48.062]  Condition on Discharge: Stable with no complications to procedure   Diet on Discharge: Same as before.  Activity: as per instruction sheet.  Discharge to: Home with a responsible adult.  Follow up: 2-4 weeks       Please call my office or pager at [phone] if experienced any weakness or loss of sensation, fever > 101.5, pain uncontrolled with oral medications, persistent nausea/vomiting/or diarrhea, redness or drainage from the incisions, or any other worrisome concerns. If physician on call was not reached or could not communicate with our office for any reason please go to the nearest emergency department      lEvis Zhou MD PhD

## 2024-04-24 NOTE — OP NOTE
Lumbar Transforaminal Epidural Steroid Injection under Fluoroscopic Guidance    The procedure, risks, benefits, and options were discussed with the patient. There are no contraindications to the procedure. The patent expressed understanding and agreed to the procedure. Informed written consent was obtained prior to the start of the procedure and can be found in the patient's chart.    PATIENT NAME: Mr. Isma Martin   MRN: [id number]     DATE OF PROCEDURE: 04/24/2024    PROCEDURE:  Right  L2/3 and L3/4 Lumbar Transforaminal Epidural Steroid Injection under Fluoroscopic Guidance    PRE-OP DIAGNOSIS: Spinal stenosis, lumbar region, with neurogenic claudication [M48.062] Lumbar radiculopathy [M54.16]    POST-OP DIAGNOSIS: Same    PHYSICIAN: Elvis Zhou MD    ASSISTANTS: None     MEDICATIONS INJECTED: Preservative-free Decadron 10mg with 5cc of Lidocaine 1% MPF     LOCAL ANESTHETIC INJECTED: Xylocaine 2%     SEDATION: Versed 1mg and Fentanyl 50mcg                                                                                                                                                                                     Conscious sedation ordered by M.D. Patient re-evaluation prior to administration of conscious sedation. No changes noted in patient's status from initial evaluation. The patient's vital signs were monitored by RN and patient remained hemodynamically stable throughout the procedure.    Event Time In   Sedation Start 0853   Sedation End 0903       ESTIMATED BLOOD LOSS: None    COMPLICATIONS: None    TECHNIQUE: Time-out was performed to identify the patient and procedure to be performed. With the patient laying in a prone position, the surgical area was prepped and draped in the usual sterile fashion using ChloraPrep and a fenestrated drape.The levels were determined under fluoroscopy guidance. Skin anesthesia was achieved by injecting Lidocaine 2% over the injection sites. The transforaminal spaces  were then approached with a 22 gauge, 5 inch spinal quinke needle that was introduced under fluoroscopic guidance in the AP and Lateral views. Once the needle tip was in the area of the transforaminal space, and there was no blood, CSF or paraesthesias, contrast dye Omnipaque (300mg/mL) was injected to confirm placement and there was no vascular runoff. Fluoroscopic imaging in the AP and lateral views revealed a clear outline of the spinal nerve with proximal spread of agent through the neural foramen into the epidural space. 3 mL of the medication mixture listed above was injected slowly at each site. Displacement of the radio opaque contrast after injection of the medication confirmed that the medication went into the area of the transforaminal spaces. The needles were removed and bleeding was nil. A sterile dressing was applied. No specimens collected. The patient tolerated the procedure well.     The patient was monitored after the procedure in the recovery area. They were given post-procedure and discharge instructions to follow at home. The patient was discharged in a stable condition.    Elvis Zhou MD

## 2024-05-06 DIAGNOSIS — M54.31 SCIATICA, RIGHT SIDE: ICD-10-CM

## 2024-05-06 RX ORDER — OXYCODONE AND ACETAMINOPHEN 5; 325 MG/1; MG/1
1 TABLET ORAL EVERY 12 HOURS PRN
Qty: 60 TABLET | Refills: 0 | Status: SHIPPED | OUTPATIENT
Start: 2024-05-06 | End: 2024-06-04 | Stop reason: SDUPTHER

## 2024-05-06 NOTE — TELEPHONE ENCOUNTER
----- Message from Macarena George sent at 5/6/2024  8:51 AM CDT -----  Contact: [phone]  Requesting an RX refill or new RX.  Is this a refill or new RX: refill   RX name and strength (copy/paste from chart):  oxyCODONE-acetaminophen (PERCOCET) 5-325 mg per tablet  Is this a 30 day or 90 day RX:   Pharmacy name and phone # (copy/paste from chart):   [id number] - [place], [place] - [address] [facility] [address]  [address]  [facility] [address]  Phone: [phone] Fax: [phone]          The doctors have asked that we provide their patients with the following 2 reminders -- prescription refills can take up to 72 hours, and a friendly reminder that in the future you can use your [facility] account to request refills:linette

## 2024-05-06 NOTE — TELEPHONE ENCOUNTER
No care due was identified.  [facility] Embedded Care Due Messages. Reference number: [id number].   5/06/2024 11:22:52 AM CDT

## 2024-05-07 DIAGNOSIS — K21.9 GASTROESOPHAGEAL REFLUX DISEASE: ICD-10-CM

## 2024-05-07 DIAGNOSIS — I10 ESSENTIAL HYPERTENSION: ICD-10-CM

## 2024-05-07 RX ORDER — LOSARTAN POTASSIUM AND HYDROCHLOROTHIAZIDE 12.5; 5 MG/1; MG/1
TABLET ORAL
Qty: 90 TABLET | Refills: 3 | Status: SHIPPED | OUTPATIENT
Start: 2024-05-07

## 2024-05-07 RX ORDER — OMEPRAZOLE 20 MG/1
CAPSULE, DELAYED RELEASE ORAL
Qty: 90 CAPSULE | Refills: 3 | Status: SHIPPED | OUTPATIENT
Start: 2024-05-07

## 2024-05-07 NOTE — TELEPHONE ENCOUNTER
Refill Decision Note   Ashby Washington  is requesting a refill authorization.  Brief Assessment and Rationale for Refill:  Approve     Medication Therapy Plan:         Comments:     Note composed:1:12 PM 05/07/2024

## 2024-05-07 NOTE — TELEPHONE ENCOUNTER
No care due was identified.  [facility] Embedded Care Due Messages. Reference number: [id number].   5/07/2024 6:07:15 AM CDT

## 2024-05-14 DIAGNOSIS — E11.9 TYPE 2 DIABETES MELLITUS WITHOUT COMPLICATION, WITHOUT LONG-TERM CURRENT USE OF INSULIN: ICD-10-CM

## 2024-05-14 RX ORDER — ATORVASTATIN CALCIUM 40 MG/1
40 TABLET, FILM COATED ORAL
Qty: 90 TABLET | Refills: 0 | Status: SHIPPED | OUTPATIENT
Start: 2024-05-14

## 2024-05-14 NOTE — TELEPHONE ENCOUNTER
No care due was identified.  [facility] Embedded Care Due Messages. Reference number: [id number].   5/14/2024 5:37:32 PM CDT

## 2024-05-14 NOTE — TELEPHONE ENCOUNTER
Refill Decision Note   Ashby Washington  is requesting a refill authorization.  Brief Assessment and Rationale for Refill:  Approve     Medication Therapy Plan:        Comments:     Note composed:6:05 PM 05/14/2024

## 2024-06-04 DIAGNOSIS — M54.31 SCIATICA, RIGHT SIDE: ICD-10-CM

## 2024-06-04 NOTE — TELEPHONE ENCOUNTER
No care due was identified.  [facility] Embedded Care Due Messages. Reference number: [id number].   6/04/2024 9:43:45 AM CDT

## 2024-06-04 NOTE — TELEPHONE ENCOUNTER
----- Message from Ciarra Castro sent at 6/4/2024  9:09 AM CDT -----  Contact: [phone] (M)  Requesting an RX refill or new RX.  Is this a refill or new RX: refill 1  RX name and strength (copy/paste from chart):  oxyCODONE-acetaminophen (PERCOCET) 5-325 mg per tablet  Is this a 30 day or 90 day RX:   Pharmacy name and phone # (copy/paste from chart):  [id number] [place] [address] [facility] [address]   Phone: [phone]  Fax: [phone]        The doctors have asked that we provide their patients with the following 2 reminders -- prescription refills can take up to 72 hours, and a friendly reminder that in the future you can use your [facility] account to request refills:

## 2024-06-05 RX ORDER — OXYCODONE AND ACETAMINOPHEN 5; 325 MG/1; MG/1
1 TABLET ORAL EVERY 12 HOURS PRN
Qty: 60 TABLET | Refills: 0 | Status: SHIPPED | OUTPATIENT
Start: 2024-06-05

## 2024-06-10 ENCOUNTER — PATIENT MESSAGE (OUTPATIENT)
Dept: INTERNAL MEDICINE | Facility: CLINIC | Age: 74
End: 2024-06-10
Payer: MEDICARE

## 2024-06-26 DIAGNOSIS — D35.2 PROLACTINOMA: ICD-10-CM

## 2024-06-26 RX ORDER — CABERGOLINE 0.5 MG/1
0.25 TABLET ORAL
Qty: 8 TABLET | Refills: 5 | Status: SHIPPED | OUTPATIENT
Start: 2024-06-27

## 2024-06-26 RX ORDER — CABERGOLINE 0.5 MG/1
TABLET ORAL
Qty: 12 TABLET | Refills: 1 | OUTPATIENT
Start: 2024-06-27

## 2024-07-09 DIAGNOSIS — M54.31 SCIATICA, RIGHT SIDE: ICD-10-CM

## 2024-07-09 RX ORDER — OXYCODONE AND ACETAMINOPHEN 5; 325 MG/1; MG/1
1 TABLET ORAL EVERY 12 HOURS PRN
Qty: 60 TABLET | Refills: 0 | Status: SHIPPED | OUTPATIENT
Start: 2024-07-09

## 2024-07-09 NOTE — TELEPHONE ENCOUNTER
Care Due:                  Date            Visit Type   Department     Provider  --------------------------------------------------------------------------------                                EP -                              PRIMARY      [facility]  Last Visit: 04-      CARE ([facility])   MEDICINE       Anthony Tay  Next Visit: None Scheduled  None         None Found                                                            Last  Test          Frequency    Reason                     Performed    Due Date  --------------------------------------------------------------------------------    CMP.........  12 months..  atorvastatin.............  07- 06-    Lipid Panel.  12 months..  atorvastatin.............  07- 06-    [facility] Embedded Care Due Messages. Reference number: [id number].   7/09/2024 10:32:38 AM CDT

## 2024-07-10 DIAGNOSIS — E11.9 TYPE 2 DIABETES MELLITUS WITHOUT COMPLICATION: ICD-10-CM

## 2024-08-08 ENCOUNTER — OFFICE VISIT (OUTPATIENT)
Dept: OPTOMETRY | Facility: CLINIC | Age: 74
End: 2024-08-08
Payer: MEDICARE

## 2024-08-08 DIAGNOSIS — E11.9 TYPE 2 DIABETES MELLITUS WITHOUT OPHTHALMIC MANIFESTATIONS: ICD-10-CM

## 2024-08-08 DIAGNOSIS — H40.013 OPEN ANGLE WITH BORDERLINE FINDINGS OF BOTH EYES: ICD-10-CM

## 2024-08-08 DIAGNOSIS — H52.03 HYPEROPIA OF BOTH EYES WITH ASTIGMATISM AND PRESBYOPIA: ICD-10-CM

## 2024-08-08 DIAGNOSIS — H52.203 HYPEROPIA OF BOTH EYES WITH ASTIGMATISM AND PRESBYOPIA: ICD-10-CM

## 2024-08-08 DIAGNOSIS — E11.9 DIABETES MELLITUS TYPE 2 WITHOUT RETINOPATHY: Primary | ICD-10-CM

## 2024-08-08 DIAGNOSIS — H52.4 HYPEROPIA OF BOTH EYES WITH ASTIGMATISM AND PRESBYOPIA: ICD-10-CM

## 2024-08-08 DIAGNOSIS — M54.31 SCIATICA, RIGHT SIDE: ICD-10-CM

## 2024-08-08 PROCEDURE — 1157F ADVNC CARE PLAN IN RCRD: CPT | Mod: HCNC,CPTII,S$GLB, | Performed by: OPTOMETRIST

## 2024-08-08 PROCEDURE — 1159F MED LIST DOCD IN RCRD: CPT | Mod: HCNC,CPTII,S$GLB, | Performed by: OPTOMETRIST

## 2024-08-08 PROCEDURE — 2023F DILAT RTA XM W/O RTNOPTHY: CPT | Mod: HCNC,CPTII,S$GLB, | Performed by: OPTOMETRIST

## 2024-08-08 PROCEDURE — 1126F AMNT PAIN NOTED NONE PRSNT: CPT | Mod: HCNC,CPTII,S$GLB, | Performed by: OPTOMETRIST

## 2024-08-08 PROCEDURE — 3061F NEG MICROALBUMINURIA REV: CPT | Mod: HCNC,CPTII,S$GLB, | Performed by: OPTOMETRIST

## 2024-08-08 PROCEDURE — 99999 PR PBB SHADOW E&M-EST. PATIENT-LVL II: CPT | Mod: PBBFAC,HCNC,, | Performed by: OPTOMETRIST

## 2024-08-08 PROCEDURE — 1160F RVW MEDS BY RX/DR IN RCRD: CPT | Mod: HCNC,CPTII,S$GLB, | Performed by: OPTOMETRIST

## 2024-08-08 PROCEDURE — 92014 COMPRE OPH EXAM EST PT 1/>: CPT | Mod: HCNC,S$GLB,, | Performed by: OPTOMETRIST

## 2024-08-08 PROCEDURE — 92015 DETERMINE REFRACTIVE STATE: CPT | Mod: HCNC,S$GLB,, | Performed by: OPTOMETRIST

## 2024-08-08 PROCEDURE — 3044F HG A1C LEVEL LT 7.0%: CPT | Mod: HCNC,CPTII,S$GLB, | Performed by: OPTOMETRIST

## 2024-08-08 PROCEDURE — 3066F NEPHROPATHY DOC TX: CPT | Mod: HCNC,CPTII,S$GLB, | Performed by: OPTOMETRIST

## 2024-08-08 RX ORDER — KETOROLAC TROMETHAMINE 30 MG/ML
INJECTION, SOLUTION INTRAMUSCULAR; INTRAVENOUS
COMMUNITY
Start: 2024-03-29

## 2024-08-08 RX ORDER — MIDAZOLAM HYDROCHLORIDE 1 MG/ML
INJECTION, SOLUTION INTRAMUSCULAR; INTRAVENOUS
COMMUNITY
Start: 2024-04-12

## 2024-08-08 RX ORDER — OXYCODONE AND ACETAMINOPHEN 5; 325 MG/1; MG/1
1 TABLET ORAL EVERY 12 HOURS PRN
Qty: 60 TABLET | Refills: 0 | Status: SHIPPED | OUTPATIENT
Start: 2024-08-08

## 2024-08-08 RX ORDER — IOHEXOL 300 MG/ML
INJECTION, SOLUTION INTRAVENOUS
COMMUNITY
Start: 2024-04-12

## 2024-08-08 RX ORDER — DEXAMETHASONE SODIUM PHOSPHATE 10 MG/ML
INJECTION INTRAMUSCULAR; INTRAVENOUS
COMMUNITY
Start: 2024-04-12

## 2024-08-08 RX ORDER — FENTANYL CITRATE 50 UG/ML
INJECTION, SOLUTION INTRAMUSCULAR; INTRAVENOUS
COMMUNITY
Start: 2024-04-12

## 2024-08-09 NOTE — OP NOTE
[facility]  Vascular Surgery  Operative Note    SUMMARY     Date of Procedure: 3/15/2017     Procedure: Left Carotid Endarterectomy, 10 Setswana [place] shunt    Surgeon(s) and Role:     * Jose Ricketts MD - Primary     * Komal Mistry MD - Fellow    Assisting Surgeon: None    Pre-Operative Diagnosis: Amaurosis fugax, left eye [G45.3]  Symptomatic stenosis of left carotid artery [I65.22]    Post-Operative Diagnosis: Post-Op Diagnosis Codes:  same    Anesthesia: General        Description of the Findings of the Procedure: high bifurcation; ulcerated plaque with 90% stenosis, appropriate signals and pulses at completion.  Moving all extremities and neurologically intact at completion.      Complications: No    Estimated Blood Loss (EBL): 25 mL         Implants:   Implant Name Type Inv. Item Serial No.  Lot No. LRB No. Used             IMPLNT PERICARD BOV VASC 0.8CM - [id number]   IMPLNT PERICARD BOV VASC 0.8CM   Fortem [id number] Left 1       Specimens:   Specimen     None                  Condition: Good    Disposition: PACU - hemodynamically stable.    Operation in detail: Consent was obtained and the patient was brought to the Operating Room and placed in a supine position. Perioperative IV antibiotics were given. General anesthesia was induced and the patient was intubated. The patient's left neck was prepped and draped in sterile fashion. Incision was made obliquely along the anterior border of the sternocleidomastoid, being careful to avoid the mandible. Dissection using sharp dissection and electrocautery was carried down through the platysma and on to the anterior border of the sternocleidomastoid. Crossing veins were identified and ligated. The internal jugular vein was identified.  The facial vein was isolated and suture ligated.The carotid sheath was entered sharply. The vagus nerve was identified and mobilized off of the carotid artery, was  noted to be anterior.  Umbilical tape was placed around the common carotid artery without occlusion. Patient was noted to have a high bifurcation, skin incision was carried distally, in order to gain adequate exposure. The patient was given IV heparin. A total of 7000 units titrated to keep the ACT greater than 250. Dissection was then carried distally to the internal carotid and ECA. The hypoglossal nerve was identified and avoided. Secondary to the high bifurcation, dissection was difficult.  Lymph nodes were identified and ligated. The internal carotid was dissected free with a vessel loop placed around it, but not on tension. The superior thyroid and external carotid was then dissected sharply free with small vessel loop around the external and silk suture around the superior thyroid, neither on tension. The 10-Kazakh shunt was then prepared by tying a 2-0 silk tie and securing in place. Internal carotid followed by common carotid artery followed by external arteries were clamped sequentially. Carotid arteriotomy was made using an 11 blade with the carotid entered and an arteriotomy extended both proximally and distally using Navarrete scissors. The 10-Kazakh shunt was then placed into the internal carotid with back bleeding allowed prior to being threaded through the common carotid with the common carotid secured with a Fei tourniquet. The internal carotid was clamped using a K clamp. Then, the common carotid artery was clamped followed by the external carotid artery. A ulcerated, focal plaque of greater than 90% was noted in the bifurcation extending into the internal and the common carotid. This was carefully elevated using a [place] elevator so that the proximal endpoint had a nice tethered endpoint as did the distal. The endarterectomy was then copiously irrigated with saline and any remaining free intima was removed, so as to prevent embolization. Two 5-0 stay sutures were placed one on medial distal aspect  and other on lateral distal aspect. A bovine patch was brought into the field. It was secured to the carotid artery using 6-0 Prolene running sutures x2. Prior to completing patch closure, the 10-Setswana shunt was removed with good back bleeding. The external carotid artery was unclamped followed by the common and finally the internal carotid artery. Hemostasis was obtained. Doppler examination demonstrated low resistance flow in the internal carotid distal to patch. Surgicel was applied to the patch and removed again with good hemostasis noted. Surgicel was placed in the more proximal aspect of the patch. Good hemostasis was obtained in the soft tissue and muscle. SCM was sutured to pretracheal fascia with interrupted 3-0 Vicryl sutures. The platysma was closed using a interrupted 2-0 Vicryl. The skin was closed using a running 4-0 Monocryl. Steri-Strips were applied followed by Tegaderm. The patient was extubated successfully. On extubation, he was noted to be moving all extremities. He was taken to the ICU in stable condition.     [Negative] : Heme/Lymph

## 2024-08-16 DIAGNOSIS — E11.9 TYPE 2 DIABETES MELLITUS WITHOUT COMPLICATION, WITHOUT LONG-TERM CURRENT USE OF INSULIN: ICD-10-CM

## 2024-08-16 NOTE — TELEPHONE ENCOUNTER
No care due was identified.  [facility] Embedded Care Due Messages. Reference number: [id number].   8/16/2024 9:47:45 AM CDT

## 2024-08-16 NOTE — TELEPHONE ENCOUNTER
Refill Routing Note   Medication(s) are not appropriate for processing by [facility] for the following reason(s):        Required labs outdated    [facility] action(s):  Defer               Appointments  past 12m or future 3m with PCP    Date Provider   Last Visit   4/8/2024 Anthony Tay MD   Next Visit   10/8/2024 Anthony Tay MD   ED visits in past 90 days: 0        Note composed:4:52 PM 08/16/2024

## 2024-08-19 RX ORDER — ATORVASTATIN CALCIUM 40 MG/1
40 TABLET, FILM COATED ORAL
Qty: 90 TABLET | Refills: 11 | Status: SHIPPED | OUTPATIENT
Start: 2024-08-19

## 2024-10-08 ENCOUNTER — OFFICE VISIT (OUTPATIENT)
Dept: INTERNAL MEDICINE | Facility: CLINIC | Age: 74
End: 2024-10-08
Payer: MEDICARE

## 2024-10-08 ENCOUNTER — IMMUNIZATION (OUTPATIENT)
Dept: INTERNAL MEDICINE | Facility: CLINIC | Age: 74
End: 2024-10-08
Payer: MEDICARE

## 2024-10-08 ENCOUNTER — LAB VISIT (OUTPATIENT)
Dept: LAB | Facility: HOSPITAL | Age: 74
End: 2024-10-08
Attending: INTERNAL MEDICINE
Payer: MEDICARE

## 2024-10-08 VITALS
HEART RATE: 56 BPM | OXYGEN SATURATION: 99 % | WEIGHT: 153.69 LBS | HEIGHT: 65 IN | SYSTOLIC BLOOD PRESSURE: 132 MMHG | BODY MASS INDEX: 25.61 KG/M2 | DIASTOLIC BLOOD PRESSURE: 60 MMHG

## 2024-10-08 DIAGNOSIS — D35.2 PROLACTINOMA: ICD-10-CM

## 2024-10-08 DIAGNOSIS — M54.31 SCIATICA, RIGHT SIDE: Primary | ICD-10-CM

## 2024-10-08 DIAGNOSIS — E11.9 TYPE 2 DIABETES MELLITUS WITHOUT COMPLICATION, WITHOUT LONG-TERM CURRENT USE OF INSULIN: ICD-10-CM

## 2024-10-08 DIAGNOSIS — E11.9 TYPE 2 DIABETES MELLITUS WITHOUT COMPLICATION: ICD-10-CM

## 2024-10-08 DIAGNOSIS — F43.21 GRIEF: ICD-10-CM

## 2024-10-08 DIAGNOSIS — H61.22 IMPACTED CERUMEN OF LEFT EAR: ICD-10-CM

## 2024-10-08 DIAGNOSIS — Z23 NEED FOR VACCINATION: Primary | ICD-10-CM

## 2024-10-08 DIAGNOSIS — I10 ESSENTIAL HYPERTENSION: ICD-10-CM

## 2024-10-08 LAB
ALBUMIN SERPL BCP-MCNC: 3.6 G/DL (ref 3.5–5.2)
ALP SERPL-CCNC: 50 U/L (ref 55–135)
ALT SERPL W/O P-5'-P-CCNC: 26 U/L (ref 10–44)
ANION GAP SERPL CALC-SCNC: 13 MMOL/L (ref 8–16)
AST SERPL-CCNC: 26 U/L (ref 10–40)
BASOPHILS # BLD AUTO: 0.01 K/UL (ref 0–0.2)
BASOPHILS NFR BLD: 0.2 % (ref 0–1.9)
BILIRUB SERPL-MCNC: 0.5 MG/DL (ref 0.1–1)
BUN SERPL-MCNC: 21 MG/DL (ref 8–23)
CALCIUM SERPL-MCNC: 9.7 MG/DL (ref 8.7–10.5)
CHLORIDE SERPL-SCNC: 105 MMOL/L (ref 95–110)
CHOLEST SERPL-MCNC: 183 MG/DL (ref 120–199)
CHOLEST SERPL-MCNC: 183 MG/DL (ref 120–199)
CHOLEST/HDLC SERPL: 4.1 {RATIO} (ref 2–5)
CHOLEST/HDLC SERPL: 4.1 {RATIO} (ref 2–5)
CO2 SERPL-SCNC: 23 MMOL/L (ref 23–29)
CREAT SERPL-MCNC: 1.3 MG/DL (ref 0.5–1.4)
DIFFERENTIAL METHOD BLD: ABNORMAL
EOSINOPHIL # BLD AUTO: 0.1 K/UL (ref 0–0.5)
EOSINOPHIL NFR BLD: 1.5 % (ref 0–8)
ERYTHROCYTE [DISTWIDTH] IN BLOOD BY AUTOMATED COUNT: 13.9 % (ref 11.5–14.5)
EST. GFR  (NO RACE VARIABLE): 58.4 ML/MIN/1.73 M^2
ESTIMATED AVG GLUCOSE: 117 MG/DL (ref 68–131)
GLUCOSE SERPL-MCNC: 99 MG/DL (ref 70–110)
HBA1C MFR BLD: 5.7 % (ref 4–5.6)
HCT VFR BLD AUTO: 50.8 % (ref 40–54)
HDLC SERPL-MCNC: 45 MG/DL (ref 40–75)
HDLC SERPL-MCNC: 45 MG/DL (ref 40–75)
HDLC SERPL: 24.6 % (ref 20–50)
HDLC SERPL: 24.6 % (ref 20–50)
HGB BLD-MCNC: 16.2 G/DL (ref 14–18)
IMM GRANULOCYTES # BLD AUTO: 0.02 K/UL (ref 0–0.04)
IMM GRANULOCYTES NFR BLD AUTO: 0.4 % (ref 0–0.5)
LDLC SERPL CALC-MCNC: 123.8 MG/DL (ref 63–159)
LDLC SERPL CALC-MCNC: 123.8 MG/DL (ref 63–159)
LYMPHOCYTES # BLD AUTO: 1.3 K/UL (ref 1–4.8)
LYMPHOCYTES NFR BLD: 25.2 % (ref 18–48)
MCH RBC QN AUTO: 29.7 PG (ref 27–31)
MCHC RBC AUTO-ENTMCNC: 31.9 G/DL (ref 32–36)
MCV RBC AUTO: 93 FL (ref 82–98)
MONOCYTES # BLD AUTO: 0.5 K/UL (ref 0.3–1)
MONOCYTES NFR BLD: 9.6 % (ref 4–15)
NEUTROPHILS # BLD AUTO: 3.3 K/UL (ref 1.8–7.7)
NEUTROPHILS NFR BLD: 63.1 % (ref 38–73)
NONHDLC SERPL-MCNC: 138 MG/DL
NONHDLC SERPL-MCNC: 138 MG/DL
NRBC BLD-RTO: 0 /100 WBC
PLATELET # BLD AUTO: 187 K/UL (ref 150–450)
PMV BLD AUTO: 9.8 FL (ref 9.2–12.9)
POTASSIUM SERPL-SCNC: 4.8 MMOL/L (ref 3.5–5.1)
PROLACTIN SERPL IA-MCNC: 3.5 NG/ML (ref 3.5–19.4)
PROT SERPL-MCNC: 7.5 G/DL (ref 6–8.4)
RBC # BLD AUTO: 5.46 M/UL (ref 4.6–6.2)
SODIUM SERPL-SCNC: 141 MMOL/L (ref 136–145)
TRIGL SERPL-MCNC: 71 MG/DL (ref 30–150)
TRIGL SERPL-MCNC: 71 MG/DL (ref 30–150)
WBC # BLD AUTO: 5.2 K/UL (ref 3.9–12.7)

## 2024-10-08 PROCEDURE — G0008 ADMIN INFLUENZA VIRUS VAC: HCPCS | Mod: HCNC,S$GLB,, | Performed by: INTERNAL MEDICINE

## 2024-10-08 PROCEDURE — 3066F NEPHROPATHY DOC TX: CPT | Mod: HCNC,CPTII,S$GLB, | Performed by: INTERNAL MEDICINE

## 2024-10-08 PROCEDURE — 3078F DIAST BP <80 MM HG: CPT | Mod: HCNC,CPTII,S$GLB, | Performed by: INTERNAL MEDICINE

## 2024-10-08 PROCEDURE — 80061 LIPID PANEL: CPT | Mod: HCNC | Performed by: INTERNAL MEDICINE

## 2024-10-08 PROCEDURE — 3008F BODY MASS INDEX DOCD: CPT | Mod: HCNC,CPTII,S$GLB, | Performed by: INTERNAL MEDICINE

## 2024-10-08 PROCEDURE — 84146 ASSAY OF PROLACTIN: CPT | Mod: HCNC | Performed by: INTERNAL MEDICINE

## 2024-10-08 PROCEDURE — 85025 COMPLETE CBC W/AUTO DIFF WBC: CPT | Mod: HCNC | Performed by: INTERNAL MEDICINE

## 2024-10-08 PROCEDURE — 36415 COLL VENOUS BLD VENIPUNCTURE: CPT | Mod: HCNC | Performed by: INTERNAL MEDICINE

## 2024-10-08 PROCEDURE — 3044F HG A1C LEVEL LT 7.0%: CPT | Mod: HCNC,CPTII,S$GLB, | Performed by: INTERNAL MEDICINE

## 2024-10-08 PROCEDURE — 1125F AMNT PAIN NOTED PAIN PRSNT: CPT | Mod: HCNC,CPTII,S$GLB, | Performed by: INTERNAL MEDICINE

## 2024-10-08 PROCEDURE — 80053 COMPREHEN METABOLIC PANEL: CPT | Mod: HCNC | Performed by: INTERNAL MEDICINE

## 2024-10-08 PROCEDURE — 99214 OFFICE O/P EST MOD 30 MIN: CPT | Mod: HCNC,S$GLB,, | Performed by: INTERNAL MEDICINE

## 2024-10-08 PROCEDURE — 1160F RVW MEDS BY RX/DR IN RCRD: CPT | Mod: HCNC,CPTII,S$GLB, | Performed by: INTERNAL MEDICINE

## 2024-10-08 PROCEDURE — 1159F MED LIST DOCD IN RCRD: CPT | Mod: HCNC,CPTII,S$GLB, | Performed by: INTERNAL MEDICINE

## 2024-10-08 PROCEDURE — 3075F SYST BP GE 130 - 139MM HG: CPT | Mod: HCNC,CPTII,S$GLB, | Performed by: INTERNAL MEDICINE

## 2024-10-08 PROCEDURE — 83036 HEMOGLOBIN GLYCOSYLATED A1C: CPT | Mod: HCNC | Performed by: INTERNAL MEDICINE

## 2024-10-08 PROCEDURE — 1157F ADVNC CARE PLAN IN RCRD: CPT | Mod: HCNC,CPTII,S$GLB, | Performed by: INTERNAL MEDICINE

## 2024-10-08 PROCEDURE — 91320 SARSCV2 VAC 30MCG TRS-SUC IM: CPT | Mod: HCNC,S$GLB,, | Performed by: INTERNAL MEDICINE

## 2024-10-08 PROCEDURE — 1101F PT FALLS ASSESS-DOCD LE1/YR: CPT | Mod: HCNC,CPTII,S$GLB, | Performed by: INTERNAL MEDICINE

## 2024-10-08 PROCEDURE — 90480 ADMN SARSCOV2 VAC 1/ONLY CMP: CPT | Mod: HCNC,S$GLB,, | Performed by: INTERNAL MEDICINE

## 2024-10-08 PROCEDURE — 3288F FALL RISK ASSESSMENT DOCD: CPT | Mod: HCNC,CPTII,S$GLB, | Performed by: INTERNAL MEDICINE

## 2024-10-08 PROCEDURE — 99999 PR PBB SHADOW E&M-EST. PATIENT-LVL V: CPT | Mod: PBBFAC,HCNC,, | Performed by: INTERNAL MEDICINE

## 2024-10-08 PROCEDURE — G2211 COMPLEX E/M VISIT ADD ON: HCPCS | Mod: HCNC,S$GLB,, | Performed by: INTERNAL MEDICINE

## 2024-10-08 PROCEDURE — 3061F NEG MICROALBUMINURIA REV: CPT | Mod: HCNC,CPTII,S$GLB, | Performed by: INTERNAL MEDICINE

## 2024-10-08 PROCEDURE — 90653 IIV ADJUVANT VACCINE IM: CPT | Mod: HCNC,S$GLB,, | Performed by: INTERNAL MEDICINE

## 2024-10-08 RX ORDER — IBUPROFEN 800 MG/1
800 TABLET ORAL EVERY 6 HOURS PRN
Qty: 60 TABLET | Refills: 1 | Status: SHIPPED | OUTPATIENT
Start: 2024-10-08

## 2024-10-08 RX ORDER — ATORVASTATIN CALCIUM 40 MG/1
40 TABLET, FILM COATED ORAL DAILY
Qty: 90 TABLET | Refills: 11 | Status: SHIPPED | OUTPATIENT
Start: 2024-10-08

## 2024-10-08 NOTE — PROGRESS NOTES
Subjective:       Patient ID: Isma Martin is a 74 y.o. male.    Chief Complaint: Follow-up    Patient is here for followup for chronic conditions.    Fluid coming from L ear since 4 days.    He noticed that Percocet pain medicine has not really been helpful for his right lower back pain and right-sided sciatica.  He even self increase the dose to 3 times daily which was not very helpful.    Doing better as far as adjustment to his wife's passing away.      Review of Systems   Constitutional:  Negative for activity change and unexpected weight change.   HENT:  Negative for hearing loss, rhinorrhea and trouble swallowing.    Eyes:  Negative for discharge and visual disturbance.   Respiratory:  Negative for chest tightness and wheezing.    Cardiovascular:  Negative for chest pain and palpitations.   Gastrointestinal:  Negative for blood in stool, constipation, diarrhea and vomiting.   Endocrine: Negative for polydipsia and polyuria.   Genitourinary:  Positive for urgency. Negative for difficulty urinating and hematuria.   Musculoskeletal:  Positive for arthralgias (R big toe and L 5th toe (which is swollen)) and back pain (R side and R leg/thigh). Negative for joint swelling and neck pain.   Neurological:  Negative for weakness and headaches.   Psychiatric/Behavioral:  Negative for confusion and dysphoric mood.            Objective:      Physical Exam  Vitals reviewed.   Constitutional:       General: He is not in acute distress.     Appearance: Normal appearance. He is well-developed. He is not ill-appearing, toxic-appearing or diaphoretic.      Comments: Well appearing, breathing comfortably, speaking full sentences, no coughing during encounter   HENT:      Head: Normocephalic and atraumatic.      Right Ear: There is no impacted cerumen.      Left Ear: There is impacted cerumen.      Ears:      Comments: Left ear has milky cerumen and mild amount of discharge.  Eyes:      General: No scleral icterus.  Neck:       Thyroid: No thyromegaly.   Cardiovascular:      Rate and Rhythm: Normal rate and regular rhythm.      Heart sounds: Normal heart sounds. No murmur heard.     No friction rub. No gallop.   Pulmonary:      Effort: Pulmonary effort is normal. No respiratory distress.      Breath sounds: Normal breath sounds. No wheezing or rales.   Abdominal:      General: Bowel sounds are normal. There is no distension.      Palpations: Abdomen is soft. There is no mass.      Tenderness: There is no abdominal tenderness. There is no guarding or rebound.   Musculoskeletal:         General: Normal range of motion.      Cervical back: Normal range of motion.      Comments: Pain with SLR on the R, but pain does not shoot down entire leg.  Hip rom does not cause pain.  No midline spine tenderness to deep palpation   Lymphadenopathy:      Cervical: No cervical adenopathy.   Skin:     Findings: No lesion.   Neurological:      Mental Status: He is alert and oriented to person, place, and time.   Psychiatric:         Mood and Affect: Mood normal.         Behavior: Behavior normal.         Thought Content: Thought content normal.         Judgment: Judgment normal.         Assessment:       1. Sciatica, right side    2. Type 2 diabetes mellitus without complication, without long-term current use of insulin    3. Prolactinoma    4. Essential hypertension    5. Grief    6. Impacted cerumen of left ear        Plan:       Isma was seen today for follow-up.    Diagnoses and all orders for this visit:    Sciatica, right side  -     ibuprofen (ADVIL,MOTRIN) 800 MG tablet; Take 1 tablet (800 mg total) by mouth every 6 (six) hours as needed for Pain.  I agree with staying off pain medicine since it has not been helpful.    Type 2 diabetes mellitus without complication, without long-term current use of insulin  -     atorvastatin (LIPITOR) 40 MG tablet; Take 1 tablet (40 mg total) by mouth once daily.  -     CBC Auto Differential; Future  -      Comprehensive Metabolic Panel; Future  -     Lipid Panel; Future  -     Cancel: Hemoglobin A1C; Future    Prolactinoma  -     PROLACTIN; Future  -     Ambulatory referral/consult to Endocrinology; Future    Essential hypertension  controlled    Grief  Symptoms improved    Impacted cerumen of left ear  -     Ambulatory referral/consult to ENT; Future  LPN Verline -- Colace containing solution applied to ear for several minutes, followed by warm water irrigation without successful removal of cerumen   Will see ent instead, consider abx/steroid drops          Health Maintenance         Date Due Completion Date    Foot Exam 08/23/2023 8/23/2022    Override on 8/23/2022: Done    Override on 7/13/2021: Done    Override on 7/28/2016: Done    Override on 8/10/2015: Done    Diabetes Urine Screening 04/08/2025 4/8/2024    Hemoglobin A1c 04/08/2025 10/8/2024    Eye Exam 08/08/2025 8/8/2024    Override on 8/10/2015: Done    High Dose Statin 10/08/2025 10/8/2024    Lipid Panel 10/08/2025 10/8/2024    TETANUS VACCINE 05/03/2026 5/3/2016    Colorectal Cancer Screening 07/08/2028 7/8/2021    Override on 5/1/2008: Done          Flu and covid vaccines please.    Visit today included increased complexity associated with the care of the episodic problem  addressed and managing the longitudinal care of the patient due to the serious and/or complex managed problem(s) .    Follow up in about 6 months (around 4/8/2025) for Flu and covid vaccines please.    Future Appointments   Date Time Provider Department Center   12/5/2024  9:30 AM Winston Valentine MD [facility] E[facility]   2/12/2025  1:00 PM Kelsey Brooks MD [facility] ENDODIA [facility]

## 2024-10-14 ENCOUNTER — TELEPHONE (OUTPATIENT)
Dept: INTERNAL MEDICINE | Facility: CLINIC | Age: 74
End: 2024-10-14
Payer: MEDICARE

## 2024-10-14 DIAGNOSIS — H60.92 OTITIS EXTERNA OF LEFT EAR, UNSPECIFIED CHRONICITY, UNSPECIFIED TYPE: ICD-10-CM

## 2024-10-14 DIAGNOSIS — H61.22 IMPACTED CERUMEN OF LEFT EAR: Primary | ICD-10-CM

## 2024-10-14 RX ORDER — NEOMYCIN SULFATE, POLYMYXIN B SULFATE AND HYDROCORTISONE 10; 3.5; 1 MG/ML; MG/ML; [USP'U]/ML
3 SUSPENSION/ DROPS AURICULAR (OTIC) 4 TIMES DAILY
Qty: 10 ML | Refills: 0 | Status: SHIPPED | OUTPATIENT
Start: 2024-10-14

## 2024-10-14 NOTE — TELEPHONE ENCOUNTER
----- Message from Med Assistant Lira sent at 10/14/2024 10:57 AM CDT -----  Contact: [phone]    ----- Message -----  From: Svetlana Reid  Sent: 10/14/2024   9:09 AM CDT  To: Jasvir Cruz Staff    .[email or web address]     Patient is calling for Medical Advice regarding:pt is calling to see if he can get some pain med for left ear he was seen 10/8/24.And some ear drop  Please call him back and advice.    How long has patient had these symptoms:    Pharmacy name and phone#:  [id number] - [place], [place] - [address] [facility]  [address]  [facility] [address]  Phone: [phone] Fax: [phone]      Patient wants a call back or thru [facility]:CALLBACK    Comments:    Please advise patient replies from provider may take up to 48 hours.

## 2024-10-14 NOTE — TELEPHONE ENCOUNTER
Hi, please call him -- I have sent in to his pharmacy:  Orders Placed This Encounter    neomycin-polymyxin-hydrocortisone (CORTISPORIN) 3.5-10,000-1 mg/mL-unit/mL-% otic suspension     This is an ear drop for his left ear, it will hopefully provide some pain relief also.    I also advised him to call the ENT office and see if he can be seen any earlier and if he can be put on a wait list to get called if an opening comes up.    Let me know if patient has any questions.  Thank you, Anthony Tay

## 2024-12-05 ENCOUNTER — OFFICE VISIT (OUTPATIENT)
Dept: OTOLARYNGOLOGY | Facility: CLINIC | Age: 74
End: 2024-12-05
Payer: MEDICARE

## 2024-12-05 ENCOUNTER — CLINICAL SUPPORT (OUTPATIENT)
Dept: AUDIOLOGY | Facility: CLINIC | Age: 74
End: 2024-12-05
Payer: MEDICARE

## 2024-12-05 DIAGNOSIS — H93.13 TINNITUS, BILATERAL: ICD-10-CM

## 2024-12-05 DIAGNOSIS — H90.3 BILATERAL SENSORINEURAL HEARING LOSS: Primary | ICD-10-CM

## 2024-12-05 DIAGNOSIS — H90.3 SENSORINEURAL HEARING LOSS, BILATERAL: Primary | ICD-10-CM

## 2024-12-05 DIAGNOSIS — H61.22 IMPACTED CERUMEN OF LEFT EAR: ICD-10-CM

## 2024-12-05 PROCEDURE — 99999 PR PBB SHADOW E&M-EST. PATIENT-LVL I: CPT | Mod: PBBFAC,HCNC,, | Performed by: AUDIOLOGIST

## 2024-12-05 PROCEDURE — 99999 PR PBB SHADOW E&M-EST. PATIENT-LVL III: CPT | Mod: PBBFAC,HCNC,, | Performed by: OTOLARYNGOLOGY

## 2024-12-05 PROCEDURE — 92557 COMPREHENSIVE HEARING TEST: CPT | Mod: HCNC,S$GLB,, | Performed by: AUDIOLOGIST

## 2024-12-05 PROCEDURE — 92567 TYMPANOMETRY: CPT | Mod: HCNC,S$GLB,, | Performed by: AUDIOLOGIST

## 2024-12-05 NOTE — PROGRESS NOTES
Audiologic Evaluation 12/5/2024:       Isma Martin, a 74 y.o. male, was seen today in the clinic for an audiologic evaluation. Mr. Martin reported decreased hearing of the left ear that resolved after having his ear cleaned by Dr. Valentine. Mr. Martin denied otalgia and tinnitus. He noted imbalance and a history of occupational noise exposure.        Tympanometry revealed Type A tympanogram in the right ear and Type A tympanogram in the left ear. Audiogram results revealed normal hearing sloping to moderate sensorineural hearing loss in the right ear and normal hearing sloping to moderate sensorineural hearing loss in the left ear.  Speech reception thresholds were noted at 25 dB in the right ear and 25 dB in the left ear.  Speech discrimination scores were 100% in the right ear and 100% in the left ear.    Recommendations:  Otologic evaluation  Hearing aid consultation  Annual audiogram  Hearing protection when in noise

## 2024-12-05 NOTE — PROGRESS NOTES
Ear, Nose, & Throat  Otolaryngology - Head & Neck Surgery    Summary of Visit:  Isma Martin is a kind patient who was referred to me by Dr. Anthony Tay in consultation for Cerumen Impaction and Otitis Media      Subjective:     Chief Complaint:   Chief Complaint   Patient presents with    Cerumen Impaction    Otitis Media       Isma Martin is a 74 y.o. male who was referred to me by Dr. Anthony Tay in consultation for evaluation of his ear.  Two months ago, he experienced pain, crusting, otorrhea in the left ear which was associated with hearing loss as well.  He was treated with Cortisporin ear drops and has experienced near-complete resolution in symptoms.  No longer has any tenderness or drainage but persists with some asymmetrical hearing loss in the left ear.  He denies any prior episodes of similar symptoms..    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 02/21/2017    Impaired mobility and ADLs 07/29/2019    Decreased range of motion of left shoulder 07/29/2019    Decreased strength of upper extremity 07/29/2019    Nuclear sclerotic cataract of both eyes 08/08/2019    Vitamin D deficiency 12/10/2020    Screening for malignant neoplasm of colon 07/08/2021    Aortic atherosclerosis 01/03/2023    Osteoarthritis of first metatarsophalangeal (MTP) joint of left foot 12/07/2023     Resolved Ambulatory Problems     Diagnosis Date Noted    Male hypogonadism  09/24/2012    Hallux rigidus 04/23/2014    Headache 12/23/2014    Screening 09/08/2015    Cervical myelopathy 10/02/2019     Past Medical History:   Diagnosis Date    Arthritis     Bilateral dry eyes     Cataract     Colon polyp     Diabetes mellitus     GERD (gastroesophageal reflux disease)     Hyperlipidemia     Obesity     Urinary tract infection        Past Surgical History  He has a past surgical history that includes Transphenoidal pituitary resection; Foot surgery (4-23-14); Carotid endarterectomy (Left); Cervical laminectomy with spinal fusion (N/A, 10/29/2019); Colonoscopy (N/A, 7/8/2021); and injection, spine, lumbosacral, transforaminal approach (Right, 4/12/2024).    Past Surgical History:   Procedure Laterality Date    CAROTID ENDARTERECTOMY Left     CERVICAL LAMINECTOMY WITH SPINAL FUSION N/A 10/29/2019    Procedure: LAMINECTOMY, SPINE, CERVICAL, WITH FUSION C3-6 Depuy SNS: [employer]SSE[employer] [place] + Pads;  Surgeon: Ariel Lamar MD;  Location: [facility] OR [address] [place] [address];  Service: Neurosurgery;  Laterality: N/A;    COLONOSCOPY N/A 7/8/2021    Procedure: COLONOSCOPY;  Surgeon: Veena Power MD;  Location: [facility] ENDO (4TH FLR);  Service: Endoscopy;  Laterality: N/A;  fully vaccinated 4/11/21, instructions sent to [email or web address]    FOOT SURGERY  4-23-14    right    INJECTION, SPINE, LUMBOSACRAL, TRANSFORAMINAL APPROACH Right 4/12/2024    Procedure: TFESI RT L2/3, L3/4;  Surgeon: Elvis Zhou MD;  Location: [facility] PAIN MANAGEMENT;  Service: Pain Management;  Laterality: Right;  20mins- no ac    TRANSPHENOIDAL PITUITARY RESECTION      pituitary tumor        Family History  His family history includes Glaucoma in his mother; Heart attack in his sister; Heart disease in his brother and sister.    Social History  He reports that he quit smoking about 29 years ago. His smoking use included cigarettes. He started smoking about 64 years ago. He has a 52.5 pack-year smoking history. He has never used smokeless  tobacco. He reports current alcohol use of about 0.8 standard drinks of alcohol per week. He reports that he does not use drugs.    Allergies  He has No Known Allergies.    Medications  He has a current medication list which includes the following prescription(s): acetaminophen, ascorbic acid (vitamin c), atorvastatin, cabergoline, cholecalciferol (vitamin d3), cyanocobalamin, dexamethasone sodium phos (pf), fentanyl, gabapentin, ibuprofen, losartan-hydrochlorothiazide 50-12.5 mg, metoprolol succinate, midazolam, neomycin-polymyxin-hydrocortisone, omega-3 fatty acids-vitamin e, omeprazole, omnipaque 300, oxycodone-acetaminophen, sildenafil, solifenacin, and vitamin e acetate.    ROS:  Pertinent positive and negative review of systems as noted in HPI.     Objective:     There were no vitals taken for this visit.   General Appearance:   Awake, Alert and Oriented. NAD. Appropriate affect and appearance      Neuro:   Spontaneous eye opening, appropriate verbal responses, follows commands  Pupils equal, round & brisk. EOMI, no proptosis, no spontaneous nystagmus  Face is symmetric, HB I, non-edematous and SILT bilaterally  Vision grossly intact, Hearing grossly intact  MUSTAPHA, normal tone     Head and Face:   skin is intact, facial movement symmetric     Ears:  Periauricular regions non-erythematous, non-fluctuanct non-tender  Pinna normal bilaterally, no skin lesions  EACs patent and without drainage bilaterally; moderate cerumen was removed from the left external auditory canal   Tympanic membranes are normal in appearance bilaterally.  No middle ear effusion noted bilaterally.    Nose:   External nose is symmetric, no skin lesions  Septum midline, No inferior turbinate hypertrophy, No polyps or rhinorrhea     OC/OP:  Tongue midline on extension, non-edematous, soft  No labial, buccal, oral tongue or floor of mouth lesions  Soft palate symmetric, midline and without lesions or masses, tonsils symmetric  No masses or  lesions of the visualized oropharynx     Neck:  Neck is symmetric, non-edematous, non-erythematous  Trachea is midline and easily palpable,  No palpable adenopathy or masses in levels I-VI     Glands:  Parotid and submandibular glands are symmetric and non-tender.   No thyroid nodules or masses, non-tender      Respiratory:  Normal work of breathing, no accessory muscle use, no stridor     Voice:  Normal vocal quality, volume, prosody and articulation   Data Review:       AUDIO        Procedures:       Assessment:     1. Impacted cerumen of left ear    2.     Bilateral sensorineural hearing loss    Plan:     I had a discussion with the patient regarding his condition and the further workup and management options.  His initial infection was likely an uncomplicated otitis externa, which has resolved.  His hearing is symmetric, with mild-moderate high frequency sensorineural hearing loss noted on audiogram today.  I believe that he is an appropriate candidate for hearing aids. I will give him the contact information of Chiquis Oakes () our hearing aid coordinator. I also discussed with him the need to repeat his audiogram in 1 year. He was afforded an opportunity to ask questions. He showed good understanding and agreed with this plan.     No orders of the defined types were placed in this encounter.         Problem List Items Addressed This Visit    None  Visit Diagnoses       Impacted cerumen of left ear

## 2025-02-05 ENCOUNTER — LAB VISIT (OUTPATIENT)
Dept: LAB | Facility: HOSPITAL | Age: 75
End: 2025-02-05
Payer: MEDICARE

## 2025-02-05 DIAGNOSIS — D35.2 PROLACTINOMA: ICD-10-CM

## 2025-02-05 LAB
PROLACTIN SERPL IA-MCNC: 2.8 NG/ML (ref 3.5–19.4)
TESTOST SERPL-MCNC: 764 NG/DL (ref 304–1227)

## 2025-02-05 PROCEDURE — 84146 ASSAY OF PROLACTIN: CPT | Performed by: INTERNAL MEDICINE

## 2025-02-05 PROCEDURE — 36415 COLL VENOUS BLD VENIPUNCTURE: CPT | Performed by: INTERNAL MEDICINE

## 2025-02-05 PROCEDURE — 84403 ASSAY OF TOTAL TESTOSTERONE: CPT | Performed by: INTERNAL MEDICINE

## 2025-02-12 ENCOUNTER — OFFICE VISIT (OUTPATIENT)
Dept: ENDOCRINOLOGY | Facility: CLINIC | Age: 75
End: 2025-02-12
Payer: MEDICARE

## 2025-02-12 VITALS
HEIGHT: 65 IN | BODY MASS INDEX: 26.79 KG/M2 | DIASTOLIC BLOOD PRESSURE: 65 MMHG | WEIGHT: 160.81 LBS | OXYGEN SATURATION: 97 % | SYSTOLIC BLOOD PRESSURE: 130 MMHG | HEART RATE: 53 BPM

## 2025-02-12 DIAGNOSIS — E11.9 TYPE 2 DIABETES MELLITUS WITHOUT COMPLICATION, WITHOUT LONG-TERM CURRENT USE OF INSULIN: Primary | ICD-10-CM

## 2025-02-12 DIAGNOSIS — N13.8 BPH WITH URINARY OBSTRUCTION: Primary | ICD-10-CM

## 2025-02-12 DIAGNOSIS — D35.2 PROLACTINOMA: ICD-10-CM

## 2025-02-12 DIAGNOSIS — N13.8 BPH WITH URINARY OBSTRUCTION: ICD-10-CM

## 2025-02-12 DIAGNOSIS — N40.1 BPH WITH URINARY OBSTRUCTION: Primary | ICD-10-CM

## 2025-02-12 DIAGNOSIS — N40.1 BPH WITH URINARY OBSTRUCTION: ICD-10-CM

## 2025-02-12 PROCEDURE — G2211 COMPLEX E/M VISIT ADD ON: HCPCS | Mod: S$GLB,,, | Performed by: INTERNAL MEDICINE

## 2025-02-12 PROCEDURE — 3072F LOW RISK FOR RETINOPATHY: CPT | Mod: CPTII,S$GLB,, | Performed by: INTERNAL MEDICINE

## 2025-02-12 PROCEDURE — 1160F RVW MEDS BY RX/DR IN RCRD: CPT | Mod: CPTII,S$GLB,, | Performed by: INTERNAL MEDICINE

## 2025-02-12 PROCEDURE — 3008F BODY MASS INDEX DOCD: CPT | Mod: CPTII,S$GLB,, | Performed by: INTERNAL MEDICINE

## 2025-02-12 PROCEDURE — 99999 PR PBB SHADOW E&M-EST. PATIENT-LVL V: CPT | Mod: PBBFAC,,, | Performed by: INTERNAL MEDICINE

## 2025-02-12 PROCEDURE — 99214 OFFICE O/P EST MOD 30 MIN: CPT | Mod: S$GLB,,, | Performed by: INTERNAL MEDICINE

## 2025-02-12 PROCEDURE — 3078F DIAST BP <80 MM HG: CPT | Mod: CPTII,S$GLB,, | Performed by: INTERNAL MEDICINE

## 2025-02-12 PROCEDURE — 3075F SYST BP GE 130 - 139MM HG: CPT | Mod: CPTII,S$GLB,, | Performed by: INTERNAL MEDICINE

## 2025-02-12 PROCEDURE — 1157F ADVNC CARE PLAN IN RCRD: CPT | Mod: CPTII,S$GLB,, | Performed by: INTERNAL MEDICINE

## 2025-02-12 PROCEDURE — 1159F MED LIST DOCD IN RCRD: CPT | Mod: CPTII,S$GLB,, | Performed by: INTERNAL MEDICINE

## 2025-02-12 PROCEDURE — 1125F AMNT PAIN NOTED PAIN PRSNT: CPT | Mod: CPTII,S$GLB,, | Performed by: INTERNAL MEDICINE

## 2025-02-12 RX ORDER — CABERGOLINE 0.5 MG/1
0.25 TABLET ORAL
Qty: 8 TABLET | Refills: 11 | Status: SHIPPED | OUTPATIENT
Start: 2025-02-13

## 2025-02-12 NOTE — PROGRESS NOTES
PITUIT[facility] ENDOCRINOLOGY FOLLOW UP VISIT  02/12/2025       Subjective:   New to me, Last seen in clinic on December 2022 by Tommie Clark and Daniel     HPI:   Isma Martin is a 74 y.o. male who presents for evaluation of prolactinoma.  Has been on cabergoline 0.25 mg twice weekly (resumed medication in August 2016) which he takes on Monday and Friday    Initial presentation:  - He presented with headache and decreased libido in his 40's and was found to have a pituitary tumor.  - He was initiated on testosterone when he was in his 60's (started with IM and was switched to gel) but stopped with no improvement of symptoms.  - Currently not on testosterone supplementation but has decreased libido  - At last visit cabergoline was continued, he was found to have normal testosterone level and normal dexa scan   - Interventions have included a Surgery in 1996 at [facility] -> residual pituitary tissue within the floor of the sella on MRI done in 2016    Since his last visit:   Denies new medical problems or medications  Appetite is unchanged, typically eats one meal a day and he does experience hunger   Weight is unchanged  Denies nausea, vomiting or abdominal discomfort   Occasional loose stools    Nocturia during he night but this is unchanged. Would like to see urology again, last appointment 1/2023.    Headache:    No, occasional pressure headache   Vision change:   Sometimes blurry  Formal Visual fields:   Not recently  -  Current symptoms:  Hyperprolactinemia:  []  breast tenderness []  nipple discharge [x]  Denies     Lab Results   Component Value Date    PROLACTIN 2.8 (L) 02/05/2025    PROLACTIN 3.5 10/08/2024    PROLACTIN 9.0 08/23/2022    PROLACTIN 23.8 (H) 12/12/2020    PROLACTIN 12.5 07/23/2020    PROLACTIN 5.1 09/25/2019       Thyroid:  []  fatigue []  weight change []  temp intolerance     [x]  Denies    []  BM change []  skin/hair change [] palpitations []  tremor [x]  Denies    Lab Results    Component Value Date    TSH 0.571 12/12/2020    FREET4 0.98 12/12/2020          Growth Hormone Excess:  []  increase hand/foot size []  teeth spacing change    [x]  Denies    []  worse glycemic control []  joint pain     [x]  Denies    Last IGF-1:   Lab Results   Component Value Date    SOMATMDN 173 08/12/2015    SOMATMDN 169 12/08/2014          Regarding hypogonadotropic hypogonadism:     [x]  Decreased libido   []  ED   []  Denies      DEXA Scan on January 2021: Normal     FINDINGS:  Lumbar spine (L1-L4):              BMD is 1.061 g/cm2, T-score is -0.3, and Z-score is -0.3.   Total hip:                               BMD is 1.105 g/cm2, T-score is 0.5, and Z-score is 0.6.  Femoral neck:                         BMD is 0.975 g/cm2, T-score is 0.3, and Z-score is 0.8.     FRAX:  1.6% risk of a major osteoporotic fracture in the next 10 years.   0.1% risk of hip fracture in the next 10 years.     Impression:  1. Normal BMD  2. Compared with previous DXA, BMD at the lumbar spine has remained stable, and the BMD at the total hip has increased 3.2%.        Past Medical History:   Diagnosis Date    Arthritis     Bilateral dry eyes     Cataract     Colon polyp     Diabetes mellitus     GERD (gastroesophageal reflux disease)     Hyperlipidemia     Hypertension     Hypogonadism male     Obesity     Prolactinoma     Urinary tract infection        Past Surgical History:   Procedure Laterality Date    CAROTID ENDARTERECTOMY Left     CERVICAL LAMINECTOMY WITH SPINAL FUSION N/A 10/29/2019    Procedure: LAMINECTOMY, SPINE, CERVICAL, WITH FUSION C3-6 Depuy SNS: [employer] [place] + Pads;  Surgeon: Ariel Lamar MD;  [facility];  Service: Neurosurgery;  Laterality: N/A;    COLONOSCOPY N/A 7/8/2021    Procedure: COLONOSCOPY;  Surgeon: Veena Power MD;  Location: [facility] (4TH FLR);  Service: Endoscopy;  Laterality: N/A;  fully vaccinated 4/11/21, instructions sent to [email or web address]    FOOT SURGERY   4-23-14    right    INJECTION, SPINE, LUMBOSACRAL, TRANSFORAMINAL APPROACH Right 4/12/2024    Procedure: TFESI RT L2/3, L3/4;  Surgeon: Elvis Zhou MD;  Location: [facility] PAIN MANAGEMENT;  Service: Pain Management;  Laterality: Right;  20mins- no ac    TRANSPHENOIDAL PITUITARY RESECTION      pituitary tumor       Review of patient's allergies indicates:  No Known Allergies    Current Outpatient Medications:     acetaminophen (TYLENOL) 650 MG TbSR, Take 1 tablet (650 mg total) by mouth every 6 (six) hours as needed (pain)., Disp: 56 tablet, Rfl: 0    ascorbic acid (VITAMIN C) 500 MG tablet, Take 500 mg by mouth Every PM., Disp: , Rfl:     atorvastatin (LIPITOR) 40 MG tablet, Take 1 tablet (40 mg total) by mouth once daily., Disp: 90 tablet, Rfl: 11    cholecalciferol, vitamin D3, 2,000 unit Tab, Take 1 tablet by mouth Every PM., Disp: , Rfl:     cyanocobalamin (VITAMIN B-12) 1000 MCG tablet, , Disp: , Rfl:     ibuprofen (ADVIL,MOTRIN) 800 MG tablet, Take 1 tablet (800 mg total) by mouth every 6 (six) hours as needed for Pain., Disp: 60 tablet, Rfl: 1    losartan-hydrochlorothiazide 50-12.5 mg (HYZAAR) 50-12.5 mg per tablet, TAKE 1 TABLET BY MOUTH EVERY DAY, Disp: 90 tablet, Rfl: 3    metoprolol succinate (TOPROL-XL) 25 MG 24 hr tablet, TAKE 1 TABLET(25 MG) BY MOUTH EVERY DAY, Disp: 90 tablet, Rfl: 3    midazolam (VERSED) 1 mg/mL injection, , Disp: , Rfl:     omega-3 fatty acids-vitamin E 1,000 mg Cap, , Disp: , Rfl:     omeprazole (PRILOSEC) 20 MG capsule, TAKE 1 CAPSULE(20 MG) BY MOUTH EVERY DAY, Disp: 90 capsule, Rfl: 3    OMNIPAQUE 300 300 mg iodine/mL injection, , Disp: , Rfl:     sildenafiL (VIAGRA) 100 MG tablet, Take 1 tablet (100 mg total) by mouth as needed for Erectile Dysfunction., Disp: 20 tablet, Rfl: 6    VITAMIN E ACETATE (VITAMIN E ORAL), Take by mouth., Disp: , Rfl:     [START ON 2/13/2025] cabergoline (DOSTINEX) 0.5 mg tablet, Take 0.5 tablets (0.25 mg total) by mouth twice a week., Disp: 8 tablet,  "Rfl: 11    dexamethasone sodium phosp/PF (DEXAMETHASONE SODIUM PHOS, PF,) 10 mg/mL Soln, , Disp: , Rfl:     fentaNYL (SUBLIMAZE) 50 mcg/mL injection, , Disp: , Rfl:     gabapentin (NEURONTIN) 100 MG capsule, Take 1 capsule (100 mg total) by mouth 3 (three) times daily. (Patient not taking: Reported on 2/12/2025), Disp: 90 capsule, Rfl: 0    neomycin-polymyxin-hydrocortisone (CORTISPORIN) 3.5-10,000-1 mg/mL-unit/mL-% otic suspension, Place 3 drops into the left ear 4 (four) times daily. (Patient not taking: Reported on 2/12/2025), Disp: 10 mL, Rfl: 0    oxyCODONE-acetaminophen (PERCOCET) 5-325 mg per tablet, Take 1 tablet by mouth every 12 (twelve) hours as needed for Pain. (Patient not taking: Reported on 2/12/2025), Disp: 60 tablet, Rfl: 0    solifenacin (VESICARE) 10 MG tablet, Take 1 tablet (10 mg total) by mouth once daily., Disp: 30 tablet, Rfl: 11    ROS:  see HPI    Objective:   Physical Exam   /65 (BP Location: Left arm, Patient Position: Sitting)   Pulse (!) 53   Ht 5' 5" (1.651 m)   Wt 73 kg (160 lb 13.2 oz)   SpO2 97%   BMI 26.76 kg/m²   Wt Readings from Last 3 Encounters:   02/12/25 73 kg (160 lb 13.2 oz)   10/08/24 69.7 kg (153 lb 10.6 oz)   04/12/24 74.8 kg (165 lb)   Constitutional:  Pleasant,  in no acute distress.   HENT:   Head:    Normocephalic and atraumatic.   Eyes:    EOMI. No scleral icterus.   Neck:    No thyromegaly, no prominent DC or SC fat pads  Cardiovascular:  Normal rate, regular rhythm, 2+ radial pulses blx   Respiratory:   Effort normal   Gastrointestinal: Soft, nontender  Neurological:  No tremor, normal speech  Skin:    Skin is warm, dry, no striae  Psych:  Normal mood and affect.   Extremity:  No edema or wounds, no deformity     LABORATORY REVIEW:    Chemistry        Component Value Date/Time     10/08/2024 1247    K 4.8 10/08/2024 1247     10/08/2024 1247    CO2 23 10/08/2024 1247    BUN 21 10/08/2024 1247    CREATININE 1.3 10/08/2024 1247    GLU 99 " 10/08/2024 1247        Component Value Date/Time    CALCIUM 9.7 10/08/2024 1247    ALKPHOS 50 (L) 10/08/2024 1247    AST 26 10/08/2024 1247    ALT 26 10/08/2024 1247    BILITOT 0.5 10/08/2024 1247    ESTGFRAFRICA >60.0 01/18/2022 1255    EGFRNONAA >60.0 01/18/2022 1255            MRI brain 6/21/2016 - on cabergoline w/ normal PRL    MRI 2014 - off cabergoline       Assessment/Plan:     Problem List Items Addressed This Visit          1 - High    Prolactinoma     - will check 8 am morning fasting labs to check for prolactin and testosterone levels   - s/p resection at [facility] 1996 with symptoms of diminished libido in the setting of previously normal testosterone level  - Appears to have recurred in age 50s (2014) after which he resumed dopamine agonist   - normal DEXA scan reviewed   - Continue Cabergoline 0.25 mg twice weekly, would likely continue for life considering slightly high prolactin levels on previous check  - The benefits and risks of DA agonist and indications for therapy including macroadenoma, galactorrhea, infertility and hypogonadism which can negatively impact bone health. Take at bedtime with snack, he currently denies nausea or orthostatic symptoms  - Recurrence rates 20% over 10 years.         Relevant Medications    cabergoline (DOSTINEX) 0.5 mg tablet (Start on 2/13/2025)    Other Relevant Orders    TSH    T4, Free    Prolactin       2     Type 2 diabetes mellitus without complication, without long-term current use of insulin - Primary     - not on medication  - continue diet control  - on atorvastatin 40 mg, LDL above goal, but patient reports got off track in the months prior to wife dying in 12/2024  - on losartan microalbumin normal   - Exam done, he will make appointment with his ophthalmologist.              3     BPH with urinary obstruction     Would farhan to see urology again, will help facilitate appointment.

## 2025-02-12 NOTE — ASSESSMENT & PLAN NOTE
- not on medication  - continue diet control  - on atorvastatin 40 mg, LDL above goal, but patient reports got off track in the months prior to wife dying in 12/2024  - on losartan microalbumin normal   - Exam done, he will make appointment with his ophthalmologist.

## 2025-02-12 NOTE — ASSESSMENT & PLAN NOTE
- will check 8 am morning fasting labs to check for prolactin and testosterone levels   - s/p resection at [facility] 1996 with symptoms of diminished libido in the setting of previously normal testosterone level  - Appears to have recurred in age 50s (2014) after which he resumed dopamine agonist   - normal DEXA scan reviewed   - Continue Cabergoline 0.25 mg twice weekly, would likely continue for life considering slightly high prolactin levels on previous check  - The benefits and risks of DA agonist and indications for therapy including macroadenoma, galactorrhea, infertility and hypogonadism which can negatively impact bone health. Take at bedtime with snack, he currently denies nausea or orthostatic symptoms  - Recurrence rates 20% over 10 years.

## 2025-02-20 ENCOUNTER — OFFICE VISIT (OUTPATIENT)
Dept: UROLOGY | Facility: CLINIC | Age: 75
End: 2025-02-20
Payer: MEDICARE

## 2025-02-20 VITALS
DIASTOLIC BLOOD PRESSURE: 83 MMHG | TEMPERATURE: 98 F | HEART RATE: 71 BPM | WEIGHT: 163.56 LBS | SYSTOLIC BLOOD PRESSURE: 130 MMHG | HEIGHT: 65 IN | BODY MASS INDEX: 27.25 KG/M2

## 2025-02-20 DIAGNOSIS — N52.9 ERECTILE DYSFUNCTION, UNSPECIFIED ERECTILE DYSFUNCTION TYPE: ICD-10-CM

## 2025-02-20 DIAGNOSIS — N40.1 BPH WITH URINARY OBSTRUCTION: Primary | ICD-10-CM

## 2025-02-20 DIAGNOSIS — R39.15 URINARY URGENCY: ICD-10-CM

## 2025-02-20 DIAGNOSIS — R35.0 URINARY FREQUENCY: ICD-10-CM

## 2025-02-20 DIAGNOSIS — N13.8 BPH WITH URINARY OBSTRUCTION: Primary | ICD-10-CM

## 2025-02-20 DIAGNOSIS — R35.1 NOCTURIA: ICD-10-CM

## 2025-02-20 LAB
BILIRUB UR QL STRIP: NEGATIVE
CLARITY UR REFRACT.AUTO: CLEAR
COLOR UR AUTO: YELLOW
GLUCOSE UR QL STRIP: NEGATIVE
HGB UR QL STRIP: NEGATIVE
KETONES UR QL STRIP: NEGATIVE
LEUKOCYTE ESTERASE UR QL STRIP: NEGATIVE
NITRITE UR QL STRIP: NEGATIVE
PH UR STRIP: 7 [PH] (ref 5–8)
POC RESIDUAL URINE VOLUME: 68 ML (ref 0–100)
PROT UR QL STRIP: NEGATIVE
SP GR UR STRIP: 1.01 (ref 1–1.03)
URN SPEC COLLECT METH UR: NORMAL

## 2025-02-20 PROCEDURE — 87086 URINE CULTURE/COLONY COUNT: CPT | Mod: HCNC | Performed by: NURSE PRACTITIONER

## 2025-02-20 PROCEDURE — 81003 URINALYSIS AUTO W/O SCOPE: CPT | Mod: HCNC | Performed by: NURSE PRACTITIONER

## 2025-02-20 RX ORDER — TAMSULOSIN HYDROCHLORIDE 0.4 MG/1
0.4 CAPSULE ORAL NIGHTLY
Qty: 30 CAPSULE | Refills: 11 | Status: SHIPPED | OUTPATIENT
Start: 2025-02-20 | End: 2026-02-20

## 2025-02-20 NOTE — PATIENT INSTRUCTIONS
U/A and urine cx today  Bladder scan (PVR) today  PSA, total and free today  MARCOS today  Start trial of tamsulosin (Flomax) 0.4 mg every evening for BPH.   Follow-up in 4 weeks.

## 2025-02-20 NOTE — PROGRESS NOTES
Subjective:       Patient ID: Isma Martin is a 74 y.o. male.    Chief Complaint: Urinary Frequency (Pt states he has been urinating too much, and its been going on for years,. Pt states his old provider is no longer with [facility]. )    History of Present Illness    CHIEF COMPLAINT:  Patient is new to me. He is a 75 yo AAM who presents today for urinary frequency and nocturia.    URINARY SYMPTOMS:  He reports urinary symptoms for approximately 3 years with stable severity. Symptoms include nocturia (3-4 times per night), weak urine flow, urgency, and post-void dribbling when not taking time to completely empty bladder. He experiences urinary frequency about one hour after fluid intake. He denies straining to urinate or urinary hesitancy.    MEDICAL HISTORY:  He has a history of enlarged prostate. Not currently on any medication for that issue.     MEDICATIONS:  He was taking solifenacin (Vesicare) 10 mg for bladder symptoms with minimal improvement. No longer taking since script . He previously took prostate medication in 2017, which has been discontinued.           Objective:      Physical Exam  Vitals and nursing note reviewed.   Constitutional:       General: He is not in acute distress.     Appearance: He is well-developed. He is not ill-appearing.   HENT:      Head: Normocephalic and atraumatic.   Eyes:      Pupils: Pupils are equal, round, and reactive to light.   Cardiovascular:      Rate and Rhythm: Normal rate.   Pulmonary:      Effort: Pulmonary effort is normal. No respiratory distress.   Abdominal:      Palpations: Abdomen is soft.      Tenderness: There is no abdominal tenderness.   Genitourinary:     Prostate: Enlarged. Not tender and no nodules present.      Comments: PVR = 68 mL  Musculoskeletal:         General: Normal range of motion.      Cervical back: Normal range of motion.   Skin:     General: Skin is warm and dry.   Neurological:      Mental Status: He is alert and oriented to  person, place, and time.      Coordination: Coordination normal.   Psychiatric:         Mood and Affect: Mood normal.         Behavior: Behavior normal.         Thought Content: Thought content normal.         Judgment: Judgment normal.         Assessment:       Problem List Items Addressed This Visit       Erectile dysfunction    BPH with urinary obstruction - Primary    Relevant Medications    tamsulosin (FLOMAX) 0.4 mg Cap    Other Relevant Orders    PSA, Total and Free    Urinalysis    Urine Culture High Risk    POCT Bladder Scan (Completed)     Other Visit Diagnoses         Urinary frequency        Relevant Medications    tamsulosin (FLOMAX) 0.4 mg Cap    Other Relevant Orders    Urinalysis    Urine Culture High Risk    POCT Bladder Scan (Completed)      Nocturia        Relevant Medications    tamsulosin (FLOMAX) 0.4 mg Cap    Other Relevant Orders    Urinalysis    Urine Culture High Risk    POCT Bladder Scan (Completed)      Urinary urgency        Relevant Orders    Urinalysis    Urine Culture High Risk    POCT Bladder Scan (Completed)            Plan:           Isma was seen today for urinary frequency.    Diagnoses and all orders for this visit:    BPH with urinary obstruction  -     PSA, Total and Free; Future  -     Urinalysis  -     Urine Culture High Risk  -     tamsulosin (FLOMAX) 0.4 mg Cap; Take 1 capsule (0.4 mg total) by mouth every evening.  -     POCT Bladder Scan    Urinary frequency  -     Urinalysis  -     Urine Culture High Risk  -     tamsulosin (FLOMAX) 0.4 mg Cap; Take 1 capsule (0.4 mg total) by mouth every evening.  -     POCT Bladder Scan    Erectile dysfunction, unspecified erectile dysfunction type    Nocturia  -     Urinalysis  -     Urine Culture High Risk  -     tamsulosin (FLOMAX) 0.4 mg Cap; Take 1 capsule (0.4 mg total) by mouth every evening.  -     POCT Bladder Scan    Urinary urgency  -     Urinalysis  -     Urine Culture High Risk  -     POCT Bladder Scan    Other  orders  MARCOS today  Start trial of tamsulosin (Flomax) 0.4 mg every evening for BPH.     Follow-up in 4 weeks.     This note was generated with the assistance of ambient listening technology. Verbal consent was obtained by the patient and accompanying visitor(s) for the recording of patient appointment to facilitate this note. I attest to having reviewed and edited the generated note for accuracy, though some syntax or spelling errors may persist. Please contact the author of this note for any clarification.      YAdy Salomon, DNP

## 2025-02-21 ENCOUNTER — RESULTS FOLLOW-UP (OUTPATIENT)
Dept: UROLOGY | Facility: CLINIC | Age: 75
End: 2025-02-21
Payer: MEDICARE

## 2025-02-21 DIAGNOSIS — Z00.00 ENCOUNTER FOR MEDICARE ANNUAL WELLNESS EXAM: ICD-10-CM

## 2025-02-21 NOTE — TELEPHONE ENCOUNTER
Spoke w/ patient and let him know his PSA was normal and it's recommend to repeat lab in 1 year. Also let patient know his next follow up appointment is on 3/20/25. Patient voiced understanding.

## 2025-02-21 NOTE — TELEPHONE ENCOUNTER
----- Message from Jakob Salomon DNP sent at 2/21/2025  8:44 AM CST -----  Please inform patient via telephone that his PSA (prostate cancer screening lab) is normal at 1.2 ng/mL. Recommended to repeat in 1 year.   ----- Message -----  From: Asim, "[facility] Lab Interface  Sent: 2/20/2025   4:37 PM CST  To: Jakob Salomon DNP

## 2025-02-22 LAB — BACTERIA UR CULT: NO GROWTH

## 2025-03-10 ENCOUNTER — OFFICE VISIT (OUTPATIENT)
Dept: URGENT CARE | Facility: CLINIC | Age: 75
End: 2025-03-10
Payer: MEDICARE

## 2025-03-10 VITALS
BODY MASS INDEX: 26.66 KG/M2 | DIASTOLIC BLOOD PRESSURE: 55 MMHG | RESPIRATION RATE: 16 BRPM | WEIGHT: 160 LBS | HEART RATE: 73 BPM | HEIGHT: 65 IN | TEMPERATURE: 97 F | OXYGEN SATURATION: 100 % | SYSTOLIC BLOOD PRESSURE: 110 MMHG

## 2025-03-10 DIAGNOSIS — M54.50 BILATERAL LOW BACK PAIN WITHOUT SCIATICA, UNSPECIFIED CHRONICITY: ICD-10-CM

## 2025-03-10 DIAGNOSIS — R10.9 ABDOMINAL CRAMPING: ICD-10-CM

## 2025-03-10 DIAGNOSIS — A09 INFECTIOUS DIARRHEA: Primary | ICD-10-CM

## 2025-03-10 PROCEDURE — 99203 OFFICE O/P NEW LOW 30 MIN: CPT | Mod: S$GLB,,, | Performed by: NURSE PRACTITIONER

## 2025-03-10 RX ORDER — AZITHROMYCIN 500 MG/1
500 TABLET, FILM COATED ORAL DAILY
Qty: 3 TABLET | Refills: 0 | Status: SHIPPED | OUTPATIENT
Start: 2025-03-10 | End: 2025-03-13

## 2025-03-10 RX ORDER — DICYCLOMINE HYDROCHLORIDE 20 MG/1
20 TABLET ORAL EVERY 8 HOURS PRN
Qty: 21 TABLET | Refills: 0 | Status: SHIPPED | OUTPATIENT
Start: 2025-03-10

## 2025-03-10 NOTE — PATIENT INSTRUCTIONS
Drop off stool sample inside [facility] on [address]   Ask  for location of outpatient lab, where you will drop the sample off.       Diarrhea:  Drink powerade, pedialyte-or any electrolyte rich drink to replace fluids lost  Eat a bland diet: avoid greasy, spicy, and dairy until well  Eat foods that might constipate you-such as plain pasta, rice, potatoes, applesauce, toast  Good handwashing and contact precautions as diarrhea can be contagious (wipe surfaces in bathroom)  Rest   Follow up with dehydration (decreased urine output), blood in stool, persistent symptoms (diarrhea >10 times per day or >1 week), lethargy, any distress, or any other concerns

## 2025-03-10 NOTE — PROGRESS NOTES
"Subjective:      Patient ID: Isma Martin is a 74 y.o. male.    Vitals:  height is 5' 5" (1.651 m) and weight is 72.6 kg (160 lb). His tympanic temperature is 97.4 °F (36.3 °C). His blood pressure is 110/55 (abnormal) and his pulse is 73. His respiration is 16 and oxygen saturation is 100%.     Chief Complaint: Diarrhea    73 y/o male presents to  today with c/o diarrhea x1 week.   Having about 10 episodes per day (worse at night, he states); and drinking water makes the water go "straight through him".  Denies blood in stool and denies fever. Denies recent antibiotic use.   Reports abdominal cramping.   Pt also mentions that his BP has been lower than normal since having diarrhea-but not eating and drinking well.   Also reports some lower back pain, bilaterally.   Taking Immodium for symptoms.       Diarrhea   This is a new problem. Episode onset: 6 days ago. The problem occurs more than 10 times per day. The problem has been unchanged. The stool consistency is described as Watery. The patient states that diarrhea awakens him from sleep. Associated symptoms include abdominal pain. Pertinent negatives include no chills, coughing, fever or vomiting. Treatments tried: imodium.       Constitution: Positive for fatigue. Negative for chills and fever.   Respiratory:  Negative for cough.    Gastrointestinal:  Positive for abdominal pain and diarrhea. Negative for nausea, vomiting, constipation, bright red blood in stool and dark colored stools.   Genitourinary:  Negative for dysuria and flank pain.   Musculoskeletal:  Positive for back pain.      Objective:     Physical Exam   Constitutional: He is oriented to person, place, and time.  Non-toxic appearance. He does not appear ill. No distress.   HENT:   Head: Normocephalic.   Nose: Nose normal.   Mouth/Throat: Mucous membranes are moist.   Cardiovascular: Normal rate and regular rhythm.   Pulmonary/Chest: Effort normal and breath sounds normal.   Abdominal: Normal " appearance. He exhibits distension (mildly bloated). Soft. Bowel sounds are increased. There is no abdominal tenderness. There is no rebound, no guarding, no left CVA tenderness and no right CVA tenderness.   Musculoskeletal: Normal range of motion.         General: Normal range of motion.   Neurological: He is alert and oriented to person, place, and time.   Skin: Skin is warm, dry and not diaphoretic.   Psychiatric: His behavior is normal.   Nursing note and vitals reviewed.      Assessment:     1. Infectious diarrhea    2. Abdominal cramping    3. Bilateral low back pain without sciatica, unspecified chronicity        Plan:   Treating for infectious diarrhea due to symptoms at one week, with >10 episodes per day. Stool studies also ordered.     Infectious diarrhea  -     azithromycin (ZITHROMAX) 500 MG tablet; Take 1 tablet (500 mg total) by mouth once daily. for 3 days  Dispense: 3 tablet; Refill: 0  -     CULTURE, STOOL; Future; Expected date: 03/10/2025  -     Stool Exam-Ova,Cysts,Parasites; Future; Expected date: 03/10/2025  -     Giardia / Cryptosporidum, EIA; Future; Expected date: 03/10/2025  -     Clostridium difficile EIA; Future; Expected date: 03/10/2025    Abdominal cramping  -     dicyclomine (BENTYL) 20 mg tablet; Take 1 tablet (20 mg total) by mouth every 8 (eight) hours as needed (abdominal cramping).  Dispense: 21 tablet; Refill: 0    Bilateral low back pain without sciatica, unspecified chronicity      Patient Instructions   Drop off stool sample inside [facility] on [address]   Ask  for location of outpatient lab, where you will drop the sample off.       Diarrhea:  Drink powerade, pedialyte-or any electrolyte rich drink to replace fluids lost  Eat a bland diet: avoid greasy, spicy, and dairy until well  Eat foods that might constipate you-such as plain pasta, rice, potatoes, applesauce, toast  Good handwashing and contact precautions as diarrhea can be contagious (wipe  surfaces in bathroom)  Rest   Follow up with dehydration (decreased urine output), blood in stool, persistent symptoms (diarrhea >10 times per day or >1 week), lethargy, any distress, or any other concerns

## 2025-03-20 ENCOUNTER — OFFICE VISIT (OUTPATIENT)
Dept: UROLOGY | Facility: CLINIC | Age: 75
End: 2025-03-20
Payer: MEDICARE

## 2025-03-20 VITALS
HEART RATE: 79 BPM | BODY MASS INDEX: 27.92 KG/M2 | HEIGHT: 65 IN | WEIGHT: 167.56 LBS | SYSTOLIC BLOOD PRESSURE: 142 MMHG | DIASTOLIC BLOOD PRESSURE: 75 MMHG | OXYGEN SATURATION: 97 %

## 2025-03-20 DIAGNOSIS — R35.0 URINARY FREQUENCY: ICD-10-CM

## 2025-03-20 DIAGNOSIS — N40.1 BENIGN PROSTATIC HYPERPLASIA WITH POST-VOID DRIBBLING: Primary | ICD-10-CM

## 2025-03-20 DIAGNOSIS — N52.9 ERECTILE DYSFUNCTION, UNSPECIFIED ERECTILE DYSFUNCTION TYPE: ICD-10-CM

## 2025-03-20 DIAGNOSIS — N39.43 BENIGN PROSTATIC HYPERPLASIA WITH POST-VOID DRIBBLING: Primary | ICD-10-CM

## 2025-03-20 DIAGNOSIS — R35.1 NOCTURIA: ICD-10-CM

## 2025-03-20 PROCEDURE — 99214 OFFICE O/P EST MOD 30 MIN: CPT | Mod: HCNC,S$GLB,, | Performed by: NURSE PRACTITIONER

## 2025-03-20 PROCEDURE — 3078F DIAST BP <80 MM HG: CPT | Mod: HCNC,CPTII,S$GLB, | Performed by: NURSE PRACTITIONER

## 2025-03-20 PROCEDURE — 1101F PT FALLS ASSESS-DOCD LE1/YR: CPT | Mod: HCNC,CPTII,S$GLB, | Performed by: NURSE PRACTITIONER

## 2025-03-20 PROCEDURE — 1157F ADVNC CARE PLAN IN RCRD: CPT | Mod: HCNC,CPTII,S$GLB, | Performed by: NURSE PRACTITIONER

## 2025-03-20 PROCEDURE — 3072F LOW RISK FOR RETINOPATHY: CPT | Mod: HCNC,CPTII,S$GLB, | Performed by: NURSE PRACTITIONER

## 2025-03-20 PROCEDURE — 99999 PR PBB SHADOW E&M-EST. PATIENT-LVL V: CPT | Mod: PBBFAC,HCNC,, | Performed by: NURSE PRACTITIONER

## 2025-03-20 PROCEDURE — 3008F BODY MASS INDEX DOCD: CPT | Mod: HCNC,CPTII,S$GLB, | Performed by: NURSE PRACTITIONER

## 2025-03-20 PROCEDURE — 1126F AMNT PAIN NOTED NONE PRSNT: CPT | Mod: HCNC,CPTII,S$GLB, | Performed by: NURSE PRACTITIONER

## 2025-03-20 PROCEDURE — 1160F RVW MEDS BY RX/DR IN RCRD: CPT | Mod: HCNC,CPTII,S$GLB, | Performed by: NURSE PRACTITIONER

## 2025-03-20 PROCEDURE — 1159F MED LIST DOCD IN RCRD: CPT | Mod: HCNC,CPTII,S$GLB, | Performed by: NURSE PRACTITIONER

## 2025-03-20 PROCEDURE — 3077F SYST BP >= 140 MM HG: CPT | Mod: HCNC,CPTII,S$GLB, | Performed by: NURSE PRACTITIONER

## 2025-03-20 PROCEDURE — 3288F FALL RISK ASSESSMENT DOCD: CPT | Mod: HCNC,CPTII,S$GLB, | Performed by: NURSE PRACTITIONER

## 2025-03-20 RX ORDER — SILDENAFIL 100 MG/1
100 TABLET, FILM COATED ORAL
Qty: 30 TABLET | Refills: 11 | Status: SHIPPED | OUTPATIENT
Start: 2025-03-20 | End: 2026-03-20

## 2025-03-20 RX ORDER — SILDENAFIL 100 MG/1
100 TABLET, FILM COATED ORAL
Qty: 20 TABLET | Refills: 11 | Status: SHIPPED | OUTPATIENT
Start: 2025-03-20 | End: 2025-03-20

## 2025-03-20 RX ORDER — TAMSULOSIN HYDROCHLORIDE 0.4 MG/1
0.4 CAPSULE ORAL NIGHTLY
Qty: 90 CAPSULE | Refills: 3 | Status: SHIPPED | OUTPATIENT
Start: 2025-03-20 | End: 2026-03-20

## 2025-03-20 NOTE — PATIENT INSTRUCTIONS
Continue taking tamsulosin (Flomax) 0.4 mg every evening for BPH.   Start back taking sildenafil (Viagra) 100 mg as needed daily for ED. DO NOT exceed more than 1 pill in a 24 hour period. Take 1 pill a hour before sexual activity. Goodrx coupon provided.   Follow-up in 6 months or sooner if needed.

## 2025-03-20 NOTE — PROGRESS NOTES
Subjective:       Patient ID: Isma Martin is a 74 y.o. male.    Chief Complaint: Follow-up    History of Present Illness    CHIEF COMPLAINT:  Patient presents today for his 4 week follow-up for BPH since being started on tamsulosin 0.4 mg every evening.    UROLOGIC SYMPTOMS:  He reports improvement in urinary symptoms since starting Tamsulosin (Flomax). Nighttime urination has reduced to 2-3 times per night with improved dribbling, increased stream strength, and complete bladder emptying. He reports erectile dysfunction for approximately one year, with erections lasting only 5-10 minutes and requiring significant concentration to maintain. He denies morning erections. He previously had positive response to Viagra treatment and would like to restart medication.    BLOOD PRESSURE:  Home blood pressure readings range from 117-120/77, noting that his blood pressure typically runs in the 130s.    RECENT ILLNESS:  He reports a two-week illness due to a bacterial stomach virus that required urgent care evaluation.    MEDICAL HISTORY:  He has a history of prolactinoma and diabetes, currently managed by Dr. Cordoba.      ROS:  General: -fever, -chills, -fatigue, -weight gain, -weight loss  Eyes: -vision changes, -redness, -discharge  ENT: -ear pain, -nasal congestion, -sore throat  Cardiovascular: -chest pain, -palpitations, -lower extremity edema  Respiratory: -cough, -shortness of breath  Gastrointestinal: -abdominal pain, -nausea, -vomiting, -diarrhea, -constipation, -blood in stool  Genitourinary: -dysuria, -hematuria, -frequency, +nocturia, +urgency, +post-urination dribbling, +weak urine stream  Musculoskeletal: -joint pain, -muscle pain  Skin: -rash, -lesion  Neurological: -headache, -dizziness, -numbness, -tingling, -lightheadedness  Psychiatric: -anxiety, -depression, -sleep difficulty  Male Genitourinary: +sexual difficulty, pain, or concern, +erectile dysfunction        Objective:      Physical Exam  Vitals  and nursing note reviewed.   Constitutional:       General: He is not in acute distress.     Appearance: He is well-developed. He is not ill-appearing.   HENT:      Head: Normocephalic and atraumatic.   Eyes:      Pupils: Pupils are equal, round, and reactive to light.   Cardiovascular:      Rate and Rhythm: Normal rate.   Pulmonary:      Effort: Pulmonary effort is normal. No respiratory distress.   Abdominal:      Palpations: Abdomen is soft.      Tenderness: There is no abdominal tenderness.   Musculoskeletal:         General: Normal range of motion.      Cervical back: Normal range of motion.   Skin:     General: Skin is warm and dry.   Neurological:      Mental Status: He is alert and oriented to person, place, and time.      Coordination: Coordination normal.   Psychiatric:         Mood and Affect: Mood normal.         Behavior: Behavior normal.         Thought Content: Thought content normal.         Judgment: Judgment normal.         Assessment:       Problem List Items Addressed This Visit       Erectile dysfunction    Relevant Medications    sildenafiL (VIAGRA) 100 MG tablet    BPH with urinary obstruction - Primary    Relevant Medications    tamsulosin (FLOMAX) 0.4 mg Cap     Other Visit Diagnoses         Nocturia        Relevant Medications    tamsulosin (FLOMAX) 0.4 mg Cap      Urinary frequency        Relevant Medications    tamsulosin (FLOMAX) 0.4 mg Cap            Plan:           Diagnoses and all orders for this visit:    Benign prostatic hyperplasia with post-void dribbling  -     tamsulosin (FLOMAX) 0.4 mg Cap; Take 1 capsule (0.4 mg total) by mouth every evening.    Nocturia  -     tamsulosin (FLOMAX) 0.4 mg Cap; Take 1 capsule (0.4 mg total) by mouth every evening.    Urinary frequency  -     tamsulosin (FLOMAX) 0.4 mg Cap; Take 1 capsule (0.4 mg total) by mouth every evening.    Erectile dysfunction, unspecified erectile dysfunction type  -     sildenafiL (VIAGRA) 100 MG tablet; Take 1 tablet  (100 mg total) by mouth as needed for Erectile Dysfunction.    Other orders  Continue taking tamsulosin (Flomax) 0.4 mg every evening for BPH.   Start back taking sildenafil (Viagra) 100 mg as needed daily for ED. DO NOT exceed more than 1 pill in a 24 hour period. Take 1 pill a hour before sexual activity. Goodrx coupon provided.     Follow-up in 6 months or sooner if needed.     This note was generated with the assistance of ambient listening technology. Verbal consent was obtained by the patient and accompanying visitor(s) for the recording of patient appointment to facilitate this note. I attest to having reviewed and edited the generated note for accuracy, though some syntax or spelling errors may persist. Please contact the author of this note for any clarification.      Jakob Salomon, DNP

## 2025-04-17 ENCOUNTER — OFFICE VISIT (OUTPATIENT)
Dept: INTERNAL MEDICINE | Facility: CLINIC | Age: 75
End: 2025-04-17
Payer: MEDICARE

## 2025-04-17 ENCOUNTER — LAB VISIT (OUTPATIENT)
Dept: LAB | Facility: HOSPITAL | Age: 75
End: 2025-04-17
Attending: INTERNAL MEDICINE
Payer: MEDICARE

## 2025-04-17 VITALS
WEIGHT: 163.38 LBS | HEART RATE: 54 BPM | OXYGEN SATURATION: 96 % | BODY MASS INDEX: 27.22 KG/M2 | HEIGHT: 65 IN | SYSTOLIC BLOOD PRESSURE: 110 MMHG | DIASTOLIC BLOOD PRESSURE: 60 MMHG

## 2025-04-17 DIAGNOSIS — E11.9 TYPE 2 DIABETES MELLITUS WITHOUT COMPLICATION, WITHOUT LONG-TERM CURRENT USE OF INSULIN: ICD-10-CM

## 2025-04-17 DIAGNOSIS — M48.061 SPINAL STENOSIS OF LUMBAR REGION WITHOUT NEUROGENIC CLAUDICATION: Primary | ICD-10-CM

## 2025-04-17 DIAGNOSIS — I10 ESSENTIAL HYPERTENSION: ICD-10-CM

## 2025-04-17 DIAGNOSIS — K21.9 GASTROESOPHAGEAL REFLUX DISEASE: ICD-10-CM

## 2025-04-17 LAB
ANION GAP (OHS): 6 MMOL/L (ref 8–16)
BUN SERPL-MCNC: 21 MG/DL (ref 8–23)
CALCIUM SERPL-MCNC: 9.2 MG/DL (ref 8.7–10.5)
CHLORIDE SERPL-SCNC: 104 MMOL/L (ref 95–110)
CO2 SERPL-SCNC: 28 MMOL/L (ref 23–29)
CREAT SERPL-MCNC: 1.1 MG/DL (ref 0.5–1.4)
EAG (OHS): 117 MG/DL (ref 68–131)
GFR SERPLBLD CREATININE-BSD FMLA CKD-EPI: >60 ML/MIN/1.73/M2
GLUCOSE SERPL-MCNC: 104 MG/DL (ref 70–110)
HBA1C MFR BLD: 5.7 % (ref 4–5.6)
POTASSIUM SERPL-SCNC: 4 MMOL/L (ref 3.5–5.1)
SODIUM SERPL-SCNC: 138 MMOL/L (ref 136–145)

## 2025-04-17 PROCEDURE — 3074F SYST BP LT 130 MM HG: CPT | Mod: HCNC,CPTII,S$GLB, | Performed by: INTERNAL MEDICINE

## 2025-04-17 PROCEDURE — 82310 ASSAY OF CALCIUM: CPT | Mod: HCNC

## 2025-04-17 PROCEDURE — 1159F MED LIST DOCD IN RCRD: CPT | Mod: HCNC,CPTII,S$GLB, | Performed by: INTERNAL MEDICINE

## 2025-04-17 PROCEDURE — 99999 PR PBB SHADOW E&M-EST. PATIENT-LVL IV: CPT | Mod: PBBFAC,HCNC,, | Performed by: INTERNAL MEDICINE

## 2025-04-17 PROCEDURE — 3288F FALL RISK ASSESSMENT DOCD: CPT | Mod: HCNC,CPTII,S$GLB, | Performed by: INTERNAL MEDICINE

## 2025-04-17 PROCEDURE — 83036 HEMOGLOBIN GLYCOSYLATED A1C: CPT | Mod: HCNC

## 2025-04-17 PROCEDURE — 1125F AMNT PAIN NOTED PAIN PRSNT: CPT | Mod: HCNC,CPTII,S$GLB, | Performed by: INTERNAL MEDICINE

## 2025-04-17 PROCEDURE — 1157F ADVNC CARE PLAN IN RCRD: CPT | Mod: HCNC,CPTII,S$GLB, | Performed by: INTERNAL MEDICINE

## 2025-04-17 PROCEDURE — 36415 COLL VENOUS BLD VENIPUNCTURE: CPT | Mod: HCNC

## 2025-04-17 PROCEDURE — 1101F PT FALLS ASSESS-DOCD LE1/YR: CPT | Mod: HCNC,CPTII,S$GLB, | Performed by: INTERNAL MEDICINE

## 2025-04-17 PROCEDURE — 3008F BODY MASS INDEX DOCD: CPT | Mod: HCNC,CPTII,S$GLB, | Performed by: INTERNAL MEDICINE

## 2025-04-17 PROCEDURE — 3072F LOW RISK FOR RETINOPATHY: CPT | Mod: HCNC,CPTII,S$GLB, | Performed by: INTERNAL MEDICINE

## 2025-04-17 PROCEDURE — 1160F RVW MEDS BY RX/DR IN RCRD: CPT | Mod: HCNC,CPTII,S$GLB, | Performed by: INTERNAL MEDICINE

## 2025-04-17 PROCEDURE — 3078F DIAST BP <80 MM HG: CPT | Mod: HCNC,CPTII,S$GLB, | Performed by: INTERNAL MEDICINE

## 2025-04-17 PROCEDURE — G2211 COMPLEX E/M VISIT ADD ON: HCPCS | Mod: HCNC,S$GLB,, | Performed by: INTERNAL MEDICINE

## 2025-04-17 PROCEDURE — 99214 OFFICE O/P EST MOD 30 MIN: CPT | Mod: HCNC,S$GLB,, | Performed by: INTERNAL MEDICINE

## 2025-04-17 RX ORDER — LOSARTAN POTASSIUM AND HYDROCHLOROTHIAZIDE 12.5; 5 MG/1; MG/1
1 TABLET ORAL DAILY
Qty: 90 TABLET | Refills: 3 | Status: SHIPPED | OUTPATIENT
Start: 2025-04-17

## 2025-04-17 RX ORDER — ATORVASTATIN CALCIUM 40 MG/1
40 TABLET, FILM COATED ORAL DAILY
Qty: 90 TABLET | Refills: 11 | Status: SHIPPED | OUTPATIENT
Start: 2025-04-17

## 2025-04-17 RX ORDER — METOPROLOL SUCCINATE 25 MG/1
25 TABLET, EXTENDED RELEASE ORAL DAILY
Qty: 90 TABLET | Refills: 3 | Status: SHIPPED | OUTPATIENT
Start: 2025-04-17

## 2025-04-17 RX ORDER — OMEPRAZOLE 20 MG/1
20 CAPSULE, DELAYED RELEASE ORAL DAILY
Qty: 90 CAPSULE | Refills: 3 | Status: SHIPPED | OUTPATIENT
Start: 2025-04-17

## 2025-04-17 RX ORDER — OXYCODONE AND ACETAMINOPHEN 5; 325 MG/1; MG/1
1 TABLET ORAL EVERY 12 HOURS PRN
Qty: 60 TABLET | Refills: 0 | Status: SHIPPED | OUTPATIENT
Start: 2025-04-17

## 2025-04-17 NOTE — PROGRESS NOTES
Subjective     Patient ID: Isma Martin is a 74 y.o. male.    Chief Complaint: Annual Exam             History of Present Illness    CHIEF COMPLAINT:  Isma presents today for back pain.    BACK PAIN AND PHYSICAL FUNCTION:  He reports chronic back pain affecting his ambulation, requiring him to shift weight from side to side while walking. He attempts to walk for one hour daily but must significantly reduce pace due to pain. He previously attended physical therapy with minimal improvement but discontinued due to financial constraints. Prior treatment included oxygen unit and electrical stimulation for disc issues. He requires assistance from his daughter for personal care, including toenail care, due to inability to bend.    NEUROLOGICAL SYMPTOMS:  He experiences numbness, coldness, and pain in fingers, predominantly occurring at night.    GI HISTORY:  He experienced a two-week episode of uncontrolled diarrhea approximately one month ago. He was treated at Urgent Care with antibiotics for suspected infection and took Imodium for symptom relief. His GI condition has since improved with these interventions.    SOCIAL HISTORY:  He is a recent  who reports experiencing emotional moments, particularly when alone in the house. While acknowledging difficult times related to the loss of his wife, he states he is managing overall.    CURRENT MEDICATIONS:  He takes Losartan, Metoprolol, Omeprazole, and Atorvastatin, all of which need refills.      ROS:  Constitutional: negative activity change, negative unexpected weight change  HENT: negative trouble swallowing  Eyes: negative discharge, negative visual disturbance  Respiratory: negative chest tightness, negative wheezing, negative shortness of breath  Cardiovascular: negative chest pain, negative palpitations  Gastrointestinal: negative blood in stool, negative constipation, negative diarrhea, negative vomiting  Endocrine: negative polydipsia, negative  polyuria  Genitourinary: negative difficulty urinating, negative dysuria, negative hematuria  Musculoskeletal: negative arthralgias, negative joint swelling, negative neck pain, +back pain, +pain with movement, +difficulty walking  Neurological: negative weakness, negative headaches, +numbness  Psychiatric/Behavioral: negative confusion, negative dysphoric mood         HPI  Review of Systems     Objective     Physical Exam  Vitals reviewed.   Constitutional:       General: He is not in acute distress.     Appearance: Normal appearance. He is well-developed. He is not ill-appearing, toxic-appearing or diaphoretic.      Comments: Well appearing, breathing comfortably, speaking full sentences, no coughing during encounter   HENT:      Head: Normocephalic and atraumatic.      Right Ear: There is no impacted cerumen.      Left Ear: There is no impacted cerumen.   Eyes:      General: No scleral icterus.  Neck:      Thyroid: No thyromegaly.   Cardiovascular:      Rate and Rhythm: Normal rate and regular rhythm.      Heart sounds: Normal heart sounds. No murmur heard.     No friction rub. No gallop.   Pulmonary:      Effort: Pulmonary effort is normal. No respiratory distress.      Breath sounds: Normal breath sounds. No wheezing or rales.   Abdominal:      General: Bowel sounds are normal. There is no distension.      Palpations: Abdomen is soft. There is no mass.      Tenderness: There is no abdominal tenderness. There is no guarding or rebound.   Musculoskeletal:         General: Normal range of motion.      Cervical back: Normal range of motion.      Comments: Pain with SLR on the R, but pain does not shoot down entire leg.  Hip rom does not cause pain.  No midline spine tenderness to deep palpation   Lymphadenopathy:      Cervical: No cervical adenopathy.   Skin:     Findings: No lesion.   Neurological:      Mental Status: He is alert and oriented to person, place, and time.   Psychiatric:         Mood and Affect: Mood  normal.         Behavior: Behavior normal.         Thought Content: Thought content normal.         Judgment: Judgment normal.            Assessment and Plan     1. Spinal stenosis of lumbar region without neurogenic claudication  -     oxyCODONE-acetaminophen (PERCOCET) 5-325 mg per tablet; Take 1 tablet by mouth every 12 (twelve) hours as needed for Pain.  Dispense: 60 tablet; Refill: 0    2. Essential hypertension  -     metoprolol succinate (TOPROL-XL) 25 MG 24 hr tablet; Take 1 tablet (25 mg total) by mouth once daily.  Dispense: 90 tablet; Refill: 3  -     losartan-hydrochlorothiazide 50-12.5 mg (HYZAAR) 50-12.5 mg per tablet; Take 1 tablet by mouth once daily.  Dispense: 90 tablet; Refill: 3    3. Gastroesophageal reflux disease  -     omeprazole (PRILOSEC) 20 MG capsule; Take 1 capsule (20 mg total) by mouth once daily.  Dispense: 90 capsule; Refill: 3    4. Type 2 diabetes mellitus without complication, without long-term current use of insulin  -     atorvastatin (LIPITOR) 40 MG tablet; Take 1 tablet (40 mg total) by mouth once daily.  Dispense: 90 tablet; Refill: 11  -     Hemoglobin A1C; Future; Expected date: 2025  -     Basic Metabolic Panel; Future; Expected date: 2025        Assessment & Plan    CHRONIC BACK PAIN:  - Assessed current pain management needs and restarted oxycodone at previous dose with option to increase if needed.  - Prescribed oxycodone/acetaminophen 5 m tablet up to twice daily.  - Advised the patient may increase to 2 tablets if pain is not adequately controlled.  - Instructed maximum dosage of 2 tablets twice daily.  - Instructed the patient to contact the office if needing a refill on oxycodone/acetaminophen before next visit.  - Performed physical exam, noting pain when elevating the patient's leg.  - Reviewed previous MRI results showing spinal stenosis.  - Reviewed history of back pain and previous treatments, including physical therapy and chiropractic care.  -  Evaluated need for spine specialist referral but respected the patient's preference to avoid surgery.  - Suggested seeing a spine specialist again, but the patient declined.  - Prescribed oxycodone 5 mg for pain management, with instructions to take up to twice daily.    DIFFICULTY IN WALKING:  - Noted the patient's report of difficulty walking due to back pain, having to slow down or shift pain.  - Inquired about walking limitations.  - Ashby to continue daily walking routine, adjusting pace as needed to manage pain.    PARESTHESIA:  - Considered report of numbness and cold sensation in fingers, potentially related to spinal arthritis/stenosis.      HYPERTENSION:  - Continued Metoprolol.  - Continued Losartan.  - Checked the patient's blood pressure and noted it was within normal range.  - Refilled blood pressure medications: Losartan and Metoprolol.    HYPERLIPIDEMIA:  - Continued Atorvastatin: Can be taken in the morning.  - Noted that cholesterol was checked in October and was within acceptable range.  - Acknowledged patient's report that prostate specialist mentioned slightly elevated cholesterol.  - Refilled atorvastatin (Lipitor) for cholesterol management.    GASTROESOPHAGEAL REFLUX DISEASE:  - Continued Omeprazole.  - Evaluated if food consumption is occurring without difficulty.  - Refilled Omeprazole for acid reflux management.    BEREAVEMENT:  - Noted the patient's report of experiencing moments of grief related to spouse's passing.  - Inquired about family situation and the patient's emotional state.    NEED FOR ASSISTANCE WITH PERSONAL CARE:  - Noted the patient's report of needing assistance with toenail care due to inability to bend.    FOLLOW-UP:  - Scheduled follow up in 6 months, or sooner if pain worsens.                    Health Maintenance         Date Due Completion Date    Foot Exam 08/23/2023 8/23/2022    Override on 8/23/2022: Done    Override on 7/13/2021: Done    Override on 7/28/2016:  Done    Override on 8/10/2015: Done    Diabetes Urine Screening 04/08/2025 4/8/2024    Hemoglobin A1c 04/08/2025 10/8/2024    Diabetic Eye Exam 08/08/2025 8/8/2024    Override on 8/10/2015: Done    Lipid Panel 10/08/2025 10/8/2024    High Dose Statin 04/17/2026 4/17/2025    TETANUS VACCINE 05/03/2026 5/3/2016    Colorectal Cancer Screening 07/08/2028 7/8/2021    Override on 5/1/2008: Done          Follow up in about 6 months (around 10/17/2025).    Visit today included increased complexity associated with the care of the episodic problem  addressed and managing the longitudinal care of the patient due to the serious and/or complex managed problem(s) .    Future Appointments   Date Time Provider Department Center   9/22/2025 10:30 AM Jakob Salomon DNP [facility] [place]   10/27/2025 11:00 AM Anthony Tay MD [facility]         This note was generated with the assistance of ambient listening technology. Verbal consent was obtained by the patient and accompanying visitor(s) for the recording of patient appointment to facilitate this note. I attest to having reviewed and edited the generated note for accuracy, though some syntax or spelling errors may persist. Please contact the author of this note for any clarification.

## 2025-04-30 DIAGNOSIS — E11.9 TYPE 2 DIABETES MELLITUS WITHOUT COMPLICATION: ICD-10-CM

## 2025-05-16 DIAGNOSIS — M48.061 SPINAL STENOSIS OF LUMBAR REGION WITHOUT NEUROGENIC CLAUDICATION: ICD-10-CM

## 2025-05-16 RX ORDER — OXYCODONE AND ACETAMINOPHEN 5; 325 MG/1; MG/1
1 TABLET ORAL EVERY 12 HOURS PRN
Qty: 60 TABLET | Refills: 0 | Status: SHIPPED | OUTPATIENT
Start: 2025-05-16

## 2025-05-16 NOTE — TELEPHONE ENCOUNTER
----- Message from Aleja sent at 5/16/2025  8:56 AM CDT -----  Contact: [phone]  Requesting an RX refill or new RX.Is this a refill or new RX: refillRX name and strength (copy/paste from chart):  oxyCODONE-acetaminophen (PERCOCET) 5-325 mg per tablet Is this a 30 day or 90 day RX: 60Pharmacy name and phone # (copy/paste from chart):  [id number] - [place] [address] AT [facility] [id number] [address]Tophx: [phone] Fax: [phone]

## 2025-05-16 NOTE — TELEPHONE ENCOUNTER
No care due was identified.  [facility] Embedded Care Due Messages. Reference number: [id number].   5/16/2025 9:01:28 AM CDT

## 2025-05-21 DIAGNOSIS — D35.2 PROLACTINOMA: ICD-10-CM

## 2025-05-21 DIAGNOSIS — E11.9 TYPE 2 DIABETES MELLITUS WITHOUT COMPLICATION, WITHOUT LONG-TERM CURRENT USE OF INSULIN: ICD-10-CM

## 2025-05-21 DIAGNOSIS — K21.9 GASTROESOPHAGEAL REFLUX DISEASE: ICD-10-CM

## 2025-05-21 DIAGNOSIS — I10 ESSENTIAL HYPERTENSION: ICD-10-CM

## 2025-05-21 RX ORDER — OMEPRAZOLE 20 MG/1
CAPSULE, DELAYED RELEASE ORAL
Qty: 90 CAPSULE | Refills: 0 | OUTPATIENT
Start: 2025-05-21

## 2025-05-21 RX ORDER — ATORVASTATIN CALCIUM 40 MG/1
TABLET, FILM COATED ORAL
Qty: 90 TABLET | Refills: 0 | OUTPATIENT
Start: 2025-05-21

## 2025-05-21 RX ORDER — LANCETS 28 GAUGE
EACH MISCELLANEOUS
Qty: 1 EACH | Refills: 0 | OUTPATIENT
Start: 2025-05-21

## 2025-05-21 RX ORDER — BLOOD-GLUCOSE METER
EACH MISCELLANEOUS
Qty: 1 EACH | Refills: 0 | OUTPATIENT
Start: 2025-05-21

## 2025-05-21 RX ORDER — LANCETS 33 GAUGE
EACH MISCELLANEOUS
Qty: 200 EACH | Refills: 0 | OUTPATIENT
Start: 2025-05-21

## 2025-05-21 RX ORDER — METOPROLOL SUCCINATE 25 MG/1
TABLET, EXTENDED RELEASE ORAL
Qty: 90 TABLET | Refills: 0 | OUTPATIENT
Start: 2025-05-21

## 2025-05-21 RX ORDER — ISOPROPYL ALCOHOL 70 ML/100ML
SWAB TOPICAL
Refills: 0 | OUTPATIENT
Start: 2025-05-21

## 2025-05-21 RX ORDER — LOSARTAN POTASSIUM AND HYDROCHLOROTHIAZIDE 12.5; 5 MG/1; MG/1
TABLET ORAL
Qty: 90 TABLET | Refills: 0 | OUTPATIENT
Start: 2025-05-21

## 2025-05-21 NOTE — TELEPHONE ENCOUNTER
No care due was identified.  [facility] Embedded Care Due Messages. Reference number: [id number].   5/21/2025 12:02:05 PM CDT

## 2025-05-21 NOTE — TELEPHONE ENCOUNTER
Refill Decision Note   Isma Martin  is requesting a refill authorization.  Brief Assessment and Rationale for Refill:  Quick Discontinue     Medication Therapy Plan:  No sig Pharmacy is requesting new scripts for the following medications without required information, (sig/ frequency/qty/etc)      Medication Reconciliation Completed: No     Comments: Pharmacies have been requesting medications for patients without required information, (sig, frequency, qty, etc.). In addition, requests are sent for medication(s) pt. are currently not taking, and medications patients have never taken.    We have spoken to the pharmacies about these request types and advised their teams previously that we are unable to assess these New Script requests and require all details for these requests. This is a known issue and has been reported.     Note composed:12:48 PM 05/21/2025

## 2025-05-23 RX ORDER — CABERGOLINE 0.5 MG/1
0.25 TABLET ORAL
Qty: 12 TABLET | Refills: 3 | Status: SHIPPED | OUTPATIENT
Start: 2025-05-26 | End: 2025-08-24

## 2025-06-16 DIAGNOSIS — M48.061 SPINAL STENOSIS OF LUMBAR REGION WITHOUT NEUROGENIC CLAUDICATION: ICD-10-CM

## 2025-06-16 RX ORDER — OXYCODONE AND ACETAMINOPHEN 5; 325 MG/1; MG/1
1 TABLET ORAL EVERY 12 HOURS PRN
Qty: 60 TABLET | Refills: 0 | Status: SHIPPED | OUTPATIENT
Start: 2025-06-16

## 2025-06-16 NOTE — TELEPHONE ENCOUNTER
No care due was identified.  [facility] Embedded Care Due Messages. Reference number: [id number].   6/16/2025 9:27:33 AM CDT

## 2025-06-16 NOTE — TELEPHONE ENCOUNTER
Copied from CRM #[id number]. Topic: Medications - Medication Refill  >> Jun 16, 2025  8:57 AM Kelsey wrote:  Requesting an RX refill or new RX.    Is this a refill or new RX: new    RX name and strength (copy/paste from chart):  oxyCODONE-acetaminophen (PERCOCET) 5-325 mg per tablet 60 tablet 0 5/16/2025 - No    Is this a 30 day or 90 day RX: 60    Pharmacy name and phone # (copy/paste from chart):    [facility] #[id number] - [place], [place] - [address] AT [facility] [address]  [address]  [facility] [address]  Phone: [phone] Fax: [phone]    Who called and call back number: Pt at [phone] (M)     The doctors have asked that we provide their patients with the following 2 reminders -- prescription refills can take up to 72 hours, and a friendly reminder that in the future you can use your [facility] account to request refills: yes    Comments: Pt would like a call once completed to pharm

## 2025-06-17 ENCOUNTER — TELEPHONE (OUTPATIENT)
Dept: PHARMACY | Facility: CLINIC | Age: 75
End: 2025-06-17
Payer: MEDICARE

## 2025-06-17 NOTE — TELEPHONE ENCOUNTER
[facility]/Population Health Chart Review & Patient Outreach Details For Medication Adherence Project    Reason for Outreach Encounter: 3rd Party payor non-compliance report ([employer], [employer], C, etc)  2.  Patient Outreach Method: Reviewed patient chart   3.   Medication in question:    Hypertension Medications              losartan-hydrochlorothiazide 50-12.5 mg (HYZAAR) 50-12.5 mg per tablet Take 1 tablet by mouth once daily.    metoprolol succinate (TOPROL-XL) 25 MG 24 hr tablet Take 1 tablet (25 mg total) by mouth once daily.                 Losartan/hctz  last filled  4/17 for 90 day supply    4.  Reviewed and or Updates Made To: Patient Chart  5. Outreach Outcomes and/or actions taken: Patient filled medication and is on track to be adherent  Additional Notes:

## 2025-07-16 DIAGNOSIS — M48.061 SPINAL STENOSIS OF LUMBAR REGION WITHOUT NEUROGENIC CLAUDICATION: ICD-10-CM

## 2025-07-16 RX ORDER — OXYCODONE AND ACETAMINOPHEN 5; 325 MG/1; MG/1
1 TABLET ORAL EVERY 12 HOURS PRN
Qty: 60 TABLET | Refills: 0 | Status: SHIPPED | OUTPATIENT
Start: 2025-07-16

## 2025-07-16 NOTE — TELEPHONE ENCOUNTER
Copied from CRM #[id number]. Topic: Medications - Medication Refill  >> Jul 16, 2025  8:26 AM Padmaja wrote:  Requesting an RX refill or new RX.    Is this a refill or new RX: Refill    RX name and strength (copy/paste from chart):  oxyCODONE-acetaminophen (PERCOCET) 5-325 mg per tablet    Is this a 30 day or 90 day RX: 60    Pharmacy name and phone # (copy/paste from chart): "[facility] #[id number] - [place], [place] - [address] [facility] [address]  [address] [facility] [address]  Phone: [phone] Fax: [phone]  Hours: Not open 24 hours         Who called and call back number:Pt callback     The doctors have asked that we provide their patients with the following 2 reminders -- prescription refills can take up to 72 hours, and a friendly reminder that in the future you can use your [facility] account to request refills: yes

## 2025-07-16 NOTE — TELEPHONE ENCOUNTER
Care Due:                  Date            Visit Type   Department     Provider  --------------------------------------------------------------------------------                                EP -                              PRIMARY      [facility]  Last Visit: 04-      CARE ([facility])   MEDICINE       Anthony Tay                              EP -                              PRIMARY      [facility]  Next Visit: 10-      CARE ([facility])   MEDICINE       Anthony Tay                                                            Last  Test          Frequency    Reason                     Performed    Due Date  --------------------------------------------------------------------------------    CMP.........  12 months..  atorvastatin.............  10-   10-    Lipid Panel.  12 months..  atorvastatin.............  10-   10-    [facility] Embedded Care Due Messages. Reference number: [id number].   7/16/2025 8:41:14 AM CDT

## 2025-08-15 DIAGNOSIS — M48.061 SPINAL STENOSIS OF LUMBAR REGION WITHOUT NEUROGENIC CLAUDICATION: ICD-10-CM

## 2025-08-15 RX ORDER — OXYCODONE AND ACETAMINOPHEN 5; 325 MG/1; MG/1
1 TABLET ORAL EVERY 12 HOURS PRN
Qty: 60 TABLET | Refills: 0 | Status: SHIPPED | OUTPATIENT
Start: 2025-08-15

## (undated) DEVICE — SEE MEDLINE ITEM 157194

## (undated) DEVICE — SET DECANTER MEDICHOICE

## (undated) DEVICE — DRESSING TRANS 4X4 TEGADERM

## (undated) DEVICE — SEE MEDLINE ITEM 156905

## (undated) DEVICE — DRESSING AQUACEL FOAM 4 X 12

## (undated) DEVICE — BLADE SCALP OPHTL BEVEL STR

## (undated) DEVICE — SEE MEDLINE ITEM 157117

## (undated) DEVICE — CLIP MED TICALL

## (undated) DEVICE — SUT PROLENE 5-0 24 C-1 BL

## (undated) DEVICE — GOWN SURGICAL X-LARGE

## (undated) DEVICE — MARKER SKIN STND TIP BLUE BARR

## (undated) DEVICE — DRAPE STERI-DRAPE 1000 17X11IN

## (undated) DEVICE — SEE MEDLINE ITEM 157131

## (undated) DEVICE — DRESSING ABSRBNT ISLAND 3.6X8

## (undated) DEVICE — DRAPE C-ARMOR EQUIPMENT COVER

## (undated) DEVICE — DRAPE STERI INCISE 17X23IN

## (undated) DEVICE — HEMOSTAT SURGICEL 4X8IN

## (undated) DEVICE — NDL SPINAL 18GX3.5 SPINOCAN

## (undated) DEVICE — TUBE FRAZIER 5MM 2FT SOFT TIP

## (undated) DEVICE — SUT PROLENE 6-0 C-1 30IN BL

## (undated) DEVICE — SEE MEDLINE ITEM 156911

## (undated) DEVICE — DIFFUSER

## (undated) DEVICE — SUT VICRYL PLUS 2-0 CT1 18

## (undated) DEVICE — DRESSING AQUACEL SACRAL 9 X 9

## (undated) DEVICE — NDL 22GA X1 1/2 REG BEVEL

## (undated) DEVICE — DRESSING TELFA STRL 4X3 LF

## (undated) DEVICE — DRAPE STERI INSTRUMENT 1018

## (undated) DEVICE — DRESSING ADHESIVE ISLAND 3 X 6

## (undated) DEVICE — TOWEL OR XRAY WHITE 17X26IN

## (undated) DEVICE — SEE MEDLINE ITEM 152622

## (undated) DEVICE — SEE MEDLINE ITEM 153688

## (undated) DEVICE — SUT PROLENE 6-0 BV-1 30IN

## (undated) DEVICE — ELECTRODE BLD 1 INCH TEFLON

## (undated) DEVICE — DRESSING AQUACEL FOAM 5 X 5

## (undated) DEVICE — SEE MEDLINE ITEM 157150

## (undated) DEVICE — CORD BIPOLAR 12 FOOT

## (undated) DEVICE — ELECTRODE REM PLYHSV RETURN 9

## (undated) DEVICE — KIT SURGIFLO HEMOSTATIC MATRIX

## (undated) DEVICE — CLOSURE SKIN 1X5 STERI-STRIP

## (undated) DEVICE — PACK UNIVERSAL SPLIT II

## (undated) DEVICE — APPLICATOR CHLORAPREP ORN 26ML

## (undated) DEVICE — DRAPE ABDOMINAL TIBURON 14X11

## (undated) DEVICE — BLADE MILL BONE MEDIUM

## (undated) DEVICE — BURR RND FLUT SFT TOUCH 2.0MM

## (undated) DEVICE — SYR 1CC TB SG 27GX1/2

## (undated) DEVICE — SEALER AQUAMANTYS 2.3 BIPOLAR

## (undated) DEVICE — STAPLER SKIN ROTATING HEAD

## (undated) DEVICE — TRAY FOLEY 16FR INFECTION CONT

## (undated) DEVICE — SPONGE GAUZE 16PLY 4X4

## (undated) DEVICE — CLOSURE SKIN STERI STRIP 1/4X3

## (undated) DEVICE — ADHESIVE MASTISOL VIAL 48/BX

## (undated) DEVICE — CARTRIDGE OIL

## (undated) DEVICE — DRAIN PENROSE XRAY 12 X 1/4 ST

## (undated) DEVICE — SYR 30CC LUER LOCK

## (undated) DEVICE — SUT SILK 3-0 SH 18IN BLACK

## (undated) DEVICE — NDL 18GA X1 1/2 REG BEVEL

## (undated) DEVICE — SPONGE LAP 4X18 PREWASHED

## (undated) DEVICE — SUT 2/0 30IN SILK BLK BRAI

## (undated) DEVICE — SUT MCRYL PLUS 4-0 PS2 27IN

## (undated) DEVICE — LOOP VESSEL BLUE MAXI

## (undated) DEVICE — BLADE ELECTRO EDGE INSULATED

## (undated) DEVICE — SPONGE LAP 18X18 PREWASHED

## (undated) DEVICE — TAP BONE MOUNTAINEER 4MM
Type: IMPLANTABLE DEVICE | Site: SPINE CERVICAL | Status: NON-FUNCTIONAL
Removed: 2019-10-29

## (undated) DEVICE — SUT VICRYL PLUS 3-0 SH 18IN

## (undated) DEVICE — DRESSING MEPILEX BORDER 4 X 4

## (undated) DEVICE — BUR BONE CUT MICRO TPS 3X3.8MM

## (undated) DEVICE — SUT D SPECIAL VICRYL 2-0

## (undated) DEVICE — SYR ONLY LUER LOCK 20CC

## (undated) DEVICE — SEE MEDLINE ITEM 146417

## (undated) DEVICE — ADHESIVE DERMABOND ADVANCED

## (undated) DEVICE — SUT 7/0 18IN PROLENE BL MO

## (undated) DEVICE — SUT ETHIBOND 0 CR/CT-1 8-18

## (undated) DEVICE — DRESSING AQUACEL FOAM 3 X 3